# Patient Record
Sex: MALE | Race: WHITE | Employment: UNEMPLOYED | URBAN - METROPOLITAN AREA
[De-identification: names, ages, dates, MRNs, and addresses within clinical notes are randomized per-mention and may not be internally consistent; named-entity substitution may affect disease eponyms.]

---

## 2019-06-17 ENCOUNTER — HOSPITAL ENCOUNTER (INPATIENT)
Age: 34
LOS: 8 days | Discharge: ANOTHER HOSPITAL | DRG: 558 | End: 2019-06-25
Attending: PSYCHIATRY & NEUROLOGY | Admitting: INTERNAL MEDICINE
Payer: MEDICARE

## 2019-06-17 PROCEDURE — 74011250637 HC RX REV CODE- 250/637: Performed by: PSYCHIATRY & NEUROLOGY

## 2019-06-17 PROCEDURE — 65220000003 HC RM SEMIPRIVATE PSYCH

## 2019-06-17 RX ORDER — BENZTROPINE MESYLATE 1 MG/ML
2 INJECTION INTRAMUSCULAR; INTRAVENOUS
Status: DISCONTINUED | OUTPATIENT
Start: 2019-06-17 | End: 2019-06-25 | Stop reason: HOSPADM

## 2019-06-17 RX ORDER — ACETAMINOPHEN 325 MG/1
650 TABLET ORAL
Status: DISCONTINUED | OUTPATIENT
Start: 2019-06-17 | End: 2019-06-25 | Stop reason: HOSPADM

## 2019-06-17 RX ORDER — IBUPROFEN 200 MG
1 TABLET ORAL
Status: DISCONTINUED | OUTPATIENT
Start: 2019-06-17 | End: 2019-06-25 | Stop reason: HOSPADM

## 2019-06-17 RX ORDER — BENZTROPINE MESYLATE 2 MG/1
2 TABLET ORAL
Status: DISCONTINUED | OUTPATIENT
Start: 2019-06-17 | End: 2019-06-25 | Stop reason: HOSPADM

## 2019-06-17 RX ORDER — HYDROXYZINE 25 MG/1
50 TABLET, FILM COATED ORAL
Status: DISCONTINUED | OUTPATIENT
Start: 2019-06-17 | End: 2019-06-25 | Stop reason: HOSPADM

## 2019-06-17 RX ORDER — OLANZAPINE 5 MG/1
5 TABLET ORAL
Status: DISCONTINUED | OUTPATIENT
Start: 2019-06-17 | End: 2019-06-19

## 2019-06-17 RX ORDER — ADHESIVE BANDAGE
30 BANDAGE TOPICAL DAILY PRN
Status: DISCONTINUED | OUTPATIENT
Start: 2019-06-17 | End: 2019-06-25 | Stop reason: HOSPADM

## 2019-06-17 RX ORDER — TRAZODONE HYDROCHLORIDE 50 MG/1
50 TABLET ORAL
Status: DISCONTINUED | OUTPATIENT
Start: 2019-06-17 | End: 2019-06-18

## 2019-06-17 RX ORDER — IBUPROFEN 400 MG/1
400 TABLET ORAL
Status: DISCONTINUED | OUTPATIENT
Start: 2019-06-17 | End: 2019-06-25 | Stop reason: HOSPADM

## 2019-06-17 RX ADMIN — TRAZODONE HYDROCHLORIDE 50 MG: 50 TABLET ORAL at 23:51

## 2019-06-18 PROBLEM — F25.9 SCHIZOAFFECTIVE DISORDER (HCC): Status: ACTIVE | Noted: 2019-06-18

## 2019-06-18 LAB
CHOLEST SERPL-MCNC: 116 MG/DL
GLUCOSE P FAST SERPL-MCNC: 102 MG/DL (ref 65–100)
HDLC SERPL-MCNC: 40 MG/DL
HDLC SERPL: 2.9 {RATIO} (ref 0–5)
LDLC SERPL CALC-MCNC: 68.6 MG/DL (ref 0–100)
LIPID PROFILE,FLP: NORMAL
TRIGL SERPL-MCNC: 37 MG/DL (ref ?–150)
TSH SERPL DL<=0.05 MIU/L-ACNC: 3.9 UIU/ML (ref 0.36–3.74)
VLDLC SERPL CALC-MCNC: 7.4 MG/DL

## 2019-06-18 PROCEDURE — 80061 LIPID PANEL: CPT

## 2019-06-18 PROCEDURE — 82947 ASSAY GLUCOSE BLOOD QUANT: CPT

## 2019-06-18 PROCEDURE — 36415 COLL VENOUS BLD VENIPUNCTURE: CPT

## 2019-06-18 PROCEDURE — 74011250637 HC RX REV CODE- 250/637: Performed by: PSYCHIATRY & NEUROLOGY

## 2019-06-18 PROCEDURE — 84443 ASSAY THYROID STIM HORMONE: CPT

## 2019-06-18 PROCEDURE — 65220000003 HC RM SEMIPRIVATE PSYCH

## 2019-06-18 RX ORDER — TRAZODONE HYDROCHLORIDE 150 MG/1
300 TABLET ORAL
COMMUNITY
End: 2019-07-29

## 2019-06-18 RX ORDER — TRAZODONE HYDROCHLORIDE 100 MG/1
300 TABLET ORAL
Status: DISCONTINUED | OUTPATIENT
Start: 2019-06-18 | End: 2019-06-18

## 2019-06-18 RX ORDER — PALIPERIDONE 3 MG/1
3 TABLET, EXTENDED RELEASE ORAL DAILY
Status: DISCONTINUED | OUTPATIENT
Start: 2019-06-18 | End: 2019-06-18

## 2019-06-18 RX ORDER — PALIPERIDONE 3 MG/1
6 TABLET, EXTENDED RELEASE ORAL DAILY
Status: DISCONTINUED | OUTPATIENT
Start: 2019-06-18 | End: 2019-06-20

## 2019-06-18 RX ORDER — ZOLPIDEM TARTRATE 5 MG/1
5 TABLET ORAL
Status: DISCONTINUED | OUTPATIENT
Start: 2019-06-18 | End: 2019-06-24

## 2019-06-18 RX ORDER — TRAZODONE HYDROCHLORIDE 100 MG/1
100 TABLET ORAL
Status: DISCONTINUED | OUTPATIENT
Start: 2019-06-18 | End: 2019-06-25 | Stop reason: HOSPADM

## 2019-06-18 RX ADMIN — HYDROXYZINE HYDROCHLORIDE 50 MG: 25 TABLET, FILM COATED ORAL at 10:23

## 2019-06-18 RX ADMIN — OLANZAPINE 5 MG: 5 TABLET, FILM COATED ORAL at 18:21

## 2019-06-18 RX ADMIN — HYDROXYZINE HYDROCHLORIDE 50 MG: 25 TABLET, FILM COATED ORAL at 23:02

## 2019-06-18 RX ADMIN — TRAZODONE HYDROCHLORIDE 100 MG: 100 TABLET ORAL at 21:24

## 2019-06-18 RX ADMIN — OLANZAPINE 5 MG: 5 TABLET, FILM COATED ORAL at 23:02

## 2019-06-18 RX ADMIN — HYDROXYZINE HYDROCHLORIDE 50 MG: 25 TABLET, FILM COATED ORAL at 18:21

## 2019-06-18 RX ADMIN — PALIPERIDONE 6 MG: 3 TABLET, EXTENDED RELEASE ORAL at 11:29

## 2019-06-18 RX ADMIN — OLANZAPINE 5 MG: 5 TABLET, FILM COATED ORAL at 11:09

## 2019-06-18 NOTE — H&P
INITIAL PSYCHIATRIC EVALUATION            IDENTIFICATION:    Patient Name  Avery Leyva   Date of Birth 1985   [facility] [id number]   Medical Record Number  [id number]      Age  29 y.o. PCP None   Admit date:  6/17/2019    Room Number  308/02  @ [facility]   Date of Service  6/18/2019            HISTORY         REASON FOR HOSPITALIZATION:  CC: \"psyshosis\". Pt admitted under a temporary prison order (TDO) for severe psychosis proving to be an imminent danger to self and others and an inability to care for self. HISTORY OF PRESENT ILLNESS:    The patient, Avery Leyva, is a 29 y.o. WHITE [place]  male with a past psychiatric history significant for schizoaffective disorder, who presents at this time with complaints of (and/or evidence of) the following emotional symptoms: psychotic behavior and paranoid behavior. Additional symptomatology include VH. The above symptoms have been present for 48+ hours. These symptoms are of moderate to high severity. These symptoms are constant in nature. The patient's condition has been precipitated by psychosocial stressors. Patient's condition made worse by treatment noncompliance. UDS: negative; BAL=0. The patient is a poor historian. The patient corroborates the above narrative. The patient contracts for safety on the unit and gives consent for the team to contact collateral. The patient is amenable to initiating treatment while on the unit. Patient appears to be actively hallucinating for the duration of the interview, staring and gesturing and speaking largely incoherently. Patient offered PO PRN antipsychotic immediately following initial evaluation. ALLERGIES: No Known Allergies   MEDICATIONS PRIOR TO ADMISSION:   Medications Prior to Admission   Medication Sig    traZODone (DESYREL) 150 mg tablet Take 300 mg by mouth nightly. Indications: Insomnia      PAST MEDICAL HISTORY:   No past medical history on file. No past surgical history on file. SOCIAL HISTORY:   Social History     Socioeconomic History    Marital status: SINGLE     Spouse name: Not on file    Number of children: Not on file    Years of education: Not on file    Highest education level: Not on file   Occupational History    Not on file   Social Needs    Financial resource strain: Not on file    Food insecurity:     Worry: Not on file     Inability: Not on file    Transportation needs:     Medical: Not on file     Non-medical: Not on file   Tobacco Use    Smoking status: Not on file   Substance and Sexual Activity    Alcohol use: Not on file    Drug use: Not on file    Sexual activity: Not on file   Lifestyle    Physical activity:     Days per week: Not on file     Minutes per session: Not on file    Stress: Not on file   Relationships    Social connections:     Talks on phone: Not on file     Gets together: Not on file     Attends Caodaism service: Not on file     Active member of club or organization: Not on file     Attends meetings of clubs or organizations: Not on file     Relationship status: Not on file    Intimate partner violence:     Fear of current or ex partner: Not on file     Emotionally abused: Not on file     Physically abused: Not on file     Forced sexual activity: Not on file   Other Topics Concern    Not on file   Social History Narrative    Not on file      FAMILY HISTORY: History reviewed, pertinent family history as below:   No family history on file. REVIEW OF SYSTEMS:   Pertinent items are noted in the History of Present Illness. All other Systems reviewed and are considered negative.            MENTAL STATUS EXAM & VITALS     MENTAL STATUS EXAM (MSE):    MSE FINDINGS ARE WITHIN NORMAL LIMITS (WNL) UNLESS OTHERWISE STATED BELOW. ( ALL OF THE BELOW CATEGORIES OF THE MSE HAVE BEEN REVIEWED (reviewed 6/18/2019) AND UPDATED AS DEEMED APPROPRIATE )  General Presentation age appropriate, guarded and vague   Orientation not oriented to situation   Vital Signs  See below (reviewed 6/18/2019); Vital Signs (BP, Pulse, & Temp) are within normal limits if not listed below.    Gait and Station Stable/steady, no ataxia   Musculoskeletal System No extrapyramidal symptoms (EPS); no abnormal muscular movements or Tardive Dyskinesia (TD); muscle strength and tone are within normal limits   Language No aphasia or dysarthria   Speech:  hypoverbal and normal volume   Thought Processes ilogical; normal rate of thoughts; poor abstract reasoning/computation   Thought Associations blocked    Thought Content visual hallucinations and internally preoccupied   Suicidal Ideations none   Homicidal Ideations none   Mood:  anxious    Affect:  constricted and odd demeanor   Memory recent  impaired   Memory remote:  fair   Concentration/Attention:  distractable   Fund of Knowledge average   Insight:  limited   Reliability poor   Judgment:  poor          VITALS:     Patient Vitals for the past 24 hrs:   Temp Pulse Resp BP SpO2   06/18/19 0718 98.4 °F (36.9 °C) (!) 102 18 (!) 145/99 98 %   06/17/19 2329 98.2 °F (36.8 °C) 98 18 (!) 129/96 99 %     Wt Readings from Last 3 Encounters:   06/18/19 113.4 kg (250 lb)     Temp Readings from Last 3 Encounters:   06/18/19 98.4 °F (36.9 °C)     BP Readings from Last 3 Encounters:   06/18/19 (!) 145/99     Pulse Readings from Last 3 Encounters:   06/18/19 (!) 102            DATA     LABORATORY DATA:  Labs Reviewed   TSH 3RD GENERATION - Abnormal; Notable for the following components:       Result Value    TSH 3.90 (*)     All other components within normal limits   GLUCOSE, FASTING - Abnormal; Notable for the following components:    Glucose 102 (*)     All other components within normal limits   LIPID PANEL     Admission on 06/17/2019   Component Date Value Ref Range Status    TSH 06/18/2019 3.90* 0.36 - 3.74 uIU/mL Final    LIPID PROFILE 06/18/2019        Final    Cholesterol, total 06/18/2019 116  <200 MG/DL Final    Triglyceride 06/18/2019 37  <150 MG/DL Final    HDL Cholesterol 06/18/2019 40  MG/DL Final    LDL, calculated 06/18/2019 68.6  0 - 100 MG/DL Final    VLDL, calculated 06/18/2019 7.4  MG/DL Final    CHOL/HDL Ratio 06/18/2019 2.9  0.0 - 5.0   Final    Glucose 06/18/2019 102* 65 - 100 MG/DL Final        RADIOLOGY REPORTS:  No results found for this or any previous visit. No results found. MEDICATIONS       ALL MEDICATIONS  Current Facility-Administered Medications   Medication Dose Route Frequency    paliperidone (INVEGA) SR tablet 6 mg  6 mg Oral DAILY    ziprasidone (GEODON) 20 mg in sterile water (preservative free) 1 mL injection  20 mg IntraMUSCular BID PRN    OLANZapine (ZyPREXA) tablet 5 mg  5 mg Oral Q6H PRN    benztropine (COGENTIN) tablet 2 mg  2 mg Oral BID PRN    benztropine (COGENTIN) injection 2 mg  2 mg IntraMUSCular BID PRN    acetaminophen (TYLENOL) tablet 650 mg  650 mg Oral Q4H PRN    ibuprofen (MOTRIN) tablet 400 mg  400 mg Oral Q8H PRN    magnesium hydroxide (MILK OF MAGNESIA) 400 mg/5 mL oral suspension 30 mL  30 mL Oral DAILY PRN    nicotine (NICODERM CQ) 21 mg/24 hr patch 1 Patch  1 Patch TransDERmal DAILY PRN    traZODone (DESYREL) tablet 50 mg  50 mg Oral QHS PRN    hydrOXYzine HCl (ATARAX) tablet 50 mg  50 mg Oral Q6H PRN      SCHEDULED MEDICATIONS  Current Facility-Administered Medications   Medication Dose Route Frequency    paliperidone (INVEGA) SR tablet 6 mg  6 mg Oral DAILY                ASSESSMENT & PLAN        The patient, Janneth James, is a 29 y.o.  male who presents at this time for treatment of the following diagnoses:  Patient Active Hospital Problem List:   Schizoaffective disorder ([facility] (6/18/2019)    Assessment: patient grossly psychotic, illogical with active hallucinations. Per patient, he was recently released from custodial where he was started on Risperdal. Compliance unclear since that time, will give AP with SNIDER form.     Plan:   - START Invega 6 mg QDAY for psychosis  - RESTART and TAPER Trazodone to 100 mg QHS for insomnia  - START Ambien 5 mg QHS PRN insomnia (adjunct to trazodone if needed)  - IGM therapy as tolerated  - Expand database / obtain collateral  - Dispo planning           A coordinated, multidisplinary treatment team (includes the nurse, unit pharmacist,  and writer) round was conducted for this initial evaluation with the patient present. The following regarding medications was addressed during rounds with patient: the risks and benefits of the proposed medication. The patient was given the opportunity to ask questions. Informed consent given to the use of the above medications. I will continue to adjust psychiatric and non-psychiatric medications (see above \"medication\" section and orders section for details) as deemed appropriate & based upon diagnoses and response to treatment. I have reviewed admission (and previous/old) labs and medical tests in the EHR and or transferring hospital documents. I will continue to order blood tests/labs and diagnostic tests as deemed appropriate and review results as they become available (see orders for details). I have reviewed old psychiatric and medical records available in the EHR. I Will order additional psychiatric records from other institutions to further elucidate the nature of patient's psychopathology and review once available. I will gather additional collateral information from friends, family and o/p treatment team to further elucidate the nature of patient's psychopathology and baselline level of psychiatric functioning.       ESTIMATED LENGTH OF STAY:  5-7 days       STRENGTHS:  Access to housing/residential stability and Knowledge of medications                                        SIGNED:    Fox Shin MD  6/18/2019

## 2019-06-18 NOTE — BH NOTES
Patient is a 29year old  male. He was transferred from [facility]. Patient currently has a temporary [facility] order. Patient primarily went to the ED in [place], [place] with complaints of chest pain, chest tightness, and shortness of breath. Patient has been cleared medically. While in the ED, patient began displaying bizarre behaviors such as acting paranoid and talking to people who weren't there. UDS and ETOH screenings were reported to be negative. Patient receive 4 mg of Ativan and 5 mg of Haldol during his stay in the ED. Patient is alert and oriented upon arrival. He was transported via wheelchair but ambulates without assistance. He is cooperative during the admission assessment. Nikunj Cope states he was released from penitentiary 1 week ago after serving a 6 year sentence. He states he did not take any medication while he was incarcerated. He admits he was diagnosed with Bipolar when he was 17 but he does not accept this diagnosis. Appearance is unkempt. Speech is clear with normal rate and [place]. He does appear to have some thought blocking intermittently. Patient appeared to be responding to internal stimuli during the assessment. Writer asked patient if he was hearing voices or sounds that were not coming from nurse. He paused momentarily then denied any hallucinations. Patient states he doesn't have any money to purchase medications and he is hopeful he could find some help. Writer explained to him that he would meet with his treatment team in the morning and initiate a plan of care. He was provided a sandwich and a sleep aid at his request. Patient has been oriented to the unit and the expectations for participation in his treatment plan. On call provider contacted for admission orders. Plan of care initiate. Patient currently denies any feelings of SI or HI. Q 15 minute safety checks initiated.  Choco Mar BSN, RN

## 2019-06-18 NOTE — BH NOTES
0700-Report received from [facility]. 0730-Patient was in the hallways walking. Patient appears to be anxious and distracted. Patient denies SI/HI/AVH. Patients VS reviewed. 0830-Patient at the nurses station asking to speak to his mom, when stating he can call his mom he said vulgerwords and then stated  I'm not going to  talk to you and walked away. 0930-Patient still walking up and down the hallways. 1030-Patient was given prn atarax for anxiety. Patient needed constant affirmation of previous conversations and of where he was and what he was doing he. Patient was offered zyprexa and refused. 1042-Patient in his room talking to the . Patient pulled off his sheets saying they were dirty and new sheets were placed on bed again. 1135-Patient given stat order of invega 6mg. Patient had stated he took it and it was still in the medicine cup and had to be redirected to take his medicine. Patient eventually took his medication and went back to his room. 1253-Patient in his room stating he feels fine but everything is slowing down. Nurse stated that was the effects of his medicine, he said yeah I can tell its working. 1350-Patient in his room standing in his boxers stating people are trying to shoot people outside, patient was redirected and advised he is safe in the hospital and he stated he knew that. He was asked to put his clothes back on. Patient put his pants and shirt back on. 1430-Patient in his room walking around. Refused to come out for group    1515-Patient keeps sticking his head out of his room and if anyone makes eye contact with him he immediately closes his door to his room. 1638-Patient sleeping in bed. 1730-Patient was in his room sleeping, was woken up to eat. Patient said okay, I'm going to eat. 1800-Patient still has yet to eat and keeps stating he is [place].  Patient had to be redirected to his food and that if he wants to eat, he needs to eat the food on this tray. He said okay thank you and began to look at the items in the containers. 1815-Patient in his room eating. Medical doctor came in to do his exam. Patient extremely anxious. Prior to coming into the room patient had just took all his sheets off his bed saying everything was dirty, patient had clean linens placed on his bed when he put them on the floor earlier in the day. 1830- Patient given prn atarax-50mg and zyprexa-5mg     1900-Report given to [facility] DOMITILA DO-ER RN.

## 2019-06-18 NOTE — H&P
History and Physical    Subjective:     Pamela Terrazas is a 29 y.o. male with no significant past medical hx. Per psych documentation, Pt admitted under a temporary senior living order (TDO) for severe psychosis proving to be an imminent danger to self and others and an inability to care for self. Pt is a WHITE OR  male with a past psychiatric history significant for schizoaffective disorder, who presents at this time with complaints of (and/or evidence of) the following emotional symptoms: psychotic behavior and paranoid behavior. Additional symptomatology include VH. The above symptoms have been present for 48+ hours. These symptoms are of moderate to high severity. These symptoms are constant in nature. The patient's condition has been precipitated by psychosocial stressors. Patient's condition made worse by treatment noncompliance. UDS: negative; BAL=0.     Pt denies any acute medical issues. Showing no signs of acute distress. Appears young and healthy. PMHx: none declared by pt. PSHX; none declared by pt. FHx: DM     Social History     Tobacco Use    Smoking status: None declared   Substance Use Topics    Alcohol use: None declared     > no illicit drugs. Prior to Admission medications    Medication Sig Start Date End Date Taking? Authorizing Provider   traZODone (DESYREL) 150 mg tablet Take 300 mg by mouth nightly. Indications: Insomnia   Yes Provider, Historical     No Known Allergies     Review of Systems:  Constitutional: negative  Eyes: negative  Ears, Nose, Mouth, Throat, and Face: negative  Respiratory: negative  Cardiovascular: negative  Gastrointestinal: negative  Genitourinary:negative  Integument/Breast: negative  Hematologic/Lymphatic: negative  Musculoskeletal:negative  Neurological: negative  Behavioral/Psychiatric: schizophrenia. Endocrine: ? Hypothyroidism  Allergic/Immunologic: negative     Objective: Intake and Output:    No intake/output data recorded.   No intake/output data recorded. Physical Exam:   Visit Vitals  BP (!) 145/99   Pulse (!) 102   Temp 98.4 °F (36.9 °C)   Resp 18   Ht 6' 3\" (1.905 m)   Wt 113.4 kg (250 lb)   SpO2 98%   BMI 31.25 kg/m²     General:  Alert, cooperative, no distress, appears stated age. Head:  Normocephalic, without obvious abnormality, atraumatic. Eyes:  Conjunctivae/corneas clear. PERRL, EOMs intact. Ears:  Normal external ear canals both ears. Nose: Nares normal. Septum midline. Mucosa normal. No drainage or sinus tenderness. Throat: Lips, mucosa, and tongue normal. Teeth and gums normal.   Neck: Supple, symmetrical, trachea midline, no adenopathy, thyroid: no enlargement/tenderness/nodules. Back:   Symmetric, no curvature. ROM normal. No CVA tenderness. Lungs:   Clear to auscultation bilaterally. Chest wall:  No tenderness or deformity. Heart:  Regular rate and rhythm, S1, S2 normal, no murmur, click, rub or gallop. Abdomen:   Soft, non-tender. Bowel sounds normal. No masses,  No organomegaly. Extremities: Extremities normal, atraumatic, no cyanosis or edema. Pulses:    Skin: Skin color, texture, turgor normal. No rashes or lesions   Lymph nodes: No cervical or supraclavicular adenopathy. Neurologic: CNII-XII intact. Normal strength, sensation and reflexes throughout.        Data Review:   Recent Results (from the past 24 hour(s))   TSH 3RD GENERATION    Collection Time: 06/18/19  5:13 AM   Result Value Ref Range    TSH 3.90 (H) 0.36 - 3.74 uIU/mL   LIPID PANEL    Collection Time: 06/18/19  5:13 AM   Result Value Ref Range    LIPID PROFILE          Cholesterol, total 116 <200 MG/DL    Triglyceride 37 <150 MG/DL    HDL Cholesterol 40 MG/DL    LDL, calculated 68.6 0 - 100 MG/DL    VLDL, calculated 7.4 MG/DL    CHOL/HDL Ratio 2.9 0.0 - 5.0     GLUCOSE, FASTING    Collection Time: 06/18/19  5:13 AM   Result Value Ref Range    Glucose 102 (H) 65 - 100 MG/DL       Assessment:     Principal Problem: [facility] (6/18/2019)    Elevated TSH ? hypothyroidism    Plan:     Repeat TFTs    No VTE prophylaxis indicated or necessary at this time.    Signed By: Lilly Mcintyre MD     June 18, 2019

## 2019-06-18 NOTE — BH NOTES
Patient was seen by the [facility] AT [facility] for a [place] TDO and was committed.

## 2019-06-18 NOTE — PROGRESS NOTES
Laboratory monitoring for mood stabilizer and antipsychotics:    Recommended baseline monitoring has been completed based on this patient's current medication regimen. The following labs were completed at transferring facility (6/17/19): WBC 10.9, Hgb 14.3, Hct 41, Plts 290, sodium 138, potassium 3.2 (low), BUN 14, creatinine 1.1, glucose 106, TSH 2.46, UDS (-), urinalysis completed       The patient is currently taking the following medication(s):   Current Facility-Administered Medications   Medication Dose Route Frequency    [START ON 6/21/2019] paliperidone (INVEGA) SR tablet 9 mg  9 mg Oral DAILY    hydroCHLOROthiazide (HYDRODIURIL) tablet 12.5 mg  12.5 mg Oral DAILY    traZODone (DESYREL) tablet 100 mg  100 mg Oral QHS       Height, Weight, BMI Estimation  Estimated body mass index is 31.25 kg/m² as calculated from the following:    Height as of this encounter: 190.5 cm (75\"). Weight as of this encounter: 113.4 kg (250 lb). Renal Function, Hepatic Function and Chemistry  See above    Lab Results   Component Value Date/Time    Glucose 102 (H) 06/18/2019 05:13 AM    ALT (SGPT) 51 06/20/2019 05:53 AM    AST (SGOT) 43 (H) 06/20/2019 05:53 AM    Alk.  phosphatase 98 06/20/2019 05:53 AM    Protein, total 7.5 06/20/2019 05:53 AM    Albumin 4.1 06/20/2019 05:53 AM    Globulin 3.4 06/20/2019 05:53 AM    A-G Ratio 1.2 06/20/2019 05:53 AM    Bilirubin, total 0.4 06/20/2019 05:53 AM       Lab Results   Component Value Date/Time    Glucose 102 (H) 06/18/2019 05:13 AM       Hematology  See above    Lipids  Lab Results   Component Value Date/Time    Cholesterol, total 116 06/18/2019 05:13 AM    HDL Cholesterol 40 06/18/2019 05:13 AM    LDL, calculated 68.6 06/18/2019 05:13 AM    Triglyceride 37 06/18/2019 05:13 AM    CHOL/HDL Ratio 2.9 06/18/2019 05:13 AM       Thyroid Function    Lab Results   Component Value Date/Time    TSH 3.90 (H) 06/18/2019 05:13 AM     Vitals  Visit Vitals  /84   Pulse 97   Temp 98.1 °F (36.7 °C)   Resp 16   Ht 190.5 cm (75\")   Wt 113.4 kg (250 lb)   SpO2 99%   BMI 31.25 kg/m²       Rambo Enrique PharmD, BCPS  [phone] (pharmacy)

## 2019-06-18 NOTE — BH NOTES
PSYCHOSOCIAL ASSESSMENT  :Patient identifying info:  Mich Macias is a 29 y.o., male admitted 6/17/2019 11:02 PM     Presenting problem and precipitating factors: Pt presented to [facility] ED reporting chest pain but was notably paranoid and reporting AH. Pt was admitted under a TDO and committed during hearing. Pt carries and diagnosis of schizoaffective disorder. Pt was a poor historian due to level of psychosis. Pt stated he was recently released from alf and reports wanting to live a better life for his family. Recently visited his fathers grave. Reports that his mother cares for his children - Ochoa Stinson and Aaron Rory. Mental status assessment: Pt is alert and oriented. Pt denies SI/HI. Pt's mood is anxious, affect is constricted. Pt's thought process is illogical with thought blocking. Pt's insight and judgment is poor, reliability is poor. Strengths: Reestablished services at [address] [place] [address] and [facility] ()    Collateral information: POA - Paperwork in [facility] at this time - [phone] reports pt was released on 6.10.2019 from possession of methamphetamines -2.5 years and his grandmother passed recently while incarcerated. During previous 3 year stint his father and aunt passed. Pt has no closure after their deaths. Current psychiatric /substance abuse providers and contact info: [facility] -  [facility] ext [phone]     Previous psychiatric/substance abuse providers and response to treatment: [facility] - 2016, 2012, [facility] 8/2016 and 5 other times, [facility] 8/2016 and [facility] 5/2008. Family history of mental illness or substance abuse: None reported.     Substance abuse history:  UDS: negative;    BAL=0  Previous amphetamine and MJ use  Social History     Tobacco Use    Smoking status: Not on file   Substance Use Topics    Alcohol use: Not on file       History of biomedical complications associated with substance abuse: unknown     Patient's current acceptance of treatment or motivation for change: committed  - accepting medication     Family constellation: single - has children - unknown ages     Is significant other involved? N/A    Describe support system: supportive mother - in prescreening     Describe living arrangements and home environment:  Living with mother     Health issues:   Hospital Problems  Never Reviewed          Codes Class Noted POA    * (Principal) Schizoaffective disorder ([facility] ICD-10-CM: F25.9  ICD-9-CM: 295.70  2019 Unknown            Trauma history: Unknown     Legal issues: recently released from [facility] - charges and probation unknown at this time. History of  service: No reported.      Financial status: unknown     Restorationist/cultural factors: unknown     Education/work history: Unknown at this time    Have you been licensed as a health care professional (current or ): no    Leisure and recreation preferences: Unknown     Describe coping skills:  [place]  2019

## 2019-06-18 NOTE — PROGRESS NOTES
[facility] Admission Pharmacy Medication Reconciliation     Recommendations/Findings:   1) Per report patient recently released from long-term. Unknown what medications patient was receiving while at long-term. Per [facility] records, patient is only prescribed trazodone 300 mg at bedtime which was prescribed on 6/12/19. Total Time Spent: 20 minutes     Information obtained from: [place]     Patient allergies: Allergies as of 06/17/2019    (No Known Allergies)       Prior to Admission Medications   Prescriptions Last Dose Informant Patient Reported? Taking?   traZODone (DESYREL) 150 mg tablet   Yes Yes   Sig: Take 300 mg by mouth nightly. Indications:  Insomnia      Facility-Administered Medications: None       Thank you,  TENNILLE Luciano  Desk: [phone]  Pharmacy: [phone] ([id number])

## 2019-06-18 NOTE — PROGRESS NOTES
Patient stated he would find staff if he had feeling of hurting himself or others. Patient keeps stated he needed to call his mom and was allowed to us the phone then said I don't need to call her I don't even know her. He then started swearing and a few minutes later asking if I needed help.

## 2019-06-19 PROCEDURE — 74011250637 HC RX REV CODE- 250/637: Performed by: PSYCHIATRY & NEUROLOGY

## 2019-06-19 PROCEDURE — 74011000250 HC RX REV CODE- 250: Performed by: PSYCHIATRY & NEUROLOGY

## 2019-06-19 PROCEDURE — 65220000003 HC RM SEMIPRIVATE PSYCH

## 2019-06-19 PROCEDURE — 74011250636 HC RX REV CODE- 250/636: Performed by: PSYCHIATRY & NEUROLOGY

## 2019-06-19 RX ORDER — LORAZEPAM 1 MG/1
2 TABLET ORAL
Status: DISCONTINUED | OUTPATIENT
Start: 2019-06-19 | End: 2019-06-24

## 2019-06-19 RX ORDER — LORAZEPAM 2 MG/ML
2 INJECTION INTRAMUSCULAR
Status: DISCONTINUED | OUTPATIENT
Start: 2019-06-19 | End: 2019-06-24

## 2019-06-19 RX ORDER — DIPHENHYDRAMINE HCL 25 MG
50 CAPSULE ORAL
Status: DISCONTINUED | OUTPATIENT
Start: 2019-06-19 | End: 2019-06-25 | Stop reason: HOSPADM

## 2019-06-19 RX ORDER — HYDROCHLOROTHIAZIDE 25 MG/1
12.5 TABLET ORAL DAILY
Status: DISCONTINUED | OUTPATIENT
Start: 2019-06-20 | End: 2019-06-22

## 2019-06-19 RX ORDER — HALOPERIDOL 5 MG/1
5 TABLET ORAL
Status: DISCONTINUED | OUTPATIENT
Start: 2019-06-19 | End: 2019-06-25 | Stop reason: HOSPADM

## 2019-06-19 RX ORDER — DIPHENHYDRAMINE HYDROCHLORIDE 50 MG/ML
50 INJECTION, SOLUTION INTRAMUSCULAR; INTRAVENOUS
Status: DISCONTINUED | OUTPATIENT
Start: 2019-06-19 | End: 2019-06-25 | Stop reason: HOSPADM

## 2019-06-19 RX ORDER — HALOPERIDOL 5 MG/ML
5 INJECTION INTRAMUSCULAR
Status: DISCONTINUED | OUTPATIENT
Start: 2019-06-19 | End: 2019-06-25 | Stop reason: HOSPADM

## 2019-06-19 RX ADMIN — TRAZODONE HYDROCHLORIDE 100 MG: 100 TABLET ORAL at 21:38

## 2019-06-19 RX ADMIN — HYDROXYZINE HYDROCHLORIDE 50 MG: 25 TABLET, FILM COATED ORAL at 21:38

## 2019-06-19 RX ADMIN — HALOPERIDOL 5 MG: 5 TABLET ORAL at 15:07

## 2019-06-19 RX ADMIN — OLANZAPINE 5 MG: 5 TABLET, FILM COATED ORAL at 08:21

## 2019-06-19 RX ADMIN — ACETAMINOPHEN 650 MG: 325 TABLET, FILM COATED ORAL at 11:26

## 2019-06-19 RX ADMIN — HYDROXYZINE HYDROCHLORIDE 50 MG: 25 TABLET, FILM COATED ORAL at 08:22

## 2019-06-19 RX ADMIN — DIPHENHYDRAMINE HYDROCHLORIDE 50 MG: 25 CAPSULE ORAL at 22:04

## 2019-06-19 RX ADMIN — DIPHENHYDRAMINE HYDROCHLORIDE 50 MG: 25 CAPSULE ORAL at 15:07

## 2019-06-19 RX ADMIN — HALOPERIDOL 5 MG: 5 TABLET ORAL at 22:05

## 2019-06-19 RX ADMIN — LORAZEPAM 2 MG: 1 TABLET ORAL at 22:04

## 2019-06-19 RX ADMIN — PALIPERIDONE 3 MG: 3 TABLET, EXTENDED RELEASE ORAL at 08:22

## 2019-06-19 RX ADMIN — PALIPERIDONE 3 MG: 3 TABLET, EXTENDED RELEASE ORAL at 09:04

## 2019-06-19 RX ADMIN — LORAZEPAM 2 MG: 1 TABLET ORAL at 15:07

## 2019-06-19 RX ADMIN — WATER 20 MG: 1 INJECTION INTRAMUSCULAR; INTRAVENOUS; SUBCUTANEOUS at 02:00

## 2019-06-19 NOTE — BH NOTES
PSYCHIATRIC PROGRESS NOTE         Patient Name  Hilary Wright   Date of Birth 1985   [facility] [id number]   Medical Record Number  [id number]      Age  29 y.o. PCP None   Admit date:  6/17/2019    Room Number  308/02  @ [facility]   Date of Service  6/19/2019         E & M PROGRESS NOTE:         HISTORY       CC:  \"psychosis\"  HISTORY OF PRESENT ILLNESS/INTERVAL HISTORY:  (reviewed/updated 6/19/2019). per initial evaluation: The patient, Hilary Wright, is a 29 y.o. WHITE [place]  male with a past psychiatric history significant for schizoaffective disorder, who presents at this time with complaints of (and/or evidence of) the following emotional symptoms: psychotic behavior and paranoid behavior. Additional symptomatology include VH. The above symptoms have been present for 48+ hours. These symptoms are of moderate to high severity. These symptoms are constant in nature. The patient's condition has been precipitated by psychosocial stressors. Patient's condition made worse by treatment noncompliance. UDS: negative; BAL=0.      The patient is a poor historian. The patient corroborates the above narrative. The patient contracts for safety on the unit and gives consent for the team to contact collateral. The patient is amenable to initiating treatment while on the unit. Patient appears to be actively hallucinating for the duration of the interview, staring and gesturing and speaking largely incoherently. Patient offered PO PRN antipsychotic immediately following initial evaluation. 6/19- patient received multiple PRNs for agitation and psychosis, c/o auditory hallucinations, slept 5 hours after getting IM Geodon. This morning, patient noted to be speaking to persons not in the room, actively responding, but able to make needs known. Per nursing, patient repeatedly removed his clothing, requires much prompting for ADLs.         SIDE EFFECTS: (reviewed/updated 6/19/2019)  None reported or admitted to. ALLERGIES:(reviewed/updated 6/19/2019)  No Known Allergies   MEDICATIONS PRIOR TO ADMISSION:(reviewed/updated 6/19/2019)  Medications Prior to Admission   Medication Sig    traZODone (DESYREL) 150 mg tablet Take 300 mg by mouth nightly. Indications: Insomnia      PAST MEDICAL HISTORY: Past medical history from the initial psychiatric evaluation has been reviewed (reviewed/updated 6/19/2019) with no additional updates (I asked patient and no additional past medical history provided). No past medical history on file. No past surgical history on file. SOCIAL HISTORY: Social history from the initial psychiatric evaluation has been reviewed (reviewed/updated 6/19/2019) with no additional updates (I asked patient and no additional social history provided).  Social History     Socioeconomic History    Marital status: SINGLE     Spouse name: Not on file    Number of children: Not on file    Years of education: Not on file    Highest education level: Not on file   Occupational History    Not on file   Social Needs    Financial resource strain: Not on file    Food insecurity:     Worry: Not on file     Inability: Not on file    Transportation needs:     Medical: Not on file     Non-medical: Not on file   Tobacco Use    Smoking status: Not on file   Substance and Sexual Activity    Alcohol use: Not on file    Drug use: Not on file    Sexual activity: Not on file   Lifestyle    Physical activity:     Days per week: Not on file     Minutes per session: Not on file    Stress: Not on file   Relationships    Social connections:     Talks on phone: Not on file     Gets together: Not on file     Attends [employer] service: Not on file     Active member of club or organization: Not on file     Attends meetings of clubs or organizations: Not on file     Relationship status: Not on file    Intimate partner violence:     Fear of current or ex partner: Not on file     Emotionally abused: Not on file Physically abused: Not on file     Forced sexual activity: Not on file   Other Topics Concern    Not on file   Social History Narrative    Not on file      FAMILY HISTORY: Family history from the initial psychiatric evaluation has been reviewed (reviewed/updated 6/19/2019) with no additional updates (I asked patient and no additional family history provided). No family history on file. REVIEW OF SYSTEMS: (reviewed/updated 6/19/2019)  Appetite:no change from normal   Sleep: poor with DIMS (difficulty initiating & maintaining sleep)   All other Review of Systems: Negative except confusion per HPI         [facility] (MSE):    MSE FINDINGS ARE WITHIN NORMAL LIMITS (WNL) UNLESS OTHERWISE STATED BELOW. ( ALL OF THE BELOW CATEGORIES OF THE MSE HAVE BEEN REVIEWED (reviewed 6/19/2019) AND UPDATED AS DEEMED APPROPRIATE )  General Presentation age appropriate, cooperative   Orientation not oriented to situation   Vital Signs  See below (reviewed 6/19/2019); Vital Signs (BP, Pulse, & Temp) are within normal limits if not listed below.    Gait and Station Stable/steady, no ataxia   Musculoskeletal System No extrapyramidal symptoms (EPS); no abnormal muscular movements or Tardive Dyskinesia (TD); muscle strength and tone are within normal limits   Language No aphasia or dysarthria   Speech:  hypoverbal and normal volume   Thought Processes illogical; normal rate of thoughts; poor abstract reasoning/computation   Thought Associations loose associations   Thought Content auditory hallucinations, visual hallucinations and internally preoccupied   Suicidal Ideations none   Homicidal Ideations none   Mood:  anxious    Affect:  mood-congruent and odd demeanor   Memory recent  impaired   Memory remote:  impaired   Concentration/Attention:  distractable   Fund of Knowledge average   Insight:  limited   Reliability poor   Judgment:  poor          VITALS:     Patient Vitals for the past 24 hrs: Temp Pulse Resp BP SpO2   06/19/19 1031    (!) 136/113    06/19/19 0905 98.1 °F (36.7 °C) 95 18 (!) 136/94 98 %   06/18/19 1930 98.8 °F (37.1 °C) 92 18 138/85 98 %     Wt Readings from Last 3 Encounters:   06/18/19 113.4 kg (250 lb)     Temp Readings from Last 3 Encounters:   06/19/19 98.1 °F (36.7 °C)     BP Readings from Last 3 Encounters:   06/19/19 (!) 136/113     Pulse Readings from Last 3 Encounters:   06/19/19 95            DATA     LABORATORY DATA:(reviewed/updated 6/19/2019)  No results found for this or any previous visit (from the past 24 hour(s)). No results found for: VALF2, VALAC, VALP, VALPR, DS6, CRBAM, CRBAMP, CARB2, XCRBAM  No results found for: LITHM   RADIOLOGY REPORTS:(reviewed/updated 6/19/2019)  No results found.        MEDICATIONS     ALL MEDICATIONS:   Current Facility-Administered Medications   Medication Dose Route Frequency    haloperidol (HALDOL) tablet 5 mg  5 mg Oral Q6H PRN    And    LORazepam (ATIVAN) tablet 2 mg  2 mg Oral Q6H PRN    And    diphenhydrAMINE (BENADRYL) capsule 50 mg  50 mg Oral Q6H PRN    haloperidol lactate (HALDOL) injection 5 mg  5 mg IntraMUSCular Q6H PRN    And    LORazepam (ATIVAN) injection 2 mg  2 mg IntraMUSCular Q6H PRN    And    diphenhydrAMINE (BENADRYL) injection 50 mg  50 mg IntraMUSCular Q6H PRN    paliperidone (INVEGA) SR tablet 6 mg  6 mg Oral DAILY    traZODone (DESYREL) tablet 100 mg  100 mg Oral QHS    zolpidem (AMBIEN) tablet 5 mg  5 mg Oral QHS PRN    benztropine (COGENTIN) tablet 2 mg  2 mg Oral BID PRN    benztropine (COGENTIN) injection 2 mg  2 mg IntraMUSCular BID PRN    acetaminophen (TYLENOL) tablet 650 mg  650 mg Oral Q4H PRN    ibuprofen (MOTRIN) tablet 400 mg  400 mg Oral Q8H PRN    magnesium hydroxide (MILK OF MAGNESIA) 400 mg/5 mL oral suspension 30 mL  30 mL Oral DAILY PRN    nicotine (NICODERM CQ) 21 mg/24 hr patch 1 Patch  1 Patch TransDERmal DAILY PRN    hydrOXYzine HCl (ATARAX) tablet 50 mg  50 mg Oral Q6H PRN SCHEDULED MEDICATIONS:   Current Facility-Administered Medications   Medication Dose Route Frequency    paliperidone (INVEGA) SR tablet 6 mg  6 mg Oral DAILY    traZODone (DESYREL) tablet 100 mg  100 mg Oral QHS          ASSESSMENT & PLAN     DIAGNOSES REQUIRING ACTIVE TREATMENT AND MONITORING: (reviewed/updated 6/19/2019)  Patient Active Hospital Problem List:    Schizoaffective disorder ([facility] (6/18/2019)    Assessment: patient grossly psychotic, illogical with active hallucinations. Per patient, he was recently released from [facility] where he was started on Risperdal. Compliance unclear since that time, will give AP with SNIDER form. Plan:   - CONTINUE Invega 6 mg QDAY for psychosis  - CONTINUE Trazodone 100 mg QHS for insomnia  - CONTINUE Ambien 5 mg QHS PRN insomnia (adjunct to trazodone if needed)  - CHANGE PRNs to Haldol/Ativan/Benadryl given PMA  - IGM therapy as tolerated  - Expand database / obtain collateral  - Dispo planning    In summary, Hilary Wright, is a 29 y.o.  male who presents with a severe exacerbation of the principal diagnosis of Schizoaffective disorder ([facility]    Patient's condition is not improving. Patient requires continued inpatient hospitalization for further stabilization, safety monitoring and medication management. I will continue to coordinate the provision of individual, milieu, occupational, group, and substance abuse therapies to address target symptoms/diagnoses as deemed appropriate for the individual patient. A coordinated, multidisplinary treatment team round was conducted with the patient (this team consists of the nurse, psychiatric unit pharmcist,  and writer). Complete current electronic health record for patient has been reviewed today including consultant notes, ancillary staff notes, nurses and psychiatric tech notes. Suicide risk assessment completed and patient deemed to be of low risk for suicide at this time.      The following regarding medications was addressed during rounds with patient:   the risks and benefits of the proposed medication. The patient was given the opportunity to ask questions. Informed consent given to the use of the above medications. Will continue to adjust psychiatric and non-psychiatric medications (see above \"medication\" section and orders section for details) as deemed appropriate & based upon diagnoses and response to treatment. I will continue to order blood tests/labs and diagnostic tests as deemed appropriate and review results as they become available (see orders for details and above listed lab/test results). I will order psychiatric records from previous [facility] hospitals to further elucidate the nature of patient's psychopathology and review once available. I will gather additional collateral information from friends, family and o/p treatment team to further elucidate the nature of patient's psychopathology and baselline level of psychiatric functioning. I certify that this patient's inpatient psychiatric hospital services furnished since the previous certification were, and continue to be, required for treatment that could reasonably be expected to improve the patient's condition, or for diagnostic study, and that the patient continues to need, on a daily basis, active treatment furnished directly by or requiring the supervision of inpatient psychiatric facility personnel. In addition, the hospital records show that services furnished were intensive treatment services, admission or related services, or equivalent services.     EXPECTED DISCHARGE DATE/DAY: TBD     DISPOSITION: Home       Signed By:   Georgi Sandhoff, MD  6/19/2019

## 2019-06-19 NOTE — PROGRESS NOTES
General Daily Progress Note    Admit Date: 6/17/2019    Subjective:     Asked to see pt for Hypertension. Pt is mainly having diastolic HTN. Current Facility-Administered Medications   Medication Dose Route Frequency    haloperidol (HALDOL) tablet 5 mg  5 mg Oral Q6H PRN    And    LORazepam (ATIVAN) tablet 2 mg  2 mg Oral Q6H PRN    And    diphenhydrAMINE (BENADRYL) capsule 50 mg  50 mg Oral Q6H PRN    haloperidol lactate (HALDOL) injection 5 mg  5 mg IntraMUSCular Q6H PRN    And    LORazepam (ATIVAN) injection 2 mg  2 mg IntraMUSCular Q6H PRN    And    diphenhydrAMINE (BENADRYL) injection 50 mg  50 mg IntraMUSCular Q6H PRN    paliperidone (INVEGA) SR tablet 6 mg  6 mg Oral DAILY    traZODone (DESYREL) tablet 100 mg  100 mg Oral QHS    zolpidem (AMBIEN) tablet 5 mg  5 mg Oral QHS PRN    benztropine (COGENTIN) tablet 2 mg  2 mg Oral BID PRN    benztropine (COGENTIN) injection 2 mg  2 mg IntraMUSCular BID PRN    acetaminophen (TYLENOL) tablet 650 mg  650 mg Oral Q4H PRN    ibuprofen (MOTRIN) tablet 400 mg  400 mg Oral Q8H PRN    magnesium hydroxide (MILK OF MAGNESIA) 400 mg/5 mL oral suspension 30 mL  30 mL Oral DAILY PRN    nicotine (NICODERM CQ) 21 mg/24 hr patch 1 Patch  1 Patch TransDERmal DAILY PRN    hydrOXYzine HCl (ATARAX) tablet 50 mg  50 mg Oral Q6H PRN            Objective:     Patient Vitals for the past 8 hrs:   BP Pulse   06/19/19 1720 (!) 133/92 (!) 101     No intake/output data recorded. No intake/output data recorded. Physical Exam:   Visit Vitals  BP (!) 133/92   Pulse (!) 101   Temp 98.1 °F (36.7 °C)   Resp 18   Ht 6' 3\" (1.905 m)   Wt 113.4 kg (250 lb)   SpO2 98%   BMI 31.25 kg/m²     General:  Alert, cooperative, no distress, appears stated age. Head:  Normocephalic, without obvious abnormality, atraumatic. Eyes:  Conjunctivae/corneas clear. PERRL, EOMs intact. Lungs:   Clear to auscultation bilaterally. Chest wall:  No tenderness or deformity.    Heart: Regular rate and rhythm, S1, S2 normal, no murmur, click, rub or gallop. Abdomen:   Soft, non-tender. Bowel sounds normal. No masses,  No organomegaly. Extremities: Extremities normal, atraumatic, no cyanosis or edema. Pulses:    Skin: Skin color, texture, turgor normal. No rashes or lesions             No results found for this or any previous visit (from the past 24 hour(s)). Assessment:     Principal Problem:    Schizoaffective disorder ([facility] (5/85/5352)    Diastolic HTN    Plan:     Start HCTZ 12.5mg po every day.

## 2019-06-19 NOTE — PROGRESS NOTES
3013 Fabiola Barcenas received report from [facility]. 0730 Pt in room naked presenting with paranoia. This writer instructed pt to put either his clothes or hospital gown on with gripper socks. Pt was easily redirectable. Pt agreed to put his clothes on. Pt put on his gown. Pt was heard screaming and yelling responding to internal stimuli as this writer left the pt's room. This writer returned to the pt's room to assess pt. Pt stated \"I'm ok, I got them. \" Pt declined to elaborate on his statements. Pt stated that he would keep his voice down. 0820 Pt yelling and screaming in his room responding to internal stimuli. Pt stated that he was feeling anxious and requested medication to help him \"calm down. \" Pt asked this writer to come in his room and examine his bed because he felt the bed was moving. This writer assured the pt that his bed was not moving. Pt received prn atarax 50 mg for anxiety and prn zyprexa 5 mg for psychosis. Pt refused to have vitals taken. Pt took one of his invega 3mg tablets. 0845 Pt heard screaming and responding to internal stimuli. This writer encouraged pt to take the other 3 mg invega tablet, pt refused to take it. 9004 Pt presenting with less anxiety and psychosis. Pt took the other 3 mg tablet. Pt allowed this writer to take his vitals. Pt presented with elevated /94. Pt presents with AVH. Pt stated that his  is in his room. This writer assured the pt that his  is not in his room. Pt presents with thought blocking and intermittently responds to internal stimuli by yelling and screaming. Pt stated to this writer \"Man, I'm all messed up. I got mad last night and told a  to go fuck off. \" This writer encouraged the pt to talk about how he is feeling if he feels angry, anxious, or wants to hurt himself today, pt agreed to do so. Pt stated that he is not experiencing SI, HI, and AVH. Pt denied pain. 46 Pt requested a \"shot\" to help him calm down. This writer informed the pt that he has no injection to assist him with anxiety that he can get at this time. Prn atarax 50 mg and 5 mg zyprexa non-effective. 1030 This writer checked the pt's blood pressure again. Pt's BP is elevated at 136/113. This writer will notify the doctor of the pt's elevated BP. Pt in [facility] stating that he is at command station. \" This writer informed the pt that he is in the hospital.      1100 This writer submitted a consult for internal medicine for the pt to be seen for elevated BP.     1125 Pt participating in treatment team. Pt complained of a headache 3/10 pain. Pt received 650 mg tylenol for headache pain. 1130 Pt in room naked. Pt put on a gown when directed to do so. Pt stated that he needed something to calm down. This writer contacted the pt's attending psychiatrist to get medication to assist him with reducing anxiety and agitation. Pt received orders for prn 5 mg haldol PO for psychosis, 2 mg ativan PO for psychosis, and 50 mg benadryl for EPS.     1152 Pt in room responding to internal stimuli. Pt's speech pattern is excessively loud. Pt was easily redirectable. Pt stated that he would lower his voice. Pt reported that his head is not hurting at the moment. Prn tylenol 650 mg effective. 1435 Pt requesting multiple towels. This writer informed the pt that he has towels in his room that he can use. Pt stated that he was saving the towels that he is requesting. This writer informed the pt that he cannot continue to request multiple towels to save. Pt in room with boxers on and no gown. Pt was redirected to put a gown on. Pt put a gown on.    1507 Pt in room with the shower running. Pt dressed in boxers. Pt stated that he needed more towels. Pt stated that he took his gown off because it was dirty. This writer informed the pt that he was given two clean gowns.  Pt took the linen off of his bed because he said it was dirty. Pt stated \"I am going through it. Can you give me something to calm down. ?\" Pt received prn 5 mg Haldol, 2 mg ativan, and 50 mg of benadryl all PO.     1736 Pt reported feeling anxious. Pt stated \"Can you give me a shot? Why don't y'all like white people? This writer informed the pt that he received medications earlier to help reduce anxiety and psychosis. This writer informed the pt that he will be notified of when he can have medications for anxiety again. This writer encouraged the pt to use coping skills to reduce feelings of anxiety. [address] Pt seen by medical doctor Osmin Marie. Pt will be prescribed a medication to lower his blood pressure.

## 2019-06-20 LAB
ALBUMIN SERPL-MCNC: 4.1 G/DL (ref 3.5–5)
ALBUMIN/GLOB SERPL: 1.2 {RATIO} (ref 1.1–2.2)
ALP SERPL-CCNC: 98 U/L (ref 45–117)
ALT SERPL-CCNC: 51 U/L (ref 12–78)
AST SERPL-CCNC: 43 U/L (ref 15–37)
BILIRUB DIRECT SERPL-MCNC: 0.1 MG/DL (ref 0–0.2)
BILIRUB SERPL-MCNC: 0.4 MG/DL (ref 0.2–1)
GLOBULIN SER CALC-MCNC: 3.4 G/DL (ref 2–4)
PROT SERPL-MCNC: 7.5 G/DL (ref 6.4–8.2)

## 2019-06-20 PROCEDURE — 74011250637 HC RX REV CODE- 250/637: Performed by: INTERNAL MEDICINE

## 2019-06-20 PROCEDURE — 74011250637 HC RX REV CODE- 250/637: Performed by: PSYCHIATRY & NEUROLOGY

## 2019-06-20 PROCEDURE — 65220000003 HC RM SEMIPRIVATE PSYCH

## 2019-06-20 PROCEDURE — 80076 HEPATIC FUNCTION PANEL: CPT

## 2019-06-20 PROCEDURE — 36415 COLL VENOUS BLD VENIPUNCTURE: CPT

## 2019-06-20 RX ORDER — PALIPERIDONE 3 MG/1
9 TABLET, EXTENDED RELEASE ORAL DAILY
Status: DISCONTINUED | OUTPATIENT
Start: 2019-06-21 | End: 2019-06-23

## 2019-06-20 RX ADMIN — PALIPERIDONE 6 MG: 3 TABLET, EXTENDED RELEASE ORAL at 07:54

## 2019-06-20 RX ADMIN — HYDROCHLOROTHIAZIDE 12.5 MG: 25 TABLET ORAL at 07:54

## 2019-06-20 RX ADMIN — LORAZEPAM 2 MG: 1 TABLET ORAL at 23:37

## 2019-06-20 RX ADMIN — HALOPERIDOL 5 MG: 5 TABLET ORAL at 10:31

## 2019-06-20 RX ADMIN — DIPHENHYDRAMINE HYDROCHLORIDE 50 MG: 25 CAPSULE ORAL at 10:31

## 2019-06-20 RX ADMIN — DIPHENHYDRAMINE HYDROCHLORIDE 50 MG: 25 CAPSULE ORAL at 23:36

## 2019-06-20 RX ADMIN — LORAZEPAM 2 MG: 1 TABLET ORAL at 10:31

## 2019-06-20 RX ADMIN — TRAZODONE HYDROCHLORIDE 100 MG: 100 TABLET ORAL at 21:13

## 2019-06-20 RX ADMIN — HALOPERIDOL 5 MG: 5 TABLET ORAL at 23:36

## 2019-06-20 NOTE — BH NOTES
0700 Assumed care of th patient    0830 Patient in dayroom eating breakfast. Medication complaint. 0900 Patient alert and verbal. Paranoid. Thinking he is being poisoned by tear gas. Easily redirected. 0930 Patient in room yelling, responding to internal stimuli. 1030 Pt yelling and screaming louder and more frequently. Continuing to respond to internal stimuli. Now completely naked. PRN benadryl, lorazepam, and haldol given. Instructed to put clothes on. Patient complied by putting on pants. 1115 Patient remains in room. Yelling has subsided, however, pt continues to talk to self in room    1200 Patient refuses lunch    1400 In room resting quietly    1700 Refuses dinner     1738 Patient out of room to collect dinner tray.  Returned to room after receiving meal.

## 2019-06-20 NOTE — BH NOTES
PSYCHIATRIC PROGRESS NOTE         Patient Name  Kassie Martin   Date of Birth 1985   [facility] [id number]   Medical Record Number  [id number]      Age  29 y.o. PCP None   Admit date:  6/17/2019    Room Number  308/02  @ [facility]   Date of Service  6/20/2019         E & M PROGRESS NOTE:         HISTORY       CC:  \"psychosis\"  HISTORY OF PRESENT ILLNESS/INTERVAL HISTORY:  (reviewed/updated 6/20/2019). per initial evaluation: The patient, Kassie Martin, is a 29 y.o. WHITE [place]  male with a past psychiatric history significant for schizoaffective disorder, who presents at this time with complaints of (and/or evidence of) the following emotional symptoms: psychotic behavior and paranoid behavior. Additional symptomatology include VH. The above symptoms have been present for 48+ hours. These symptoms are of moderate to high severity. These symptoms are constant in nature. The patient's condition has been precipitated by psychosocial stressors. Patient's condition made worse by treatment noncompliance. UDS: negative; BAL=0.      The patient is a poor historian. The patient corroborates the above narrative. The patient contracts for safety on the unit and gives consent for the team to contact collateral. The patient is amenable to initiating treatment while on the unit. Patient appears to be actively hallucinating for the duration of the interview, staring and gesturing and speaking largely incoherently. Patient offered PO PRN antipsychotic immediately following initial evaluation. 6/19- patient received multiple PRNs for agitation and psychosis, c/o auditory hallucinations, slept 5 hours after getting IM Geodon. This morning, patient noted to be speaking to persons not in the room, actively responding, but able to make needs known.  Per nursing, patient repeatedly removed his clothing, requires much prompting for ADLs.  6/20- patient grossly disorganized, visible on the unit and generally incoherent, got multiple PRNs for ongoing psychosis, but able to voice his experiences in treatment team. Patient reports things being \"messed up\" and states his thinking is not clear. SIDE EFFECTS: (reviewed/updated 6/20/2019)  None reported or admitted to. ALLERGIES:(reviewed/updated 6/20/2019)  No Known Allergies   MEDICATIONS PRIOR TO ADMISSION:(reviewed/updated 6/20/2019)  Medications Prior to Admission   Medication Sig    traZODone (DESYREL) 150 mg tablet Take 300 mg by mouth nightly. Indications: Insomnia      PAST MEDICAL HISTORY: Past medical history from the initial psychiatric evaluation has been reviewed (reviewed/updated 6/20/2019) with no additional updates (I asked patient and no additional past medical history provided). No past medical history on file. No past surgical history on file. SOCIAL HISTORY: Social history from the initial psychiatric evaluation has been reviewed (reviewed/updated 6/20/2019) with no additional updates (I asked patient and no additional social history provided).    Social History     Socioeconomic History    Marital status: SINGLE     Spouse name: Not on file    Number of children: Not on file    Years of education: Not on file    Highest education level: Not on file   Occupational History    Not on file   Social Needs    Financial resource strain: Not on file    Food insecurity:     Worry: Not on file     Inability: Not on file    Transportation needs:     Medical: Not on file     Non-medical: Not on file   Tobacco Use    Smoking status: Not on file   Substance and Sexual Activity    Alcohol use: Not on file    Drug use: Not on file    Sexual activity: Not on file   Lifestyle    Physical activity:     Days per week: Not on file     Minutes per session: Not on file    Stress: Not on file   Relationships    Social connections:     Talks on phone: Not on file     Gets together: Not on file     Attends [employer] service: Not on file Active member of club or organization: Not on file     Attends meetings of clubs or organizations: Not on file     Relationship status: Not on file    Intimate partner violence:     Fear of current or ex partner: Not on file     Emotionally abused: Not on file     Physically abused: Not on file     Forced sexual activity: Not on file   Other Topics Concern    Not on file   Social History Narrative    Not on file      FAMILY HISTORY: Family history from the initial psychiatric evaluation has been reviewed (reviewed/updated 6/20/2019) with no additional updates (I asked patient and no additional family history provided). No family history on file. REVIEW OF SYSTEMS: (reviewed/updated 6/20/2019)  Appetite:no change from normal   Sleep: poor with DIMS (difficulty initiating & maintaining sleep)   All other Review of Systems: Negative except confusion per HPI         [facility] (MSE):    MSE FINDINGS ARE WITHIN NORMAL LIMITS (WNL) UNLESS OTHERWISE STATED BELOW. ( ALL OF THE BELOW CATEGORIES OF THE MSE HAVE BEEN REVIEWED (reviewed 6/20/2019) AND UPDATED AS DEEMED APPROPRIATE )  General Presentation age appropriate, cooperative   Orientation not oriented to situation   Vital Signs  See below (reviewed 6/20/2019); Vital Signs (BP, Pulse, & Temp) are within normal limits if not listed below.    Gait and Station Stable/steady, no ataxia   Musculoskeletal System No extrapyramidal symptoms (EPS); no abnormal muscular movements or Tardive Dyskinesia (TD); muscle strength and tone are within normal limits   Language No aphasia or dysarthria   Speech:  hypoverbal and normal volume   Thought Processes illogical; normal rate of thoughts; poor abstract reasoning/computation   Thought Associations loose associations   Thought Content auditory hallucinations, visual hallucinations and internally preoccupied   Suicidal Ideations none   Homicidal Ideations none   Mood:  anxious    Affect: mood-congruent and odd demeanor   Memory recent  impaired   Memory remote:  impaired   Concentration/Attention:  distractable   Fund of Knowledge average   Insight:  limited   Reliability poor   Judgment:  poor          VITALS:     Patient Vitals for the past 24 hrs:   Temp Pulse Resp BP SpO2   06/20/19 0834 98.1 °F (36.7 °C) 97 16 139/84    06/19/19 1935 97.6 °F (36.4 °C) 88 18 (!) 141/95 99 %   06/19/19 1720  (!) 101  (!) 133/92      Wt Readings from Last 3 Encounters:   06/18/19 113.4 kg (250 lb)     Temp Readings from Last 3 Encounters:   06/20/19 98.1 °F (36.7 °C)     BP Readings from Last 3 Encounters:   06/20/19 139/84     Pulse Readings from Last 3 Encounters:   06/20/19 97            DATA     LABORATORY DATA:(reviewed/updated 6/20/2019)  Recent Results (from the past 24 hour(s))   HEPATIC FUNCTION PANEL    Collection Time: 06/20/19  5:53 AM   Result Value Ref Range    Protein, total 7.5 6.4 - 8.2 g/dL    Albumin 4.1 3.5 - 5.0 g/dL    Globulin 3.4 2.0 - 4.0 g/dL    A-G Ratio 1.2 1.1 - 2.2      Bilirubin, total 0.4 0.2 - 1.0 MG/DL    Bilirubin, direct 0.1 0.0 - 0.2 MG/DL    Alk. phosphatase 98 45 - 117 U/L    AST (SGOT) 43 (H) 15 - 37 U/L    ALT (SGPT) 51 12 - 78 U/L     No results found for: VALF2, VALAC, VALP, VALPR, DS6, CRBAM, CRBAMP, CARB2, XCRBAM  No results found for: LITHM   RADIOLOGY REPORTS:(reviewed/updated 6/20/2019)  No results found.        MEDICATIONS     ALL MEDICATIONS:   Current Facility-Administered Medications   Medication Dose Route Frequency    [START ON 6/21/2019] paliperidone (INVEGA) SR tablet 9 mg  9 mg Oral DAILY    haloperidol (HALDOL) tablet 5 mg  5 mg Oral Q6H PRN    And    LORazepam (ATIVAN) tablet 2 mg  2 mg Oral Q6H PRN    And    diphenhydrAMINE (BENADRYL) capsule 50 mg  50 mg Oral Q6H PRN    haloperidol lactate (HALDOL) injection 5 mg  5 mg IntraMUSCular Q6H PRN    And    LORazepam (ATIVAN) injection 2 mg  2 mg IntraMUSCular Q6H PRN    And    diphenhydrAMINE (BENADRYL) injection 50 mg  50 mg IntraMUSCular Q6H PRN    hydroCHLOROthiazide (HYDRODIURIL) tablet 12.5 mg  12.5 mg Oral DAILY    traZODone (DESYREL) tablet 100 mg  100 mg Oral QHS    zolpidem (AMBIEN) tablet 5 mg  5 mg Oral QHS PRN    benztropine (COGENTIN) tablet 2 mg  2 mg Oral BID PRN    benztropine (COGENTIN) injection 2 mg  2 mg IntraMUSCular BID PRN    acetaminophen (TYLENOL) tablet 650 mg  650 mg Oral Q4H PRN    ibuprofen (MOTRIN) tablet 400 mg  400 mg Oral Q8H PRN    magnesium hydroxide (MILK OF MAGNESIA) 400 mg/5 mL oral suspension 30 mL  30 mL Oral DAILY PRN    nicotine (NICODERM CQ) 21 mg/24 hr patch 1 Patch  1 Patch TransDERmal DAILY PRN    hydrOXYzine HCl (ATARAX) tablet 50 mg  50 mg Oral Q6H PRN      SCHEDULED MEDICATIONS:   Current Facility-Administered Medications   Medication Dose Route Frequency    [START ON 6/21/2019] paliperidone (INVEGA) SR tablet 9 mg  9 mg Oral DAILY    hydroCHLOROthiazide (HYDRODIURIL) tablet 12.5 mg  12.5 mg Oral DAILY    traZODone (DESYREL) tablet 100 mg  100 mg Oral QHS          ASSESSMENT & PLAN     DIAGNOSES REQUIRING ACTIVE TREATMENT AND MONITORING: (reviewed/updated 6/20/2019)  Patient Active Hospital Problem List:    Schizoaffective disorder ([facility] 75.) (6/18/2019)    Assessment: patient grossly psychotic, illogical with active hallucinations. Per patient, he was recently released from [facility] where he was started on Risperdal. Compliance unclear since that time, will give AP with SNIDER form.     Plan:   - INCREASE Invega to 9 mg QDAY for psychosis  - CONTINUE Trazodone 100 mg QHS for insomnia  - CONTINUE Ambien 5 mg QHS PRN insomnia (adjunct to trazodone if needed)  - CHANGE PRNs to Haldol/Ativan/Benadryl given PMA  - IGM therapy as tolerated  - Expand database / obtain collateral  - Dispo planning    In summary, Liz Mahoney, is a 29 y.o.  male who presents with a severe exacerbation of the principal diagnosis of Schizoaffective disorder ([facility].)    Patient's condition is not improving. Patient requires continued inpatient hospitalization for further stabilization, safety monitoring and medication management. I will continue to coordinate the provision of individual, milieu, occupational, group, and substance abuse therapies to address target symptoms/diagnoses as deemed appropriate for the individual patient. A coordinated, multidisplinary treatment team round was conducted with the patient (this team consists of the nurse, psychiatric unit pharmcist,  and writer). Complete current electronic health record for patient has been reviewed today including consultant notes, ancillary staff notes, nurses and psychiatric tech notes. Suicide risk assessment completed and patient deemed to be of low risk for suicide at this time. The following regarding medications was addressed during rounds with patient:   the risks and benefits of the proposed medication. The patient was given the opportunity to ask questions. Informed consent given to the use of the above medications. Will continue to adjust psychiatric and non-psychiatric medications (see above \"medication\" section and orders section for details) as deemed appropriate & based upon diagnoses and response to treatment. I will continue to order blood tests/labs and diagnostic tests as deemed appropriate and review results as they become available (see orders for details and above listed lab/test results). I will order psychiatric records from previous [facility] hospitals to further elucidate the nature of patient's psychopathology and review once available. I will gather additional collateral information from friends, family and o/p treatment team to further elucidate the nature of patient's psychopathology and baselline level of psychiatric functioning.          I certify that this patient's inpatient psychiatric hospital services furnished since the previous certification were, and continue to be, required for treatment that could reasonably be expected to improve the patient's condition, or for diagnostic study, and that the patient continues to need, on a daily basis, active treatment furnished directly by or requiring the supervision of inpatient psychiatric facility personnel. In addition, the hospital records show that services furnished were intensive treatment services, admission or related services, or equivalent services.     EXPECTED DISCHARGE DATE/DAY: TBD     DISPOSITION: Home       Signed By:   Georgi Sandhoff, MD  6/20/2019

## 2019-06-20 NOTE — PROGRESS NOTES
Report received from [place], [facility]  Patient is in his room completely nude and engaging in self pleasure. Patient was asked to put on his pants while female staff was attempting to enter room and obtain vital signs and do assessment. Patient did put his pants on. He remains bizarre and he continues to appear to be responding to internal stimuli. Patient does have outbursts. He denies thoughts of harm to himself or others. Patient denies pain. No new issues noted. Patient did place the mattress on the floor. Continue to monitor. Hourly rounds ongoing throughout the night. Patient slept approx. 1 hr and 30 min.

## 2019-06-20 NOTE — BH NOTES
Patient is in the dayroom getting vitals signs signs taken and keeps wanting to touch the buttons on the vital sign machine and was redirected but still show interest in touching machine . Brant there after continued same behavior. PRN atarax and trazadone was given @ 2138. Patient continued to be loud and disruptive.  PRN haldol,ativan,benadryl was given @ 2205  Hourly rounding done,slept 1.5 hours

## 2019-06-21 LAB
ALBUMIN SERPL-MCNC: 3.8 G/DL (ref 3.5–5)
ALBUMIN/GLOB SERPL: 1.2 {RATIO} (ref 1.1–2.2)
ALP SERPL-CCNC: 99 U/L (ref 45–117)
ALT SERPL-CCNC: 55 U/L (ref 12–78)
ANION GAP SERPL CALC-SCNC: 8 MMOL/L (ref 5–15)
AST SERPL-CCNC: 44 U/L (ref 15–37)
ATRIAL RATE: 96 BPM
BILIRUB SERPL-MCNC: 0.4 MG/DL (ref 0.2–1)
BUN SERPL-MCNC: 15 MG/DL (ref 6–20)
BUN/CREAT SERPL: 14 (ref 12–20)
CALCIUM SERPL-MCNC: 9 MG/DL (ref 8.5–10.1)
CALCULATED P AXIS, ECG09: 62 DEGREES
CALCULATED R AXIS, ECG10: 21 DEGREES
CALCULATED T AXIS, ECG11: 48 DEGREES
CHLORIDE SERPL-SCNC: 103 MMOL/L (ref 97–108)
CK MB CFR SERPL CALC: 0.3 % (ref 0–2.5)
CK MB CFR SERPL CALC: 0.4 % (ref 0–2.5)
CK MB SERPL-MCNC: 3.4 NG/ML (ref 5–25)
CK MB SERPL-MCNC: 3.8 NG/ML (ref 5–25)
CK SERPL-CCNC: 1216 U/L (ref 39–308)
CK SERPL-CCNC: 875 U/L (ref 39–308)
CO2 SERPL-SCNC: 27 MMOL/L (ref 21–32)
CREAT SERPL-MCNC: 1.08 MG/DL (ref 0.7–1.3)
D DIMER PPP FEU-MCNC: <0.19 MG/L FEU (ref 0–0.65)
DIAGNOSIS, 93000: NORMAL
ERYTHROCYTE [DISTWIDTH] IN BLOOD BY AUTOMATED COUNT: 12.6 % (ref 11.5–14.5)
GLOBULIN SER CALC-MCNC: 3.1 G/DL (ref 2–4)
GLUCOSE SERPL-MCNC: 95 MG/DL (ref 65–100)
HCT VFR BLD AUTO: 39.9 % (ref 36.6–50.3)
HGB BLD-MCNC: 13.6 G/DL (ref 12.1–17)
MCH RBC QN AUTO: 28.9 PG (ref 26–34)
MCHC RBC AUTO-ENTMCNC: 34.1 G/DL (ref 30–36.5)
MCV RBC AUTO: 84.9 FL (ref 80–99)
NRBC # BLD: 0 K/UL (ref 0–0.01)
NRBC BLD-RTO: 0 PER 100 WBC
P-R INTERVAL, ECG05: 140 MS
PLATELET # BLD AUTO: 309 K/UL (ref 150–400)
PMV BLD AUTO: 9.9 FL (ref 8.9–12.9)
POTASSIUM SERPL-SCNC: 4 MMOL/L (ref 3.5–5.1)
PROT SERPL-MCNC: 6.9 G/DL (ref 6.4–8.2)
Q-T INTERVAL, ECG07: 338 MS
QRS DURATION, ECG06: 84 MS
QTC CALCULATION (BEZET), ECG08: 427 MS
RBC # BLD AUTO: 4.7 M/UL (ref 4.1–5.7)
SODIUM SERPL-SCNC: 138 MMOL/L (ref 136–145)
TROPONIN I SERPL-MCNC: <0.05 NG/ML
VENTRICULAR RATE, ECG03: 96 BPM
WBC # BLD AUTO: 8.9 K/UL (ref 4.1–11.1)

## 2019-06-21 PROCEDURE — 85379 FIBRIN DEGRADATION QUANT: CPT

## 2019-06-21 PROCEDURE — 74011250637 HC RX REV CODE- 250/637: Performed by: PSYCHIATRY & NEUROLOGY

## 2019-06-21 PROCEDURE — 74011250637 HC RX REV CODE- 250/637: Performed by: INTERNAL MEDICINE

## 2019-06-21 PROCEDURE — 80053 COMPREHEN METABOLIC PANEL: CPT

## 2019-06-21 PROCEDURE — 84484 ASSAY OF TROPONIN QUANT: CPT

## 2019-06-21 PROCEDURE — 36415 COLL VENOUS BLD VENIPUNCTURE: CPT

## 2019-06-21 PROCEDURE — 93005 ELECTROCARDIOGRAM TRACING: CPT

## 2019-06-21 PROCEDURE — 82550 ASSAY OF CK (CPK): CPT

## 2019-06-21 PROCEDURE — 74011250637 HC RX REV CODE- 250/637: Performed by: NEUROMUSCULOSKELETAL MEDICINE & OMM

## 2019-06-21 PROCEDURE — 65220000003 HC RM SEMIPRIVATE PSYCH

## 2019-06-21 PROCEDURE — 85027 COMPLETE CBC AUTOMATED: CPT

## 2019-06-21 RX ORDER — METOPROLOL TARTRATE 25 MG/1
25 TABLET, FILM COATED ORAL EVERY 12 HOURS
Status: DISCONTINUED | OUTPATIENT
Start: 2019-06-21 | End: 2019-06-21

## 2019-06-21 RX ADMIN — HYDROXYZINE HYDROCHLORIDE 50 MG: 25 TABLET, FILM COATED ORAL at 21:10

## 2019-06-21 RX ADMIN — PALIPERIDONE 9 MG: 3 TABLET, EXTENDED RELEASE ORAL at 09:34

## 2019-06-21 RX ADMIN — DIPHENHYDRAMINE HYDROCHLORIDE 50 MG: 25 CAPSULE ORAL at 09:34

## 2019-06-21 RX ADMIN — DIPHENHYDRAMINE HYDROCHLORIDE 50 MG: 25 CAPSULE ORAL at 18:44

## 2019-06-21 RX ADMIN — METOPROLOL TARTRATE 25 MG: 25 TABLET ORAL at 09:59

## 2019-06-21 RX ADMIN — ZOLPIDEM TARTRATE 5 MG: 5 TABLET ORAL at 01:20

## 2019-06-21 RX ADMIN — HALOPERIDOL 5 MG: 5 TABLET ORAL at 18:44

## 2019-06-21 RX ADMIN — HALOPERIDOL 5 MG: 5 TABLET ORAL at 09:33

## 2019-06-21 RX ADMIN — LORAZEPAM 2 MG: 1 TABLET ORAL at 18:44

## 2019-06-21 RX ADMIN — LORAZEPAM 2 MG: 1 TABLET ORAL at 09:33

## 2019-06-21 RX ADMIN — HYDROCHLOROTHIAZIDE 12.5 MG: 25 TABLET ORAL at 09:34

## 2019-06-21 RX ADMIN — TRAZODONE HYDROCHLORIDE 100 MG: 100 TABLET ORAL at 21:10

## 2019-06-21 NOTE — BH NOTES
PSYCHIATRIC PROGRESS NOTE         Patient Name  Kristina Ledezma   Date of Birth 1985   [facility] [id number]   Medical Record Number  [id number]      Age  29 y.o. PCP None   Admit date:  6/17/2019    Room Number  308/02  @ [facility]   Date of Service  6/21/2019         E & M PROGRESS NOTE:         HISTORY       CC:  \"psychosis\"  HISTORY OF PRESENT ILLNESS/INTERVAL HISTORY:  (reviewed/updated 6/21/2019). per initial evaluation: The patient, Kristina Ledezma, is a 29 y.o. WHITE [place]  male with a past psychiatric history significant for schizoaffective disorder, who presents at this time with complaints of (and/or evidence of) the following emotional symptoms: psychotic behavior and paranoid behavior. Additional symptomatology include VH. The above symptoms have been present for 48+ hours. These symptoms are of moderate to high severity. These symptoms are constant in nature. The patient's condition has been precipitated by psychosocial stressors. Patient's condition made worse by treatment noncompliance. UDS: negative; BAL=0.      The patient is a poor historian. The patient corroborates the above narrative. The patient contracts for safety on the unit and gives consent for the team to contact collateral. The patient is amenable to initiating treatment while on the unit. Patient appears to be actively hallucinating for the duration of the interview, staring and gesturing and speaking largely incoherently. Patient offered PO PRN antipsychotic immediately following initial evaluation. 6/19- patient received multiple PRNs for agitation and psychosis, c/o auditory hallucinations, slept 5 hours after getting IM Geodon. This morning, patient noted to be speaking to persons not in the room, actively responding, but able to make needs known.  Per nursing, patient repeatedly removed his clothing, requires much prompting for ADLs.  6/20- patient grossly disorganized, visible on the unit and generally incoherent, got multiple PRNs for ongoing psychosis, but able to voice his experiences in treatment team. Patient reports things being \"messed up\" and states his thinking is not clear. 6/21- the patient slept 1 hour overnight, has been getting multiple PRNs, standing in his room naked, was noted be nursing to be masturbating, not redirectable. Patient has been isolative to room, but compliant with PO medication. Per family collateral, the patient was most recently stabilized on Invega and Seroquel. Patient received EKG, QTc is 427. SIDE EFFECTS: (reviewed/updated 6/21/2019)  None reported or admitted to. ALLERGIES:(reviewed/updated 6/21/2019)  No Known Allergies   MEDICATIONS PRIOR TO ADMISSION:(reviewed/updated 6/21/2019)  Medications Prior to Admission   Medication Sig    traZODone (DESYREL) 150 mg tablet Take 300 mg by mouth nightly. Indications: Insomnia      PAST MEDICAL HISTORY: Past medical history from the initial psychiatric evaluation has been reviewed (reviewed/updated 6/21/2019) with no additional updates (I asked patient and no additional past medical history provided). No past medical history on file. No past surgical history on file. SOCIAL HISTORY: Social history from the initial psychiatric evaluation has been reviewed (reviewed/updated 6/21/2019) with no additional updates (I asked patient and no additional social history provided).    Social History     Socioeconomic History    Marital status: SINGLE     Spouse name: Not on file    Number of children: Not on file    Years of education: Not on file    Highest education level: Not on file   Occupational History    Not on file   Social Needs    Financial resource strain: Not on file    Food insecurity:     Worry: Not on file     Inability: Not on file    Transportation needs:     Medical: Not on file     Non-medical: Not on file   Tobacco Use    Smoking status: Not on file   Substance and Sexual Activity    Alcohol use: Not on file    Drug use: Not on file    Sexual activity: Not on file   Lifestyle    Physical activity:     Days per week: Not on file     Minutes per session: Not on file    Stress: Not on file   Relationships    Social connections:     Talks on phone: Not on file     Gets together: Not on file     Attends Cheondoism service: Not on file     Active member of club or organization: Not on file     Attends meetings of clubs or organizations: Not on file     Relationship status: Not on file    Intimate partner violence:     Fear of current or ex partner: Not on file     Emotionally abused: Not on file     Physically abused: Not on file     Forced sexual activity: Not on file   Other Topics Concern    Not on file   Social History Narrative    Not on file      FAMILY HISTORY: Family history from the initial psychiatric evaluation has been reviewed (reviewed/updated 6/21/2019) with no additional updates (I asked patient and no additional family history provided). No family history on file. REVIEW OF SYSTEMS: (reviewed/updated 6/21/2019)  Appetite:no change from normal   Sleep: poor with DIMS (difficulty initiating & maintaining sleep)   All other Review of Systems: Negative except confusion per HPI         [address] (MSE):    MSE FINDINGS ARE WITHIN NORMAL LIMITS (WNL) UNLESS OTHERWISE STATED BELOW. ( ALL OF THE BELOW CATEGORIES OF THE MSE HAVE BEEN REVIEWED (reviewed 6/21/2019) AND UPDATED AS DEEMED APPROPRIATE )  General Presentation age appropriate, cooperative   Orientation not oriented to situation   Vital Signs  See below (reviewed 6/21/2019); Vital Signs (BP, Pulse, & Temp) are within normal limits if not listed below.    Gait and Station Stable/steady, no ataxia   Musculoskeletal System No extrapyramidal symptoms (EPS); no abnormal muscular movements or Tardive Dyskinesia (TD); muscle strength and tone are within normal limits   Language No aphasia or dysarthria   Speech:  hypoverbal and normal volume   Thought Processes illogical; normal rate of thoughts; poor abstract reasoning/computation   Thought Associations loose associations   Thought Content auditory hallucinations, visual hallucinations and internally preoccupied   Suicidal Ideations none   Homicidal Ideations none   Mood:  anxious    Affect:  mood-congruent and odd demeanor   Memory recent  impaired   Memory remote:  impaired   Concentration/Attention:  distractable   Fund of Knowledge average   Insight:  limited   Reliability poor   Judgment:  poor          VITALS:     Patient Vitals for the past 24 hrs:   Temp Pulse Resp BP SpO2   06/21/19 1049  94  135/83    06/21/19 0928 98.2 °F (36.8 °C) (!) 123 20 (!) 139/102 98 %   06/20/19 1935 98 °F (36.7 °C) (!) 107 16 112/89 97 %     Wt Readings from Last 3 Encounters:   06/18/19 113.4 kg (250 lb)     Temp Readings from Last 3 Encounters:   06/21/19 98.2 °F (36.8 °C)     BP Readings from Last 3 Encounters:   06/21/19 135/83     Pulse Readings from Last 3 Encounters:   06/21/19 94            DATA     LABORATORY DATA:(reviewed/updated 6/21/2019)  Recent Results (from the past 24 hour(s))   EKG, 12 LEAD, INITIAL    Collection Time: 06/21/19  9:55 AM   Result Value Ref Range    Ventricular Rate 96 BPM    Atrial Rate 96 BPM    P-R Interval 140 ms    QRS Duration 84 ms    Q-T Interval 338 ms    QTC Calculation (Bezet) 427 ms    Calculated P Axis 62 degrees    Calculated R Axis 21 degrees    Calculated T Axis 48 degrees    Diagnosis       Sinus tachycardia  benign leftward axis  normal QTc  normal variant EKG  Confirmed by Jeremy Álvarez MD, Lauri Montilla ([id number]) on 6/21/2019 10:57:46 AM       No results found for: VALF2, VALAC, VALP, VALPR, DS6, CRBAM, CRBAMP, CARB2, XCRBAM  No results found for: LITHM   RADIOLOGY REPORTS:(reviewed/updated 6/21/2019)  No results found.        MEDICATIONS     ALL MEDICATIONS:   Current Facility-Administered Medications   Medication Dose Route Frequency    metoprolol tartrate (LOPRESSOR) tablet 25 mg  25 mg Oral Q12H    paliperidone (INVEGA) SR tablet 9 mg  9 mg Oral DAILY    haloperidol (HALDOL) tablet 5 mg  5 mg Oral Q6H PRN    And    LORazepam (ATIVAN) tablet 2 mg  2 mg Oral Q6H PRN    And    diphenhydrAMINE (BENADRYL) capsule 50 mg  50 mg Oral Q6H PRN    haloperidol lactate (HALDOL) injection 5 mg  5 mg IntraMUSCular Q6H PRN    And    LORazepam (ATIVAN) injection 2 mg  2 mg IntraMUSCular Q6H PRN    And    diphenhydrAMINE (BENADRYL) injection 50 mg  50 mg IntraMUSCular Q6H PRN    hydroCHLOROthiazide (HYDRODIURIL) tablet 12.5 mg  12.5 mg Oral DAILY    traZODone (DESYREL) tablet 100 mg  100 mg Oral QHS    zolpidem (AMBIEN) tablet 5 mg  5 mg Oral QHS PRN    benztropine (COGENTIN) tablet 2 mg  2 mg Oral BID PRN    benztropine (COGENTIN) injection 2 mg  2 mg IntraMUSCular BID PRN    acetaminophen (TYLENOL) tablet 650 mg  650 mg Oral Q4H PRN    ibuprofen (MOTRIN) tablet 400 mg  400 mg Oral Q8H PRN    magnesium hydroxide (MILK OF MAGNESIA) 400 mg/5 mL oral suspension 30 mL  30 mL Oral DAILY PRN    nicotine (NICODERM CQ) 21 mg/24 hr patch 1 Patch  1 Patch TransDERmal DAILY PRN    hydrOXYzine HCl (ATARAX) tablet 50 mg  50 mg Oral Q6H PRN      SCHEDULED MEDICATIONS:   Current Facility-Administered Medications   Medication Dose Route Frequency    metoprolol tartrate (LOPRESSOR) tablet 25 mg  25 mg Oral Q12H    paliperidone (INVEGA) SR tablet 9 mg  9 mg Oral DAILY    hydroCHLOROthiazide (HYDRODIURIL) tablet 12.5 mg  12.5 mg Oral DAILY    traZODone (DESYREL) tablet 100 mg  100 mg Oral QHS          ASSESSMENT & PLAN     DIAGNOSES REQUIRING ACTIVE TREATMENT AND MONITORING: (reviewed/updated 6/21/2019)  Patient Active Hospital Problem List:    Schizoaffective disorder ([facility] [address] (6/18/2019)    Assessment: patient grossly psychotic, illogical with active hallucinations.  Per patient, he was recently released from long-term where he was started on Risperdal. Compliance unclear since that time, will give AP with SNIDER form. Plan:   - INCREASE Invega to 12 mg QDAY for psychosis  - Consider Seroquel adjunct  - CONTINUE Trazodone 100 mg QHS for insomnia  - CONTINUE Ambien 5 mg QHS PRN insomnia (adjunct to trazodone if needed)  - CHANGE PRNs to Haldol/Ativan/Benadryl given PMA  - IGM therapy as tolerated  - Expand database / obtain collateral  - Dispo planning    In summary, Lora Kenny, is a 29 y.o.  male who presents with a severe exacerbation of the principal diagnosis of Schizoaffective disorder ([facility] [address]    Patient's condition is not improving. Patient requires continued inpatient hospitalization for further stabilization, safety monitoring and medication management. I will continue to coordinate the provision of individual, milieu, occupational, group, and substance abuse therapies to address target symptoms/diagnoses as deemed appropriate for the individual patient. A coordinated, multidisplinary treatment team round was conducted with the patient (this team consists of the nurse, psychiatric unit pharmcist,  and writer). Complete current electronic health record for patient has been reviewed today including consultant notes, ancillary staff notes, nurses and psychiatric tech notes. Suicide risk assessment completed and patient deemed to be of low risk for suicide at this time. The following regarding medications was addressed during rounds with patient:   the risks and benefits of the proposed medication. The patient was given the opportunity to ask questions. Informed consent given to the use of the above medications. Will continue to adjust psychiatric and non-psychiatric medications (see above \"medication\" section and orders section for details) as deemed appropriate & based upon diagnoses and response to treatment.      I will continue to order blood tests/labs and diagnostic tests as deemed appropriate and review results as they become available (see orders for details and above listed lab/test results). I will order psychiatric records from previous [facility] hospitals to further elucidate the nature of patient's psychopathology and review once available. I will gather additional collateral information from friends, family and o/p treatment team to further elucidate the nature of patient's psychopathology and baselline level of psychiatric functioning. I certify that this patient's inpatient psychiatric hospital services furnished since the previous certification were, and continue to be, required for treatment that could reasonably be expected to improve the patient's condition, or for diagnostic study, and that the patient continues to need, on a daily basis, active treatment furnished directly by or requiring the supervision of inpatient psychiatric facility personnel. In addition, the hospital records show that services furnished were intensive treatment services, admission or related services, or equivalent services.     EXPECTED DISCHARGE DATE/DAY: TBD     DISPOSITION: Home       Signed By:   Ivette Anderson MD  6/21/2019

## 2019-06-21 NOTE — PROGRESS NOTES
10 [facility] RN received report from Ra COOL.    0800 Pt in room taking sheets off of his bed.     5325 Pt presents with a distracted mood and affect inconsistent with thought content. Pt's mood is euthymic. Pt presents with AVH. Pt walking around room loudly responding to internal stimuli. Pt alert and oriented to self and time. Pt's thought content is preoccupied with meeting with his . Pt's thought process is thought blocking. Pt ate 100% of his breakfast. Pt is medication and meal compliant. Pt is present on the unit keeping to self and isolative to the room. Pt in room standing in shower with his clothes on. Pt stated \"I need some medication. \" Pt is anxious pacing around in room going in and out of the shower fully clothed. Pt's speech is garbled. Pt is missing several teeth. Pt's blood pressure and pulse are elevated. Pt /102, pulse 123. This writer notified the charge nurse and [address] [place] [address] of the patient's condition. Dr. Del Heath ordered an EKG for the pt and metoprolol 25 mg.     2337 Pt was given prn PO benadryl 50 mg, ativan 2 mg, and haldol 5 mg for psychosis. 3962 Pt completed an EKG for elevated pulse and blood pressure. EKG resulted in sinus tachycardia. 1617 This writer educated the pt on the medication metoprolol 25 mg side effects, method of action, and directions for use. Pt needs reinforcement on this matter. 18 Pt presents with less intensive psychotic behavior. Pt is no longer restless. Pt walked down the hallway to inform this writer that he is tired. This writer encouraged the pt to go in his room and lay down. Prn po ativan 2 mg, benadryl 50 mg, and haldol 5 mg proven effective. 1047 This writer re-checked the pt's blood pressure and pulse. The pt's VO=733/83 pulse 94. Pt in room laying down on the bed. 1114 This writer rechecked the pt's pulse, blood pressure, and respiratory rate.  Pt's RR=17, Pt's BP= 121/64, Pt's pulse=72. Vitals taken while pt is laying in the bed in supine position. Pt sleeping/resting in bed.     1153 Pt seen by Dr. Selvin Childress. . Pt is sleeping. Dr Selvin Childress stated that he planned to discontinue the metoprolol previously ordered. 200 Pt not cooperating with lab draw. Labs drawn on pt. CBCw/o dif, CK W/CKMB, and metabolic panel labs drawn. 1319 Pt walked to the nursing station drowsy in appearance. Pt mumbled inaudible words to the nurse. This writer encouraged the pt to go to his room to lay down and get rest. This writer walked with the pt down the hallway. This writer rechecked the pt's vitals. Pt OO=573/83, pulse 89, RR 18. This writer changed the pt's linen and cleaned the pt's room. This writer dimmed the lights and encouraged pt to get sleep. 1734 Pt's previous lab draw showed elevated CK-MB Index, and elevated Ck-mb, pt's AST is elevated. 200 Pt is wet and fully clothed. Pt stated that he took a shower while fully clothed. Pt's floor is wet. This writer encouraged the pt to clean up his room and turn the shower off. This writer called environmental services to inquire about cleaning the pt's room. Pt in responding to internal stimuli and refused to answer this writers questioned about his actions. 1749 Pt in room running the shower again. Pt naked in room. Pt came outside of his room with no clothes on. When questioned why the pt took a shower again and is not clothed the pt stated \"Because yall are trying to get me. \"    [phone] Unit secretary put in an order to stat clean pt's room. 1800 This writer notified the on call doctor, Selvin Childress of the pt's lab results. . Dr. Selvin Childress stated that he would order a troponin lab for the pt.    1807 This writer rechecked the pt's pulse and BP. Pt's pulse and BP are elevated. , /99. Pt in the sitting position. 1815 This writer rechecked the pt's pulse and BP. Pulse 115 and /110. Pt in the sitting position.  This writer notified  [facility] of the pt's elevated BP and and pulse. Nury Vasquez stated that he would add in a D-dimer lab order for the pt.     1838 This writer took the lab computer into the pt room to draw the following labs: troponin, d-dimer, and ck w/ckmb and index. Pt would not sit still. Pt obtained assistance from another nurse to assist with this lab draw. Pt stated that he was feeling anxious and requested medication for anxiety. Pt stated that he was dying and I would not be able to draw his blood. 1844 Pt received prn PO ativan 2 mg, haldol 5mg, and benadryl 50 mg for anxiety and psychosis. This writer was unable to draw the pt's blood. This writer stuck the pt two times then allowed another nurse to attempt to draw the pt's blood. This writer was unable to draw the pt's blood. This writer will notify upcoming night shift nurse of the pt's need labs.

## 2019-06-21 NOTE — PROGRESS NOTES
Hospitalist Progress Note    NAME: Liz Mahoney   :  1985   MRN:  [id number]       Assessment / Plan:  1. htn tachycardia - ekg ordered and review. Pt received one dose of lopressor. Hr and bp have improved. bp didn't sleep much last evening. Would consider secondary causes of htn and tachycardia. Will dc lopressor and monitor for now. Subjective:     Chief Complaint / Reason for Physician Visit  \"comfortable, no complaints,  sleeping\". Discussed with RN events overnight. Review of Systems:  Symptom Y/N Comments  Symptom Y/N Comments   Fever/Chills    Chest Pain     Poor Appetite    Edema     Cough    Abdominal Pain     Sputum    Joint Pain     SOB/DUNCAN    Pruritis/Rash     Nausea/vomit    Tolerating PT/OT     Diarrhea    Tolerating Diet     Constipation    Other       Could NOT obtain due to:      Objective:     VITALS:   Last 24hrs VS reviewed since prior progress note. Most recent are:  Patient Vitals for the past 24 hrs:   Temp Pulse Resp BP SpO2   19 1114  72 17 121/64    19 1049  94  135/83    19 0928 98.2 °F (36.8 °C) (!) 123 20 (!) 139/102 98 %   19 1935 98 °F (36.7 °C) (!) 107 16 112/89 97 %     No intake or output data in the 24 hours ending 19 1204     PHYSICAL EXAM:  General: WD, WN. Alert, cooperative, no acute distress    EENT:  EOMI. Anicteric sclerae. MMM  Resp:  CTA bilaterally, no wheezing or rales. No accessory muscle use  CV:  Regular  rhythm,  No edema  GI:  Soft, Non distended, Non tender.  +Bowel sounds  Neurologic:  Alert and oriented X 3, normal speech,   Psych:   Good insight. Not anxious nor agitated  Skin:  No rashes.   No jaundice    Reviewed most current lab test results and cultures  YES  Reviewed most current radiology test results   YES  Review and summation of old records today    NO  Reviewed patient's current orders and MAR    YES  PMH/SH reviewed - no change compared to H&P  ________________________________________________________________________  Care Plan discussed with:    Comments   Patient     Family      RN     Care Manager     Consultant                        Multidiciplinary team rounds were held today with , nursing, pharmacist and clinical coordinator. Patient's plan of care was discussed; medications were reviewed and discharge planning was addressed. ________________________________________________________________________  Total NON critical care TIME:  20   Minutes    Total CRITICAL CARE TIME Spent:   Minutes non procedure based      Comments   >50% of visit spent in counseling and coordination of care     ________________________________________________________________________  Carlito Hanley DO     Procedures: see electronic medical records for all procedures/Xrays and details which were not copied into this note but were reviewed prior to creation of Plan. LABS:  I reviewed today's most current labs and imaging studies. Pertinent labs include:  No results for input(s): WBC, HGB, HCT, PLT, HGBEXT, HCTEXT, PLTEXT in the last 72 hours.   Recent Labs     06/20/19  0553   ALB 4.1   TBILI 0.4   SGOT 43*   ALT 51       Signed: Carlito Hanley DO

## 2019-06-22 PROCEDURE — 74011250636 HC RX REV CODE- 250/636: Performed by: PSYCHIATRY & NEUROLOGY

## 2019-06-22 PROCEDURE — 74011250637 HC RX REV CODE- 250/637: Performed by: PSYCHIATRY & NEUROLOGY

## 2019-06-22 PROCEDURE — 74011250637 HC RX REV CODE- 250/637: Performed by: NEUROMUSCULOSKELETAL MEDICINE & OMM

## 2019-06-22 PROCEDURE — 65220000003 HC RM SEMIPRIVATE PSYCH

## 2019-06-22 PROCEDURE — 74011250637 HC RX REV CODE- 250/637: Performed by: INTERNAL MEDICINE

## 2019-06-22 RX ORDER — LANOLIN ALCOHOL/MO/W.PET/CERES
100 CREAM (GRAM) TOPICAL DAILY
Status: DISCONTINUED | OUTPATIENT
Start: 2019-06-22 | End: 2019-06-25 | Stop reason: HOSPADM

## 2019-06-22 RX ORDER — SODIUM CHLORIDE 0.9 % (FLUSH) 0.9 %
SYRINGE (ML) INJECTION
Status: COMPLETED
Start: 2019-06-22 | End: 2019-06-22

## 2019-06-22 RX ORDER — SODIUM CHLORIDE 9 MG/ML
150 INJECTION, SOLUTION INTRAVENOUS ONCE
Status: COMPLETED | OUTPATIENT
Start: 2019-06-22 | End: 2019-06-22

## 2019-06-22 RX ORDER — THERA TABS 400 MCG
1 TAB ORAL DAILY
Status: DISCONTINUED | OUTPATIENT
Start: 2019-06-22 | End: 2019-06-25 | Stop reason: HOSPADM

## 2019-06-22 RX ORDER — FOLIC ACID 1 MG/1
1 TABLET ORAL DAILY
Status: DISCONTINUED | OUTPATIENT
Start: 2019-06-22 | End: 2019-06-25 | Stop reason: HOSPADM

## 2019-06-22 RX ORDER — SODIUM CHLORIDE 9 MG/ML
150 INJECTION, SOLUTION INTRAVENOUS CONTINUOUS
Status: DISCONTINUED | OUTPATIENT
Start: 2019-06-22 | End: 2019-06-22

## 2019-06-22 RX ORDER — LABETALOL 200 MG/1
200 TABLET, FILM COATED ORAL
Status: DISCONTINUED | OUTPATIENT
Start: 2019-06-22 | End: 2019-06-25 | Stop reason: HOSPADM

## 2019-06-22 RX ADMIN — HALOPERIDOL 5 MG: 5 TABLET ORAL at 08:21

## 2019-06-22 RX ADMIN — THERA TABS 1 TABLET: TAB at 12:02

## 2019-06-22 RX ADMIN — LORAZEPAM 2 MG: 1 TABLET ORAL at 08:21

## 2019-06-22 RX ADMIN — Medication 100 MG: at 12:02

## 2019-06-22 RX ADMIN — ZOLPIDEM TARTRATE 5 MG: 5 TABLET ORAL at 01:23

## 2019-06-22 RX ADMIN — HYDROXYZINE HYDROCHLORIDE 50 MG: 25 TABLET, FILM COATED ORAL at 15:17

## 2019-06-22 RX ADMIN — HYDROCHLOROTHIAZIDE 12.5 MG: 25 TABLET ORAL at 08:21

## 2019-06-22 RX ADMIN — HALOPERIDOL 5 MG: 5 TABLET ORAL at 15:17

## 2019-06-22 RX ADMIN — SODIUM CHLORIDE 150 ML/HR: 900 INJECTION, SOLUTION INTRAVENOUS at 10:20

## 2019-06-22 RX ADMIN — FOLIC ACID 1 MG: 1 TABLET ORAL at 12:02

## 2019-06-22 RX ADMIN — PALIPERIDONE 9 MG: 3 TABLET, EXTENDED RELEASE ORAL at 08:21

## 2019-06-22 RX ADMIN — DIPHENHYDRAMINE HYDROCHLORIDE 50 MG: 25 CAPSULE ORAL at 08:21

## 2019-06-22 RX ADMIN — DIPHENHYDRAMINE HYDROCHLORIDE 50 MG: 25 CAPSULE ORAL at 15:17

## 2019-06-22 RX ADMIN — TRAZODONE HYDROCHLORIDE 100 MG: 100 TABLET ORAL at 22:13

## 2019-06-22 RX ADMIN — ZOLPIDEM TARTRATE 5 MG: 5 TABLET ORAL at 22:13

## 2019-06-22 RX ADMIN — Medication 10 ML: at 10:54

## 2019-06-22 NOTE — PROGRESS NOTES
Hospitalist Progress Note    NAME: Janneth James   :  1985   MRN:  [id number]       Assessment / Plan:  1. Acute psychosis - follow as per psych. On haldol/ativan/benadryl prn. 2. etoh withdrawal -  Place on thiamin/folic acid/mvi. [place] po hydration if cannot maintain iv site. 3. Mildly Elevated ck - maybe related to dehydration vs. Withdrawal.  Pt has been non compliant with     4. htn / tachycardia (uncontrolled)- likley secondary to acute psychosis. Labetalol. Would recommend treating supportively with mvi, iv hydration until acute psychosis resolved. Dc hct atc and place on labetalol prn for now. Prophylaxis: ambulates freely no dvt prophylaxis required        Subjective:     Chief Complaint / Reason for Physician Visit  \"pt agitated and not speaking coherently. Unable to obtain accurate history\". Discussed with RN events overnight. Review of Systems:  Symptom Y/N Comments  Symptom Y/N Comments   Fever/Chills    Chest Pain     Poor Appetite    Edema     Cough    Abdominal Pain     Sputum    Joint Pain     SOB/DUNCAN    Pruritis/Rash     Nausea/vomit    Tolerating PT/OT     Diarrhea    Tolerating Diet     Constipation    Other       Could NOT obtain due to:      Objective:     VITALS:   Last 24hrs VS reviewed since prior progress note. Most recent are:  Patient Vitals for the past 24 hrs:   Temp Pulse Resp BP SpO2   19 0752 97.7 °F (36.5 °C) (!) 116 18 165/84 98 %   19 2040  100      19 2030 98.2 °F (36.8 °C) (!) 123 24 142/87 97 %   19 1812  (!) 115  (!) 133/110    19 1807  (!) 112 18 (!) 144/99    19 1321  89 18 121/83      No intake or output data in the 24 hours ending 19 1131     PHYSICAL EXAM:  General: Agitated with hallucinations (auditory). Walking around his room naked. EENT:  EOMI. Anicteric sclerae. MMM  Resp:  CTA bilaterally, no wheezing or rales.   No accessory muscle use  CV:  Borderline tachycardia  GI:  Soft, Non distended, Non tender.  +Bowel sounds  Neurologic:  Disoriented and delusional  Psych:   Poor  insight.   Skin:  No rashes. No jaundice    Reviewed most current lab test results and cultures  YES  Reviewed most current radiology test results   YES  Review and summation of old records today    NO  Reviewed patient's current orders and MAR    YES  PMH/SH reviewed - no change compared to H&P  ________________________________________________________________________  Care Plan discussed with:    Comments   Patient     Family      RN     Care Manager     Consultant                        Multidiciplinary team rounds were held today with , nursing, pharmacist and clinical coordinator. Patient's plan of care was discussed; medications were reviewed and discharge planning was addressed. ________________________________________________________________________  Total NON critical care TIME:  30   Minutes    Total CRITICAL CARE TIME Spent:   Minutes non procedure based      Comments   >50% of visit spent in counseling and coordination of care     ________________________________________________________________________  Yamileth Martinez DO     Procedures: see electronic medical records for all procedures/Xrays and details which were not copied into this note but were reviewed prior to creation of Plan. LABS:  I reviewed today's most current labs and imaging studies.   Pertinent labs include:  Recent Labs     06/21/19  1224   WBC 8.9   HGB 13.6   HCT 39.9        Recent Labs     06/21/19  1224 06/20/19  0553     --    K 4.0  --      --    CO2 27  --    GLU 95  --    BUN 15  --    CREA 1.08  --    CA 9.0  --    ALB 3.8 4.1   TBILI 0.4 0.4   SGOT 44* 43*   ALT 55 51       Signed: Lexx Rodriguez DO

## 2019-06-22 NOTE — BH NOTES
1020 IV line placed for pt to received 1000 mg of fluids. Fluid started. Nurse stayed with pt in room. Pt attempted to pull IV out and nurse was able to calm pt down. Pt eventually pulled line out. MD notified. Pt was given water pitcher to drink water. Will continue to monitor pt and assess needs.

## 2019-06-22 NOTE — PROGRESS NOTES
3721 received report from [address] [place] [address] Patient is medication and meal compliant. 1100 Attempted to start an IV and give some fluids but patient didn't want it. 1800 PRN's given twice today. Patient spoke to his sister and told her that we had planted a bomb and some tear gas.

## 2019-06-22 NOTE — PROGRESS NOTES
1930 Rec'd report from [facility]. Pt's bp 142/87, elevated pulse at 123, then on recheck pulse 100 radial. Pt ambulating in room, completely undressed. Pt is presenting with paranoid delusions. Pt is receptive to verbal redirection. Denies SI/HI/AH. Stated \"there's blood everywhere. Look; don't you see it? \" Pt repeatedly asked \"can you see me? \" Pt is experiencing VH and is very preoccupied. Pt is oriented to name only. Labs drawn and sent to lab. Pt will often slam his bedroom door. Verbal redirection has been mildly effective. Encouraged to rest in bed. Pt was medication adherent. PRN Atarax and Ambien given for anxiety and sleep. Pt provided with reality orientation of time and that he should get some rest. Verbal redirection was effective. Pt rested ~3.5 hours during the night. NAD noted. Q15min checks and nursing rounds continue for safety.

## 2019-06-22 NOTE — BH NOTES
Pt is in his room, confused but pleasant. He reports he is thirsty. CK is elevated.     Speech: wnl  TP: confused    Plan:  Normal Saline 1L if possible for elevated CK

## 2019-06-23 PROCEDURE — 74011250637 HC RX REV CODE- 250/637: Performed by: PSYCHIATRY & NEUROLOGY

## 2019-06-23 PROCEDURE — 65220000003 HC RM SEMIPRIVATE PSYCH

## 2019-06-23 PROCEDURE — 74011250637 HC RX REV CODE- 250/637: Performed by: NEUROMUSCULOSKELETAL MEDICINE & OMM

## 2019-06-23 RX ORDER — RISPERIDONE 1 MG/1
2 TABLET, FILM COATED ORAL
Status: DISCONTINUED | OUTPATIENT
Start: 2019-06-23 | End: 2019-06-25 | Stop reason: HOSPADM

## 2019-06-23 RX ORDER — RISPERIDONE 1 MG/1
1 TABLET, FILM COATED ORAL ONCE
Status: COMPLETED | OUTPATIENT
Start: 2019-06-23 | End: 2019-06-23

## 2019-06-23 RX ORDER — RISPERIDONE 1 MG/1
1 TABLET, FILM COATED ORAL DAILY
Status: DISCONTINUED | OUTPATIENT
Start: 2019-06-24 | End: 2019-06-25 | Stop reason: HOSPADM

## 2019-06-23 RX ADMIN — Medication 100 MG: at 09:14

## 2019-06-23 RX ADMIN — RISPERIDONE 2 MG: 1 TABLET ORAL at 21:24

## 2019-06-23 RX ADMIN — THERA TABS 1 TABLET: TAB at 09:15

## 2019-06-23 RX ADMIN — PALIPERIDONE 9 MG: 3 TABLET, EXTENDED RELEASE ORAL at 09:14

## 2019-06-23 RX ADMIN — RISPERIDONE 1 MG: 1 TABLET ORAL at 14:29

## 2019-06-23 RX ADMIN — TRAZODONE HYDROCHLORIDE 100 MG: 100 TABLET ORAL at 21:24

## 2019-06-23 RX ADMIN — DIPHENHYDRAMINE HYDROCHLORIDE 50 MG: 25 CAPSULE ORAL at 09:15

## 2019-06-23 RX ADMIN — ZOLPIDEM TARTRATE 5 MG: 5 TABLET ORAL at 21:25

## 2019-06-23 RX ADMIN — LORAZEPAM 2 MG: 1 TABLET ORAL at 09:15

## 2019-06-23 RX ADMIN — HALOPERIDOL 5 MG: 5 TABLET ORAL at 09:14

## 2019-06-23 RX ADMIN — FOLIC ACID 1 MG: 1 TABLET ORAL at 09:14

## 2019-06-23 NOTE — PROGRESS NOTES
Hospitalist Progress Note    NAME: Eladia Matthews   :  1985   MRN:  [id number]       Assessment / Plan:  1. Acute psychosis - follow as per psych. On haldol/ativan/benadryl prn. Cogentin prn     2. etoh withdrawal -  cont thiamin/folic acid/mvi.  cont withliveral po hydration if cannot maintain iv site.     3. Mildly Elevated ck - recheck in am.  Reports are that he is eating well.     4. htn / tachycardia (uncontrolled)- cont with labetalol prn until acute psychosis resolves              Prophylaxis: ambulates freely no dvt prophylaxis required      Subjective:     Chief Complaint / Reason for Physician Visit  \"reports he didn't sleep last night. Appetite has been good\". Discussed with RN events overnight. Review of Systems:  Symptom Y/N Comments  Symptom Y/N Comments   Fever/Chills    Chest Pain     Poor Appetite    Edema     Cough    Abdominal Pain     Sputum    Joint Pain     SOB/DUNCAN    Pruritis/Rash     Nausea/vomit    Tolerating PT/OT     Diarrhea    Tolerating Diet     Constipation    Other       Could NOT obtain due to:      Objective:     VITALS:   Last 24hrs VS reviewed since prior progress note. Most recent are:  Patient Vitals for the past 24 hrs:   Temp Pulse Resp BP SpO2   19 0855 97.7 °F (36.5 °C) (!) 109 20 (!) 138/92 98 %   19  (!) 104      19 1935 98.1 °F (36.7 °C) (!) 120 22 151/90 98 %     No intake or output data in the 24 hours ending 19 1245     PHYSICAL EXAM:  General: Agitated and walking around the room barely clothed. EENT:  EOMI. Anicteric sclerae. MMM  Resp:  CTA bilaterally, no wheezing or rales. No accessory muscle use  CV:  Mildly tachycardic  GI:  Soft, Non distended, Non tender.  +Bowel sounds  Neurologic:  Disoriented. Psych:   poor insight. Skin:  No rashes.   No jaundice    Reviewed most current lab test results and cultures  YES  Reviewed most current radiology test results   YES  Review and summation of old records today NO  Reviewed patient's current orders and MAR    YES  PMH/SH reviewed - no change compared to H&P  ________________________________________________________________________  Care Plan discussed with:    Comments   Patient     Family      RN     Care Manager     Consultant                        Multidiciplinary team rounds were held today with , nursing, pharmacist and clinical coordinator. Patient's plan of care was discussed; medications were reviewed and discharge planning was addressed. ________________________________________________________________________  Total NON critical care TIME:  20   Minutes    Total CRITICAL CARE TIME Spent:   Minutes non procedure based      Comments   >50% of visit spent in counseling and coordination of care     ________________________________________________________________________  Tete De Leon DO     Procedures: see electronic medical records for all procedures/Xrays and details which were not copied into this note but were reviewed prior to creation of Plan. LABS:  I reviewed today's most current labs and imaging studies.   Pertinent labs include:  Recent Labs     06/21/19  1224   WBC 8.9   HGB 13.6   HCT 39.9        Recent Labs     06/21/19  1224      K 4.0      CO2 27   GLU 95   BUN 15   CREA 1.08   CA 9.0   ALB 3.8   TBILI 0.4   SGOT 44*   ALT 55       Signed: Tete De Leon DO

## 2019-06-23 NOTE — PROGRESS NOTES
Diet as tolerated. Pt noted to be meal compliant. Double portions ordered to help meet ~ needs.   Hx unremarkable per nutrition  Ht: 6'3\"  Wt: 250 lb  BMI: 31.25 kg/(m^2) c/w obesity grade I  Est energy needs: 2255 kcal, 75 g protein, 1 mL/kcal fluids  Pt will consume > 75% of meals at follow up 7-10 days  LOS

## 2019-06-23 NOTE — PROGRESS NOTES
1930 Rec'd report from offgoing RN. 1935 Pt lying in bed awake. Cooperative with nursing assessment. Denies SI/HI/AVH. Preoccupied in thought, responding to internal stimuli. Denies pain. NAD noted. Encouraged pt to drink fluids. 2200 Pt lying in bed with eyes closed. Easily awakened. Medication administration attempted. However, pt spit out his meds. Attempted to administer trazodone and ambien. Both medications were wasted. Pt in and out of the bathroom. He dropped the toilet paper roll in the toilet. Staff removed the toilet paper roll. Verbal directives given. Pt speaking with a garbled tone. Meds were pulled again from the Pyxis and administered by mouth with verbal cues to drink water and swallow medications (trazodone and ambien). Pt is lying in bed talking randomly. 2320 Pt lying in bed quietly. 0140 Pt lying in the floor. Pt pulled mattress off of the bed, but is lying on the floor quietly. 0200 Pt's toilet flooded, which resulted in a leak to the second floor. Pt relocated to the seclusion room, with door left open. Lying on the mattress. NAD noted. 0300 Pt continues to rest in the seclusion room with the door open. Eyes closed, even respirations. 0400 Pt resting with eyes closed, even respirations. Seclusion room door open. 0500 Pt resting in the seclusion room with eyes closed, even respirations. 0600 Pt lying in the seclusion room, with door open. Pt redirected to return to his room. Pt rested for ~4.75 hours.  Q15min checks and nursing rounds continue for safety

## 2019-06-23 NOTE — BH NOTES
Adult Progress Note    Date: 6/23/2019  Account Number:  [de-identified]  Name: Eladia Matthews  Diagnosis: psychotic d/o  Length of session: 10 minutes    Subjective:     Patient Active Problem List    Diagnosis Date Noted    Schizoaffective disorder ([facility] 06/18/2019     No past surgical history on file. No Known Allergies   Social History     Tobacco Use    Smoking status: Not on file   Substance Use Topics    Alcohol use: Not on file      No family history on file. Review of Systems  Review of systems not obtained due to patient factors. Objective:         Patient Vitals for the past 8 hrs:   BP Temp Pulse Resp SpO2   06/23/19 0855 (!) 138/92 97.7 °F (36.5 °C) (!) 109 20 98 %       Lab/Data Review: All lab results for the last 24 hours reviewed. Mental Status exam: WNL except for    Sensorium  disorganized   Orientation person   Relations guarded   Eye Contact poor   Appearance:  age appropriate and pt naked and in seclusion room   Motor Behavior:  hyperactive and within normal limits   Speech:  loud   Thought Process: blocked and illogical   Thought Content delusions       Assessment/Plan:   Principal Problem:    Schizoaffective disorder ([facility] (6/18/2019)    Pt was in seclusion room (not restraints) refusing to wear clothes, grossly disorganized. Decompensated since yesterday. As of this writing, he has been \"banging his hands against the windows\" very agitated. He has been getting Invega 9 mg each morning but not improving. Past history shows use of Risperdal, may be more effective. Also in [address] [place] [address] form. Will switch antipsychotics for lack of efficacy.     Risperdal 1 mg PO once now  D/c Invega  Start Risperdal 1 mg in the AM, 2 mg at HS        Medications:    Current Facility-Administered Medications   Medication Dose Route Frequency    thiamine HCL (B-1) tablet 100 mg  100 mg Oral DAILY    therapeutic multivitamin (THERAGRAN) tablet 1 Tab  1 Tab Oral DAILY    folic acid (FOLVITE) tablet 1 mg  1 mg Oral DAILY    labetalol (NORMODYNE) tablet 200 mg  200 mg Oral Q4H PRN    paliperidone (INVEGA) SR tablet 9 mg  9 mg Oral DAILY    haloperidol (HALDOL) tablet 5 mg  5 mg Oral Q6H PRN    And    LORazepam (ATIVAN) tablet 2 mg  2 mg Oral Q6H PRN    And    diphenhydrAMINE (BENADRYL) capsule 50 mg  50 mg Oral Q6H PRN    haloperidol lactate (HALDOL) injection 5 mg  5 mg IntraMUSCular Q6H PRN    And    LORazepam (ATIVAN) injection 2 mg  2 mg IntraMUSCular Q6H PRN    And    diphenhydrAMINE (BENADRYL) injection 50 mg  50 mg IntraMUSCular Q6H PRN    traZODone (DESYREL) tablet 100 mg  100 mg Oral QHS    zolpidem (AMBIEN) tablet 5 mg  5 mg Oral QHS PRN    benztropine (COGENTIN) tablet 2 mg  2 mg Oral BID PRN    benztropine (COGENTIN) injection 2 mg  2 mg IntraMUSCular BID PRN    acetaminophen (TYLENOL) tablet 650 mg  650 mg Oral Q4H PRN    ibuprofen (MOTRIN) tablet 400 mg  400 mg Oral Q8H PRN    magnesium hydroxide (MILK OF MAGNESIA) 400 mg/5 mL oral suspension 30 mL  30 mL Oral DAILY PRN    nicotine (NICODERM CQ) 21 mg/24 hr patch 1 Patch  1 Patch TransDERmal DAILY PRN    hydrOXYzine HCl (ATARAX) tablet 50 mg  50 mg Oral Q6H PRN           The following information was reviewed and discussed:  unable to communicate with patient due to mental status

## 2019-06-23 NOTE — PROGRESS NOTES
Report received from PM RN. Alert and oriented. Patient was placed in the seclusion room with the door open this morning so maintenance could unplug his toilet. He kept flicking the lights on and off and then finally he started banging on the window. Placed on continuous observation. Doctor made some medication changes and when his room was ready he was placed back in his room without further incident. Hourly rounds done and continues. Will continue to monitor patients status and assess needs per [facility] protocol.

## 2019-06-24 LAB
ALBUMIN SERPL-MCNC: 3.5 G/DL (ref 3.5–5)
ALBUMIN SERPL-MCNC: 4.1 G/DL (ref 3.5–5)
ALBUMIN/GLOB SERPL: 1.2 {RATIO} (ref 1.1–2.2)
ALBUMIN/GLOB SERPL: 1.3 {RATIO} (ref 1.1–2.2)
ALP SERPL-CCNC: 79 U/L (ref 45–117)
ALP SERPL-CCNC: 96 U/L (ref 45–117)
ALT SERPL-CCNC: 112 U/L (ref 12–78)
ALT SERPL-CCNC: 113 U/L (ref 12–78)
AMPHET UR QL SCN: NEGATIVE
ANION GAP SERPL CALC-SCNC: 11 MMOL/L (ref 5–15)
ANION GAP SERPL CALC-SCNC: 12 MMOL/L (ref 5–15)
AST SERPL-CCNC: 196 U/L (ref 15–37)
AST SERPL-CCNC: 227 U/L (ref 15–37)
ATRIAL RATE: 116 BPM
BARBITURATES UR QL SCN: NEGATIVE
BENZODIAZ UR QL: NEGATIVE
BILIRUB SERPL-MCNC: 0.6 MG/DL (ref 0.2–1)
BILIRUB SERPL-MCNC: 0.8 MG/DL (ref 0.2–1)
BUN SERPL-MCNC: 24 MG/DL (ref 6–20)
BUN SERPL-MCNC: 29 MG/DL (ref 6–20)
BUN/CREAT SERPL: 20 (ref 12–20)
BUN/CREAT SERPL: 22 (ref 12–20)
CALCIUM SERPL-MCNC: 8.2 MG/DL (ref 8.5–10.1)
CALCIUM SERPL-MCNC: 9.1 MG/DL (ref 8.5–10.1)
CALCULATED P AXIS, ECG09: 68 DEGREES
CALCULATED R AXIS, ECG10: 8 DEGREES
CALCULATED T AXIS, ECG11: 26 DEGREES
CANNABINOIDS UR QL SCN: NEGATIVE
CHLORIDE SERPL-SCNC: 101 MMOL/L (ref 97–108)
CHLORIDE SERPL-SCNC: 102 MMOL/L (ref 97–108)
CK MB CFR SERPL CALC: 0.5 % (ref 0–2.5)
CK MB CFR SERPL CALC: 0.7 % (ref 0–2.5)
CK MB SERPL-MCNC: 44.9 NG/ML (ref 5–25)
CK MB SERPL-MCNC: 46.4 NG/ML (ref 5–25)
CK SERPL-CCNC: 6783 U/L (ref 39–308)
CK SERPL-CCNC: 8489 U/L (ref 39–308)
CO2 SERPL-SCNC: 25 MMOL/L (ref 21–32)
CO2 SERPL-SCNC: 27 MMOL/L (ref 21–32)
COCAINE UR QL SCN: NEGATIVE
CREAT SERPL-MCNC: 1.08 MG/DL (ref 0.7–1.3)
CREAT SERPL-MCNC: 1.44 MG/DL (ref 0.7–1.3)
DIAGNOSIS, 93000: NORMAL
DRUG SCRN COMMENT,DRGCM: NORMAL
ERYTHROCYTE [DISTWIDTH] IN BLOOD BY AUTOMATED COUNT: 12.8 % (ref 11.5–14.5)
GLOBULIN SER CALC-MCNC: 2.9 G/DL (ref 2–4)
GLOBULIN SER CALC-MCNC: 3.1 G/DL (ref 2–4)
GLUCOSE SERPL-MCNC: 114 MG/DL (ref 65–100)
GLUCOSE SERPL-MCNC: 151 MG/DL (ref 65–100)
HCT VFR BLD AUTO: 35.5 % (ref 36.6–50.3)
HGB BLD-MCNC: 12.1 G/DL (ref 12.1–17)
LACTATE SERPL-SCNC: 0.9 MMOL/L (ref 0.4–2)
MAGNESIUM SERPL-MCNC: 2 MG/DL (ref 1.6–2.4)
MCH RBC QN AUTO: 28.6 PG (ref 26–34)
MCHC RBC AUTO-ENTMCNC: 34.1 G/DL (ref 30–36.5)
MCV RBC AUTO: 83.9 FL (ref 80–99)
METHADONE UR QL: NEGATIVE
NRBC # BLD: 0 K/UL (ref 0–0.01)
NRBC BLD-RTO: 0 PER 100 WBC
OPIATES UR QL: NEGATIVE
P-R INTERVAL, ECG05: 144 MS
PCP UR QL: NEGATIVE
PLATELET # BLD AUTO: 286 K/UL (ref 150–400)
PMV BLD AUTO: 10 FL (ref 8.9–12.9)
POTASSIUM SERPL-SCNC: 3.3 MMOL/L (ref 3.5–5.1)
POTASSIUM SERPL-SCNC: 3.6 MMOL/L (ref 3.5–5.1)
PROT SERPL-MCNC: 6.4 G/DL (ref 6.4–8.2)
PROT SERPL-MCNC: 7.2 G/DL (ref 6.4–8.2)
Q-T INTERVAL, ECG07: 308 MS
QRS DURATION, ECG06: 78 MS
QTC CALCULATION (BEZET), ECG08: 428 MS
RBC # BLD AUTO: 4.23 M/UL (ref 4.1–5.7)
SODIUM SERPL-SCNC: 138 MMOL/L (ref 136–145)
SODIUM SERPL-SCNC: 140 MMOL/L (ref 136–145)
TROPONIN I SERPL-MCNC: <0.05 NG/ML
VENTRICULAR RATE, ECG03: 116 BPM
WBC # BLD AUTO: 10.6 K/UL (ref 4.1–11.1)

## 2019-06-24 PROCEDURE — 74011250636 HC RX REV CODE- 250/636: Performed by: NEUROMUSCULOSKELETAL MEDICINE & OMM

## 2019-06-24 PROCEDURE — 74011250637 HC RX REV CODE- 250/637: Performed by: PSYCHIATRY & NEUROLOGY

## 2019-06-24 PROCEDURE — 74011250636 HC RX REV CODE- 250/636: Performed by: PSYCHIATRY & NEUROLOGY

## 2019-06-24 PROCEDURE — 83735 ASSAY OF MAGNESIUM: CPT

## 2019-06-24 PROCEDURE — 36415 COLL VENOUS BLD VENIPUNCTURE: CPT

## 2019-06-24 PROCEDURE — 93005 ELECTROCARDIOGRAM TRACING: CPT

## 2019-06-24 PROCEDURE — 83605 ASSAY OF LACTIC ACID: CPT

## 2019-06-24 PROCEDURE — 80307 DRUG TEST PRSMV CHEM ANLYZR: CPT

## 2019-06-24 PROCEDURE — 84484 ASSAY OF TROPONIN QUANT: CPT

## 2019-06-24 PROCEDURE — 74011250637 HC RX REV CODE- 250/637: Performed by: NEUROMUSCULOSKELETAL MEDICINE & OMM

## 2019-06-24 PROCEDURE — 82550 ASSAY OF CK (CPK): CPT

## 2019-06-24 PROCEDURE — 85027 COMPLETE CBC AUTOMATED: CPT

## 2019-06-24 PROCEDURE — 80053 COMPREHEN METABOLIC PANEL: CPT

## 2019-06-24 PROCEDURE — 65220000003 HC RM SEMIPRIVATE PSYCH

## 2019-06-24 RX ORDER — LORAZEPAM 2 MG/ML
1 INJECTION INTRAMUSCULAR
Status: DISCONTINUED | OUTPATIENT
Start: 2019-06-24 | End: 2019-06-25 | Stop reason: HOSPADM

## 2019-06-24 RX ORDER — LORAZEPAM 1 MG/1
4 TABLET ORAL
Status: DISCONTINUED | OUTPATIENT
Start: 2019-06-24 | End: 2019-06-25 | Stop reason: HOSPADM

## 2019-06-24 RX ORDER — LORAZEPAM 1 MG/1
2 TABLET ORAL
Status: DISCONTINUED | OUTPATIENT
Start: 2019-06-24 | End: 2019-06-25 | Stop reason: HOSPADM

## 2019-06-24 RX ORDER — SODIUM CHLORIDE 9 MG/ML
50 INJECTION, SOLUTION INTRAVENOUS CONTINUOUS
Status: DISCONTINUED | OUTPATIENT
Start: 2019-06-24 | End: 2019-06-24

## 2019-06-24 RX ORDER — HEPARIN SODIUM 5000 [USP'U]/ML
5000 INJECTION, SOLUTION INTRAVENOUS; SUBCUTANEOUS EVERY 12 HOURS
Status: DISCONTINUED | OUTPATIENT
Start: 2019-06-24 | End: 2019-06-24

## 2019-06-24 RX ORDER — SODIUM CHLORIDE 9 MG/ML
500 INJECTION, SOLUTION INTRAVENOUS CONTINUOUS
Status: DISCONTINUED | OUTPATIENT
Start: 2019-06-24 | End: 2019-06-24 | Stop reason: ALTCHOICE

## 2019-06-24 RX ORDER — SODIUM CHLORIDE 9 MG/ML
150 INJECTION, SOLUTION INTRAVENOUS CONTINUOUS
Status: DISCONTINUED | OUTPATIENT
Start: 2019-06-24 | End: 2019-06-24

## 2019-06-24 RX ADMIN — SODIUM CHLORIDE 1000 ML: 900 INJECTION, SOLUTION INTRAVENOUS at 22:08

## 2019-06-24 RX ADMIN — SODIUM CHLORIDE 1000 ML: 900 INJECTION, SOLUTION INTRAVENOUS at 19:53

## 2019-06-24 RX ADMIN — Medication 100 MG: at 11:00

## 2019-06-24 RX ADMIN — LORAZEPAM 4 MG: 1 TABLET ORAL at 16:44

## 2019-06-24 RX ADMIN — DIPHENHYDRAMINE HYDROCHLORIDE 50 MG: 50 INJECTION, SOLUTION INTRAMUSCULAR; INTRAVENOUS at 06:39

## 2019-06-24 RX ADMIN — LORAZEPAM 2 MG: 2 INJECTION INTRAMUSCULAR; INTRAVENOUS at 06:39

## 2019-06-24 RX ADMIN — ACETAMINOPHEN 650 MG: 325 TABLET, FILM COATED ORAL at 17:31

## 2019-06-24 RX ADMIN — HALOPERIDOL LACTATE 5 MG: 5 INJECTION INTRAMUSCULAR at 06:39

## 2019-06-24 RX ADMIN — SODIUM CHLORIDE 500 ML/HR: 900 INJECTION, SOLUTION INTRAVENOUS at 17:53

## 2019-06-24 RX ADMIN — RISPERIDONE 1 MG: 1 TABLET ORAL at 11:01

## 2019-06-24 RX ADMIN — THERA TABS 1 TABLET: TAB at 11:01

## 2019-06-24 RX ADMIN — FOLIC ACID 1 MG: 1 TABLET ORAL at 11:01

## 2019-06-24 NOTE — BH NOTES
Pt speech is more lucid and he is making more sense. Asking for water and is drinking a lot of water.

## 2019-06-24 NOTE — PROGRESS NOTES
Hospitalist Progress Note    NAME: Hilary Wright   :  1985   MRN:  [id number]       Assessment / Plan:  1. Acute psychosis / delerium/ ? Withdrawal - will try on ciwa scale and see how he respons. He was started on risperal yesterday and appears more coherent today though still has episodes of confusion.     2. Worsening rhabdomyolysis / dehydration -  needs iv fluids. Give 3 liters over 6 hrs.  recheck ck, bmp after iv fluids to ensure levels are coming down. Will need restraints while on iv fluids keep pt from pulling out iv. I was asked if this could be NMS. Pt with no fevers or muscle rigidity. If cks come down with iv fluids than more likely secondary to dehydration. 4. htn (controlled) - hold bp meds for now until psychosis resolves.              Prophylaxis: heparin sq bid until off restraints     Subjective:     Chief Complaint / Reason for Physician Visit  \"pt states he is very thirsty and urine is dark. Review with nursing indicates that sedate most of the morning and then ate a meal around noon. \". Discussed with RN events overnight. Review of Systems:  Symptom Y/N Comments  Symptom Y/N Comments   Fever/Chills    Chest Pain     Poor Appetite    Edema     Cough    Abdominal Pain     Sputum    Joint Pain     SOB/DUNCAN    Pruritis/Rash     Nausea/vomit    Tolerating PT/OT     Diarrhea    Tolerating Diet     Constipation    Other       Could NOT obtain due to:      Objective:     VITALS:   Last 24hrs VS reviewed since prior progress note. Most recent are:  Patient Vitals for the past 24 hrs:   Temp Pulse Resp BP   19 0720 98.1 °F (36.7 °C) (!) 112 20 106/84   19 2136 98.5 °F (36.9 °C) (!) 102 22 141/89     No intake or output data in the 24 hours ending 19 1641     PHYSICAL EXAM:  General: Awake and conversive, speech is still incoherent but more clear than yesterday. EENT:  EOMI. Anicteric sclerae. MMM  Resp:  CTA bilaterally, no wheezing or rales.   No accessory muscle use  CV:  Regular  rhythm,  No edema  GI:  Soft, Non distended, Non tender.  +Bowel sounds  Psych:   Less agitated but still poor insight. Skin:  No rashes. No jaundice    Reviewed most current lab test results and cultures  YES  Reviewed most current radiology test results   YES  Review and summation of old records today    NO  Reviewed patient's current orders and MAR    YES  PMH/SH reviewed - no change compared to H&P  ________________________________________________________________________  Care Plan discussed with:    Comments   Patient     Family      RN     Care Manager     Consultant                        Multidiciplinary team rounds were held today with , nursing, pharmacist and clinical coordinator. Patient's plan of care was discussed; medications were reviewed and discharge planning was addressed. ________________________________________________________________________  Total NON critical care TIME:  30   Minutes    Total CRITICAL CARE TIME Spent:   Minutes non procedure based      Comments   >50% of visit spent in counseling and coordination of care     ________________________________________________________________________  Joon Stokes, DO     Procedures: see electronic medical records for all procedures/Xrays and details which were not copied into this note but were reviewed prior to creation of Plan. LABS:  I reviewed today's most current labs and imaging studies. Pertinent labs include:  No results for input(s): WBC, HGB, HCT, PLT, HGBEXT, HCTEXT, PLTEXT in the last 72 hours.   Recent Labs     06/24/19  1251      K 3.6      CO2 27   *   BUN 29*   CREA 1.44*   CA 9.1   ALB 4.1   TBILI 0.8   SGOT 196*   *       Signed: Joon Stokes DO

## 2019-06-24 NOTE — BH NOTES
Psychiatric Progress Note    Date: 6/24/2019  Account Number:  [de-identified]  Name: Fanta Jose  Diagnosis: psychotic d/o  Length of session: 10 minutes    Subjective:     Patient Active Problem List    Diagnosis Date Noted    Schizoaffective disorder ([facility] 06/18/2019     No past surgical history on file. No Known Allergies   Social History     Tobacco Use    Smoking status: Not on file   Substance Use Topics    Alcohol use: Not on file      No family history on file. Review of Systems  Review of systems not obtained due to patient factors. Objective:         Patient Vitals for the past 8 hrs:   BP Temp Pulse Resp   06/24/19 0720 106/84 98.1 °F (36.7 °C) (!) 112 20       Lab/Data Review: All lab results for the last 24 hours reviewed. Mental Status exam: WNL except for    Sensorium  disorganized   Orientation person   Relations passive and unreliable   Eye Contact poor   Appearance:  age appropriate in his room   Motor Behavior:  restless   Speech:  normal volume, non-pressured, garbled and sounds inebriated   Thought Process: illogical and disorganized   Thought Content delusions       Assessment/Plan:   Principal Problem:    Schizoaffective disorder ([facility] (6/18/2019)    Pt was in seclusion room (not restraints) refusing to wear clothes, grossly disorganized. Decompensated since yesterday. As of this writing, he has been \"banging his hands against the windows\" very agitated. He has been getting Invega 9 mg each morning but not improving. Past history shows use of Risperdal, may be more effective. Also in [address] [place] [address] form. Will switch antipsychotics for lack of efficacy. 6/24--Jesús was in his own room today with a 1:1 tech due to repeated disrobing and wandering behaviors. No aggression or violence. However is seeing his daughter in the attic, reaching toward the ceiling in the room. Appears to be inebriated.   Speech is slurred and he is not making much sense though responds appropriately to questions. Denies SI/HI. Hearing the voice of his  mother.        Continue current care with risperdal  Follow up on lab results  Discuss physical health with Dr. Macie Sanz (hospitalist following) for plan to r/o delirium      Medications:  Reviewed    The following information was reviewed and discussed:  Medical derangements of labs, jaundice appearance to nursing

## 2019-06-24 NOTE — BH NOTES
Patient placed on hospital bed & is a 1:1 at this time. Bilateral soft wrist restraints applied without difficulty, patient tolerated. Patient is in need of fluids at this time, nurse attempted before & pulled out lines. Dr. Hess He consulted. Behavioral Restraint Face-to-Face Evaluation  (must be completed within one hour of initiation of restraints)      Evaluate immediate situation:  pt needing fluids, pt uncooperative & pulling lines    Reaction to intervention: pt tolerated    Medical Condition/Assessment: pt needs fluids per physician     Behavioral Condition/Assessment: pt uncooperative & pulling lines    The patients review of systems, history, medications, and recent labs were reviewed at this time.      Continue/Discontinue restraints at this time: Continue

## 2019-06-24 NOTE — BH NOTES
Patient continues with bizarre behaviors. He appears to be confused as he had a difficult time following one step directions such as \"turn the lights on\". Patient needed several episodes or redirection as he attempted to come into the hallways naked and he tried to put his gown in the toilet. Patient was offered a sandwich and juice along with his bedtime medications. He accepted all medications. He denies any current feelings of SI or HI. Patient appears to be hearing voices at times, but he denies when he his asked about hallucinations. He requires frequent room checks as he can be heard shifting furniture around his room.

## 2019-06-24 NOTE — BH NOTES
0700-Report received from [facility] previous nurse stated patient was just given B-52 and that she had to stay in his room reference to him wanting to flush his clothes and linens down the toilet and was constantly trying to get out of bed to go back into the bathroom. She stated she took everything off his bed so he could not longer flush it down the toilet and that everything was removed from his shelf as well for safety. 0720-Walking into patients room, he was naked and wet and unsteady on his feet, bumping into the walls. Patient also had garbled speech. The floor in patients room was completely saturated with water. Patient was confused, stating someone was trying to shoot him. Patient had to be directed to get into his bed and lay down. Patient kept saying he needed to shower. Patient had to be directed that he just took a shower. Patient also had a piece of plastic in his hand, that he took off the shower curtain that was holding  up the shower curtain tra. Plastic piece was removed from patients hand. Patient was extremely agitated and had to keep being directed to stay in bed. Patient appears jaundiced as well. 1 to 1 observation was placed on patient by nurse due to acute confusion. Charge nurse [facility] was made aware of situation. Patient was observed for safety by T until morning rounds could be completed by nurse. Patient denies SI/HI but is hearing voices. It is not clear who he is saying the voices are as his speech is garbled. 0830-Patient in his room sleeping. Held morning meds, and his breakfast was saved for him should he wake up.    0930-Patient still sleeping. 1030-Patient sleeping in bed. 1130-Patient awake in bed, needs constant redirection to lay back down, he is still unsteady on his feet, patients speech is still garbled, but is redirectable. Patient keeps trying to get into the shower. Patient is on 1 to 1. T in the room with him.  Patient given his breakfast to eat and this nurse gave him roughly 800 ml's of water. Patient also given his morning medications. 1230-Patient is eating his lunch in his room. Patients speech is still garbled. Patient was given another gown to put on and also encouraged to drink more water. Patient did drink another 300 ml's of water. 1340-Patient in bed. He is still confused, however his speech has improved. He appears to be more cohearant, and is more receptive to commands. 1418-Patient in his room stating he is [place]. Some gold fish and ham sandwich were provided to him. 1445-Patients pants are missing they are not in his bin, his boxers are being washed, so he can keep some clothing on. [address] with Dr. Jj Cameron, he agreed 1 to 1 observation was appropriate, order was placed. 1649-Patient was given prn 4 mg Ativan po.    1700-Patient was placed in bi-lateral wrist restraints. 1715-Patient had 18 gauge iv started in left upper arm (bicep) with 50 ml/ns running per order. IV and fluids was started by Roxann Horton.    1800-Patients IV fluid orders changed increased to 500 ml/hr. Patient given prn tylenol 650 mg po ref temperature of 99.0    1803-Patient being fed dinner by this nurse, patient ate about 5% of his dinner and then began spitting it out and saying I'm not eating anymore until you take this IV out. Patient was told again, the IV needs to stay in place to make him feel better and he refused to eat anymore. 1843-SCD's applied bi-laterally per physician orders. Patient offered water.  Patients temperature was rechecked following prn tylenol for elevated temp, it is now 97.7

## 2019-06-25 ENCOUNTER — HOSPITAL ENCOUNTER (INPATIENT)
Age: 34
LOS: 6 days | Discharge: PSYCHIATRIC OR SUBSTANCE USE FACILITY | DRG: 558 | End: 2019-07-01
Attending: INTERNAL MEDICINE | Admitting: INTERNAL MEDICINE
Payer: MEDICARE

## 2019-06-25 VITALS
SYSTOLIC BLOOD PRESSURE: 145 MMHG | HEART RATE: 107 BPM | DIASTOLIC BLOOD PRESSURE: 85 MMHG | HEIGHT: 75 IN | OXYGEN SATURATION: 98 % | TEMPERATURE: 99.7 F | RESPIRATION RATE: 20 BRPM | BODY MASS INDEX: 31.08 KG/M2 | WEIGHT: 250 LBS

## 2019-06-25 PROBLEM — R79.89 ELEVATED LFTS: Status: ACTIVE | Noted: 2019-06-25

## 2019-06-25 PROBLEM — M62.82 RHABDOMYOLYSIS: Status: ACTIVE | Noted: 2019-06-25

## 2019-06-25 LAB
ALBUMIN SERPL-MCNC: 3.4 G/DL (ref 3.5–5)
ALBUMIN/GLOB SERPL: 1.2 {RATIO} (ref 1.1–2.2)
ALP SERPL-CCNC: 77 U/L (ref 45–117)
ALT SERPL-CCNC: 120 U/L (ref 12–78)
ANION GAP SERPL CALC-SCNC: 11 MMOL/L (ref 5–15)
ANION GAP SERPL CALC-SCNC: 11 MMOL/L (ref 5–15)
AST SERPL-CCNC: 201 U/L (ref 15–37)
BILIRUB SERPL-MCNC: 0.8 MG/DL (ref 0.2–1)
BUN SERPL-MCNC: 16 MG/DL (ref 6–20)
BUN SERPL-MCNC: 19 MG/DL (ref 6–20)
BUN/CREAT SERPL: 17 (ref 12–20)
BUN/CREAT SERPL: 20 (ref 12–20)
CALCIUM SERPL-MCNC: 8 MG/DL (ref 8.5–10.1)
CALCIUM SERPL-MCNC: 8 MG/DL (ref 8.5–10.1)
CHLORIDE SERPL-SCNC: 104 MMOL/L (ref 97–108)
CHLORIDE SERPL-SCNC: 104 MMOL/L (ref 97–108)
CK MB CFR SERPL CALC: 0.4 % (ref 0–2.5)
CK MB SERPL-MCNC: 26.9 NG/ML (ref 5–25)
CK SERPL-CCNC: 3820 U/L (ref 39–308)
CK SERPL-CCNC: 5828 U/L (ref 39–308)
CK SERPL-CCNC: 7685 U/L (ref 39–308)
CO2 SERPL-SCNC: 25 MMOL/L (ref 21–32)
CO2 SERPL-SCNC: 26 MMOL/L (ref 21–32)
CREAT SERPL-MCNC: 0.93 MG/DL (ref 0.7–1.3)
CREAT SERPL-MCNC: 0.95 MG/DL (ref 0.7–1.3)
ERYTHROCYTE [DISTWIDTH] IN BLOOD BY AUTOMATED COUNT: 12.9 % (ref 11.5–14.5)
GLOBULIN SER CALC-MCNC: 2.9 G/DL (ref 2–4)
GLUCOSE SERPL-MCNC: 123 MG/DL (ref 65–100)
GLUCOSE SERPL-MCNC: 96 MG/DL (ref 65–100)
HCT VFR BLD AUTO: 34.8 % (ref 36.6–50.3)
HGB BLD-MCNC: 12 G/DL (ref 12.1–17)
MAGNESIUM SERPL-MCNC: 1.9 MG/DL (ref 1.6–2.4)
MCH RBC QN AUTO: 29.3 PG (ref 26–34)
MCHC RBC AUTO-ENTMCNC: 34.5 G/DL (ref 30–36.5)
MCV RBC AUTO: 85.1 FL (ref 80–99)
NRBC # BLD: 0 K/UL (ref 0–0.01)
NRBC BLD-RTO: 0 PER 100 WBC
PLATELET # BLD AUTO: 240 K/UL (ref 150–400)
PMV BLD AUTO: 10 FL (ref 8.9–12.9)
POTASSIUM SERPL-SCNC: 3.4 MMOL/L (ref 3.5–5.1)
POTASSIUM SERPL-SCNC: 3.4 MMOL/L (ref 3.5–5.1)
PROT SERPL-MCNC: 6.3 G/DL (ref 6.4–8.2)
RBC # BLD AUTO: 4.09 M/UL (ref 4.1–5.7)
SODIUM SERPL-SCNC: 140 MMOL/L (ref 136–145)
SODIUM SERPL-SCNC: 141 MMOL/L (ref 136–145)
WBC # BLD AUTO: 7 K/UL (ref 4.1–11.1)

## 2019-06-25 PROCEDURE — 74011250637 HC RX REV CODE- 250/637: Performed by: PSYCHIATRY & NEUROLOGY

## 2019-06-25 PROCEDURE — 80053 COMPREHEN METABOLIC PANEL: CPT

## 2019-06-25 PROCEDURE — 85027 COMPLETE CBC AUTOMATED: CPT

## 2019-06-25 PROCEDURE — 36415 COLL VENOUS BLD VENIPUNCTURE: CPT

## 2019-06-25 PROCEDURE — 74011250637 HC RX REV CODE- 250/637: Performed by: INTERNAL MEDICINE

## 2019-06-25 PROCEDURE — 65660000001 HC RM ICU INTERMED STEPDOWN

## 2019-06-25 PROCEDURE — 82550 ASSAY OF CK (CPK): CPT

## 2019-06-25 PROCEDURE — 74011250636 HC RX REV CODE- 250/636: Performed by: INTERNAL MEDICINE

## 2019-06-25 PROCEDURE — 74011250636 HC RX REV CODE- 250/636: Performed by: NURSE PRACTITIONER

## 2019-06-25 PROCEDURE — 83735 ASSAY OF MAGNESIUM: CPT

## 2019-06-25 RX ORDER — POTASSIUM CHLORIDE 7.45 MG/ML
10 INJECTION INTRAVENOUS
Status: COMPLETED | OUTPATIENT
Start: 2019-06-25 | End: 2019-06-25

## 2019-06-25 RX ORDER — FOLIC ACID 1 MG/1
1 TABLET ORAL DAILY
Status: DISCONTINUED | OUTPATIENT
Start: 2019-06-25 | End: 2019-07-01 | Stop reason: HOSPADM

## 2019-06-25 RX ORDER — POTASSIUM CHLORIDE 750 MG/1
40 TABLET, FILM COATED, EXTENDED RELEASE ORAL
Status: COMPLETED | OUTPATIENT
Start: 2019-06-25 | End: 2019-06-25

## 2019-06-25 RX ORDER — SODIUM CHLORIDE 9 MG/ML
250 INJECTION, SOLUTION INTRAVENOUS CONTINUOUS
Status: DISCONTINUED | OUTPATIENT
Start: 2019-06-25 | End: 2019-06-25

## 2019-06-25 RX ORDER — LORAZEPAM 2 MG/ML
4 INJECTION INTRAMUSCULAR
Status: DISCONTINUED | OUTPATIENT
Start: 2019-06-25 | End: 2019-07-01 | Stop reason: HOSPADM

## 2019-06-25 RX ORDER — LANOLIN ALCOHOL/MO/W.PET/CERES
100 CREAM (GRAM) TOPICAL DAILY
Status: DISCONTINUED | OUTPATIENT
Start: 2019-06-25 | End: 2019-07-01 | Stop reason: HOSPADM

## 2019-06-25 RX ORDER — ENOXAPARIN SODIUM 100 MG/ML
40 INJECTION SUBCUTANEOUS EVERY 24 HOURS
Status: DISCONTINUED | OUTPATIENT
Start: 2019-06-25 | End: 2019-07-01 | Stop reason: HOSPADM

## 2019-06-25 RX ORDER — SODIUM CHLORIDE 0.9 % (FLUSH) 0.9 %
5-40 SYRINGE (ML) INJECTION EVERY 8 HOURS
Status: DISCONTINUED | OUTPATIENT
Start: 2019-06-25 | End: 2019-07-01 | Stop reason: HOSPADM

## 2019-06-25 RX ORDER — SODIUM CHLORIDE 0.9 % (FLUSH) 0.9 %
SYRINGE (ML) INJECTION
Status: DISCONTINUED
Start: 2019-06-25 | End: 2019-06-25 | Stop reason: HOSPADM

## 2019-06-25 RX ORDER — THERA TABS 400 MCG
1 TAB ORAL DAILY
Status: DISCONTINUED | OUTPATIENT
Start: 2019-06-25 | End: 2019-07-01 | Stop reason: HOSPADM

## 2019-06-25 RX ORDER — LABETALOL HCL 20 MG/4 ML
20 SYRINGE (ML) INTRAVENOUS
Status: DISCONTINUED | OUTPATIENT
Start: 2019-06-25 | End: 2019-07-01 | Stop reason: HOSPADM

## 2019-06-25 RX ORDER — LORAZEPAM 2 MG/ML
2 INJECTION INTRAMUSCULAR
Status: DISCONTINUED | OUTPATIENT
Start: 2019-06-25 | End: 2019-07-01 | Stop reason: HOSPADM

## 2019-06-25 RX ORDER — POTASSIUM CHLORIDE AND SODIUM CHLORIDE 900; 300 MG/100ML; MG/100ML
INJECTION, SOLUTION INTRAVENOUS CONTINUOUS
Status: DISCONTINUED | OUTPATIENT
Start: 2019-06-25 | End: 2019-06-25

## 2019-06-25 RX ORDER — SODIUM CHLORIDE 0.9 % (FLUSH) 0.9 %
5-40 SYRINGE (ML) INJECTION AS NEEDED
Status: DISCONTINUED | OUTPATIENT
Start: 2019-06-25 | End: 2019-07-01 | Stop reason: HOSPADM

## 2019-06-25 RX ORDER — SODIUM CHLORIDE AND POTASSIUM CHLORIDE .9; .15 G/100ML; G/100ML
SOLUTION INTRAVENOUS CONTINUOUS
Status: DISCONTINUED | OUTPATIENT
Start: 2019-06-25 | End: 2019-06-26

## 2019-06-25 RX ADMIN — LORAZEPAM 4 MG: 2 INJECTION INTRAMUSCULAR; INTRAVENOUS at 11:34

## 2019-06-25 RX ADMIN — POTASSIUM CHLORIDE 10 MEQ: 10 INJECTION, SOLUTION INTRAVENOUS at 17:00

## 2019-06-25 RX ADMIN — SODIUM CHLORIDE AND POTASSIUM CHLORIDE: .9; .15 SOLUTION INTRAVENOUS at 16:28

## 2019-06-25 RX ADMIN — POTASSIUM CHLORIDE 10 MEQ: 10 INJECTION, SOLUTION INTRAVENOUS at 14:24

## 2019-06-25 RX ADMIN — LORAZEPAM 2 MG: 2 INJECTION INTRAMUSCULAR; INTRAVENOUS at 20:57

## 2019-06-25 RX ADMIN — FOLIC ACID 1 MG: 1 TABLET ORAL at 10:36

## 2019-06-25 RX ADMIN — Medication 10 ML: at 10:00

## 2019-06-25 RX ADMIN — LORAZEPAM 1 MG: 2 INJECTION INTRAMUSCULAR; INTRAVENOUS at 00:13

## 2019-06-25 RX ADMIN — THERA TABS 1 TABLET: TAB at 10:37

## 2019-06-25 RX ADMIN — ACETAMINOPHEN 650 MG: 325 TABLET, FILM COATED ORAL at 02:33

## 2019-06-25 RX ADMIN — Medication 100 MG: at 10:36

## 2019-06-25 RX ADMIN — SODIUM CHLORIDE 1000 ML: 900 INJECTION, SOLUTION INTRAVENOUS at 10:08

## 2019-06-25 RX ADMIN — POTASSIUM CHLORIDE 40 MEQ: 750 TABLET, EXTENDED RELEASE ORAL at 10:36

## 2019-06-25 RX ADMIN — POTASSIUM CHLORIDE 10 MEQ: 10 INJECTION, SOLUTION INTRAVENOUS at 18:07

## 2019-06-25 RX ADMIN — ENOXAPARIN SODIUM 40 MG: 40 INJECTION, SOLUTION INTRAVENOUS; SUBCUTANEOUS at 11:38

## 2019-06-25 RX ADMIN — SODIUM CHLORIDE AND POTASSIUM CHLORIDE: .9; .15 SOLUTION INTRAVENOUS at 23:44

## 2019-06-25 RX ADMIN — SODIUM CHLORIDE AND POTASSIUM CHLORIDE: .9; .15 SOLUTION INTRAVENOUS at 18:04

## 2019-06-25 RX ADMIN — Medication 10 ML: at 23:44

## 2019-06-25 RX ADMIN — TRAZODONE HYDROCHLORIDE 100 MG: 100 TABLET ORAL at 02:33

## 2019-06-25 RX ADMIN — SODIUM CHLORIDE AND POTASSIUM CHLORIDE: .9; .15 SOLUTION INTRAVENOUS at 11:31

## 2019-06-25 RX ADMIN — Medication 10 ML: at 14:00

## 2019-06-25 RX ADMIN — LORAZEPAM 4 MG: 2 INJECTION INTRAMUSCULAR; INTRAVENOUS at 10:36

## 2019-06-25 RX ADMIN — POTASSIUM CHLORIDE 10 MEQ: 10 INJECTION, SOLUTION INTRAVENOUS at 13:15

## 2019-06-25 RX ADMIN — RISPERIDONE 2 MG: 1 TABLET ORAL at 02:33

## 2019-06-25 NOTE — PROGRESS NOTES
Patient has elevated CPK due to possible rhabdomyolysis. Now it is slightly trending down. Patient received IV normal saline bolus yesterday. It was recommended by nursing staff that patient be transferred to [facility] due to medical/psychiatry combined requirement which cannot be fulfilled at [facility].  I talked to hospitalist at [facility] who recommended to obtain TD for transfer to [facility] before he would accept the transfer.  We will leave it up to daytime rounder to decide if patient needs further transfer orders Or can be managed locally

## 2019-06-25 NOTE — PROGRESS NOTES
Report called and given to Tisha Cart in ICU, patient transported to ICU by nursing supervisor Chelle Curry.

## 2019-06-25 NOTE — PROGRESS NOTES
10:11 AM  Critical potassium of 2.5. MD notified; will order repletion    10:34 AM  Left restraint removed so patient can eat his breakfast sandwich    1100. Left restraint replaced and right restraint removed. Discussed with patient that he is not to pull on the medical equipment; especially his IV; and if he is able to comply, we can leave the restraint off. Verbalizes understanding. 1:33 PM  Called psych consult. It will be doctor Sheryl Melo.   Facesheet will be faxed to [phone]

## 2019-06-25 NOTE — H&P
Hospitalist Admission Note    NAME: Yakelin Bass   :  1985   MRN:  [id number]   Room Number: ADM6/46  @ [facility]     Date/Time:  2019 9:08 AM    Patient PCP: None  ______________________________________________________________________  Given the patient's current clinical presentation, I have a high level of concern for decompensation if discharged from the emergency department. Complex decision making was performed, which includes reviewing the patient's available past medical records, laboratory results, and x-ray films. My assessment of this patient's clinical condition and my plan of care is as follows. Assessment / Plan:    Rhabdomyolysis most likely secondary to dehydration  Low suspicion for NMS. Despite the fact that the patient was recently started on Risperdal.  He does not have fever or muscle rigidity. Admit to stepdown  Monitor CK levels, continue with aggressive fluid hydration    Elevated LFTs  Check CMP. Continue IV fluids    Acute psychosis/delirium. Schizophrenia  Patient was recently started on Risperdal and was receiving Invega 9 mg IV daily per psychiatry. Consult psych  PRN Ativan for agitation    Hypokalemia  Replace    Code Status: full  Surrogate Decision Maker:motherELIZ([phone])    DVT Prophylaxis: Lovenox  GI Prophylaxis: not indicated    Baseline: Transferred from [facility]:   CHIEF COMPLAINT: delirium    HISTORY OF PRESENT ILLNESS:     Yakelin Bass is a 29 y.o.  male with PMH of schizophrenia who was transdferred from [facility] for medical management. He has history of schizoaffective disorder who was admitted on  to behavioral health unit with concerns of severe psychosis. Was seen by hospitalist on  for acute psychosis/delirium likely withdrawal.  Worsening rhabdomyolysis/dehydration.   He was started on IV fluids however patient ripped out IV lines and IV fluids could not be administered. His CK was around 8000. As per records, patient was receiving iInvega 9 mg every day but not improving. He was started on Risperdal by psychiatrist.  He was evaluated by hospitalist yesterday. Patient did not have any fever or muscle rigidity. elevated CK was most likely from dehydration. Overnight there were concerns of transferring the patient to [facility] due toworsening rhabdomyolysis however there were no ICU beds available. Patient seen by me this morning and transferred to stepdown unit. We were asked to admit for work up and evaluation of the above problems. past medical history -schizophrenia    No past surgical history -reviewed    Social History     Tobacco Use    Smoking status: None declared   Substance Use Topics    Alcohol use: None declared         family history-DM in family  No Known Allergies    Prior to Admission medications    Medication Sig Start Date End Date Taking? Authorizing Provider   traZODone (DESYREL) 150 mg tablet Take 300 mg by mouth nightly. Indications: Insomnia    Provider, Historical       REVIEW OF SYSTEMS:     I am not able to complete the review of systems because:    The patient is intubated and sedated    The patient has altered mental status due to his acute medical problems    The patient has baseline aphasia from prior stroke(s)    The patient has baseline dementia and is not reliable historian    The patient is in acute medical distress and unable to provide information           Total of 12 systems reviewed as follows:       POSITIVE= underlined text  Negative = text not underlined  General:  fever, chills, sweats, generalized weakness, weight loss/gain,      loss of appetite   Eyes:    blurred vision, eye pain, loss of vision, double vision  ENT:    rhinorrhea, pharyngitis   Respiratory:   cough, sputum production, SOB, DUNCAN, wheezing, pleuritic pain   Cardiology:   chest pain, palpitations, orthopnea, PND, edema, syncope Gastrointestinal:  abdominal pain , N/V, diarrhea, dysphagia, constipation, bleeding   Genitourinary:  frequency, urgency, dysuria, hematuria, incontinence   Muskuloskeletal :  arthralgia, myalgia, back pain  Hematology:  easy bruising, nose or gum bleeding, lymphadenopathy   Dermatological: rash, ulceration, pruritis, color change / jaundice  Endocrine:   hot flashes or polydipsia   Neurological:  headache, dizziness, confusion, focal weakness, paresthesia,     Speech difficulties, memory loss, gait difficulty  Psychological: Feelings of anxiety, depression, agitation    Objective:   VITALS:    Visit Vitals  BP (!) 134/92   Pulse 94   Temp 98 °F (36.7 °C)   Resp 17   SpO2 99%       PHYSICAL EXAM:    General:    Alert, cooperative, no distress, appears stated age. HEENT: Atraumatic, anicteric sclerae, pink conjunctivae     No oral ulcers, mucosa moist, throat clear, dentition fair  Neck:  Supple, symmetrical,  thyroid: non tender  Lungs:   Clear to auscultation bilaterally. No Wheezing or Rhonchi. No rales. Chest wall:  No tenderness  No Accessory muscle use. Heart:   Regular  rhythm,  No  murmur   No edema  Abdomen:   Soft, non-tender. Not distended. Bowel sounds normal  Extremities: No cyanosis. No clubbing,      Skin turgor normal, Capillary refill normal, Radial dial pulse 2+  Skin:     Not pale. Not Jaundiced  No rashes   Psych:  poor insight. Not depressed. Visual and auditory hallucinations,No anxious or agitation  Neurologic: EOMs intact. No facial asymmetry. No aphasia or slurred speech. Symmetrical strength, Sensation grossly intact.  Alert and oriented X 4.     ______________________________________________________________________    Care Plan discussed with:  Patient/Family, Nurse and     Expected  Disposition:  [facility]  ________________________________________________________________________  TOTAL TIME:  85 Minutes    Critical Care Provided     Minutes non procedure based Comments     Reviewed previous records   >50% of visit spent in counseling and coordination of care  Discussion with patient and/or family and questions answered       ________________________________________________________________________  Signed: Mitch Simmons MD    Procedures: see electronic medical records for all procedures/Xrays and details which were not copied into this note but were reviewed prior to creation of Plan. LAB DATA REVIEWED:    Recent Results (from the past 24 hour(s))   EKG, 12 LEAD, INITIAL    Collection Time: 06/24/19  8:16 PM   Result Value Ref Range    Ventricular Rate 116 BPM    Atrial Rate 116 BPM    P-R Interval 144 ms    QRS Duration 78 ms    Q-T Interval 308 ms    QTC Calculation (Bezet) 428 ms    Calculated P Axis 68 degrees    Calculated R Axis 8 degrees    Calculated T Axis 26 degrees    Diagnosis       Sinus tachycardia  Leftward axis  normal QTc  normal variant with no significant interval change  Confirmed by Alfredo Church MD, [facility] ([id number]) on 6/24/2019 10:20:47 PM     DRUG SCREEN, URINE    Collection Time: 06/24/19  8:32 PM   Result Value Ref Range    AMPHETAMINES NEGATIVE  NEG      BARBITURATES NEGATIVE  NEG      BENZODIAZEPINES NEGATIVE  NEG      COCAINE NEGATIVE  NEG      METHADONE NEGATIVE  NEG      OPIATES NEGATIVE  NEG      PCP(PHENCYCLIDINE) NEGATIVE  NEG      THC (TH-CANNABINOL) NEGATIVE  NEG      Drug screen comment (NOTE)    CBC W/O DIFF    Collection Time: 06/24/19  8:32 PM   Result Value Ref Range    WBC 10.6 4.1 - 11.1 K/uL    RBC 4.23 4. 10 - 5.70 M/uL    HGB 12.1 12.1 - 17.0 g/dL    HCT 35.5 (L) 36.6 - 50.3 %    MCV 83.9 80.0 - 99.0 FL    MCH 28.6 26.0 - 34.0 PG    MCHC 34.1 30.0 - 36.5 g/dL    RDW 12.8 11.5 - 14.5 %    PLATELET 329 679 - 790 K/uL    MPV 10.0 8.9 - 12.9 FL    NRBC 0.0 0  WBC    ABSOLUTE NRBC 0.00 0.00 - 0.01 K/uL   MAGNESIUM    Collection Time: 06/24/19  8:32 PM   Result Value Ref Range    Magnesium 2.0 1.6 - 2.4 mg/dL   METABOLIC PANEL, COMPREHENSIVE    Collection Time: 06/24/19  8:32 PM   Result Value Ref Range    Sodium 138 136 - 145 mmol/L    Potassium 3.3 (L) 3.5 - 5.1 mmol/L    Chloride 102 97 - 108 mmol/L    CO2 25 21 - 32 mmol/L    Anion gap 11 5 - 15 mmol/L    Glucose 114 (H) 65 - 100 mg/dL    BUN 24 (H) 6 - 20 MG/DL    Creatinine 1.08 0.70 - 1.30 MG/DL    BUN/Creatinine ratio 22 (H) 12 - 20      GFR est AA >60 >60 ml/min/1.73m2    GFR est non-AA >60 >60 ml/min/1.73m2    Calcium 8.2 (L) 8.5 - 10.1 MG/DL    Bilirubin, total 0.6 0.2 - 1.0 MG/DL    ALT (SGPT) 113 (H) 12 - 78 U/L    AST (SGOT) 227 (H) 15 - 37 U/L    Alk.  phosphatase 79 45 - 117 U/L    Protein, total 6.4 6.4 - 8.2 g/dL    Albumin 3.5 3.5 - 5.0 g/dL    Globulin 2.9 2.0 - 4.0 g/dL    A-G Ratio 1.2 1.1 - 2.2     LACTIC ACID    Collection Time: 06/24/19  8:32 PM   Result Value Ref Range    Lactic acid 0.9 0.4 - 2.0 MMOL/L   CK W/ CKMB & INDEX    Collection Time: 06/24/19  8:34 PM   Result Value Ref Range    CK 8,489 (H) 39 - 308 U/L    CK - MB 46.4 (H) <3.6 NG/ML    CK-MB Index 0.5 0.0 - 2.5     CK W/ CKMB & INDEX    Collection Time: 06/25/19  2:52 AM   Result Value Ref Range    CK 7,685 (H) 39 - 308 U/L    CK - MB 26.9 (H) <3.6 NG/ML    CK-MB Index 0.4 0.0 - 2.5     METABOLIC PANEL, BASIC    Collection Time: 06/25/19  2:52 AM   Result Value Ref Range    Sodium 140 136 - 145 mmol/L    Potassium 3.4 (L) 3.5 - 5.1 mmol/L    Chloride 104 97 - 108 mmol/L    CO2 25 21 - 32 mmol/L    Anion gap 11 5 - 15 mmol/L    Glucose 123 (H) 65 - 100 mg/dL    BUN 19 6 - 20 MG/DL    Creatinine 0.95 0.70 - 1.30 MG/DL    BUN/Creatinine ratio 20 12 - 20      GFR est AA >60 >60 ml/min/1.73m2    GFR est non-AA >60 >60 ml/min/1.73m2    Calcium 8.0 (L) 8.5 - 10.1 MG/DL   CBC W/O DIFF    Collection Time: 06/25/19  9:41 AM   Result Value Ref Range    WBC 7.0 4.1 - 11.1 K/uL    RBC 4.09 (L) 4.10 - 5.70 M/uL    HGB 12.0 (L) 12.1 - 17.0 g/dL    HCT 34.8 (L) 36.6 - 50.3 %    MCV 85.1 80.0 - 99.0 FL    MCH 29.3 26.0 - 34.0 PG    MCHC 34.5 30.0 - 36.5 g/dL    RDW 12.9 11.5 - 14.5 %    PLATELET 674 928 - 803 K/uL    MPV 10.0 8.9 - 12.9 FL    NRBC 0.0 0  WBC    ABSOLUTE NRBC 0.00 0.00 - 6.82 K/uL   METABOLIC PANEL, COMPREHENSIVE    Collection Time: 06/25/19  9:41 AM   Result Value Ref Range    Sodium 141 136 - 145 mmol/L    Potassium 3.4 (L) 3.5 - 5.1 mmol/L    Chloride 104 97 - 108 mmol/L    CO2 26 21 - 32 mmol/L    Anion gap 11 5 - 15 mmol/L    Glucose 96 65 - 100 mg/dL    BUN 16 6 - 20 MG/DL    Creatinine 0.93 0.70 - 1.30 MG/DL    BUN/Creatinine ratio 17 12 - 20      GFR est AA >60 >60 ml/min/1.73m2    GFR est non-AA >60 >60 ml/min/1.73m2    Calcium 8.0 (L) 8.5 - 10.1 MG/DL    Bilirubin, total 0.8 0.2 - 1.0 MG/DL    ALT (SGPT) 120 (H) 12 - 78 U/L    AST (SGOT) 201 (H) 15 - 37 U/L    Alk.  phosphatase 77 45 - 117 U/L    Protein, total 6.3 (L) 6.4 - 8.2 g/dL    Albumin 3.4 (L) 3.5 - 5.0 g/dL    Globulin 2.9 2.0 - 4.0 g/dL    A-G Ratio 1.2 1.1 - 2.2     CK    Collection Time: 06/25/19  9:41 AM   Result Value Ref Range    CK 5,828 (H) 39 - 308 U/L   MAGNESIUM    Collection Time: 06/25/19  9:41 AM   Result Value Ref Range    Magnesium 1.9 1.6 - 2.4 mg/dL

## 2019-06-25 NOTE — BH NOTES
Behavioral Health Transition Record to Provider    Patient Name: Jed Marte  YOB: 1985  Medical Record Number: [id number]  Date of Admission: 6/17/2019  Date of Discharge: 6/25/2019    Attending Provider: Alisha Johnson, *  Discharging Provider: Leandra Holguin MD  To contact this individual call [phone] and ask the  to page. If unavailable, ask to be transferred to [facility] Provider on call. [facility] Provider will be available on call 24/7 and during holidays. Primary Care Provider: None    No Known Allergies    Reason for Admission: Pt presented to [facility] ED reporting chest pain but was notably paranoid and reporting AH. Pt was admitted under a TDO and committed during hearing. Pt carries and diagnosis of schizoaffective disorder. Pt was a poor historian due to level of psychosis. Pt stated he was recently released from prison and reports wanting to live a better life for his family. Recently visited his fathers grave. Reports that his mother cares for his children - Fuad Sánchez and Evelyn Tim. Admission Diagnosis: Schizoaffective disorder ([address] [F25.9]  Elevated LFTs [R94.5]    * No surgery found *    Results for orders placed or performed during the hospital encounter of 06/17/19   TSH 3RD GENERATION   Result Value Ref Range    TSH 3.90 (H) 0.36 - 3.74 uIU/mL   LIPID PANEL   Result Value Ref Range    LIPID PROFILE          Cholesterol, total 116 <200 MG/DL    Triglyceride 37 <150 MG/DL    HDL Cholesterol 40 MG/DL    LDL, calculated 68.6 0 - 100 MG/DL    VLDL, calculated 7.4 MG/DL    CHOL/HDL Ratio 2.9 0.0 - 5.0     GLUCOSE, FASTING   Result Value Ref Range    Glucose 102 (H) 65 - 100 MG/DL   HEPATIC FUNCTION PANEL   Result Value Ref Range    Protein, total 7.5 6.4 - 8.2 g/dL    Albumin 4.1 3.5 - 5.0 g/dL    Globulin 3.4 2.0 - 4.0 g/dL    A-G Ratio 1.2 1.1 - 2.2      Bilirubin, total 0.4 0.2 - 1.0 MG/DL    Bilirubin, direct 0.1 0.0 - 0.2 MG/DL    Alk. phosphatase 98 45 - 117 U/L    AST (SGOT) 43 (H) 15 - 37 U/L    ALT (SGPT) 51 12 - 78 U/L   CBC W/O DIFF   Result Value Ref Range    WBC 8.9 4.1 - 11.1 K/uL    RBC 4.70 4. 10 - 5.70 M/uL    HGB 13.6 12.1 - 17.0 g/dL    HCT 39.9 36.6 - 50.3 %    MCV 84.9 80.0 - 99.0 FL    MCH 28.9 26.0 - 34.0 PG    MCHC 34.1 30.0 - 36.5 g/dL    RDW 12.6 11.5 - 14.5 %    PLATELET 360 229 - 786 K/uL    MPV 9.9 8.9 - 12.9 FL    NRBC 0.0 0  WBC    ABSOLUTE NRBC 0.00 0.00 - 0.22 K/uL   METABOLIC PANEL, COMPREHENSIVE   Result Value Ref Range    Sodium 138 136 - 145 mmol/L    Potassium 4.0 3.5 - 5.1 mmol/L    Chloride 103 97 - 108 mmol/L    CO2 27 21 - 32 mmol/L    Anion gap 8 5 - 15 mmol/L    Glucose 95 65 - 100 mg/dL    BUN 15 6 - 20 MG/DL    Creatinine 1.08 0.70 - 1.30 MG/DL    BUN/Creatinine ratio 14 12 - 20      GFR est AA >60 >60 ml/min/1.73m2    GFR est non-AA >60 >60 ml/min/1.73m2    Calcium 9.0 8.5 - 10.1 MG/DL    Bilirubin, total 0.4 0.2 - 1.0 MG/DL    ALT (SGPT) 55 12 - 78 U/L    AST (SGOT) 44 (H) 15 - 37 U/L    Alk.  phosphatase 99 45 - 117 U/L    Protein, total 6.9 6.4 - 8.2 g/dL    Albumin 3.8 3.5 - 5.0 g/dL    Globulin 3.1 2.0 - 4.0 g/dL    A-G Ratio 1.2 1.1 - 2.2     CK W/ CKMB & INDEX   Result Value Ref Range     (H) 39 - 308 U/L    CK - MB 3.8 (H) <3.6 NG/ML    CK-MB Index 0.4 0.0 - 2.5     CK W/ CKMB & INDEX   Result Value Ref Range    CK 1,216 (H) 39 - 308 U/L    CK - MB 3.4 <3.6 NG/ML    CK-MB Index 0.3 0.0 - 2.5     TROPONIN I   Result Value Ref Range    Troponin-I, Qt. <0.05 <0.05 ng/mL   D DIMER   Result Value Ref Range    D-dimer <0.19 0.00 - 0.65 mg/L FEU   METABOLIC PANEL, COMPREHENSIVE   Result Value Ref Range    Sodium 140 136 - 145 mmol/L    Potassium 3.6 3.5 - 5.1 mmol/L    Chloride 101 97 - 108 mmol/L    CO2 27 21 - 32 mmol/L    Anion gap 12 5 - 15 mmol/L    Glucose 151 (H) 65 - 100 mg/dL    BUN 29 (H) 6 - 20 MG/DL    Creatinine 1.44 (H) 0.70 - 1.30 MG/DL    BUN/Creatinine ratio 20 12 - 20      GFR est AA >60 >60 ml/min/1.73m2    GFR est non-AA 57 (L) >60 ml/min/1.73m2    Calcium 9.1 8.5 - 10.1 MG/DL    Bilirubin, total 0.8 0.2 - 1.0 MG/DL    ALT (SGPT) 112 (H) 12 - 78 U/L    AST (SGOT) 196 (H) 15 - 37 U/L    Alk. phosphatase 96 45 - 117 U/L    Protein, total 7.2 6.4 - 8.2 g/dL    Albumin 4.1 3.5 - 5.0 g/dL    Globulin 3.1 2.0 - 4.0 g/dL    A-G Ratio 1.3 1.1 - 2.2     CK W/ CKMB & INDEX   Result Value Ref Range    CK 6,783 (H) 39 - 308 U/L    CK - MB 44.9 (H) <3.6 NG/ML    CK-MB Index 0.7 0.0 - 2.5     TROPONIN I   Result Value Ref Range    Troponin-I, Qt. <0.05 <0.05 ng/mL   DRUG SCREEN, URINE   Result Value Ref Range    AMPHETAMINES NEGATIVE  NEG      BARBITURATES NEGATIVE  NEG      BENZODIAZEPINES NEGATIVE  NEG      COCAINE NEGATIVE  NEG      METHADONE NEGATIVE  NEG      OPIATES NEGATIVE  NEG      PCP(PHENCYCLIDINE) NEGATIVE  NEG      THC (TH-CANNABINOL) NEGATIVE  NEG      Drug screen comment (NOTE)    CBC W/O DIFF   Result Value Ref Range    WBC 10.6 4.1 - 11.1 K/uL    RBC 4.23 4. 10 - 5.70 M/uL    HGB 12.1 12.1 - 17.0 g/dL    HCT 35.5 (L) 36.6 - 50.3 %    MCV 83.9 80.0 - 99.0 FL    MCH 28.6 26.0 - 34.0 PG    MCHC 34.1 30.0 - 36.5 g/dL    RDW 12.8 11.5 - 14.5 %    PLATELET 798 351 - 241 K/uL    MPV 10.0 8.9 - 12.9 FL    NRBC 0.0 0  WBC    ABSOLUTE NRBC 0.00 0.00 - 0.01 K/uL   MAGNESIUM   Result Value Ref Range    Magnesium 2.0 1.6 - 2.4 mg/dL   METABOLIC PANEL, COMPREHENSIVE   Result Value Ref Range    Sodium 138 136 - 145 mmol/L    Potassium 3.3 (L) 3.5 - 5.1 mmol/L    Chloride 102 97 - 108 mmol/L    CO2 25 21 - 32 mmol/L    Anion gap 11 5 - 15 mmol/L    Glucose 114 (H) 65 - 100 mg/dL    BUN 24 (H) 6 - 20 MG/DL    Creatinine 1.08 0.70 - 1.30 MG/DL    BUN/Creatinine ratio 22 (H) 12 - 20      GFR est AA >60 >60 ml/min/1.73m2    GFR est non-AA >60 >60 ml/min/1.73m2    Calcium 8.2 (L) 8.5 - 10.1 MG/DL    Bilirubin, total 0.6 0.2 - 1.0 MG/DL    ALT (SGPT) 113 (H) 12 - 78 U/L    AST (SGOT) 227 (H) 15 - 37 U/L    Alk.  phosphatase 79 45 - 117 U/L    Protein, total 6.4 6.4 - 8.2 g/dL    Albumin 3.5 3.5 - 5.0 g/dL    Globulin 2.9 2.0 - 4.0 g/dL    A-G Ratio 1.2 1.1 - 2.2     LACTIC ACID   Result Value Ref Range    Lactic acid 0.9 0.4 - 2.0 MMOL/L   CK W/ CKMB & INDEX   Result Value Ref Range    CK 8,489 (H) 39 - 308 U/L    CK - MB 46.4 (H) <3.6 NG/ML    CK-MB Index 0.5 0.0 - 2.5     CK W/ CKMB & INDEX   Result Value Ref Range    CK 7,685 (H) 39 - 308 U/L    CK - MB 26.9 (H) <3.6 NG/ML    CK-MB Index 0.4 0.0 - 2.5     METABOLIC PANEL, BASIC   Result Value Ref Range    Sodium 140 136 - 145 mmol/L    Potassium 3.4 (L) 3.5 - 5.1 mmol/L    Chloride 104 97 - 108 mmol/L    CO2 25 21 - 32 mmol/L    Anion gap 11 5 - 15 mmol/L    Glucose 123 (H) 65 - 100 mg/dL    BUN 19 6 - 20 MG/DL    Creatinine 0.95 0.70 - 1.30 MG/DL    BUN/Creatinine ratio 20 12 - 20      GFR est AA >60 >60 ml/min/1.73m2    GFR est non-AA >60 >60 ml/min/1.73m2    Calcium 8.0 (L) 8.5 - 10.1 MG/DL   EKG, 12 LEAD, INITIAL   Result Value Ref Range    Ventricular Rate 96 BPM    Atrial Rate 96 BPM    P-R Interval 140 ms    QRS Duration 84 ms    Q-T Interval 338 ms    QTC Calculation (Bezet) 427 ms    Calculated P Axis 62 degrees    Calculated R Axis 21 degrees    Calculated T Axis 48 degrees    Diagnosis       Sinus tachycardia  benign leftward axis  normal QTc  normal variant EKG  Confirmed by Anaya Kelly MD, Robert Vizcarra ([id number]) on 6/21/2019 10:57:46 AM     EKG, 12 LEAD, INITIAL   Result Value Ref Range    Ventricular Rate 116 BPM    Atrial Rate 116 BPM    P-R Interval 144 ms    QRS Duration 78 ms    Q-T Interval 308 ms    QTC Calculation (Bezet) 428 ms    Calculated P Axis 68 degrees    Calculated R Axis 8 degrees    Calculated T Axis 26 degrees    Diagnosis       Sinus tachycardia  Leftward axis  normal QTc  normal variant with no significant interval change  Confirmed by Anaya Kelly MD, Robert Vizcarra ([id number]) on 6/24/2019 10:20:47 PM         Immunizations administered during this encounter: There is no immunization history on file for this patient. Screening for Metabolic Disorders for Patients on Antipsychotic Medications  (Data obtained from the EMR)    Estimated Body Mass Index  Estimated body mass index is 31.25 kg/m² as calculated from the following:    Height as of this encounter: 6' 3\" (1.905 m). Weight as of this encounter: 113.4 kg (250 lb). Vital Signs/Blood Pressure  Visit Vitals  /85 (BP 1 Location: Right arm)   Pulse (!) 107   Temp 99.7 °F (37.6 °C)   Resp 20   Ht 6' 3\" (1.905 m)   Wt 113.4 kg (250 lb)   SpO2 98%   BMI 31.25 kg/m²       Blood Glucose/Hemoglobin A1c  Lab Results   Component Value Date/Time    Glucose 123 (H) 06/25/2019 02:52 AM    Glucose 102 (H) 06/18/2019 05:13 AM       No results found for: HBA1C, HGBE8, KIF3ZOGV     Lipid Panel  Lab Results   Component Value Date/Time    Cholesterol, total 116 06/18/2019 05:13 AM    HDL Cholesterol 40 06/18/2019 05:13 AM    LDL, calculated 68.6 06/18/2019 05:13 AM    Triglyceride 37 06/18/2019 05:13 AM    CHOL/HDL Ratio 2.9 06/18/2019 05:13 AM        Discharge Diagnosis: Please refer to physician's discharge plan. Discharge Plan:  Pt was transferred to medical unit due to medical needs. Pt will be transferred to [facility] ICU. Pt's mother/POA was informed of plans. Pt remains delirious and disorganized. Once discharged from [facility] for medical and behavioral health treatment he will follow up with the [facility] treatment team's recommendations and [facility] - Reginia Hiss and Derl Flatness (CM). Discharge Medication List and Instructions:   Current Discharge Medication List          Unresulted Labs (24h ago, onward)    None        To obtain results of studies pending at discharge, please contact     Follow-up Information    None         Advanced Directive:   Does the patient have an appointed surrogate decision maker? Unknown   Does the patient have a Medical Advance Directive?  Unknown   Does the patient have a Psychiatric Advance Directive? Unknown   If the patient does not have a surrogate or Medical Advance Directive AND Psychiatric Advance Directive, the patient was offered information on these advance directives. Unknown        Patient Instructions: Please continue all medications until otherwise directed by physician. Tobacco Cessation Discharge Plan:   Is the patient a smoker and needs referral for smoking cessation? No  Patient referred to the following for smoking cessation with an appointment? No   Patient was offered medication to assist with smoking cessation at discharge? No  Was education for smoking cessation added to the discharge instructions? No     Alcohol/Substance Abuse Discharge Plan:   Does the patient have a history of substance/alcohol abuse and requires a referral for treatment? Yes  Patient referred to the following for substance/alcohol abuse treatment with an appointment? Yes  Patient was offered medication to assist with alcohol cessation at discharge? No  Was education for substance/alcohol abuse added to discharge instructions? Yes     Patient discharged to Home; provided to the patient/caregiver either in hard copy or electronically. Continuing care paperwork was faxed to community mental health providers.

## 2019-06-25 NOTE — PROGRESS NOTES
Assisted nursing staff with patient assessment and obtaining ordered labs. Bilateral soft wrist restraints remain in use and documented.

## 2019-06-25 NOTE — BH NOTES
Patient was able to maneuver his wrist restraints so he could get to his IV tubing. He was successful with pulling the IV out most of the way. IV was d/c'd and dressed with a band aid. MD notified.  Jesús GUIDRYN, RN

## 2019-06-25 NOTE — BH NOTES
Patient continues on IV fluids. He had difficulty voiding despite infusing IV fluids and oral fluids. He was bladder scanned as a nursing judgment. Bladder scan yields 263 ml. After approximately 45 minutes, patient was able to void 75 ml of patsy colored urine. Urine specimen was sent to the lab for a drug screening. Patient continues with auditory hallucination and mild anxiety. He can be redirected but requires frequent reminders not to scoot down to the bottom of the bed or not to pull at the tubes and cords on or near him. He refused his HS medications x 3 attempts. Latest CIWA yields a score of 5. No prn medication given at this time. Writer attempted to feed patient but he has a poor appetite. He readily accepted oral fluids at the beginning of the shift, but currently is pressing his lips closed and shaking his head when staff offers fluids. Bag 3 of 3 of normal saline is currently infusing at 500 ml/hr. IV site in clean and dry. [facility] remains of 1:1 observation. Skin underneath the wrist restraints have been assessed. Skin integrity remains intact. Vital signs will be reassessed as his blood pressure was elevated at 147/107 and pulse rate was 118. His blood pressure is currently below the parameters to receive labetalol. Patient's speech is clear, but his responses are non-logical. Will continue to monitor patient for any further changes.

## 2019-06-25 NOTE — INTERDISCIPLINARY ROUNDS
Patient transfer from [address] [place] [address]. Provide patient POA information to MD. Mother Abdulaziz Dao [phone]. [address] [place] [address] 
[phone]

## 2019-06-25 NOTE — PROGRESS NOTES
1957 Assessment of patient shows increasing need for medical care in addition to behavioral health care. Patient has 1:1 care established, in 4 point restraints upon shift arrival, receiving IV fluids and order is for BMP after fluids are completed 6 hours from now. Patient temperature is increasing, currently 100.0 axillary and tachycardic. Nursing judgement is patient needs diagnostic results STAT related to worsening of patient condition for re-evaluation and treatment. Nurse called on call psychiatry to update with Dr. Sarita Olsen findings. Requested CBC, Magnesium, CMP and Lactic Acid due to Internal Medicine consult reference to increasing rhabdomyolysis. Orders received from Peyman NP along with STAT EKG, consult with hospitalist to transfer to medical care after STAT orders resulted. Nursing Supervisor present and assisting in care of patient with staff. Unit Manager also contacted. [address] updated. [address]  Dr. Roddy Larkin, on call medical TeleQ, paged regarding patient's condition declining. Dr. Roddy Larkin made aware of Dr. Sarita Olsen documentation and Roddy Larkin reviewed chart for himself.  Dr. Roddy Larkin states he will call back with further information

## 2019-06-25 NOTE — BH NOTES
[facility] called this AM & reported that Dr. Edwina Ernst who accepted the patient at  has suggested that the patient now be a ICU admission. They are reporting that at this time there are no ICU beds available, they have 2 holds in the ER & 1 patient being transferred in. They will call when they have a bed available. Dr. Stephania Zepeda was updated & provided this information. The patient will be moved to PCU bed at this time.

## 2019-06-25 NOTE — BH NOTES
After many attempts, patient was able to void 300 ml of patsy colored urine. Bladder scanned post void yields 48 ml. Patient continues with delusions and requires frequent redirection and reorientation. Continues with 1:1 observation.  Mario Officer BSN, RN

## 2019-06-26 LAB
ANION GAP SERPL CALC-SCNC: 8 MMOL/L (ref 5–15)
BUN SERPL-MCNC: 10 MG/DL (ref 6–20)
BUN/CREAT SERPL: 13 (ref 12–20)
CALCIUM SERPL-MCNC: 8.2 MG/DL (ref 8.5–10.1)
CHLORIDE SERPL-SCNC: 107 MMOL/L (ref 97–108)
CO2 SERPL-SCNC: 26 MMOL/L (ref 21–32)
CREAT SERPL-MCNC: 0.79 MG/DL (ref 0.7–1.3)
ERYTHROCYTE [DISTWIDTH] IN BLOOD BY AUTOMATED COUNT: 13 % (ref 11.5–14.5)
GLUCOSE SERPL-MCNC: 81 MG/DL (ref 65–100)
HCT VFR BLD AUTO: 38.1 % (ref 36.6–50.3)
HCV AB SER IA-ACNC: >11 INDEX
HCV AB SERPL QL IA: REACTIVE
HCV COMMENT,HCGAC: ABNORMAL
HGB BLD-MCNC: 12.7 G/DL (ref 12.1–17)
HIV 1+2 AB+HIV1 P24 AG SERPL QL IA: NONREACTIVE
HIV12 RESULT COMMENT, HHIVC: NORMAL
MCH RBC QN AUTO: 29.3 PG (ref 26–34)
MCHC RBC AUTO-ENTMCNC: 33.3 G/DL (ref 30–36.5)
MCV RBC AUTO: 87.8 FL (ref 80–99)
NRBC # BLD: 0 K/UL (ref 0–0.01)
NRBC BLD-RTO: 0 PER 100 WBC
PLATELET # BLD AUTO: 249 K/UL (ref 150–400)
PMV BLD AUTO: 10.3 FL (ref 8.9–12.9)
POTASSIUM SERPL-SCNC: 4.5 MMOL/L (ref 3.5–5.1)
RBC # BLD AUTO: 4.34 M/UL (ref 4.1–5.7)
SODIUM SERPL-SCNC: 141 MMOL/L (ref 136–145)
WBC # BLD AUTO: 7.1 K/UL (ref 4.1–11.1)

## 2019-06-26 PROCEDURE — 36415 COLL VENOUS BLD VENIPUNCTURE: CPT

## 2019-06-26 PROCEDURE — 86480 TB TEST CELL IMMUN MEASURE: CPT

## 2019-06-26 PROCEDURE — 87389 HIV-1 AG W/HIV-1&-2 AB AG IA: CPT

## 2019-06-26 PROCEDURE — 74011000258 HC RX REV CODE- 258: Performed by: INTERNAL MEDICINE

## 2019-06-26 PROCEDURE — 74011250636 HC RX REV CODE- 250/636: Performed by: INTERNAL MEDICINE

## 2019-06-26 PROCEDURE — 87517 HEPATITIS B DNA QUANT: CPT

## 2019-06-26 PROCEDURE — 86803 HEPATITIS C AB TEST: CPT

## 2019-06-26 PROCEDURE — 74011250637 HC RX REV CODE- 250/637: Performed by: INTERNAL MEDICINE

## 2019-06-26 PROCEDURE — 85027 COMPLETE CBC AUTOMATED: CPT

## 2019-06-26 PROCEDURE — 80048 BASIC METABOLIC PNL TOTAL CA: CPT

## 2019-06-26 PROCEDURE — 65660000001 HC RM ICU INTERMED STEPDOWN

## 2019-06-26 RX ORDER — ACETAMINOPHEN 325 MG/1
650 TABLET ORAL
Status: DISCONTINUED | OUTPATIENT
Start: 2019-06-26 | End: 2019-06-26

## 2019-06-26 RX ORDER — IBUPROFEN 400 MG/1
400 TABLET ORAL
Status: DISCONTINUED | OUTPATIENT
Start: 2019-06-26 | End: 2019-07-01 | Stop reason: HOSPADM

## 2019-06-26 RX ORDER — SODIUM CHLORIDE 450 MG/100ML
100 INJECTION, SOLUTION INTRAVENOUS CONTINUOUS
Status: DISCONTINUED | OUTPATIENT
Start: 2019-06-26 | End: 2019-07-01

## 2019-06-26 RX ADMIN — THERA TABS 1 TABLET: TAB at 08:41

## 2019-06-26 RX ADMIN — LORAZEPAM 2 MG: 2 INJECTION INTRAMUSCULAR; INTRAVENOUS at 22:23

## 2019-06-26 RX ADMIN — SODIUM CHLORIDE AND POTASSIUM CHLORIDE: .9; .15 SOLUTION INTRAVENOUS at 04:15

## 2019-06-26 RX ADMIN — Medication 10 ML: at 21:18

## 2019-06-26 RX ADMIN — Medication 100 MG: at 08:41

## 2019-06-26 RX ADMIN — SODIUM CHLORIDE 150 ML/HR: 450 INJECTION, SOLUTION INTRAVENOUS at 15:33

## 2019-06-26 RX ADMIN — Medication 10 ML: at 14:30

## 2019-06-26 RX ADMIN — FOLIC ACID 1 MG: 1 TABLET ORAL at 08:41

## 2019-06-26 RX ADMIN — LORAZEPAM 2 MG: 2 INJECTION INTRAMUSCULAR; INTRAVENOUS at 21:18

## 2019-06-26 RX ADMIN — SODIUM CHLORIDE 150 ML/HR: 450 INJECTION, SOLUTION INTRAVENOUS at 08:41

## 2019-06-26 RX ADMIN — ENOXAPARIN SODIUM 40 MG: 40 INJECTION, SOLUTION INTRAVENOUS; SUBCUTANEOUS at 09:38

## 2019-06-26 RX ADMIN — SODIUM CHLORIDE 150 ML/HR: 450 INJECTION, SOLUTION INTRAVENOUS at 21:22

## 2019-06-26 NOTE — PROGRESS NOTES
Hospitalist Progress Note    NAME: Janneth James   :  1985   MRN:  [id number]   Room Number:  DTG0/87  @ [facility] Hospital Summary: 29 y.o. male whom presented on 2019 with      Assessment / Plan:  update  Called patient's mom Rose Marie(medical power of ) on [phone]. Mother reported that the patient was incarcerated twince and was being treated for latent TB. He also has a history of polysubstance drug abuse and IV drug abuse and had hepatitis C that was never treated. I took consent for checking HIV test which she approved. Also got a call from Nurse- public health nurse in [facility]. She reported that patient was being treated for latent TB from 3/15/2019 with last day 6/10/2019. He was on 300 mg isoniazid daily +50 mg vitamin B6. She has requested us to do a QuantiFERON-TB test and report results to her. She will need to be contacted on [phone]. Rhabdomyolysis most likely secondary to dehydration-improving  Low suspicion for NMS. Despite the fact that the patient was recently started on Risperdal.  He does not have fever or muscle rigidity. CK [id number]  continue with aggressive fluid hydration    Elevated LFTs  C/t monitor  Avoid hepatotoxic meds    Acute psychosis/delirium- mentation better today off meds  Schizophrenia  Patient was recently started on Risperdal and was receiving Invega 9 mg IV daily per psychiatry. appreciate psych input. PRN Ativan for agitation    Chronic Hep C  Hx IV Drug abuse  polysubstance drug abuse  Latent TB    Check lab test as stated above    Code status: Full  Prophylaxis: Lovenox  Recommended Disposition: [facility]     Subjective:     Chief Complaint / Reason for Physician Visit  \" DOING OK\". Discussed with RN events overnight.      Review of Systems:  Symptom Y/N Comments  Symptom Y/N Comments   Fever/Chills    Chest Pain     Poor Appetite    Edema     Cough    Abdominal Pain     Sputum    Joint Pain SOB/DUNCAN    Pruritis/Rash     Nausea/vomit    Tolerating PT/OT     Diarrhea    Tolerating Diet     Constipation    Other       Could NOT obtain due to:      Objective:     VITALS:   Last 24hrs VS reviewed since prior progress note. Most recent are:  Patient Vitals for the past 24 hrs:   Temp Pulse Resp BP SpO2   06/26/19 1217 97.5 °F (36.4 °C) 89 18 (!) 133/95 100 %   06/26/19 0829 98.3 °F (36.8 °C) 98 17 (!) 135/91 98 %   06/26/19 0400 98.1 °F (36.7 °C) 70 11 100/63 95 %   06/26/19 0200  73 13 108/72 94 %   06/25/19 2300  77 13 105/64 95 %   06/25/19 2200  (!) 103 19 (!) 118/96 95 %   06/25/19 2000 98.2 °F (36.8 °C) 99 20 132/88 99 %   06/25/19 1845  97 17  98 %   06/25/19 1800  94 17 134/82 100 %   06/25/19 1700  93 16 122/80 98 %   06/25/19 1645  97 20  99 %   06/25/19 1606  91 18 109/66 98 %       Intake/Output Summary (Last 24 hours) at 6/26/2019 1513  Last data filed at 6/26/2019 1504  Gross per 24 hour   Intake 240 ml   Output 4925 ml   Net -4685 ml        PHYSICAL EXAM:  General: WD, WN. Alert, cooperative, no acute distress    EENT:  EOMI. Anicteric sclerae. MMM  Resp:  CTA bilaterally, no wheezing or rales. No accessory muscle use  CV:  Regular  rhythm,  No edema  GI:  Soft, Non distended, Non tender.  +Bowel sounds  Neurologic:  Alert and oriented X 2, normal speech,   Psych:   some insight. Not anxious nor agitated  Skin:  No rashes.   No jaundice    Reviewed most current lab test results and cultures  YES  Reviewed most current radiology test results   YES  Review and summation of old records today    NO  Reviewed patient's current orders and MAR    YES  PMH/SH reviewed - no change compared to H&P  ________________________________________________________________________  Care Plan discussed with:    Comments   Patient     Family      RN     Care Manager     Consultant                        Multidiciplinary team rounds were held today with , nursing, pharmacist and clinical coordinator. Patient's plan of care was discussed; medications were reviewed and discharge planning was addressed. ________________________________________________________________________  Total NON critical care TIME:  45   Minutes    Total CRITICAL CARE TIME Spent:   Minutes non procedure based      Comments   >50% of visit spent in counseling and coordination of care x    ________________________________________________________________________  Rojelio Paige MD     Procedures: see electronic medical records for all procedures/Xrays and details which were not copied into this note but were reviewed prior to creation of Plan. LABS:  I reviewed today's most current labs and imaging studies.   Pertinent labs include:  Recent Labs     06/26/19  0451 06/25/19  0941 06/24/19 2032   WBC 7.1 7.0 10.6   HGB 12.7 12.0* 12.1   HCT 38.1 34.8* 35.5*    240 286     Recent Labs     06/26/19  0451 06/25/19  0941 06/25/19  0252 06/24/19 2032 06/24/19  1251    141 140 138 140   K 4.5 3.4* 3.4* 3.3* 3.6    104 104 102 101   CO2 26 26 25 25 27   GLU 81 96 123* 114* 151*   BUN 10 16 19 24* 29*   CREA 0.79 0.93 0.95 1.08 1.44*   CA 8.2* 8.0* 8.0* 8.2* 9.1   MG  --  1.9  --  2.0  --    ALB  --  3.4*  --  3.5 4.1   TBILI  --  0.8  --  0.6 0.8   SGOT  --  201*  --  227* 196*   ALT  --  120*  --  113* 112*       Signed: Rojelio Brooking, MD

## 2019-06-26 NOTE — CONSULTS
PSYCHIATRY CONSULT NOTE:    REASON FOR CONSULT:  delusions and psychotic behavior  Paranoia    HISTORY OF PRESENTING COMPLAINT:  Kay Nuñez is a 29 y.o. male who was admitted to psychiatry for paranoid and psychotic behaviors that was acute in onset and seemed to worsen during the course of hospitalization. Medical was consulted and they followed along noticing the negative trend in the patients sensorium (waxing and waning periods of clarity and the appearance of a drunken stupor) and his chemistries. CPK's elevated and kidney functions prompted further medical action. Patient was not aggressive or violent, however was exhibitionistic, confused and wandering without proper body coverings. Transferred for safety and stabilization    Today patient remains delirious and confused with disorganized speech on a 1:1 safety check. Question as to whether the CPK is due to the antipsychotic injections vs an alternative cause of muscle breakdown. He speaks with a slight slur also. No aggression or violence. PAST PSYCHIATRIC HISTORY:  In Patient Schizoaffective disorder    SUBSTANCE ABUSE HISTORY:  Social History     Substance and Sexual Activity   Drug Use Not on file     Social History     Substance and Sexual Activity   Alcohol Use Not on file     Social History     Tobacco Use   Smoking Status Not on file       PAST MEDICAL HISTORY:  No past medical history on file.     SOCIAL HISTORY:  ARTIE is Mother Kimi Given [phone].       VITALS:  Visit Vitals  BP (!) 118/96   Pulse (!) 103   Temp 98.2 °F (36.8 °C)   Resp 19   SpO2 95%       MEDICATIONS:    Current Facility-Administered Medications:     sodium chloride (NS) flush 5-40 mL, 5-40 mL, IntraVENous, Q8H, Madhuri Dotson MD, 10 mL at 06/25/19 1400    sodium chloride (NS) flush 5-40 mL, 5-40 mL, IntraVENous, PRN, Alida Greer MD    enoxaparin (LOVENOX) injection 40 mg, 40 mg, SubCUTAneous, Q24H, Alida Greer MD, 40 mg at 06/25/19 1138   labetalol (NORMODYNE;TRANDATE) 20 mg/4 mL (5 mg/mL) injection 20 mg, 20 mg, IntraVENous, Q4H PRN, Krystle Lopez MD    LORazepam (ATIVAN) injection 2 mg, 2 mg, IntraVENous, Q1H PRN, Krystle Lopez MD, 2 mg at 06/25/19 2057    LORazepam (ATIVAN) injection 4 mg, 4 mg, IntraVENous, Q1H PRN, Krystle Lopez MD, 4 mg at 06/25/19 1134    thiamine HCL (B-1) tablet 100 mg, 100 mg, Oral, DAILY, Krystle Lopez MD, 100 mg at 98/72/98 4403    folic acid (FOLVITE) tablet 1 mg, 1 mg, Oral, DAILY, Krystle Lopez MD, 1 mg at 06/25/19 1036    therapeutic multivitamin (THERAGRAN) tablet 1 Tab, 1 Tab, Oral, DAILY, Krystle Lopez MD, 1 Tab at 06/25/19 1037    0.9% sodium chloride with KCl 20 mEq/L infusion, , IntraVENous, CONTINUOUS, Krystle Lopez MD, Last Rate: 200 mL/hr at 06/25/19 1804    MENTAL STATUS EXAM:  Calm, Delusions and Preoccupied  Person/place only  Denies SI/HI/AH/VH  Goal directed    ASSESSMENT AND PLAN:  Schizoaffective Disorder, Cluster A Traits , Problems related to social environment  and 51-60 moderate symptoms    Will follow along with medical to determine which medications are needed  Please continue the 1:1 due to confusion and wandering spells.   Thank you for this consultation    Gloira Craig MD  6/25/2019

## 2019-06-26 NOTE — INTERDISCIPLINARY ROUNDS
Discuss patient treatment plan with team. Patient left arm restraint removed. Once medically cleared transfer to [facility]. [address] [place] [address] 
[phone]

## 2019-06-26 NOTE — DISCHARGE SUMMARY
PSYCHIATRIC DISCHARGE SUMMARY         IDENTIFICATION:    Patient Name  Pieter Fountain   Date of Birth 1985   [facility] [id number]   Medical Record Number  [id number]      Age  29 y.o. PCP None   Admit date:  6/25/2019    Discharge date: 6/25/2019   Room Number  PCU2/01  @ [facility]   Date of Service  6/25/2019            TYPE OF DISCHARGE: Transfer to Medical               CONDITION AT DISCHARGE: poor and serious       PROVISIONAL & DISCHARGE DIAGNOSES:    Problem List  Never Reviewed          Codes Class    Elevated LFTs ICD-10-CM: R94.5  ICD-9-CM: 790.6         * (Principal) Rhabdomyolysis ICD-10-CM: M62.82  ICD-9-CM: 728.88         [facility] ([facility] ICD-10-CM: F25.9  ICD-9-CM: 295.70               Active Hospital Problems    *Rhabdomyolysis        DISCHARGE DIAGNOSIS:   Axis I:  SEE ABOVE  Axis II: SEE ABOVE  Axis III: SEE ABOVE  Axis IV:  lack of structure  Axis V:  <50 on admission, 55+ on discharge     CC & HISTORY OF PRESENT ILLNESS:   29 y.o. WHITE OR  male with a past psychiatric history significant for schizoaffective disorder, who presents at this time with complaints of (and/or evidence of) the following emotional symptoms: psychotic behavior and paranoid behavior. Additional symptomatology include VH. The above symptoms have been present for 48+ hours. These symptoms are of moderate to high severity. These symptoms are constant in nature. The patient's condition has been precipitated by psychosocial stressors. Patient's condition made worse by treatment noncompliance.  UDS: negative; BAL=0.      SOCIAL HISTORY:    Social History     Socioeconomic History    Marital status: SINGLE     Spouse name: Not on file    Number of children: Not on file    Years of education: Not on file    Highest education level: Not on file   Occupational History    Not on file   Social Needs    Financial resource strain: Not on file    Food insecurity:     Worry: Not on file Inability: Not on file    Transportation needs:     Medical: Not on file     Non-medical: Not on file   Tobacco Use    Smoking status: Not on file   Substance and Sexual Activity    Alcohol use: Not on file    Drug use: Not on file    Sexual activity: Not on file   Lifestyle    Physical activity:     Days per week: Not on file     Minutes per session: Not on file    Stress: Not on file   Relationships    Social connections:     Talks on phone: Not on file     Gets together: Not on file     Attends Mandaeism service: Not on file     Active member of club or organization: Not on file     Attends meetings of clubs or organizations: Not on file     Relationship status: Not on file    Intimate partner violence:     Fear of current or ex partner: Not on file     Emotionally abused: Not on file     Physically abused: Not on file     Forced sexual activity: Not on file   Other Topics Concern    Not on file   Social History Narrative    Not on file      FAMILY HISTORY:   No family history on file. HOSPITALIZATION COURSE:    Will Le was admitted to the inpatient psychiatric unit [facility] for acute psychiatric stabilization in regards to symptomatology as described in the HPI above. The differential diagnosis at time of admission included: Schizophrenia vs substance induced psychotic disorder schizoaffective vs bipolar disorder. While on the unit Will Le was involved in individual, group, occupational and milieu therapy. Psychiatric medications were adjusted during this hospitalization. Will Le demonstrated a progressive improvement in overall condition. Much of patient's initial presentation appeared to be related to situational stressors, effects of medication non-compliance and psychological factors. Please see individual progress notes for more specific details regarding patient's hospitalization course.      At time of discharge, Will Le is delirious and confused along with psychotic. His condition was steadily declining over the course of treatment. The hospitalist evaluated and found that he was having Rhabdomyolisis so he was taken to medical for IV management.        LABS AND IMAGAING:    Labs Reviewed   CBC W/O DIFF - Abnormal; Notable for the following components:       Result Value    RBC 4.09 (*)     HGB 12.0 (*)     HCT 34.8 (*)     All other components within normal limits   METABOLIC PANEL, COMPREHENSIVE - Abnormal; Notable for the following components:    Potassium 3.4 (*)     Calcium 8.0 (*)     ALT (SGPT) 120 (*)     AST (SGOT) 201 (*)     Protein, total 6.3 (*)     Albumin 3.4 (*)     All other components within normal limits   CK - Abnormal; Notable for the following components:    CK 5,828 (*)     All other components within normal limits   CK - Abnormal; Notable for the following components:    CK 3,820 (*)     All other components within normal limits   MAGNESIUM   METABOLIC PANEL, BASIC   CBC W/O DIFF     No results found for: DS35, PHEN, PHENO, PHENT, DILF, DS39, PHENY, PTN, VALF2, VALAC, VALP, VALPR, DS6, CRBAM, CRBAMP, CARB2, XCRBAM  Admission on 06/25/2019   Component Date Value Ref Range Status    WBC 06/25/2019 7.0  4.1 - 11.1 K/uL Final    RBC 06/25/2019 4.09* 4.10 - 5.70 M/uL Final    HGB 06/25/2019 12.0* 12.1 - 17.0 g/dL Final    HCT 06/25/2019 34.8* 36.6 - 50.3 % Final    MCV 06/25/2019 85.1  80.0 - 99.0 FL Final    MCH 06/25/2019 29.3  26.0 - 34.0 PG Final    MCHC 06/25/2019 34.5  30.0 - 36.5 g/dL Final    RDW 06/25/2019 12.9  11.5 - 14.5 % Final    PLATELET 23/29/2536 378  150 - 400 K/uL Final    MPV 06/25/2019 10.0  8.9 - 12.9 FL Final    NRBC 06/25/2019 0.0  0  WBC Final    ABSOLUTE NRBC 06/25/2019 0.00  0.00 - 0.01 K/uL Final    Sodium 06/25/2019 141  136 - 145 mmol/L Final    Potassium 06/25/2019 3.4* 3.5 - 5.1 mmol/L Final    Chloride 06/25/2019 104  97 - 108 mmol/L Final    CO2 06/25/2019 26  21 - 32 mmol/L Final    Anion gap 06/25/2019 11  5 - 15 mmol/L Final    Glucose 06/25/2019 96  65 - 100 mg/dL Final    BUN 06/25/2019 16  6 - 20 MG/DL Final    Creatinine 06/25/2019 0.93  0.70 - 1.30 MG/DL Final    BUN/Creatinine ratio 06/25/2019 17  12 - 20   Final    GFR est AA 06/25/2019 >60  >60 ml/min/1.73m2 Final    GFR est non-AA 06/25/2019 >60  >60 ml/min/1.73m2 Final    Calcium 06/25/2019 8.0* 8.5 - 10.1 MG/DL Final    Bilirubin, total 06/25/2019 0.8  0.2 - 1.0 MG/DL Final    ALT (SGPT) 06/25/2019 120* 12 - 78 U/L Final    AST (SGOT) 06/25/2019 201* 15 - 37 U/L Final    Alk. phosphatase 06/25/2019 77  45 - 117 U/L Final    Protein, total 06/25/2019 6.3* 6.4 - 8.2 g/dL Final    Albumin 06/25/2019 3.4* 3.5 - 5.0 g/dL Final    Globulin 06/25/2019 2.9  2.0 - 4.0 g/dL Final    A-G Ratio 06/25/2019 1.2  1.1 - 2.2   Final    CK 06/25/2019 5,828* 39 - 308 U/L Final    Magnesium 06/25/2019 1.9  1.6 - 2.4 mg/dL Final    CK 06/25/2019 3,820* 39 - 308 U/L Final   Admission on 06/17/2019, Discharged on 06/25/2019   Component Date Value Ref Range Status    TSH 06/18/2019 3.90* 0.36 - 3.74 uIU/mL Final    LIPID PROFILE 06/18/2019        Final    Cholesterol, total 06/18/2019 116  <200 MG/DL Final    Triglyceride 06/18/2019 37  <150 MG/DL Final    HDL Cholesterol 06/18/2019 40  MG/DL Final    LDL, calculated 06/18/2019 68.6  0 - 100 MG/DL Final    VLDL, calculated 06/18/2019 7.4  MG/DL Final    CHOL/HDL Ratio 06/18/2019 2.9  0.0 - 5.0   Final    Glucose 06/18/2019 102* 65 - 100 MG/DL Final    Protein, total 06/20/2019 7.5  6.4 - 8.2 g/dL Final    Albumin 06/20/2019 4.1  3.5 - 5.0 g/dL Final    Globulin 06/20/2019 3.4  2.0 - 4.0 g/dL Final    A-G Ratio 06/20/2019 1.2  1.1 - 2.2   Final    Bilirubin, total 06/20/2019 0.4  0.2 - 1.0 MG/DL Final    Bilirubin, direct 06/20/2019 0.1  0.0 - 0.2 MG/DL Final    Alk.  phosphatase 06/20/2019 98  45 - 117 U/L Final    AST (SGOT) 06/20/2019 43* 15 - 37 U/L Final    ALT (SGPT) 06/20/2019 51  12 - 78 U/L Final    Ventricular Rate 06/21/2019 96  BPM Final    Atrial Rate 06/21/2019 96  BPM Final    P-R Interval 06/21/2019 140  ms Final    QRS Duration 06/21/2019 84  ms Final    Q-T Interval 06/21/2019 338  ms Final    QTC Calculation (Bezet) 06/21/2019 427  ms Final    Calculated P Axis 06/21/2019 62  degrees Final    Calculated R Axis 06/21/2019 21  degrees Final    Calculated T Axis 06/21/2019 48  degrees Final    Diagnosis 06/21/2019    Final                    Value:Sinus tachycardia  benign leftward axis  normal QTc  normal variant EKG  Confirmed by Governor Cassette MD, Orquidea Trinidad ([id number]) on 6/21/2019 10:57:46 AM      WBC 06/21/2019 8.9  4.1 - 11.1 K/uL Final    RBC 06/21/2019 4.70  4. 10 - 5.70 M/uL Final    HGB 06/21/2019 13.6  12.1 - 17.0 g/dL Final    HCT 06/21/2019 39.9  36.6 - 50.3 % Final    MCV 06/21/2019 84.9  80.0 - 99.0 FL Final    MCH 06/21/2019 28.9  26.0 - 34.0 PG Final    MCHC 06/21/2019 34.1  30.0 - 36.5 g/dL Final    RDW 06/21/2019 12.6  11.5 - 14.5 % Final    PLATELET 82/50/5773 687  150 - 400 K/uL Final    MPV 06/21/2019 9.9  8.9 - 12.9 FL Final    NRBC 06/21/2019 0.0  0  WBC Final    ABSOLUTE NRBC 06/21/2019 0.00  0.00 - 0.01 K/uL Final    Sodium 06/21/2019 138  136 - 145 mmol/L Final    Potassium 06/21/2019 4.0  3.5 - 5.1 mmol/L Final    Chloride 06/21/2019 103  97 - 108 mmol/L Final    CO2 06/21/2019 27  21 - 32 mmol/L Final    Anion gap 06/21/2019 8  5 - 15 mmol/L Final    Glucose 06/21/2019 95  65 - 100 mg/dL Final    BUN 06/21/2019 15  6 - 20 MG/DL Final    Creatinine 06/21/2019 1.08  0.70 - 1.30 MG/DL Final    BUN/Creatinine ratio 06/21/2019 14  12 - 20   Final    GFR est AA 06/21/2019 >60  >60 ml/min/1.73m2 Final    GFR est non-AA 06/21/2019 >60  >60 ml/min/1.73m2 Final    Calcium 06/21/2019 9.0  8.5 - 10.1 MG/DL Final    Bilirubin, total 06/21/2019 0.4  0.2 - 1.0 MG/DL Final    ALT (SGPT) 06/21/2019 55  12 - 78 U/L Final    AST (SGOT) 06/21/2019 44* 15 - 37 U/L Final    Alk. phosphatase 06/21/2019 99  45 - 117 U/L Final    Protein, total 06/21/2019 6.9  6.4 - 8.2 g/dL Final    Albumin 06/21/2019 3.8  3.5 - 5.0 g/dL Final    Globulin 06/21/2019 3.1  2.0 - 4.0 g/dL Final    A-G Ratio 06/21/2019 1.2  1.1 - 2.2   Final    CK 06/21/2019 875* 39 - 308 U/L Final    CK - MB 06/21/2019 3.8* <3.6 NG/ML Final    CK-MB Index 06/21/2019 0.4  0.0 - 2.5   Final    CK 06/21/2019 1,216* 39 - 308 U/L Final    CK - MB 06/21/2019 3.4  <3.6 NG/ML Final    CK-MB Index 06/21/2019 0.3  0.0 - 2.5   Final    Troponin-I, Qt. 06/21/2019 <0.05  <0.05 ng/mL Final    D-dimer 06/21/2019 <0.19  0.00 - 0.65 mg/L FEU Final    Sodium 06/24/2019 140  136 - 145 mmol/L Final    Potassium 06/24/2019 3.6  3.5 - 5.1 mmol/L Final    Chloride 06/24/2019 101  97 - 108 mmol/L Final    CO2 06/24/2019 27  21 - 32 mmol/L Final    Anion gap 06/24/2019 12  5 - 15 mmol/L Final    Glucose 06/24/2019 151* 65 - 100 mg/dL Final    BUN 06/24/2019 29* 6 - 20 MG/DL Final    Creatinine 06/24/2019 1.44* 0.70 - 1.30 MG/DL Final    BUN/Creatinine ratio 06/24/2019 20  12 - 20   Final    GFR est AA 06/24/2019 >60  >60 ml/min/1.73m2 Final    GFR est non-AA 06/24/2019 57* >60 ml/min/1.73m2 Final    Calcium 06/24/2019 9.1  8.5 - 10.1 MG/DL Final    Bilirubin, total 06/24/2019 0.8  0.2 - 1.0 MG/DL Final    ALT (SGPT) 06/24/2019 112* 12 - 78 U/L Final    AST (SGOT) 06/24/2019 196* 15 - 37 U/L Final    Alk.  phosphatase 06/24/2019 96  45 - 117 U/L Final    Protein, total 06/24/2019 7.2  6.4 - 8.2 g/dL Final    Albumin 06/24/2019 4.1  3.5 - 5.0 g/dL Final    Globulin 06/24/2019 3.1  2.0 - 4.0 g/dL Final    A-G Ratio 06/24/2019 1.3  1.1 - 2.2   Final    CK 06/24/2019 6,783* 39 - 308 U/L Final    CK - MB 06/24/2019 44.9* <3.6 NG/ML Final    CK-MB Index 06/24/2019 0.7  0.0 - 2.5   Final    Troponin-I, Qt. 06/24/2019 <0.05  <0.05 ng/mL Final    AMPHETAMINES 06/24/2019 NEGATIVE   NEG   Final    BARBITURATES 06/24/2019 NEGATIVE   NEG   Final    BENZODIAZEPINES 06/24/2019 NEGATIVE   NEG   Final    COCAINE 06/24/2019 NEGATIVE   NEG   Final    METHADONE 06/24/2019 NEGATIVE   NEG   Final    OPIATES 06/24/2019 NEGATIVE   NEG   Final    PCP(PHENCYCLIDINE) 06/24/2019 NEGATIVE   NEG   Final    THC (TH-CANNABINOL) 06/24/2019 NEGATIVE   NEG   Final    Drug screen comment 06/24/2019 (NOTE)   Final    WBC 06/24/2019 10.6  4.1 - 11.1 K/uL Final    RBC 06/24/2019 4.23  4. 10 - 5.70 M/uL Final    HGB 06/24/2019 12.1  12.1 - 17.0 g/dL Final    HCT 06/24/2019 35.5* 36.6 - 50.3 % Final    MCV 06/24/2019 83.9  80.0 - 99.0 FL Final    MCH 06/24/2019 28.6  26.0 - 34.0 PG Final    MCHC 06/24/2019 34.1  30.0 - 36.5 g/dL Final    RDW 06/24/2019 12.8  11.5 - 14.5 % Final    PLATELET 47/02/7063 523  150 - 400 K/uL Final    MPV 06/24/2019 10.0  8.9 - 12.9 FL Final    NRBC 06/24/2019 0.0  0  WBC Final    ABSOLUTE NRBC 06/24/2019 0.00  0.00 - 0.01 K/uL Final    Magnesium 06/24/2019 2.0  1.6 - 2.4 mg/dL Final    Sodium 06/24/2019 138  136 - 145 mmol/L Final    Potassium 06/24/2019 3.3* 3.5 - 5.1 mmol/L Final    Chloride 06/24/2019 102  97 - 108 mmol/L Final    CO2 06/24/2019 25  21 - 32 mmol/L Final    Anion gap 06/24/2019 11  5 - 15 mmol/L Final    Glucose 06/24/2019 114* 65 - 100 mg/dL Final    BUN 06/24/2019 24* 6 - 20 MG/DL Final    Creatinine 06/24/2019 1.08  0.70 - 1.30 MG/DL Final    BUN/Creatinine ratio 06/24/2019 22* 12 - 20   Final    GFR est AA 06/24/2019 >60  >60 ml/min/1.73m2 Final    GFR est non-AA 06/24/2019 >60  >60 ml/min/1.73m2 Final    Calcium 06/24/2019 8.2* 8.5 - 10.1 MG/DL Final    Bilirubin, total 06/24/2019 0.6  0.2 - 1.0 MG/DL Final    ALT (SGPT) 06/24/2019 113* 12 - 78 U/L Final    AST (SGOT) 06/24/2019 227* 15 - 37 U/L Final    Alk.  phosphatase 06/24/2019 79  45 - 117 U/L Final    Protein, total 06/24/2019 6.4  6.4 - 8.2 g/dL Final    Albumin 06/24/2019 3.5  3.5 - 5.0 g/dL Final    Globulin 06/24/2019 2.9  2.0 - 4.0 g/dL Final    A-G Ratio 06/24/2019 1.2  1.1 - 2.2   Final    Lactic acid 06/24/2019 0.9  0.4 - 2.0 MMOL/L Final    Ventricular Rate 06/24/2019 116  BPM Final    Atrial Rate 06/24/2019 116  BPM Final    P-R Interval 06/24/2019 144  ms Final    QRS Duration 06/24/2019 78  ms Final    Q-T Interval 06/24/2019 308  ms Final    QTC Calculation (Bezet) 06/24/2019 428  ms Final    Calculated P Axis 06/24/2019 68  degrees Final    Calculated R Axis 06/24/2019 8  degrees Final    Calculated T Axis 06/24/2019 26  degrees Final    Diagnosis 06/24/2019    Final                    Value:Sinus tachycardia  Leftward axis  normal QTc  normal variant with no significant interval change  Confirmed by [facility] Bud JOHNSON ([id number]) on 6/24/2019 10:20:47 PM      CK 06/24/2019 8,489* 39 - 308 U/L Final    CK - MB 06/24/2019 46.4* <3.6 NG/ML Final    CK-MB Index 06/24/2019 0.5  0.0 - 2.5   Final    CK 06/25/2019 7,685* 39 - 308 U/L Final    CK - MB 06/25/2019 26.9* <3.6 NG/ML Final    CK-MB Index 06/25/2019 0.4  0.0 - 2.5   Final    Sodium 06/25/2019 140  136 - 145 mmol/L Final    Potassium 06/25/2019 3.4* 3.5 - 5.1 mmol/L Final    Chloride 06/25/2019 104  97 - 108 mmol/L Final    CO2 06/25/2019 25  21 - 32 mmol/L Final    Anion gap 06/25/2019 11  5 - 15 mmol/L Final    Glucose 06/25/2019 123* 65 - 100 mg/dL Final    BUN 06/25/2019 19  6 - 20 MG/DL Final    Creatinine 06/25/2019 0.95  0.70 - 1.30 MG/DL Final    BUN/Creatinine ratio 06/25/2019 20  12 - 20   Final    GFR est AA 06/25/2019 >60  >60 ml/min/1.73m2 Final    GFR est non-AA 06/25/2019 >60  >60 ml/min/1.73m2 Final    Calcium 06/25/2019 8.0* 8.5 - 10.1 MG/DL Final     No results found. DISPOSITION:    Medical floor bed PCU-2             FOLLOW-UP CARE:    Activity as tolerated  Regular diet  Wound Care: none needed.   Follow-up Information    None PROGNOSIS:   Guarded / Poor---- based on nature of patient's pathology/ies and treatment compliance issues. Prognosis is greatly dependent upon patient's ability to remain sober and to follow up with scheduled appointments as well as to comply with psychiatric medications as prescribed. DISCHARGE MEDICATIONS:     Informed consent given for the use of following psychotropic medications:  Current Discharge Medication List                 A coordinated, multidisplinary treatment team round was conducted with Marilu Nazario is done daily here at [facility]. This team consists of the nurse, psychiatric unit pharmacist,  and Beto Sanford. I have spent greater than 35 minutes on discharge work.     Signed:  Chata Chaparro MD  6/25/2019

## 2019-06-26 NOTE — PROGRESS NOTES
Bedside and Verbal shift change report given to Annalisa Mcclellan (oncoming nurse) by Audra Santo rn (offgoing nurse). Report included the following information SBAR, Kardex, Intake/Output, MAR, Recent Results, Med Rec Status and Cardiac Rhythm NSR/STACH.

## 2019-06-27 LAB
ALBUMIN SERPL-MCNC: 3.6 G/DL (ref 3.5–5)
ALBUMIN/GLOB SERPL: 1.1 {RATIO} (ref 1.1–2.2)
ALP SERPL-CCNC: 83 U/L (ref 45–117)
ALT SERPL-CCNC: 113 U/L (ref 12–78)
ANION GAP SERPL CALC-SCNC: 9 MMOL/L (ref 5–15)
AST SERPL-CCNC: 110 U/L (ref 15–37)
BILIRUB SERPL-MCNC: 0.5 MG/DL (ref 0.2–1)
BUN SERPL-MCNC: 8 MG/DL (ref 6–20)
BUN/CREAT SERPL: 11 (ref 12–20)
CALCIUM SERPL-MCNC: 9 MG/DL (ref 8.5–10.1)
CHLORIDE SERPL-SCNC: 103 MMOL/L (ref 97–108)
CK SERPL-CCNC: 1729 U/L (ref 39–308)
CO2 SERPL-SCNC: 27 MMOL/L (ref 21–32)
CREAT SERPL-MCNC: 0.76 MG/DL (ref 0.7–1.3)
GLOBULIN SER CALC-MCNC: 3.4 G/DL (ref 2–4)
GLUCOSE SERPL-MCNC: 83 MG/DL (ref 65–100)
HBV DNA SERPL NAA+PROBE-ACNC: NORMAL IU/ML
HBV DNA SERPL NAA+PROBE-LOG IU: NORMAL LOG10 IU/ML
POTASSIUM SERPL-SCNC: 3.9 MMOL/L (ref 3.5–5.1)
PROT SERPL-MCNC: 7 G/DL (ref 6.4–8.2)
SODIUM SERPL-SCNC: 139 MMOL/L (ref 136–145)
TEST INFORMATION: NORMAL

## 2019-06-27 PROCEDURE — 80053 COMPREHEN METABOLIC PANEL: CPT

## 2019-06-27 PROCEDURE — 65660000001 HC RM ICU INTERMED STEPDOWN

## 2019-06-27 PROCEDURE — 74011000258 HC RX REV CODE- 258: Performed by: INTERNAL MEDICINE

## 2019-06-27 PROCEDURE — 87522 HEPATITIS C REVRS TRNSCRPJ: CPT

## 2019-06-27 PROCEDURE — 82550 ASSAY OF CK (CPK): CPT

## 2019-06-27 PROCEDURE — 74011250636 HC RX REV CODE- 250/636: Performed by: INTERNAL MEDICINE

## 2019-06-27 PROCEDURE — 36415 COLL VENOUS BLD VENIPUNCTURE: CPT

## 2019-06-27 PROCEDURE — 74011250637 HC RX REV CODE- 250/637: Performed by: INTERNAL MEDICINE

## 2019-06-27 RX ADMIN — Medication 10 ML: at 08:45

## 2019-06-27 RX ADMIN — ENOXAPARIN SODIUM 40 MG: 40 INJECTION, SOLUTION INTRAVENOUS; SUBCUTANEOUS at 10:07

## 2019-06-27 RX ADMIN — THERA TABS 1 TABLET: TAB at 08:45

## 2019-06-27 RX ADMIN — SODIUM CHLORIDE 100 ML/HR: 450 INJECTION, SOLUTION INTRAVENOUS at 18:46

## 2019-06-27 RX ADMIN — Medication 10 ML: at 21:09

## 2019-06-27 RX ADMIN — FOLIC ACID 1 MG: 1 TABLET ORAL at 08:45

## 2019-06-27 RX ADMIN — Medication 100 MG: at 08:45

## 2019-06-27 RX ADMIN — LORAZEPAM 4 MG: 2 INJECTION INTRAMUSCULAR; INTRAVENOUS at 02:39

## 2019-06-27 RX ADMIN — LORAZEPAM 2 MG: 2 INJECTION INTRAMUSCULAR; INTRAVENOUS at 23:21

## 2019-06-27 RX ADMIN — SODIUM CHLORIDE 150 ML/HR: 450 INJECTION, SOLUTION INTRAVENOUS at 10:11

## 2019-06-27 RX ADMIN — LORAZEPAM 2 MG: 2 INJECTION INTRAMUSCULAR; INTRAVENOUS at 21:13

## 2019-06-27 RX ADMIN — LORAZEPAM 4 MG: 2 INJECTION INTRAMUSCULAR; INTRAVENOUS at 22:19

## 2019-06-27 RX ADMIN — LORAZEPAM 4 MG: 2 INJECTION INTRAMUSCULAR; INTRAVENOUS at 06:47

## 2019-06-27 RX ADMIN — Medication 20 ML: at 23:21

## 2019-06-27 RX ADMIN — SODIUM CHLORIDE 150 ML/HR: 450 INJECTION, SOLUTION INTRAVENOUS at 02:45

## 2019-06-27 RX ADMIN — Medication 10 ML: at 13:50

## 2019-06-27 NOTE — PROGRESS NOTES
Hospitalist Progress Note    NAME: Rachna Paris   :  1985   MRN:  [id number]   Room Number:  IRC3/09  @ [facility] Summary: 29 y.o. male whom presented on 2019 with      Assessment / Plan:  Update-  Limit use of Ativan   C/w ivf(dec rate)      Called patient's mom Rose Marie(medical power of ) on [phone]. Mother reported that the patient was incarcerated twince and was being treated for latent TB. He also has a history of polysubstance drug abuse and IV drug abuse and had hepatitis C that was never treated. I took consent for checking HIV test which she approved. Also got a call from Nurse- public health nurse in [facility]. She reported that patient was being treated for latent TB from 3/15/2019 with last day 6/10/2019. He was on 300 mg isoniazid daily +50 mg vitamin B6. She has requested us to do a QuantiFERON-TB test and report results to her. She will need to be contacted on [phone]. Rhabdomyolysis most likely secondary to dehydration-improving  Low suspicion for NMS. Despite the fact that the patient was recently started on Risperdal.  He does not have fever or muscle rigidity. CK [id number]  continue with fluid hydration-dec rate    Elevated LFTs  C/t monitor  Avoid hepatotoxic meds    Acute psychosis/delirium- mentation better today off meds  Schizophrenia  Patient was recently started on Risperdal and was receiving Invega 9 mg IV daily per psychiatry. appreciate psych input. PRN Ativan for agitation- limit ativan use    Chronic Hep C  Hx IV Drug abuse  polysubstance drug abuse  Latent TB  HEP C POSITIVE  HIV- Neg        Code status: Full  Prophylaxis: Lovenox  Recommended Disposition: [facility]     Subjective:     Chief Complaint / Reason for Physician Visit  \" DOING OK\". Discussed with RN events overnight.      Review of Systems:  Symptom Y/N Comments  Symptom Y/N Comments   Fever/Chills    Chest Pain     Poor Appetite Edema     Cough    Abdominal Pain     Sputum    Joint Pain     SOB/DUNCAN    Pruritis/Rash     Nausea/vomit    Tolerating PT/OT     Diarrhea    Tolerating Diet     Constipation    Other       Could NOT obtain due to:      Objective:     VITALS:   Last 24hrs VS reviewed since prior progress note. Most recent are:  Patient Vitals for the past 24 hrs:   Temp Pulse Resp BP SpO2   06/27/19 1201 98.5 °F (36.9 °C) 89 23 111/82 100 %   06/27/19 0823 98.7 °F (37.1 °C) 90 16 102/87 99 %   06/27/19 0330 98 °F (36.7 °C) 94 18 127/79 98 %   06/26/19 2337 97.8 °F (36.6 °C) 85 18 129/81 98 %   06/26/19 2104 99 °F (37.2 °C) (!) 106 20 (!) 136/95 100 %   06/26/19 1606 99 °F (37.2 °C) 98 20 (!) 134/96 100 %       Intake/Output Summary (Last 24 hours) at 6/27/2019 1303  Last data filed at 6/26/2019 1504  Gross per 24 hour   Intake    Output 1600 ml   Net -1600 ml        PHYSICAL EXAM:  General: WD, WN. Alert, cooperative, no acute distress    EENT:  EOMI. Anicteric sclerae. MMM  Resp:  CTA bilaterally, no wheezing or rales. No accessory muscle use  CV:  Regular  rhythm,  No edema  GI:  Soft, Non distended, Non tender.  +Bowel sounds  Neurologic:  Alert and oriented X 2, normal speech,   Psych:   some insight. Not anxious nor agitated  Skin:  No rashes. No jaundice    Reviewed most current lab test results and cultures  YES  Reviewed most current radiology test results   YES  Review and summation of old records today    NO  Reviewed patient's current orders and MAR    YES  PMH/SH reviewed - no change compared to H&P  ________________________________________________________________________  Care Plan discussed with:    Comments   Patient     Family      RN     Care Manager     Consultant                        Multidiciplinary team rounds were held today with , nursing, pharmacist and clinical coordinator. Patient's plan of care was discussed; medications were reviewed and discharge planning was addressed. ________________________________________________________________________  Total NON critical care TIME:  45   Minutes    Total CRITICAL CARE TIME Spent:   Minutes non procedure based      Comments   >50% of visit spent in counseling and coordination of care x    ________________________________________________________________________  Savana Mccarty MD     Procedures: see electronic medical records for all procedures/Xrays and details which were not copied into this note but were reviewed prior to creation of Plan. LABS:  I reviewed today's most current labs and imaging studies. Pertinent labs include:  Recent Labs     06/26/19 0451 06/25/19  0941 06/24/19 2032   WBC 7.1 7.0 10.6   HGB 12.7 12.0* 12.1   HCT 38.1 34.8* 35.5*    240 286     Recent Labs     06/27/19 0227 06/26/19  0451 06/25/19  0941  06/24/19 2032    141 141   < > 138   K 3.9 4.5 3.4*   < > 3.3*    107 104   < > 102   CO2 27 26 26   < > 25   GLU 83 81 96   < > 114*   BUN 8 10 16   < > 24*   CREA 0.76 0.79 0.93   < > 1.08   CA 9.0 8.2* 8.0*   < > 8.2*   MG  --   --  1.9  --  2.0   ALB 3.6  --  3.4*  --  3.5   TBILI 0.5  --  0.8  --  0.6   SGOT 110*  --  201*  --  227*   *  --  120*  --  113*    < > = values in this interval not displayed.        Signed: Savana Mccarty MD

## 2019-06-27 NOTE — PROGRESS NOTES
Bedside and Verbal shift change report given to Stella rn (oncoming nurse) by Lucy Trujillo rn (offgoing nurse). Report included the following information SBAR, Kardex, Intake/Output, MAR, Recent Results, Med Rec Status and Cardiac Rhythm NSR/STACH.

## 2019-06-27 NOTE — CONSULTS
PSYCHIATRY CONSULT NOTE:    REASON FOR CONSULT:  delusions and psychotic behavior  Paranoia    HISTORY OF PRESENTING COMPLAINT:  Yakelin Bass is a 29 y.o. male who was admitted to psychiatry for paranoid and psychotic behaviors that was acute in onset and seemed to worsen during the course of hospitalization. Medical was consulted and they followed along noticing the negative trend in the patients sensorium (waxing and waning periods of clarity and the appearance of a drunken stupor) and his chemistries. CPK's elevated and kidney functions prompted further medical action. Patient was not aggressive or violent, however was exhibitionistic, confused and wandering without proper body coverings. Transferred for safety and stabilization    Today patient remains delirious and confused with disorganized speech on a 1:1 safety check. Question as to whether the CPK is due to the antipsychotic injections vs an alternative cause of muscle breakdown. He speaks with a slight slur also. No aggression or violence. 6/26- Discussed with Dr. Darryle Bottom. Janee Freedman Raciel Barrett remains delirious but is a bit more clear today. He is partially clothed and alert speaking more clearly. He was not drinking prior to admission by reports, but was on Methamphetamines which may have contributed to the muscle breakdown leading to the Rhabdomyolsis. No aggression or violence. PAST PSYCHIATRIC HISTORY:  In Patient Schizoaffective disorder    SUBSTANCE ABUSE HISTORY:  Social History     Substance and Sexual Activity   Drug Use Not on file     Social History     Substance and Sexual Activity   Alcohol Use Not on file     Social History     Tobacco Use   Smoking Status Not on file       PAST MEDICAL HISTORY:  No past medical history on file.     SOCIAL HISTORY:  ARTIE is Mother Gray Stai [phone].       VITALS:  Visit Vitals  /87 (BP 1 Location: Left arm, BP Patient Position: At rest)   Pulse 90   Temp 98.7 °F (37.1 °C)   Resp 16   SpO2 99%       MEDICATIONS:    Current Facility-Administered Medications:     0.45% sodium chloride infusion, 150 mL/hr, IntraVENous, CONTINUOUS, Leana Salgado MD, Last Rate: 150 mL/hr at 06/27/19 0245, 150 mL/hr at 06/27/19 0245    ibuprofen (MOTRIN) tablet 400 mg, 400 mg, Oral, Q6H PRN, Leana Salgado MD    sodium chloride (NS) flush 5-40 mL, 5-40 mL, IntraVENous, Q8H, Nati ROMANO MD, 10 mL at 06/27/19 0845    sodium chloride (NS) flush 5-40 mL, 5-40 mL, IntraVENous, PRN, Leana Salgado MD    enoxaparin (LOVENOX) injection 40 mg, 40 mg, SubCUTAneous, Q24H, Nati ROMANO MD, 40 mg at 06/26/19 1205    labetalol (NORMODYNE;TRANDATE) 20 mg/4 mL (5 mg/mL) injection 20 mg, 20 mg, IntraVENous, Q4H PRN, Leana Salgado MD    LORazepam (ATIVAN) injection 2 mg, 2 mg, IntraVENous, Q1H PRN, Leana Salgado MD, 2 mg at 06/26/19 2223    LORazepam (ATIVAN) injection 4 mg, 4 mg, IntraVENous, Q1H PRN, Leana Salgado MD, 4 mg at 06/27/19 7972    thiamine HCL (B-1) tablet 100 mg, 100 mg, Oral, DAILY, Leana Salgado MD, 100 mg at 90/69/55 7476    folic acid (FOLVITE) tablet 1 mg, 1 mg, Oral, DAILY, Leana Salgado MD, 1 mg at 06/27/19 0845    therapeutic multivitamin (Ollis Farrow) tablet 1 Tab, 1 Tab, Oral, DAILY, Leana Salgado MD, 1 Tab at 06/27/19 0845    MENTAL STATUS EXAM:  Calm, Delusions and Preoccupied  Person/place only  Denies SI/HI/AH/VH  Goal directed    ASSESSMENT AND PLAN:  Schizoaffective Disorder, Cluster A Traits , Problems related to social environment  and 51-60 moderate symptoms  Will follow along with medical to determine which medications are needed  Please continue the 1:1 due to confusion and wandering spells.   Minimize use of ativan and benzodiazepines for detox purposes as this was not his condition and may prolong his confusion/delirium  Thank you for this consultation    Janny Gillespie MD  6/27/2019

## 2019-06-28 LAB
CK SERPL-CCNC: 726 U/L (ref 39–308)
HCV RNA SERPL NAA+PROBE-ACNC: NORMAL IU/ML
HCV RNA SERPL NAA+PROBE-LOG IU: 6.61 LOG10 IU/ML
SERIAL MONITORING: NORMAL

## 2019-06-28 PROCEDURE — 65270000029 HC RM PRIVATE

## 2019-06-28 PROCEDURE — 74011000258 HC RX REV CODE- 258: Performed by: INTERNAL MEDICINE

## 2019-06-28 PROCEDURE — 74011250637 HC RX REV CODE- 250/637: Performed by: INTERNAL MEDICINE

## 2019-06-28 PROCEDURE — 74011250636 HC RX REV CODE- 250/636: Performed by: INTERNAL MEDICINE

## 2019-06-28 PROCEDURE — 36415 COLL VENOUS BLD VENIPUNCTURE: CPT

## 2019-06-28 PROCEDURE — 82550 ASSAY OF CK (CPK): CPT

## 2019-06-28 RX ADMIN — LORAZEPAM 4 MG: 2 INJECTION INTRAMUSCULAR; INTRAVENOUS at 02:47

## 2019-06-28 RX ADMIN — Medication 10 ML: at 21:24

## 2019-06-28 RX ADMIN — THERA TABS 1 TABLET: TAB at 08:41

## 2019-06-28 RX ADMIN — Medication 10 ML: at 15:03

## 2019-06-28 RX ADMIN — SODIUM CHLORIDE 100 ML/HR: 450 INJECTION, SOLUTION INTRAVENOUS at 04:57

## 2019-06-28 RX ADMIN — FOLIC ACID 1 MG: 1 TABLET ORAL at 08:41

## 2019-06-28 RX ADMIN — SODIUM CHLORIDE 100 ML/HR: 450 INJECTION, SOLUTION INTRAVENOUS at 15:02

## 2019-06-28 RX ADMIN — LORAZEPAM 2 MG: 2 INJECTION INTRAMUSCULAR; INTRAVENOUS at 15:04

## 2019-06-28 RX ADMIN — Medication 100 MG: at 08:41

## 2019-06-28 RX ADMIN — ENOXAPARIN SODIUM 40 MG: 40 INJECTION, SOLUTION INTRAVENOUS; SUBCUTANEOUS at 10:08

## 2019-06-28 RX ADMIN — Medication 10 ML: at 05:03

## 2019-06-28 NOTE — PROGRESS NOTES
Bedside and Verbal shift change report given to Stella rn (oncoming nurse) by Ziggy David rn (offgoing nurse). Report included the following information SBAR, Kardex, Intake/Output, MAR, Recent Results and Med Rec Status.

## 2019-06-28 NOTE — PROGRESS NOTES
Hospitalist Progress Note    NAME: Elen Reyna   :  1985   MRN:  [id number]   Room Number:  RRU0/02  @ [address] [facility]       Interim Hospital Summary: 29 y.o. male whom presented on 2019 with      Assessment / Plan:  Update-  Not agitated/anxious. Sitting comfortably on bed. 1:1 SITTER        Called patient's mom Rose Marie(medical power of ) on [phone]. Mother reported that the patient was incarcerated twince and was being treated for latent TB. He also has a history of polysubstance drug abuse and IV drug abuse and had hepatitis C that was never treated. I took consent for checking HIV test which she approved. Also got a call from Nurse- public health nurse in [facility]. She reported that patient was being treated for latent TB from 3/15/2019 with last day 6/10/2019. He was on 300 mg isoniazid daily +50 mg vitamin B6. She has requested us to do a QuantiFERON-TB test and report results to her. She will need to be contacted on [phone]. Rhabdomyolysis most likely secondary to dehydration-improving  Low suspicion for NMS. Despite the fact that the patient was recently started on Risperdal.  He does not have fever or muscle rigidity. CK [id number]  continue with fluid hydration-dec rate    Elevated LFTs  C/t monitor  Avoid hepatotoxic meds    Acute psychosis/delirium- mentation better today off meds  Schizophrenia  Patient was recently started on Risperdal and was receiving Invega 9 mg IV daily per psychiatry. appreciate psych input. PRN Ativan for agitation- limit ativan use    Chronic Hep C  Hx IV Drug abuse  polysubstance drug abuse  Latent TB  HEP C POSITIVE,await viral load results. Will need OP Hepatology referral.   HIV- Neg  Await TB test results        Code status: Full  Prophylaxis: Lovenox  Recommended Disposition: [facility]     Subjective:     Chief Complaint / Reason for Physician Visit  \" DOING OK\". Discussed with RN events overnight. Review of Systems:  Symptom Y/N Comments  Symptom Y/N Comments   Fever/Chills    Chest Pain     Poor Appetite    Edema     Cough    Abdominal Pain     Sputum    Joint Pain     SOB/DUNCAN    Pruritis/Rash     Nausea/vomit    Tolerating PT/OT     Diarrhea    Tolerating Diet     Constipation    Other       Could NOT obtain due to:      Objective:     VITALS:   Last 24hrs VS reviewed since prior progress note. Most recent are:  Patient Vitals for the past 24 hrs:   Temp Pulse Resp BP SpO2   06/28/19 0454 98 °F (36.7 °C) 73 14 104/63 100 %   06/28/19 0041 97.2 °F (36.2 °C) 75 18 121/76 99 %   06/27/19 2318 97.7 °F (36.5 °C) 83 16 113/73 100 %   06/27/19 1936 98.2 °F (36.8 °C) 95 23 119/72 98 %   06/27/19 1611 98 °F (36.7 °C) 91 16 114/72 100 %   06/27/19 1201 98.5 °F (36.9 °C) 89 23 111/82 100 %       Intake/Output Summary (Last 24 hours) at 6/28/2019 0831  Last data filed at 6/28/2019 0558  Gross per 24 hour   Intake 6346.67 ml   Output 525 ml   Net 5821.67 ml        PHYSICAL EXAM:  General: WD, WN. Alert, cooperative, no acute distress    EENT:  EOMI. Anicteric sclerae. MMM  Resp:  CTA bilaterally, no wheezing or rales. No accessory muscle use  CV:  Regular  rhythm,  No edema  GI:  Soft, Non distended, Non tender.  +Bowel sounds  Neurologic:  Alert and oriented X 2, normal speech,   Psych:   some insight. Not anxious nor agitated  Skin:  No rashes. No jaundice    Reviewed most current lab test results and cultures  YES  Reviewed most current radiology test results   YES  Review and summation of old records today    NO  Reviewed patient's current orders and MAR    YES  PMH/SH reviewed - no change compared to H&P  ________________________________________________________________________  Care Plan discussed with:    Comments   Patient     Family      RN     Care Manager     Consultant                        Multidiciplinary team rounds were held today with , nursing, pharmacist and clinical coordinator. Patient's plan of care was discussed; medications were reviewed and discharge planning was addressed. ________________________________________________________________________  Total NON critical care TIME:  45   Minutes    Total CRITICAL CARE TIME Spent:   Minutes non procedure based      Comments   >50% of visit spent in counseling and coordination of care x    ________________________________________________________________________  Shilpa East MD     Procedures: see electronic medical records for all procedures/Xrays and details which were not copied into this note but were reviewed prior to creation of Plan. LABS:  I reviewed today's most current labs and imaging studies.   Pertinent labs include:  Recent Labs     06/26/19 0451 06/25/19  0941   WBC 7.1 7.0   HGB 12.7 12.0*   HCT 38.1 34.8*    240     Recent Labs     06/27/19 0227 06/26/19 0451 06/25/19  0941    141 141   K 3.9 4.5 3.4*    107 104   CO2 27 26 26   GLU 83 81 96   BUN 8 10 16   CREA 0.76 0.79 0.93   CA 9.0 8.2* 8.0*   MG  --   --  1.9   ALB 3.6  --  3.4*   TBILI 0.5  --  0.8   SGOT 110*  --  201*   *  --  120*       Signed: Shilpa East MD

## 2019-06-28 NOTE — PROGRESS NOTES
Spiritual Care Partner Volunteer visited patient in PCU at [facility] on 6/28/19.   Documented by:  Noe Vargas

## 2019-06-28 NOTE — PROGRESS NOTES
Initial Nutrition Assessment:    INTERVENTIONS/RECOMMENDATIONS:   ·  Reg diet as tolerated  ·  cont IVF resuscitation  · Supplements: Commercial supplement  ·  push po fluids  ·  Ensure supplement on lunch trays  ·  MV/day  ·  double portions meat and vegetables    ASSESSMENT:   Pt admitted for rhabdo 2' dehydration, slurred speech and drunken-like behavior, latent TB (was being treated elsewhere), CPK trending down, renal fx improving. Elev LFTs noted, + hep C, polysubstance abuse, IVDU    Diet Order: Regular  % Eaten:    Patient Vitals for the past 72 hrs:   % Diet Eaten   06/26/19 0735 100 %     Pertinent Medications: [x]Reviewed []Other  Pertinent Labs: [x]Reviewed []Other  Food Allergies: [x]None []Other   Last BM:    [x]Active     []Hyperactive  []Hypoactive       [] Absent BS  Skin:    [x] Intact   [] Incision  [] Breakdown  [] Other:    Anthropometrics:   Height: 6' 3\" (190.5 cm) Weight: 113.4 kg (250 lb)(from recent previous [facility] admission)   IBW (%IBW): 88.9 kg (196 lb) (127.55 %) UBW (%UBW):   (  %)   Last Weight Metrics:  Weight Loss Metrics 6/28/2019 6/25/2019 6/18/2019 6/17/2019   Today's Wt 250 lb - 250 lb -   BMI - 31.25 kg/m2 - 31.25 kg/m2       BMI: Body mass index is 31.25 kg/m². This BMI is indicative of:   []Underweight    []Normal    []Overweight    [x] Obesity   [] Extreme Obesity (BMI>40)     Estimated Nutrition Needs (Based on):   2255 Kcals/day(2020 x 1.2 AF) , 75 g(.7 g/kg CBW) Protein  Carbohydrate:  At Least 130 g/day  Fluids: 2250  mL/day (1ml/kcal)    NUTRITION DIAGNOSES:   Problem:  Limited adherence to nutrition-related recommendations      Etiology: related to poor diet, poor nutrient intake, substance abuse, dehydration     Signs/Symptoms: as evidenced by labs, UDS, rhabdo      NUTRITION INTERVENTIONS:      Supplements: Commercial supplement              GOAL:   PO intake of meals and supplements > 75% next 5-7 days    LEARNING NEEDS (Diet, Food/Nutrient-Drug Interaction):    [x] None Identified   [] Identified and Education Provided/Documented   [] Identified and Pt declined/was not appropriate     Cultureal, Druze, OR Ethnic Dietary Needs:    [x] None Identified   [] Identified and Addressed     [x] Interdisciplinary Care Plan Reviewed/Documented    [x] Discharge Planning:  Red diet with supplements    MONITORING /EVALUATION:   Behavioral-Environmental Outcomes: Behavior  Food/Nutrient Intake Outcomes:  Total energy intake, Oral fluids amount, IV fluids  Physical Signs/Symptoms Outcomes: Acid-base balance, Electrolyte and renal profile, Other (comment)(hepatic, TB)    NUTRITION RISK:    [] Patient At Nutritional Risk Mild   [] Patient Not At Nutritional Risk    PT SEEN FOR:    []  MD Consult: []Calorie Count      []Diabetic Diet Education        []Diet Education     []Electrolyte Management     [x]General Nutrition Management and Supplements     []Management of Tube Feeding     []TPN Recommendations    [x]  RN Referral:  []MST score >=2     []Enteral/Parenteral Nutrition PTA     []Pregnant: Gestational DM or Multigestation     []Pressure Ulcer/Wound Care needs        []  Low BMI  []  DAISY Hussein, PhD, [facility], [address] [place] [address]   Pager [phone]

## 2019-06-28 NOTE — PROGRESS NOTES
Interdisciplinary team rounds were held 6/28/2019 with the following team members:Care Management, Infection Control, Nursing and Physician and the patient. Plan of care discussed. See clinical pathway and/or care plan for interventions and desired outcomes.

## 2019-06-28 NOTE — PROGRESS NOTES
Patient will need PCP follow-up. CM to call patient mother confirm family PCP for appointment.      [address] [place] [address]  [phone]

## 2019-06-28 NOTE — PROGRESS NOTES
Bedside and Verbal shift change report given to Yu Ashton (oncoming nurse) by Armen Uribe rn (offgoing nurse). Report included the following information SBAR, Kardex, Intake/Output, MAR, Recent Results, Med Rec Status and Cardiac Rhythm A-FIB.

## 2019-06-29 LAB
ANION GAP SERPL CALC-SCNC: 10 MMOL/L (ref 5–15)
BUN SERPL-MCNC: 14 MG/DL (ref 6–20)
BUN/CREAT SERPL: 18 (ref 12–20)
CALCIUM SERPL-MCNC: 8.9 MG/DL (ref 8.5–10.1)
CHLORIDE SERPL-SCNC: 104 MMOL/L (ref 97–108)
CK SERPL-CCNC: 575 U/L (ref 39–308)
CO2 SERPL-SCNC: 25 MMOL/L (ref 21–32)
CREAT SERPL-MCNC: 0.8 MG/DL (ref 0.7–1.3)
GLUCOSE SERPL-MCNC: 94 MG/DL (ref 65–100)
POTASSIUM SERPL-SCNC: 3.9 MMOL/L (ref 3.5–5.1)
SODIUM SERPL-SCNC: 139 MMOL/L (ref 136–145)

## 2019-06-29 PROCEDURE — 80048 BASIC METABOLIC PNL TOTAL CA: CPT

## 2019-06-29 PROCEDURE — 74011250636 HC RX REV CODE- 250/636: Performed by: INTERNAL MEDICINE

## 2019-06-29 PROCEDURE — 65270000029 HC RM PRIVATE

## 2019-06-29 PROCEDURE — 74011250637 HC RX REV CODE- 250/637: Performed by: INTERNAL MEDICINE

## 2019-06-29 PROCEDURE — 74011000258 HC RX REV CODE- 258: Performed by: INTERNAL MEDICINE

## 2019-06-29 PROCEDURE — 36415 COLL VENOUS BLD VENIPUNCTURE: CPT

## 2019-06-29 PROCEDURE — 82550 ASSAY OF CK (CPK): CPT

## 2019-06-29 RX ORDER — LANOLIN ALCOHOL/MO/W.PET/CERES
3 CREAM (GRAM) TOPICAL
Status: DISCONTINUED | OUTPATIENT
Start: 2019-06-29 | End: 2019-06-30 | Stop reason: ALTCHOICE

## 2019-06-29 RX ADMIN — LORAZEPAM 2 MG: 2 INJECTION INTRAMUSCULAR; INTRAVENOUS at 22:46

## 2019-06-29 RX ADMIN — LORAZEPAM 2 MG: 2 INJECTION INTRAMUSCULAR; INTRAVENOUS at 10:34

## 2019-06-29 RX ADMIN — SODIUM CHLORIDE 100 ML/HR: 450 INJECTION, SOLUTION INTRAVENOUS at 21:44

## 2019-06-29 RX ADMIN — THERA TABS 1 TABLET: TAB at 09:16

## 2019-06-29 RX ADMIN — Medication 3 MG: at 21:32

## 2019-06-29 RX ADMIN — Medication 10 ML: at 06:08

## 2019-06-29 RX ADMIN — Medication 10 ML: at 18:00

## 2019-06-29 RX ADMIN — SODIUM CHLORIDE 100 ML/HR: 450 INJECTION, SOLUTION INTRAVENOUS at 12:18

## 2019-06-29 RX ADMIN — Medication 10 ML: at 21:32

## 2019-06-29 RX ADMIN — FOLIC ACID 1 MG: 1 TABLET ORAL at 09:16

## 2019-06-29 RX ADMIN — ENOXAPARIN SODIUM 40 MG: 40 INJECTION, SOLUTION INTRAVENOUS; SUBCUTANEOUS at 09:17

## 2019-06-29 RX ADMIN — SODIUM CHLORIDE 100 ML/HR: 450 INJECTION, SOLUTION INTRAVENOUS at 01:44

## 2019-06-29 RX ADMIN — Medication 100 MG: at 09:15

## 2019-06-29 NOTE — PROGRESS NOTES
Bedside and Verbal shift change report given to Franci Funes (oncoming nurse) by Stella (offgoing nurse). Report included the following information SBAR, Kardex, MAR, Accordion and Recent Results. Patient in bed; restless but redirectable. Sitter at bedside for safety (pulling at lines)    10:46 AM  Bed alarm placed on patient as there is no sitter to monitor the patient. 2:52 PM Spoke with the patient's mother on the phone, who had just spoken with her son. Expressed concerns that he seems \"dopey. \" Inquired which medications the patient is taking. Let his mother know that the patient is on IV fluids and does get ativan as needed. Mother expressed concern that he has not yet been transferred up to psych. Discussed the target CK values for the patient and explained that patients are not usually on IV fluids on the psych unit due to safety concerns. Verbalized understanding. Assured the patient's mother that I will be working with the patient tomorrow as well, and that I would call her with updates to his labs and his plan. Ms. Freya Sloan- [phone]       Melatonin order placed per verbal order from Dr. Chelly Yee to promote sleep. 1945 Bedside and Verbal shift change report given to Stella (oncoming nurse) by Franci Funes (offgoing nurse). Report included the following information SBAR, Kardex, MAR and Accordion.

## 2019-06-29 NOTE — PROGRESS NOTES
Hospitalist Progress Note    NAME: Pamela Terrazas   :  1985   MRN:  [id number]   Room Number:  HWA1/20  @ [address] [facility]       Interim Hospital Summary: 29 y.o. male whom presented on 2019 with      Assessment / Plan:  Update-  Not agitated/anxious. Sitting comfortably on bed. 1:1 SITTER        Called patient's mom Rose Marie(medical power of ) on [phone]. Mother reported that the patient was incarcerated twince and was being treated for latent TB. He also has a history of polysubstance drug abuse and IV drug abuse and had hepatitis C that was never treated. I took consent for checking HIV test which she approved. Also got a call from Nurse- public health nurse in [facility]. She reported that patient was being treated for latent TB from 3/15/2019 with last day 6/10/2019. He was on 300 mg isoniazid daily +50 mg vitamin B6. She has requested us to do a QuantiFERON-TB test and report results to her. She will need to be contacted on [phone]. Rhabdomyolysis most likely secondary to dehydration-improving  Low suspicion for NMS. Despite the fact that the patient was recently started on Risperdal.  He does not have fever or muscle rigidity. CK [id number]  continue with fluid hydration-dec rate    Elevated LFTs  C/t monitor  Avoid hepatotoxic meds    Acute psychosis/delirium- mentation better today off meds  Schizophrenia  Patient was recently started on Risperdal and was receiving Invega 9 mg IV daily per psychiatry. appreciate psych input. PRN Ativan for agitation- limit ativan use    Chronic Hep C  Hx IV Drug abuse  polysubstance drug abuse  Latent TB  HEP C POSITIVE viral load 4,110,000 -. Will need OP Hepatology referral.   HIV- Neg  Await TB test results    Insomnia  PRN Melatonin    Code status: Full  Prophylaxis: Lovenox  Recommended Disposition: [facility]     Subjective:     Chief Complaint / Reason for Physician Visit  \" Wong Loud".   Discussed with RN events overnight. Review of Systems:  Symptom Y/N Comments  Symptom Y/N Comments   Fever/Chills    Chest Pain     Poor Appetite    Edema     Cough    Abdominal Pain     Sputum    Joint Pain     SOB/DUNCAN    Pruritis/Rash     Nausea/vomit    Tolerating PT/OT     Diarrhea    Tolerating Diet     Constipation    Other       Could NOT obtain due to:      Objective:     VITALS:   Last 24hrs VS reviewed since prior progress note. Most recent are:  Patient Vitals for the past 24 hrs:   Temp Pulse Resp BP SpO2   06/29/19 1033  90  115/72 97 %   06/29/19 0900 98.6 °F (37 °C) 80 18 (!) 109/91 99 %   06/29/19 0145 97.6 °F (36.4 °C) 76 18 108/78 99 %   06/28/19 1940 98.8 °F (37.1 °C) 88 16 113/75 100 %   06/28/19 1610 98 °F (36.7 °C) 92 20 124/74 99 %   06/28/19 1502  82  108/78        Intake/Output Summary (Last 24 hours) at 6/29/2019 1301  Last data filed at 6/29/2019 0802  Gross per 24 hour   Intake 3123.33 ml   Output 1375 ml   Net 1748.33 ml        PHYSICAL EXAM:  General: WD, WN. Alert, cooperative, no acute distress    EENT:  EOMI. Anicteric sclerae. MMM  Resp:  CTA bilaterally, no wheezing or rales. No accessory muscle use  CV:  Regular  rhythm,  No edema  GI:  Soft, Non distended, Non tender.  +Bowel sounds  Neurologic:  Alert and oriented X 2, normal speech,   Psych:   some insight. Not anxious nor agitated  Skin:  No rashes. No jaundice    Reviewed most current lab test results and cultures  YES  Reviewed most current radiology test results   YES  Review and summation of old records today    NO  Reviewed patient's current orders and MAR    YES  PMH/SH reviewed - no change compared to H&P  ________________________________________________________________________  Care Plan discussed with:    Comments   Patient     Family      RN     Care Manager     Consultant                        Multidiciplinary team rounds were held today with , nursing, pharmacist and clinical coordinator.   Patient's plan of care was discussed; medications were reviewed and discharge planning was addressed. ________________________________________________________________________  Total NON critical care TIME:  45   Minutes    Total CRITICAL CARE TIME Spent:   Minutes non procedure based      Comments   >50% of visit spent in counseling and coordination of care x    ________________________________________________________________________  Yenny Brasher MD     Procedures: see electronic medical records for all procedures/Xrays and details which were not copied into this note but were reviewed prior to creation of Plan. LABS:  I reviewed today's most current labs and imaging studies. Pertinent labs include:  No results for input(s): WBC, HGB, HCT, PLT, HGBEXT, HCTEXT, PLTEXT, HGBEXT, HCTEXT, PLTEXT in the last 72 hours.   Recent Labs     06/29/19  0441 06/27/19  0227    139   K 3.9 3.9    103   CO2 25 27   GLU 94 83   BUN 14 8   CREA 0.80 0.76   CA 8.9 9.0   ALB  --  3.6   TBILI  --  0.5   SGOT  --  110*   ALT  --  113*       Signed: Yenny Brasher MD

## 2019-06-30 LAB — CK SERPL-CCNC: 423 U/L (ref 39–308)

## 2019-06-30 PROCEDURE — 74011250636 HC RX REV CODE- 250/636: Performed by: INTERNAL MEDICINE

## 2019-06-30 PROCEDURE — 65270000029 HC RM PRIVATE

## 2019-06-30 PROCEDURE — 74011250637 HC RX REV CODE- 250/637: Performed by: PSYCHIATRY & NEUROLOGY

## 2019-06-30 PROCEDURE — 82550 ASSAY OF CK (CPK): CPT

## 2019-06-30 PROCEDURE — 74011250637 HC RX REV CODE- 250/637: Performed by: INTERNAL MEDICINE

## 2019-06-30 PROCEDURE — 74011000258 HC RX REV CODE- 258: Performed by: INTERNAL MEDICINE

## 2019-06-30 PROCEDURE — 74011250636 HC RX REV CODE- 250/636

## 2019-06-30 PROCEDURE — 36415 COLL VENOUS BLD VENIPUNCTURE: CPT

## 2019-06-30 RX ORDER — VALPROIC ACID 250 MG/1
250 CAPSULE, LIQUID FILLED ORAL 3 TIMES DAILY
Status: DISCONTINUED | OUTPATIENT
Start: 2019-06-30 | End: 2019-06-30 | Stop reason: RX

## 2019-06-30 RX ORDER — HALOPERIDOL 5 MG/ML
2 INJECTION INTRAMUSCULAR ONCE
Status: COMPLETED | OUTPATIENT
Start: 2019-06-30 | End: 2019-06-30

## 2019-06-30 RX ORDER — LANOLIN ALCOHOL/MO/W.PET/CERES
50 CREAM (GRAM) TOPICAL DAILY
COMMUNITY
End: 2019-07-29

## 2019-06-30 RX ORDER — LEVOTHYROXINE SODIUM 50 UG/1
50 TABLET ORAL
COMMUNITY
End: 2019-07-29

## 2019-06-30 RX ORDER — LEVOTHYROXINE SODIUM 50 UG/1
50 TABLET ORAL
Status: DISCONTINUED | OUTPATIENT
Start: 2019-07-01 | End: 2019-07-01 | Stop reason: HOSPADM

## 2019-06-30 RX ORDER — RISPERIDONE 0.25 MG/1
0.5 TABLET, FILM COATED ORAL EVERY 12 HOURS
Status: DISCONTINUED | OUTPATIENT
Start: 2019-06-30 | End: 2019-07-01

## 2019-06-30 RX ORDER — HALOPERIDOL 5 MG/ML
INJECTION INTRAMUSCULAR
Status: COMPLETED
Start: 2019-06-30 | End: 2019-06-30

## 2019-06-30 RX ORDER — TRAZODONE HYDROCHLORIDE 100 MG/1
300 TABLET ORAL
Status: DISCONTINUED | OUTPATIENT
Start: 2019-06-30 | End: 2019-07-01 | Stop reason: HOSPADM

## 2019-06-30 RX ORDER — VALPROIC ACID 250 MG/5ML
250 SOLUTION ORAL 3 TIMES DAILY
Status: DISCONTINUED | OUTPATIENT
Start: 2019-06-30 | End: 2019-06-30

## 2019-06-30 RX ORDER — ISONIAZID 300 MG/1
300 TABLET ORAL DAILY
COMMUNITY
End: 2019-07-29

## 2019-06-30 RX ORDER — RISPERIDONE 0.5 MG/1
1.5 TABLET, FILM COATED ORAL
COMMUNITY
End: 2019-07-29

## 2019-06-30 RX ADMIN — LORAZEPAM 2 MG: 2 INJECTION INTRAMUSCULAR; INTRAVENOUS at 18:50

## 2019-06-30 RX ADMIN — SODIUM CHLORIDE 100 ML/HR: 450 INJECTION, SOLUTION INTRAVENOUS at 07:15

## 2019-06-30 RX ADMIN — Medication 10 ML: at 18:50

## 2019-06-30 RX ADMIN — Medication 10 ML: at 21:39

## 2019-06-30 RX ADMIN — THERA TABS 1 TABLET: TAB at 08:48

## 2019-06-30 RX ADMIN — Medication 10 ML: at 13:41

## 2019-06-30 RX ADMIN — LORAZEPAM 2 MG: 2 INJECTION INTRAMUSCULAR; INTRAVENOUS at 01:51

## 2019-06-30 RX ADMIN — RISPERIDONE 0.5 MG: 0.25 TABLET ORAL at 13:39

## 2019-06-30 RX ADMIN — Medication 100 MG: at 08:48

## 2019-06-30 RX ADMIN — LORAZEPAM 2 MG: 2 INJECTION INTRAMUSCULAR; INTRAVENOUS at 13:39

## 2019-06-30 RX ADMIN — TRAZODONE HYDROCHLORIDE 300 MG: 100 TABLET ORAL at 21:39

## 2019-06-30 RX ADMIN — HALOPERIDOL 2 MG: 5 INJECTION INTRAMUSCULAR at 02:50

## 2019-06-30 RX ADMIN — Medication 10 ML: at 05:22

## 2019-06-30 RX ADMIN — FOLIC ACID 1 MG: 1 TABLET ORAL at 08:48

## 2019-06-30 RX ADMIN — SODIUM CHLORIDE 100 ML/HR: 450 INJECTION, SOLUTION INTRAVENOUS at 18:16

## 2019-06-30 RX ADMIN — HALOPERIDOL LACTATE 2 MG: 5 INJECTION, SOLUTION INTRAMUSCULAR at 02:50

## 2019-06-30 RX ADMIN — RISPERIDONE 0.5 MG: 0.25 TABLET ORAL at 21:39

## 2019-06-30 RX ADMIN — ENOXAPARIN SODIUM 40 MG: 40 INJECTION, SOLUTION INTRAVENOUS; SUBCUTANEOUS at 11:56

## 2019-06-30 NOTE — PROGRESS NOTES
Dr. Marylene Hopping paged via TeleMed d/t pt being very anxious and restless in bed. PRN ativan given with little effect. Pt crying stating he feels like he is about to have a nervous breakdown. Dr. Marylene Hopping also made aware that pt has not slept more than a couple hours in the past few days and that Melatonin was given earlier in the night with no effect. x1 dose of 2mg Haldol IV ordered. See Epic/MAR.

## 2019-06-30 NOTE — PROGRESS NOTES
[facility] Admission Pharmacy Medication Reconciliation     Information obtained from:   Faxed [facility] document from [facility] [phone] per Nurse Sebastian Restrepo    Findings:     6/11/19: Patient released from correctional facility with 30-day supply of the following:    Levothyroxine 50 mcg po daily  Risperidone 0.5 mg tabs - take 3 tabs po every evening  Vitamin B6 50 mg po daily  Isoniazid 300 mg po daily    6/12/19: Dispensed from [address] [phone]:    Trazodone 150 mg tabs - take 2 tabs po QHS      PTA list updated below. Patient Active Problem List   Diagnosis Code    Schizoaffective disorder ([facility] [address] F25.9    Elevated LFTs R94.5    Rhabdomyolysis M62.82         Patient allergies: Allergies as of 06/25/2019    (No Known Allergies)         Prior to Admission Medications   Prescriptions    Taking?   isoniazid (NYDRAZID) 300 mg tablet    Yes   Sig: Take 300 mg by mouth daily. levothyroxine (SYNTHROID) 50 mcg tablet    Yes   Sig: Take 50 mcg by mouth Daily (before breakfast). pyridoxine, vitamin B6, (VITAMIN B-6) 50 mg tablet    Yes   Sig: Take 50 mg by mouth daily. risperiDONE (RISPERDAL) 0.5 mg tablet    Yes   Sig: Take 1.5 mg by mouth nightly. Risperidone 0.5 mg tabs: Take 3 tabs po every evening   traZODone (DESYREL) 150 mg tablet    Yes   Sig: Take 300 mg by mouth nightly. Indications:  Insomnia           Thank you,  Devan Carr, [facility]

## 2019-06-30 NOTE — PROGRESS NOTES
Bedside and Verbal shift change report given to Becki Stubbs (oncoming nurse) by Stella (offgoing nurse). Report included the following information SBAR, Kardex, MAR, Accordion and Recent Results. Patient resting comfortably in bed, no acute distress noted; appears asleep. Patient did receive Ativan twice overnight for agitation and ultimately received a one-time dose of Haldol and was able to sleep for the past 4 hours. 80- Called and spoke with Dr. Luis Hoang re: lab results and general plan for this patient. CK was in the 400s. Plan is to keep the patient on the unit on fluids and re-evaluate the CK in the morning. Patient will likely transfer to behavioral health tomorrow pending lab results. 9:11 AM  Called and left a nonspecific message on Briseida Monte's (patient's [phone]) voicemail requesting a call-back. Was calling to give a status update on the patient as requested. Left our callback number on the voicemail. 10:02 AM  Patient's mother called back to discuss patient's status. Inquired if the patient was receiving his medication for thyroid. Advised her that I had not given him any medication for thyroid, and asked her for the prescription information. Reading from the bottles, she listed the following home medications:     Levothyroxine 50mcg. Take one tablet daily   Risperidone 0.5mg tablets once daily  Vitamin B4 50mg daily  Trazodone (for sleep)  Isoniazid 300mg (states patient had a false positive on a TB test)    1015 Dr Joycelyn Portillo at bedside. Discussed concerns expressed by the patient's mother. Psych consult was called by monitor tech. Dr. Sima Mccoy will call back. 11:31 AM Called and spoke with patient's mother to determine the pharmacy whence the patient got the medications. Patient had his medications filled at [facility] in [place], [place] where he was recently incarcerated.   This information was passed on to the pharmacist to see if we can get his prescription history. 1:18 PM  Patient getting increasingly anxious, calling out to each person who comes by and continually ringing the call bell. Called [facility] again and requested that they re-submit the page to psych consult as he has not yet contacted us.    1:22 PM  Dr. Lebanon called the unit. Report given about the patient; including his current status. Is currently at [facility]. Will review the chart, place orders as appropriate, and come in to see the patient in the morning.

## 2019-06-30 NOTE — PROGRESS NOTES
Progress Note      Pt Name  Jed Marte   Date of Birth 1985   Medical Record Number  [id number]      Age  29 y.o. PCP None   Admit date:  6/25/2019    Room Number  RGK0/50  @ [facility]   Date of Service  6/30/2019     Admission Diagnoses:  Rhabdomyolysis     Assessment and plan:     Update- 06/30/19   Report from tele-hospitalist overnight shows -   Not agitated/anxious. Sitting comfortably on bed  Will ask psychiatrist to help with sleep medicines       6/26  Called patient's mom Rose Marie(medical power of ) on [phone].  Mother reported that the patient was incarcerated twince and was being treated for latent TB. He also has a history of polysubstance drug abuse and IV drug abuse and had hepatitis C that was never treated. I took consent for checking HIV test which she approved. Also got a call from Nurse- public health nurse in [facility]. Sony Alston reported that patient was being treated for latent TB from 3/15/2019 with last day 6/10/2019. Eugene Nation was on 300 mg isoniazid daily +50 mg vitamin B6.  She has requested us to do a QuantiFERON-TB test and report results to her. Sony Alston will need to be contacted on [phone].      Rhabdomyolysis most likely secondary to dehydration-improving  Low suspicion for NMS.  Despite the fact that the patient was recently started on [address] does not have fever or muscle rigidity. CK [id number]  continue with fluid hydration-dec rate    Elevated LFTs  C/t monitor  Avoid hepatotoxic meds    Acute psychosis/delirium- mentation better today off meds  Schizophrenia  Patient was recently started on Risperdal and was receiving Invega 9 mg IV daily per psychiatry. appreciate psych input. PRN Ativan for agitation- limit ativan use    ? Hypothyroidism   -nursing staff reported to me that pt's mother had reported to them that the pt was on thyroid supplementation, levothyroxine 50mcg /d; pt confirmed too.    We will ask pharmacist to reconcile and resume that.     Chronic Hep C  -HEP C POSITIVE viral load 4,110,000 -. Will need OP Hepatology referral.     Hx of IV Drug abuse  Polysubstance drug abuse    Latent TB- see above 06/29/19   -HIV- Neg  -Await TB test results     Insomnia -  Will ask psychiatrist to help/assist             CODE STATUS   Full     Functional Status  Pt is disabled from mental health issues. He lives with his mother in [facility]     Surrogate decision maker:  Pt's mother      Prophylaxis   Lovenox   Discharge Plan:  Back to behavioral health unit ,     Misc   Benefit:  Payor: [place] MED[place]ARE / Plan: [place] MEDICARE PART A / Product Type: Medicare /    Isolation :  There are currently no Active Isolations   ADT status:  INPATIENT      Query   None noted today    Prognosis      Social issues  Date  Comment     06/30/19  12:01 PM  PM No family or visitor here with the patient today                      Subjective Data     \"OK \"  Review of Systems - History obtained from the patient  General ROS: negative  Respiratory ROS: no cough, shortness of breath, or wheezing  Cardiovascular ROS: no chest pain or dyspnea on exertion    Objective Data       Comments  Soft spoken pleasant gentleman lying in bed in no distress      Patient Vitals for the past 24 hrs:   BP   06/30/19 0841 104/83   06/30/19 0313 124/77   06/29/19 2134 128/77   06/29/19 1033 115/72      Patient Vitals for the past 24 hrs:   Pulse   06/30/19 0841 83   06/30/19 0313 77   06/29/19 2134 80   06/29/19 1033 90      Patient Vitals for the past 24 hrs:   Resp   06/30/19 0841 20   06/30/19 0313 20   06/29/19 2134 16      Patient Vitals for the past 24 hrs:   Temp   06/30/19 0841 98.1 °F (36.7 °C)   06/30/19 0313 98.1 °F (36.7 °C)   06/29/19 2134 97.9 °F (36.6 °C)        SpO2 Readings from Last 6 Encounters:   06/30/19 100%   06/25/19 98%          O2 Device: Room air Body mass index is 31.25 kg/m².  -  Wt Readings from Last 10 Encounters:   06/28/19 113.4 kg (250 lb) 06/18/19 113.4 kg (250 lb)        Physical Exam:             General:  Alert, cooperative,   well noursished,   well developed,   appears stated age    Ears/Eyes:  Hearing intact  Sclera anicteric. Pupils equal   Mouth/Throat:  Mucous membranes normal pink and moist     Neck:     Lungs:  Trachea midline  Chest excursion symmetrical   Auscultation B/L Symmetrical with   Vesicular breath sounds     No Crepitations noted          CVS:  Regular rate and rhythm   no  murmur,   No click, rub or gallop  S1 normal   S2 normal   Pedal pulses  b/l symmetrical   Abdomen:    Soft, non-tender  Bowel sounds normal  No distension      Extremities:    No cyanosis, jaundice  No edema noted   No sign of DVT/cord like lesion on palpation  No sign of acute trauma   Age appropriate OA changes noted.     Skin:    Skin color, texture, turgor normal. no acute rash or lesions    Lymph nodes:     Musculoskeletal Muscle bulk B/L symmetrical   Neuro Cranial nerves are intact,   motor movement b/l symmetrical,   Sensory evaluation b/l symmetrical    Psych:  Alert and oriented,           Medications reviewed     Current Facility-Administered Medications   Medication Dose Route Frequency    melatonin tablet 3 mg  3 mg Oral QHS    0.45% sodium chloride infusion  100 mL/hr IntraVENous CONTINUOUS    ibuprofen (MOTRIN) tablet 400 mg  400 mg Oral Q6H PRN    sodium chloride (NS) flush 5-40 mL  5-40 mL IntraVENous Q8H    sodium chloride (NS) flush 5-40 mL  5-40 mL IntraVENous PRN    enoxaparin (LOVENOX) injection 40 mg  40 mg SubCUTAneous Q24H    labetalol (NORMODYNE;TRANDATE) 20 mg/4 mL (5 mg/mL) injection 20 mg  20 mg IntraVENous Q4H PRN    LORazepam (ATIVAN) injection 2 mg  2 mg IntraVENous Q1H PRN    LORazepam (ATIVAN) injection 4 mg  4 mg IntraVENous Q1H PRN    thiamine HCL (B-1) tablet 100 mg  100 mg Oral DAILY    folic acid (FOLVITE) tablet 1 mg  1 mg Oral DAILY    therapeutic multivitamin (THERAGRAN) tablet 1 Tab  1 Tab Oral DAILY Relevant other informations: Other medical conditions listed in [facility] problem list section; all of these and other pertinent data were taken into consideration when treatment plan is developed and customized to this patient's unique overall circumstances and needs. We have reviewed available old medical records within the constraints of this admission process. Data Review:   Recent Days:  All Micro Results     None          No results for input(s): WBC, HGB, HCT, PLT, HGBEXT, HCTEXT, PLTEXT in the last 72 hours. Recent Labs     06/29/19  0441      K 3.9      CO2 25   GLU 94   BUN 14   CREA 0.80   CA 8.9      Lab Results   Component Value Date/Time    TSH 3.90 (H) 06/18/2019 05:13 AM            Care Plan discussed with:Patient/Family and Nurse   Other medical conditions are listed in the [facility] hospital problem list section; these and other pertinent data were taken into consideration when the treatment plan was developed and customized to this patient's unique overall circumstances and needs. Mod  complexity decision making was performed for this patient who is at high risk for decompensation with multiple organ involvement. Today total floor/unit time was 35 minutes while caring for this patient and greater than 50% of that time was spent with patient (and/or family) coordinating patients clinical issues; this includes time spent during multidisciplinary rounds.         Marlon Levin MD MPH FACP    6/30/2019

## 2019-06-30 NOTE — PROGRESS NOTES
Patients mother here visiting with the patient, she is asking about the patients dentures. Pts nurse at this time reported that the patient was not sent down wearing dentures. [address] [place] [address] Unit was checked by this Nursing Supervisor, the patient does have belongings still on the unit, no dentures were located. Patients charted noted that the patient did not have any dentures when he arrived to the [address] [place] [address]. The patient was a transfer from [place]. This facility was contacted by this Nursing Supervisor, who spoke with the charge nurse Indira Elliott, she checked the unit & reported that there were not any dentures found for the patient. The patients mother was updated about the dentures. She reported thank you & had no further comments or concerns at this time.

## 2019-06-30 NOTE — PROGRESS NOTES
Re: Valproic acid with liver dysfunction:  Liver disease: Should not be administered to patients with hepatic disease or significant hepatic insufficiency [Micromedex]    Patient Active Problem List   Diagnosis Code    Schizoaffective disorder ([facility] F25.9    Elevated LFTs R94.5    Rhabdomyolysis M62.82     Contacted [facility] [phone] to inform MD.   Dr. Sahni Wolfe responded. Discontinue Valproic acid for now.

## 2019-06-30 NOTE — BH NOTES
I received a call from Consuelo Maldonado, [address] [facility] regarding the agitated state of Mr. Renetta Negrete. He has been restless, trying to get out of bed. His CK has improved markedly and was 432 earlier today. There was no leukocytosis, dysautonomia, rigidity, or elevated temperatures and NMS seems highly unlikely. I will restart him on Risperidone 0.5mg BID and Ativan IV can be given for agitation. He was also started on Valproic acid 250mg TID for his agitation. Please avoid given an IM or IV antipsychotics at the present time. After evaluating him tomorrow morning I will make a decision regarding transfer to psychiatry.

## 2019-07-01 ENCOUNTER — HOSPITAL ENCOUNTER (INPATIENT)
Age: 34
LOS: 28 days | Discharge: HOME | DRG: 558 | End: 2019-07-29
Attending: PSYCHIATRY & NEUROLOGY | Admitting: PSYCHIATRY & NEUROLOGY
Payer: MEDICARE

## 2019-07-01 VITALS
HEART RATE: 87 BPM | BODY MASS INDEX: 31.08 KG/M2 | WEIGHT: 250 LBS | RESPIRATION RATE: 18 BRPM | DIASTOLIC BLOOD PRESSURE: 69 MMHG | OXYGEN SATURATION: 100 % | HEIGHT: 75 IN | TEMPERATURE: 98 F | SYSTOLIC BLOOD PRESSURE: 114 MMHG

## 2019-07-01 LAB
ALBUMIN SERPL-MCNC: 3.4 G/DL (ref 3.5–5)
ALBUMIN/GLOB SERPL: 1.1 {RATIO} (ref 1.1–2.2)
ALP SERPL-CCNC: 79 U/L (ref 45–117)
ALT SERPL-CCNC: 100 U/L (ref 12–78)
AST SERPL-CCNC: 45 U/L (ref 15–37)
BILIRUB DIRECT SERPL-MCNC: 0.1 MG/DL (ref 0–0.2)
BILIRUB SERPL-MCNC: 0.2 MG/DL (ref 0.2–1)
CK SERPL-CCNC: 294 U/L (ref 39–308)
GLOBULIN SER CALC-MCNC: 3.1 G/DL (ref 2–4)
PROT SERPL-MCNC: 6.5 G/DL (ref 6.4–8.2)

## 2019-07-01 PROCEDURE — 82550 ASSAY OF CK (CPK): CPT

## 2019-07-01 PROCEDURE — 74011250636 HC RX REV CODE- 250/636: Performed by: INTERNAL MEDICINE

## 2019-07-01 PROCEDURE — 74011250637 HC RX REV CODE- 250/637: Performed by: PSYCHIATRY & NEUROLOGY

## 2019-07-01 PROCEDURE — 80076 HEPATIC FUNCTION PANEL: CPT

## 2019-07-01 PROCEDURE — 74011250637 HC RX REV CODE- 250/637: Performed by: INTERNAL MEDICINE

## 2019-07-01 PROCEDURE — 36415 COLL VENOUS BLD VENIPUNCTURE: CPT

## 2019-07-01 PROCEDURE — 65220000003 HC RM SEMIPRIVATE PSYCH

## 2019-07-01 RX ORDER — IBUPROFEN 400 MG/1
400 TABLET ORAL
Status: CANCELLED | OUTPATIENT
Start: 2019-07-01

## 2019-07-01 RX ORDER — BENZTROPINE MESYLATE 2 MG/1
2 TABLET ORAL
Status: DISCONTINUED | OUTPATIENT
Start: 2019-07-01 | End: 2019-07-30 | Stop reason: HOSPADM

## 2019-07-01 RX ORDER — LABETALOL HCL 20 MG/4 ML
20 SYRINGE (ML) INTRAVENOUS
Status: CANCELLED | OUTPATIENT
Start: 2019-07-01

## 2019-07-01 RX ORDER — SODIUM CHLORIDE 0.9 % (FLUSH) 0.9 %
5-40 SYRINGE (ML) INJECTION AS NEEDED
Status: CANCELLED | OUTPATIENT
Start: 2019-07-01

## 2019-07-01 RX ORDER — TRAZODONE HYDROCHLORIDE 50 MG/1
50 TABLET ORAL
Status: DISCONTINUED | OUTPATIENT
Start: 2019-07-01 | End: 2019-07-02

## 2019-07-01 RX ORDER — ADHESIVE BANDAGE
30 BANDAGE TOPICAL DAILY PRN
Status: DISCONTINUED | OUTPATIENT
Start: 2019-07-01 | End: 2019-07-30 | Stop reason: HOSPADM

## 2019-07-01 RX ORDER — HYDROXYZINE 25 MG/1
50 TABLET, FILM COATED ORAL
Status: DISCONTINUED | OUTPATIENT
Start: 2019-07-01 | End: 2019-07-30 | Stop reason: HOSPADM

## 2019-07-01 RX ORDER — BENZTROPINE MESYLATE 1 MG/ML
2 INJECTION INTRAMUSCULAR; INTRAVENOUS
Status: DISCONTINUED | OUTPATIENT
Start: 2019-07-01 | End: 2019-07-30 | Stop reason: HOSPADM

## 2019-07-01 RX ORDER — LORAZEPAM 2 MG/ML
2 INJECTION INTRAMUSCULAR
Status: DISCONTINUED | OUTPATIENT
Start: 2019-07-01 | End: 2019-07-19

## 2019-07-01 RX ORDER — RISPERIDONE 1 MG/1
1 TABLET, FILM COATED ORAL EVERY 12 HOURS
Status: DISCONTINUED | OUTPATIENT
Start: 2019-07-01 | End: 2019-07-01 | Stop reason: HOSPADM

## 2019-07-01 RX ORDER — THERA TABS 400 MCG
1 TAB ORAL DAILY
Status: CANCELLED | OUTPATIENT
Start: 2019-07-02

## 2019-07-01 RX ORDER — ENOXAPARIN SODIUM 100 MG/ML
40 INJECTION SUBCUTANEOUS EVERY 24 HOURS
Status: CANCELLED | OUTPATIENT
Start: 2019-07-02

## 2019-07-01 RX ORDER — IBUPROFEN 400 MG/1
400 TABLET ORAL
Status: DISCONTINUED | OUTPATIENT
Start: 2019-07-01 | End: 2019-07-30 | Stop reason: HOSPADM

## 2019-07-01 RX ORDER — LORAZEPAM 2 MG/ML
4 INJECTION INTRAMUSCULAR
Status: CANCELLED | OUTPATIENT
Start: 2019-07-01

## 2019-07-01 RX ORDER — OLANZAPINE 5 MG/1
5 TABLET ORAL
Status: DISCONTINUED | OUTPATIENT
Start: 2019-07-01 | End: 2019-07-03

## 2019-07-01 RX ORDER — LORAZEPAM 2 MG/ML
2 INJECTION INTRAMUSCULAR
Status: CANCELLED | OUTPATIENT
Start: 2019-07-01

## 2019-07-01 RX ORDER — TRAZODONE HYDROCHLORIDE 100 MG/1
300 TABLET ORAL
Status: CANCELLED | OUTPATIENT
Start: 2019-07-01

## 2019-07-01 RX ORDER — IBUPROFEN 200 MG
1 TABLET ORAL
Status: DISCONTINUED | OUTPATIENT
Start: 2019-07-01 | End: 2019-07-26

## 2019-07-01 RX ORDER — RISPERIDONE 1 MG/1
1 TABLET, FILM COATED ORAL EVERY 12 HOURS
Status: DISCONTINUED | OUTPATIENT
Start: 2019-07-01 | End: 2019-07-03

## 2019-07-01 RX ORDER — SODIUM CHLORIDE 0.9 % (FLUSH) 0.9 %
5-40 SYRINGE (ML) INJECTION EVERY 8 HOURS
Status: CANCELLED | OUTPATIENT
Start: 2019-07-01

## 2019-07-01 RX ORDER — RISPERIDONE 1 MG/1
1 TABLET, FILM COATED ORAL EVERY 12 HOURS
Status: CANCELLED | OUTPATIENT
Start: 2019-07-01

## 2019-07-01 RX ORDER — LEVOTHYROXINE SODIUM 50 UG/1
50 TABLET ORAL
Status: CANCELLED | OUTPATIENT
Start: 2019-07-02

## 2019-07-01 RX ORDER — LANOLIN ALCOHOL/MO/W.PET/CERES
100 CREAM (GRAM) TOPICAL DAILY
Status: CANCELLED | OUTPATIENT
Start: 2019-07-02

## 2019-07-01 RX ORDER — FOLIC ACID 1 MG/1
1 TABLET ORAL DAILY
Status: CANCELLED | OUTPATIENT
Start: 2019-07-02

## 2019-07-01 RX ORDER — ACETAMINOPHEN 325 MG/1
650 TABLET ORAL
Status: DISCONTINUED | OUTPATIENT
Start: 2019-07-01 | End: 2019-07-30 | Stop reason: HOSPADM

## 2019-07-01 RX ADMIN — ACETAMINOPHEN 650 MG: 325 TABLET, FILM COATED ORAL at 16:51

## 2019-07-01 RX ADMIN — HYDROXYZINE HYDROCHLORIDE 50 MG: 25 TABLET, FILM COATED ORAL at 16:51

## 2019-07-01 RX ADMIN — LORAZEPAM 2 MG: 2 INJECTION INTRAMUSCULAR; INTRAVENOUS at 05:43

## 2019-07-01 RX ADMIN — Medication 100 MG: at 08:55

## 2019-07-01 RX ADMIN — ENOXAPARIN SODIUM 40 MG: 40 INJECTION, SOLUTION INTRAVENOUS; SUBCUTANEOUS at 09:45

## 2019-07-01 RX ADMIN — FOLIC ACID 1 MG: 1 TABLET ORAL at 08:55

## 2019-07-01 RX ADMIN — THERA TABS 1 TABLET: TAB at 08:55

## 2019-07-01 RX ADMIN — RISPERIDONE 1 MG: 1 TABLET ORAL at 20:49

## 2019-07-01 RX ADMIN — HYDROXYZINE HYDROCHLORIDE 50 MG: 25 TABLET, FILM COATED ORAL at 20:49

## 2019-07-01 RX ADMIN — LEVOTHYROXINE SODIUM 50 MCG: 50 TABLET ORAL at 08:55

## 2019-07-01 RX ADMIN — RISPERIDONE 0.5 MG: 0.25 TABLET ORAL at 08:54

## 2019-07-01 RX ADMIN — Medication 10 ML: at 05:15

## 2019-07-01 RX ADMIN — TRAZODONE HYDROCHLORIDE 50 MG: 50 TABLET ORAL at 20:49

## 2019-07-01 NOTE — BH NOTES
GROUP THERAPY PROGRESS NOTE    The patient Carlos carias 29 y.o. male is participating in [employer]. Group time: 1 hour    Personal goal for participation:  To concentrate on selected task    Goal orientation: social    Group therapy participation: minimal    Therapeutic interventions reviewed and discussed: Crafts, games, music    Impression of participation: The patient was present-elected to watch    [employer] April 7/1/2019  6:13 PM

## 2019-07-01 NOTE — PROGRESS NOTES
1445 Pt arrived to the [facility] unit from the med surg unit. Pt denied SI, HI. Pt is cooperative and restless. Pt presents with a distracted attitude, flat affect, and anxious mood. Pt presents with delusions and pt is paranoid. Pt sated \"I feel like my eyes aren't there. \" Pt's speech is garbled. Pt presents with a circumstantial thought process. 1651 Pt complained of pain in his left finger 4/10. Pt presenting with anxiety. Pt restless, continuing to be intrusive at the nursing station. Pt received prn atarax 50 mg.     1751 Pt presents with less anxiety, although pt is slightly restless. Pt reported less pain in his finger.

## 2019-07-01 NOTE — DISCHARGE SUMMARY
Hospitalist Discharge Summary     Patient ID:  Lora Kenny  [id number]  8 y.o.  1985    PCP on record: None    Admit date: 6/25/2019  Discharge date and time: 7/1/2019      Admission Diagnoses: Rhabdomyolysis [M62.82]    Discharge Diagnoses:    Principal Problem:    Rhabdomyolysis (6/25/2019)    Active Problems:    Schizoaffective disorder ([address] (6/18/2019)           Hospital Course:     D/W with Dr Dang Marx, APPRECIATE HIS ASSISTANCE IN ACCEPTING PATIENT  [address].      6/26  Called patient's mom Rose Marie(medical power of ) on [phone]. Mother reported that the patient was incarcerated twince and was being treated for latent TB. He also has a history of polysubstance drug abuse and IV drug abuse and had hepatitis C that was never treated. I took consent for checking HIV test which she approved. Also got a call from Nurse- public health nurse in [facility]. She reported that patient was being treated for latent TB from 3/15/2019 with last day 6/10/2019. He was on 300 mg isoniazid daily +50 mg vitamin B6. She has requested us to do a QuantiFERON-TB test and report results to her. She will need to be contacted on [phone]. Rhabdomyolysis most likely secondary to dehydration-iresolved  Low suspicion for NMS. Despite the fact that the patient was recently started on Risperdal.  He does not have fever or muscle rigidity. CK [id number]->200  continue with fluid hydration-dec rate    Elevated LFTs  C/t monitor  Avoid hepatotoxic meds    Acute psychosis/delirium- mentation better today off meds  Schizophrenia  Patient was recently started on Risperdal and was receiving Invega 9 mg IV daily per psychiatry. appreciate psych input. PRN Ativan for agitation- limit ativan use     Chronic Hep C  Hx IV Drug abuse  polysubstance drug abuse  Latent TB  HEP C POSITIVE viral load 4,110,000 -.  Will need OP Hepatology referral.   HIV- Neg  Await TB test results Insomnia  PRN Melatonin          CONSULTATIONS:  IP CONSULT TO PSYCHIATRY    Excerpted HPI from H&P of Christel Brown MD:  Kristina Ledezma is a 29 y.o.  male with PMH of schizophrenia who was transdferred from [facility] for medical management. He has history of schizoaffective disorder who was admitted on 18 June to behavioral health unit with concerns of severe psychosis. Was seen by hospitalist on 24 June for acute psychosis/delirium likely withdrawal.  Worsening rhabdomyolysis/dehydration. He was started on IV fluids however patient ripped out IV lines and IV fluids could not be administered. His CK was around 8000. As per records, patient was receiving iInvega 9 mg every day but not improving. He was started on Risperdal by psychiatrist.  He was evaluated by hospitalist yesterday. Patient did not have any fever or muscle rigidity. elevated CK was most likely from dehydration. Overnight there were concerns of transferring the patient to [facility] due toworsening rhabdomyolysis however there were no ICU beds available. Patient seen by me this morning and transferred to stepdown unit.         ______________________________________________________________________  DISCHARGE SUMMARY/HOSPITAL COURSE:  for full details see H&P, daily progress notes, labs, consult notes. _______________________________________________________________________  Patient seen and examined by me on discharge day. Pertinent Findings:  Gen:    Not in distress  Chest: Clear lungs  CVS:   Regular rhythm. No edema  Abd:  Soft, not distended, not tender  Neuro:  Alert with good insight.   Oriented to person, place, and time   _______________________________________________________________________  CURRENT IN HOSPITAL MEDICATIONS:   Current Facility-Administered Medications   Medication Dose Route Frequency    risperiDONE (RisperDAL) tablet 1 mg  1 mg Oral Q12H    levothyroxine (SYNTHROID) tablet 50 mcg  50 mcg Oral ACB  traZODone (DESYREL) tablet 300 mg  300 mg Oral QHS    ibuprofen (MOTRIN) tablet 400 mg  400 mg Oral Q6H PRN    sodium chloride (NS) flush 5-40 mL  5-40 mL IntraVENous Q8H    sodium chloride (NS) flush 5-40 mL  5-40 mL IntraVENous PRN    enoxaparin (LOVENOX) injection 40 mg  40 mg SubCUTAneous Q24H    labetalol (NORMODYNE;TRANDATE) 20 mg/4 mL (5 mg/mL) injection 20 mg  20 mg IntraVENous Q4H PRN    LORazepam (ATIVAN) injection 2 mg  2 mg IntraVENous Q1H PRN    LORazepam (ATIVAN) injection 4 mg  4 mg IntraVENous Q1H PRN    thiamine HCL (B-1) tablet 100 mg  100 mg Oral DAILY    folic acid (FOLVITE) tablet 1 mg  1 mg Oral DAILY    therapeutic multivitamin (THERAGRAN) tablet 1 Tab  1 Tab Oral DAILY       My Recommended Diet, Activity, Wound Care, and follow-up labs are listed in the patient's Discharge Insturctions which I have personally completed and reviewed.     _______________________________________________________________________  DISPOSITION:     Home with Family: x   Home with HH/PT/OT/RN:    SNF/LTC:    PRASHANTH:    OTHER:        Condition at Discharge:  Stable  _______________________________________________________________________  Follow up with:   PCP : None  Follow-up Information     Follow up With Specialties Details Why Contact Info    None    None ([phone]) Patient stated that they have no PCP                Total time in minutes spent coordinating this discharge (includes going over instructions, follow-up, prescriptions, and preparing report for sign off to her PCP) :  30 minutes    Signed:  Yenny Brasher MD

## 2019-07-01 NOTE — PROGRESS NOTES
TRANSFER - OUT REPORT:    Verbal report given to [facility] Rn(name) on Mich Macias  being transferred to [facility]) for routine progression of care       Report consisted of patients Situation, Background, Assessment and   Recommendations(SBAR). Information from the following report(s) SBAR, Kardex, Intake/Output, Recent Results and Med Rec Status was reviewed with the receiving nurse. Opportunity for questions and clarification was provided. Patient transported with:   Tech. 1446 pt transfer to Psych unit by  and PCT. all belonging send with pt.

## 2019-07-02 PROBLEM — F23 SCHIZOPHRENIA, ACUTE (HCC): Status: ACTIVE | Noted: 2019-07-02

## 2019-07-02 LAB
M TB IFN-G BLD-IMP: NEGATIVE
QUANTIFERON CRITERIA, QFI1T: NORMAL
QUANTIFERON MITOGEN VALUE: >10 IU/ML
QUANTIFERON NIL VALUE: 0.01 IU/ML
QUANTIFERON TB1 AG: 0.34 IU/ML
QUANTIFERON TB2 AG: 0.32 IU/ML

## 2019-07-02 PROCEDURE — 74011250637 HC RX REV CODE- 250/637: Performed by: PSYCHIATRY & NEUROLOGY

## 2019-07-02 PROCEDURE — 74011250636 HC RX REV CODE- 250/636: Performed by: PSYCHIATRY & NEUROLOGY

## 2019-07-02 PROCEDURE — 74011000250 HC RX REV CODE- 250: Performed by: PSYCHIATRY & NEUROLOGY

## 2019-07-02 PROCEDURE — 65220000003 HC RM SEMIPRIVATE PSYCH

## 2019-07-02 PROCEDURE — 74011250636 HC RX REV CODE- 250/636: Performed by: INTERNAL MEDICINE

## 2019-07-02 RX ORDER — SODIUM CHLORIDE 9 MG/ML
125 INJECTION, SOLUTION INTRAVENOUS ONCE
Status: COMPLETED | OUTPATIENT
Start: 2019-07-02 | End: 2019-07-02

## 2019-07-02 RX ORDER — LEVOTHYROXINE SODIUM 50 UG/1
50 TABLET ORAL
Status: DISCONTINUED | OUTPATIENT
Start: 2019-07-03 | End: 2019-07-30 | Stop reason: HOSPADM

## 2019-07-02 RX ORDER — ZOLPIDEM TARTRATE 10 MG/1
10 TABLET ORAL
Status: DISCONTINUED | OUTPATIENT
Start: 2019-07-02 | End: 2019-07-19

## 2019-07-02 RX ORDER — TRAZODONE HYDROCHLORIDE 100 MG/1
300 TABLET ORAL
Status: DISCONTINUED | OUTPATIENT
Start: 2019-07-02 | End: 2019-07-17

## 2019-07-02 RX ADMIN — HYDROXYZINE HYDROCHLORIDE 50 MG: 25 TABLET, FILM COATED ORAL at 07:28

## 2019-07-02 RX ADMIN — TRAZODONE HYDROCHLORIDE 300 MG: 100 TABLET ORAL at 21:05

## 2019-07-02 RX ADMIN — WATER 20 MG: 1 INJECTION INTRAMUSCULAR; INTRAVENOUS; SUBCUTANEOUS at 16:03

## 2019-07-02 RX ADMIN — SODIUM CHLORIDE 125 ML/HR: 900 INJECTION, SOLUTION INTRAVENOUS at 11:01

## 2019-07-02 RX ADMIN — RISPERIDONE 1 MG: 1 TABLET ORAL at 08:27

## 2019-07-02 RX ADMIN — RISPERIDONE 1 MG: 1 TABLET ORAL at 21:05

## 2019-07-02 RX ADMIN — LORAZEPAM 2 MG: 2 INJECTION INTRAMUSCULAR; INTRAVENOUS at 20:18

## 2019-07-02 RX ADMIN — ZOLPIDEM TARTRATE 10 MG: 10 TABLET ORAL at 23:23

## 2019-07-02 RX ADMIN — HYDROXYZINE HYDROCHLORIDE 50 MG: 25 TABLET, FILM COATED ORAL at 13:16

## 2019-07-02 RX ADMIN — OLANZAPINE 5 MG: 5 TABLET, FILM COATED ORAL at 10:01

## 2019-07-02 NOTE — PROGRESS NOTES
Patient ambulating in hallway and said \"welcome back\". Patient denies any SI/HI but appears to respond to internal stimuli. Will continue to monitor with nursing rounds and q 15 minute checks. 2013 Patient pulse 117 and states his heart feels like it is racing. Patient encouraged to take slow deep breaths and sit quietly. Will re-evaluate. 2100 Patient pulse 126, medicated with Atarax and Trazodone. 2200 Patient radial pulse 94    0630 Patient has been sleeping approximately 7 hours.

## 2019-07-02 NOTE — BH NOTES
[address]    Name:  Malcolm Banuelos  MR#:  [id number]  :  1985  ACCOUNT #:  [de-identified]  DATE OF SERVICE:  2019    CHIEF COMPLAINT:  \"I feel okay now. \"    HISTORY OF PRESENT ILLNESS:  The patient is a 60-year-old  man, who is currently admitted to the medical floor at [facility].  He was initially admitted on , to Dr. Veronica Porter service on the psychiatry floor. After a couple of days, his CPK was found to be elevated to 1729 and he was transferred to the medical floor for treatment of rhabdomyolysis. At that time, he did not have any rigidity, hyperthermia, or leukocytosis, which are indicative of neuroleptic malignant syndrome. His CKs dropped immediately with hydration. He has improved markedly and currently reports feeling better in a physical sense. Also during his stay, his liver enzymes increased although, they were normal at baseline. He does have a history of being positive for hepatitis C and has been treated with Isoniazid for latent tuberculosis in the past.  Currently, he remains on one-to-one observation and was agitated and aggressive at times yesterday. He had to be given p.r.n.'s. We had started him on a low dose of risperidone yesterday given the low likelihood of him having had NMS. His CPK most recently was 297. He continues to exhibit disorganized behavior and delusional thinking. When I asked him, he stated that he wanted to leave and go stay with his mother but when Dr. Steffanie Servin, the hospitalist, asked him he stated that he wanted to go to the psychiatric floor because he does not feel safe going home. At the present time, if the patient requests to be discharged home, my opinion is that Crisis should be called for TDO evaluation. He denies any auditory hallucinations today, but was yesterday noted to be talking to himself. He is a poor historian and unable to provide much history.     PAST MEDICAL HISTORY: Reviewed as per the history and physical exam and includes recent acute rhabdomyolysis. No past medical history on file. Prior to Admission medications    Medication Sig Start Date End Date Taking? Authorizing Provider   levothyroxine (SYNTHROID) 50 mcg tablet Take 50 mcg by mouth Daily (before breakfast). Provider, Historical   risperiDONE (RISPERDAL) 0.5 mg tablet Take 1.5 mg by mouth nightly. Risperidone 0.5 mg tabs: Take 3 tabs po every evening    Provider, Historical   pyridoxine, vitamin B6, (VITAMIN B-6) 50 mg tablet Take 50 mg by mouth daily. Provider, Historical   isoniazid (NYDRAZID) 300 mg tablet Take 300 mg by mouth daily. Provider, Historical   traZODone (DESYREL) 150 mg tablet Take 300 mg by mouth nightly. Indications: Insomnia    Provider, Historical      Vitals:    07/02/19 0747 07/02/19 2132 07/03/19 0707 07/03/19 1000   BP: 111/86 126/63 113/72 128/64   Pulse: 99 (!) 107 100 100   Resp:  19 20 20   Temp:  98.1 °F (36.7 °C) 97.9 °F (36.6 °C)    SpO2:  99% 100% 100%   Weight:       Height:          Lab Results   Component Value Date/Time    WBC 7.1 06/26/2019 04:51 AM    HGB 12.7 06/26/2019 04:51 AM    HCT 38.1 06/26/2019 04:51 AM    PLATELET 867 09/34/0029 04:51 AM    MCV 87.8 06/26/2019 04:51 AM     Lab Results   Component Value Date/Time    Sodium 139 06/29/2019 04:41 AM    Potassium 3.9 06/29/2019 04:41 AM    Chloride 104 06/29/2019 04:41 AM    CO2 25 06/29/2019 04:41 AM    Anion gap 10 06/29/2019 04:41 AM    Glucose 94 06/29/2019 04:41 AM    Glucose 102 (H) 06/18/2019 05:13 AM    BUN 14 06/29/2019 04:41 AM    Creatinine 0.80 06/29/2019 04:41 AM    BUN/Creatinine ratio 18 06/29/2019 04:41 AM    GFR est AA >60 06/29/2019 04:41 AM    GFR est non-AA >60 06/29/2019 04:41 AM    Calcium 8.9 06/29/2019 04:41 AM    Bilirubin, total 0.2 07/01/2019 02:17 PM    AST (SGOT) 45 (H) 07/01/2019 02:17 PM    Alk.  phosphatase 79 07/01/2019 02:17 PM    Protein, total 6.5 07/01/2019 02:17 PM    Albumin 3.4 (L) 07/01/2019 02:17 PM    Globulin 3.1 07/01/2019 02:17 PM    A-G Ratio 1.1 07/01/2019 02:17 PM    ALT (SGPT) 100 (H) 07/01/2019 02:17 PM         PAST PSYCHIATRIC HISTORY:  The patient reports that he has been diagnosed with schizoaffective disorder and has been in treatment since his teens. He has been hospitalized multiple times, although he could not remember any of the occasions that he was an inpatient. He had been incarcerated for 6 years and states that he was being treated by the long-term psychiatrist there. There is a history of meth addiction, but states he has not used meths in 6 years. Also has a history of heavy drinking periodically. PSYCHOSOCIAL HISTORY:  The patient reports that he is currently homeless, although he was living with his mother in [place], [place], recently. Prior to that, he had been incarcerated for 6 years for several different charges including possession of narcotics. He was released from long-term about 2 months ago, but he is not sure of the exact duration. States that he is single, has never been  and does not have any children. MENTAL STATUS EXAMINATION:  The patient is a young  man, who is dressed in hospital apparel. He is sitting in a chair and was relaxed and comfortable during the interview. His speech is spontaneous and coherent. Affect is reactive and mood is reported as being okay. Denied any perceptual abnormalities to me. Denies any active suicidal ideation or plan, but kept repeatedly asking whether the chair he was sitting in was a chair meant for electrocution because he is worried about that. His thought process is disorganized and tangential.  Cognitively, he is awake and alert, oriented to place and person, but struggled with the date and the day of the week. Cognitive exam was not possible as he is not cooperative. Insight is poor. Judgment is poor.     ASSESSMENT AND PLAN:  The patient meets criteria for a diagnosis of schizoaffective disorder and is status post acute rhabdomyolysis, which appears to have resolved. He remains a candidate for placement on the Inpatient Psychiatric Unit and at the present time, he has indicated his willingness to do so. If he refuses, please call Crisis for a TDO evaluation. Thank you for your consult.       RUTH Flanagan MD      [id number]  D:  07/01/2019 13:54  T:  07/01/2019 17:27  JOB #:  [id number]

## 2019-07-02 NOTE — BH NOTES
0700 Assumed care of the patient    0730 Patient states he is anxious, cannot sit still and has too many thoughts in his head. Blood pressure elevated. PRN atarax given. 0800 Patient continuously calling for nurse stating he needs help. Patient is restless, complains of feeling hot, and keeps grabbing at neck. Medical consult placed for patient evaluation. Blood pressure within normal limits at this time    0830 Atarax ineffective. Patient conts to call nurse repetitively, asking for help. Patient states he is being electrocuted. Patient ensured he is safe. Patient verbalized understanding yet continues to call out for nurse    1000 Patient remains restless. Somatic complaints intensified. PRN zyprexa given. 1030 Patient appears flushed. Remains restless. Vitals assessed. Heart rate 133. Hospitalist Dr. Burns  informed. New order obtained to insert peripheral IV and administer 250ml bolus of normal saline. 1105 20 gauge IV inserted in right AC. IV fluids started    1145 IV fluids infusing without difficulty. Patient cannot sit still. Continues to ask for help. Nurse at bedside.  Continues to reassure patient he is safe

## 2019-07-02 NOTE — PROGRESS NOTES
Initial Nutrition Assessment:    INTERVENTIONS/RECOMMENDATIONS:   · Meals/Snacks: General/healthful diet:  Continue regular diet. Enc compliance with meals. ASSESSMENT:   7/2:  Chart reviewed; med noted for gilmarenia. Admitted to the [facility]. Recent documented po intake indicates good intake. No new acute nutrition dx at this time. Suspect documented weight is an error. Diet Order: Regular  % Eaten:  No data found. Pertinent Medications: [x]Reviewed []Other: milk of mag  Pertinent Labs: [x]Reviewed []Other   Food Allergies: [x]None []Other   Last BM: 7/1   [x]Active     []Hyperactive  []Hypoactive       [] Absent BS  Skin:    [x] Intact   [] Incision  [] Breakdown  [] Other:    Anthropometrics:   Height: 6' 2\" (188 cm) Weight: 92.9 kg (204 lb 11.2 oz)   IBW (%IBW):   ( ) UBW (%UBW):   (  %)   Last Weight Metrics:  Weight Loss Metrics 7/1/2019 6/28/2019 6/25/2019 6/18/2019 6/17/2019   Today's Wt 204 lb 11.2 oz 250 lb - 250 lb -   BMI 26.28 kg/m2 - 31.25 kg/m2 - 31.25 kg/m2       BMI: Body mass index is 26.28 kg/m². This BMI is indicative of:   []Underweight    []Normal    [x]Overweight    [] Obesity   [] Extreme Obesity (BMI>40)     Estimated Nutrition Needs (Based on):   2338 Kcals/day(BMR (1949) x 1. 2AF) , 93 g(1.0 g/kg bw) Protein  Carbohydrate:  At Least 130 g/day  Fluids: 2300 mL/day (1ml/kcal)    NUTRITION DIAGNOSES:   Problem:  No nutritional diagnosis at this time      Etiology: related to       Signs/Symptoms: as evidenced by        NUTRITION INTERVENTIONS:  Meals/Snacks: General/healthful diet                  GOAL:   PO intake >50% of meals next 5-7 days    LEARNING NEEDS (Diet, Food/Nutrient-Drug Interaction):    [x] None Identified   [] Identified and Education Provided/Documented   [] Identified and Pt declined/was not appropriate     Cultureal, Advent, OR Ethnic Dietary Needs:    [x] None Identified   [] Identified and Addressed     [x] Interdisciplinary Care Plan Reviewed/Documented [x] Discharge Planning:  Continue regular diet     MONITORING /EVALUATION:   Food/Nutrient Intake Outcomes:  Total energy intake    NUTRITION RISK:    [x] Patient At Nutritional Risk    [] Patient Not At Nutritional Risk    PT SEEN FOR:    []  MD Consult: []Calorie Count      []Diabetic Diet Education        []Diet Education     []Electrolyte Management     []General Nutrition Management and Supplements     []Management of Tube Feeding     []TPN Recommendations    [x]  RN Referral:  [x]MST score >=2     []Enteral/Parenteral Nutrition PTA     []Pregnant: Gestational DM or Multigestation     []Pressure Ulcer/Wound Care needs        []  Low BMI  []  DAISY      Annamarie Elsie, [address] [place] [address]  Pager [phone]  Weekend Pager [phone]

## 2019-07-02 NOTE — H&P
Hospitalist Admission Note    NAME: Jayden Kowalski   :  1985   MRN:  [id number]   Room Number: 011/88  @ [address] [facility]     Date/Time:  2019 11:59 AM    Patient PCP: None  ______________________________________________________________________  Given the patient's current clinical presentation, I have a high level of concern for decompensation if discharged from the emergency department. Complex decision making was performed, which includes reviewing the patient's available past medical records, laboratory results, and x-ray films. My assessment of this patient's clinical condition and my plan of care is as follows. Assessment / Plan:    Chronic hep C   history of IV drug abuse  Polysubstance drug abuse  Latent TB  Elevated LFTs  hypothyroidism   Sinus tachycardia  Insomnia       250 mg IV bolus x1  Await QuantiFERON test results. Once results are all public health nurse in [facility] Ms. Autumn Gaffney will need to be contacted on [phone]. Continue to monitor LFTs, trending down  Patient will need outpatient hepatology follow-up  Avoid hepatotoxic agents  HEP C POSITIVE viral load 4,110,000 -. Will need OP Hepatology referral.   HIV- Neg  PRN Trazadone    Psychiatric treatment and management of health issues,Defer to psychiatrist for further management. Continue home meds. Medically stable at this time. We will follow up on a p.r.n. basis. No VTE prophylaxis indicated or warranted at this time. Subjective:   CHIEF COMPLAINT: mental health problem    HISTORY OF PRESENT ILLNESS:     Jayden Kowalski is a 29 y.o.  male with PMH of schizophrenia who was transdferred from [facility] for medical management. He has history of schizoaffective disorder who was admitted on  to behavioral health unit with concerns of severe psychosis. Was seen by hospitalist on  for acute psychosis/delirium likely withdrawal.  Worsening rhabdomyolysis/dehydration.   He was started on IV fluids however patient ripped out IV lines and IV fluids could not be administered. His CK was around 8000. As per records, patient was receiving iInvega 9 mg every day but not improving. He was started on Risperdal by psychiatrist.  He was evaluated by hospitalist yesterday. Patient did not have any fever or muscle rigidity. elevated CK was most likely from dehydration. Patient transferred to stepdown unit. Patient was treated for acute rhabdomyolysis and the levels with back to normal.  He was transferred back to psych floor for further management of his schizoaffective disorder. Nurse reported to this morning patient's heart rate -sinus tachycardia. 250 mill IV bolus was initiated patient asymptomatic at the moment. Vitals stable. past medical history-hep c,scziophrenia    No past surgical history-reviewed     Social History     Tobacco Use    Smoking status: occasional   Substance Use Topics    Alcohol use: yes         family history -DM ,HTN  No Known Allergies     Prior to Admission medications    Medication Sig Start Date End Date Taking? Authorizing Provider   levothyroxine (SYNTHROID) 50 mcg tablet Take 50 mcg by mouth Daily (before breakfast). Provider, Historical   risperiDONE (RISPERDAL) 0.5 mg tablet Take 1.5 mg by mouth nightly. Risperidone 0.5 mg tabs: Take 3 tabs po every evening    Provider, Historical   pyridoxine, vitamin B6, (VITAMIN B-6) 50 mg tablet Take 50 mg by mouth daily. Provider, Historical   isoniazid (NYDRAZID) 300 mg tablet Take 300 mg by mouth daily. Provider, Historical   traZODone (DESYREL) 150 mg tablet Take 300 mg by mouth nightly. Indications: Insomnia    Provider, Historical       REVIEW OF SYSTEMS:     I am not able to complete the review of systems because:    The patient is intubated and sedated    The patient has altered mental status due to his acute medical problems    The patient has baseline aphasia from prior stroke(s)    The patient has baseline dementia and is not reliable historian    The patient is in acute medical distress and unable to provide information           Total of 12 systems reviewed as follows:       POSITIVE= underlined text  Negative = text not underlined  General:  fever, chills, sweats, generalized weakness, weight loss/gain,      loss of appetite   Eyes:    blurred vision, eye pain, loss of vision, double vision  ENT:    rhinorrhea, pharyngitis   Respiratory:   cough, sputum production, SOB, DUNCAN, wheezing, pleuritic pain   Cardiology:   chest pain, palpitations, orthopnea, PND, edema, syncope   Gastrointestinal:  abdominal pain , N/V, diarrhea, dysphagia, constipation, bleeding   Genitourinary:  frequency, urgency, dysuria, hematuria, incontinence   Muskuloskeletal :  arthralgia, myalgia, back pain  Hematology:  easy bruising, nose or gum bleeding, lymphadenopathy   Dermatological: rash, ulceration, pruritis, color change / jaundice  Endocrine:   hot flashes or polydipsia   Neurological:  headache, dizziness, confusion, focal weakness, paresthesia,     Speech difficulties, memory loss, gait difficulty  Psychological: Feelings of anxiety, depression, agitation    Objective:   VITALS:    Visit Vitals  /86 (BP 1 Location: Left arm, BP Patient Position: Sitting)   Pulse 99   Temp 97.4 °F (36.3 °C)   Resp 18   Ht 6' 2\" (1.88 m)   Wt 92.9 kg (204 lb 11.2 oz)   SpO2 100%   BMI 26.28 kg/m²       PHYSICAL EXAM:    General:    Alert, cooperative, no distress, appears stated age. HEENT: Atraumatic, anicteric sclerae, pink conjunctivae     No oral ulcers, mucosa moist, throat clear, dentition fair  Neck:  Supple, symmetrical,  thyroid: non tender  Lungs:   Clear to auscultation bilaterally. No Wheezing or Rhonchi. No rales. Chest wall:  No tenderness  No Accessory muscle use. Heart:   Regular  rhythm,  No  murmur   No edema  Abdomen:   Soft, non-tender. Not distended. Bowel sounds normal  Extremities: No cyanosis. No clubbing,      Skin turgor normal, Capillary refill normal, Radial dial pulse 2+  Skin:     Not pale. Not Jaundiced  No rashes   Psych:   some insight. Not depressed. Not anxious or agitated. Neurologic: EOMs intact. No facial asymmetry. No aphasia or slurred speech. Symmetrical strength, Sensation grossly intact. Alert and oriented X 4.     ______________________________________________________________________    Care Plan discussed with:  Patient/Family and Nurse    ________________________________________________________________________  TOTAL TIME:  30 Minutes    Critical Care Provided     Minutes non procedure based      Comments     Reviewed previous records   >50% of visit spent in counseling and coordination of care  Discussion with patient and/or family and questions answered       ________________________________________________________________________  Signed: Raciel Benson MD    Procedures: see electronic medical records for all procedures/Xrays and details which were not copied into this note but were reviewed prior to creation of Plan. LAB DATA REVIEWED:    No results found for this or any previous visit (from the past 24 hour(s)).

## 2019-07-02 NOTE — BH NOTES
PSYCHOSOCIAL ASSESSMENT  :Patient identifying info:  Kristina Ledezma is a 29 y.o., male admitted 7/1/2019  3:08 PM     Presenting problem and precipitating factors: Pt was transferred to medical floor and treated for rhabodomyolysis and now has transferred back to [facility]. Committed to [facility] on 6/18/19. Pt presented to [facility] ED reporting chest pain but was notably paranoid and reporting AH. Pt was admitted under a TDO and committed during hearing. Pt carries and diagnosis of schizoaffective disorder. Pt was a poor historian due to level of psychosis. Pt stated he was recently released from [facility] and reports wanting to live a better life for his family. Recently visited his fathers grave. Reports that his mother cares for his children - Roddy Mcgrath and Lin Nelson.      Mental status assessment: Pt is alert and oriented. Pt denies SI/HI. Pt's mood is anxious, affect is constricted, odd. Pt's thought process is illogical, slow rate of thoughts. Pt's insight and judgment is poor, reliability is poor.       Strengths: Reestablished services at [address] [place] [address] and [facility]ia Nash ()     Collateral information: POA - Paperwork in [facility] -  Shani Oleary at this time - [phone] reports pt was released on 6.10.2019 from possession of methamphetamines -2.5 years and his grandmother passed recently while incarcerated. During previous 3 year stint his father and aunt passed. Pt has no closure after their deaths.     Current psychiatric /substance abuse providers and contact info: [facility] -  Vania Charity [phone] ext [phone]      Previous psychiatric/substance abuse providers and response to treatment: [facility] - 2016, 2012, [facility] 8/2016 and 5 other times, [facility] 8/2016 and [facility] 5/2008.      Family history of mental illness or substance abuse: None reported.     Substance abuse history:  UDS: negative;   BAL=0  Previous amphetamine and MJ use.    Social History     Tobacco Use    Smoking status: Not on file   Substance Use Topics    Alcohol use: Not on file     History of biomedical complications associated with substance abuse: unknown      Patient's current acceptance of treatment or motivation for change: committed  - accepting medication      Family constellation: single - has children that are in the custody of his mother.      Is significant other involved?  N/A     Describe support system: supportive mother      Describe living arrangements and home environment:  Living with mother and his children     Health issues:   Hospital Problems  Date Reviewed: 2019          Codes Class Noted POA    Schizophrenia, acute ([facility] ICD-10-CM: F23  ICD-9-CM: 295.80  2019 Unknown        * (Principal) Schizoaffective disorder ([facility] ICD-10-CM: F25.9  ICD-9-CM: 295.70  2019 Yes              Trauma history: Unknown      Legal issues: recently released from [facility] - charges and probation - meth related drug charges.      History of  service: None.       Financial status: Family      Orthodoxy/cultural factors: unknown      Education/work history: High school education.      Have you been licensed as a health care professional (current or ): no     Leisure and recreation preferences:  spending time with his children.      Describe coping skills:  Derald Bosworth  2019

## 2019-07-02 NOTE — H&P
INITIAL PSYCHIATRIC EVALUATION            IDENTIFICATION:    Patient Name  Pamela Terrazas   Date of Birth 1985   [facility] [id number]   Medical Record Number  [id number]      Age  29 y.o. PCP None   Admit date:  7/1/2019    Room Number  314/01  @ [facility]   Date of Service  7/2/2019            HISTORY         REASON FOR HOSPITALIZATION:  CC: \"psychosis\". Pt admitted under a temporary residential order (TDO) for severe psychosis proving to be an imminent danger to self and an inability to care for self. HISTORY OF PRESENT ILLNESS:    The patient, Pamela Terrazas, is a 29 y.o. WHITE [place]  male with a past psychiatric history significant for schizoaffective disorder vs schizophrenia, who presents at this time with complaints of (and/or evidence of) the following emotional symptoms: agitation, delusions and psychotic behavior. Additional symptomatology include AH and VH. The above symptoms have been present for 2+ weeks. These symptoms are of moderate to high severity. These symptoms are constant in nature. The patient's condition has been precipitated by psychosocial stressors. Patient's condition made worse by treatment noncompliance. UDS: negative; BAL=0. The patient was readmitted from medical unit following rhabdomyolysis and SHYLA which resolved with IV hydration. He is grossly psychotic and has been noted to be confused. Patient is markedly disorganized and confused on interview. He cannot state the days of the week backward despite effort. From patient's medical admission:    The patient is a 59-year-old  man, who is currently admitted to the medical floor at [facility].  He was initially admitted on 06/17, to Dr. Atul Morris service on the psychiatry floor. After a couple of days, his CPK was found to be elevated to 1729 and he was transferred to the medical floor for treatment of rhabdomyolysis.   At that time, he did not have any rigidity, hyperthermia, or leukocytosis, which are indicative of neuroleptic malignant syndrome. His CKs dropped immediately with hydration. He has improved markedly and currently reports feeling better in a physical sense. Also during his stay, his liver enzymes increased although, they were normal at baseline. He does have a history of being positive for hepatitis C and has been treated with Isoniazid for latent tuberculosis in the past.  Currently, he remains on one-to-one observation and was agitated and aggressive at times yesterday. He had to be given p.r.n.'s. We had started him on a low dose of risperidone yesterday given the low likelihood of him having had NMS. His CPK most recently was 297. He continues to exhibit disorganized behavior and delusional thinking. When I asked him, he stated that he wanted to leave and go stay with his mother but when Dr. Katie Pablo, the hospitalist, asked him he stated that he wanted to go to the psychiatric floor because he does not feel safe going home. At the present time, if the patient requests to be discharged home, my opinion is that Crisis should be called for TDO evaluation. He denies any auditory hallucinations today, but was yesterday noted to be talking to himself. He is a poor historian and unable to provide much history. ALLERGIES: No Known Allergies   MEDICATIONS PRIOR TO ADMISSION:   Medications Prior to Admission   Medication Sig    levothyroxine (SYNTHROID) 50 mcg tablet Take 50 mcg by mouth Daily (before breakfast).  risperiDONE (RISPERDAL) 0.5 mg tablet Take 1.5 mg by mouth nightly. Risperidone 0.5 mg tabs: Take 3 tabs po every evening    pyridoxine, vitamin B6, (VITAMIN B-6) 50 mg tablet Take 50 mg by mouth daily.  isoniazid (NYDRAZID) 300 mg tablet Take 300 mg by mouth daily.  traZODone (DESYREL) 150 mg tablet Take 300 mg by mouth nightly. Indications: Insomnia      PAST MEDICAL HISTORY:   No past medical history on file. No past surgical history on file. SOCIAL HISTORY:   Social History     Socioeconomic History    Marital status: SINGLE     Spouse name: Not on file    Number of children: Not on file    Years of education: Not on file    Highest education level: Not on file   Occupational History    Not on file   Social Needs    Financial resource strain: Not on file    Food insecurity:     Worry: Not on file     Inability: Not on file    Transportation needs:     Medical: Not on file     Non-medical: Not on file   Tobacco Use    Smoking status: Not on file   Substance and Sexual Activity    Alcohol use: Not on file    Drug use: Not on file    Sexual activity: Not on file   Lifestyle    Physical activity:     Days per week: Not on file     Minutes per session: Not on file    Stress: Not on file   Relationships    Social connections:     Talks on phone: Not on file     Gets together: Not on file     Attends L[employer]-day service: Not on file     Active member of club or organization: Not on file     Attends meetings of clubs or organizations: Not on file     Relationship status: Not on file    Intimate partner violence:     Fear of current or ex partner: Not on file     Emotionally abused: Not on file     Physically abused: Not on file     Forced sexual activity: Not on file   Other Topics Concern    Not on file   Social History Narrative    Not on file      FAMILY HISTORY: History reviewed, pertinent family history as below:   No family history on file. REVIEW OF SYSTEMS:   Pertinent items are noted in the History of Present Illness. All other Systems reviewed and are considered negative.            MENTAL STATUS EXAM & VITALS     MENTAL STATUS EXAM (MSE):    MSE FINDINGS ARE WITHIN NORMAL LIMITS (WNL) UNLESS OTHERWISE STATED BELOW. ( ALL OF THE BELOW CATEGORIES OF THE MSE HAVE BEEN REVIEWED (reviewed 7/2/2019) AND UPDATED AS DEEMED APPROPRIATE )  General Presentation age appropriate, cooperative   Orientation disorganized, disoriented   Vital Signs  See below (reviewed 7/2/2019); Vital Signs (BP, Pulse, & Temp) are within normal limits if not listed below.    Gait and Station Stable/steady, no ataxia   Musculoskeletal System No extrapyramidal symptoms (EPS); no abnormal muscular movements or Tardive Dyskinesia (TD); muscle strength and tone are within normal limits   Language No aphasia or dysarthria   Speech:  normal volume and non-pressured   Thought Processes illogical; slow rate of thoughts; poor abstract reasoning/computation   Thought Associations loose associations   Thought Content free of hallucinations and internally preoccupied   Suicidal Ideations none   Homicidal Ideations none   Mood:  euthymic   Affect:  constricted and odd demeanor   Memory recent  impaired   Memory remote:  impaired   Concentration/Attention:  distractable   Fund of Knowledge below average   Insight:  poor   Reliability poor   Judgment:  limited          VITALS:     Patient Vitals for the past 24 hrs:   Temp Pulse Resp BP SpO2   07/02/19 0747  99  111/86    07/02/19 0719  98 18 (!) 146/105 100 %   07/01/19 2200  94      07/01/19 2100  (!) 126  128/72    07/01/19 2013 97.4 °F (36.3 °C) (!) 117 18 144/64 100 %   07/01/19 1824 98.8 °F (37.1 °C) 100 18 131/75 99 %   07/01/19 1452 98.9 °F (37.2 °C) (!) 111 18 115/76 99 %     Wt Readings from Last 3 Encounters:   07/01/19 92.9 kg (204 lb 11.2 oz)   06/28/19 113.4 kg (250 lb)   06/18/19 113.4 kg (250 lb)     Temp Readings from Last 3 Encounters:   07/01/19 97.4 °F (36.3 °C)   07/01/19 98 °F (36.7 °C)   06/25/19 99.7 °F (37.6 °C)     BP Readings from Last 3 Encounters:   07/02/19 111/86   07/01/19 114/69   06/25/19 145/85     Pulse Readings from Last 3 Encounters:   07/02/19 99   07/01/19 87   06/25/19 (!) 107            DATA     LABORATORY DATA:  Labs Reviewed - No data to display  Admission on 06/25/2019, Discharged on 07/01/2019   Component Date Value Ref Range Status    WBC 06/25/2019 7.0  4.1 - 11.1 K/uL Final    RBC 06/25/2019 4.09* 4.10 - 5.70 M/uL Final    HGB 06/25/2019 12.0* 12.1 - 17.0 g/dL Final    HCT 06/25/2019 34.8* 36.6 - 50.3 % Final    MCV 06/25/2019 85.1  80.0 - 99.0 FL Final    MCH 06/25/2019 29.3  26.0 - 34.0 PG Final    MCHC 06/25/2019 34.5  30.0 - 36.5 g/dL Final    RDW 06/25/2019 12.9  11.5 - 14.5 % Final    PLATELET 34/84/3939 381  150 - 400 K/uL Final    MPV 06/25/2019 10.0  8.9 - 12.9 FL Final    NRBC 06/25/2019 0.0  0  WBC Final    ABSOLUTE NRBC 06/25/2019 0.00  0.00 - 0.01 K/uL Final    Sodium 06/25/2019 141  136 - 145 mmol/L Final    Potassium 06/25/2019 3.4* 3.5 - 5.1 mmol/L Final    Chloride 06/25/2019 104  97 - 108 mmol/L Final    CO2 06/25/2019 26  21 - 32 mmol/L Final    Anion gap 06/25/2019 11  5 - 15 mmol/L Final    Glucose 06/25/2019 96  65 - 100 mg/dL Final    BUN 06/25/2019 16  6 - 20 MG/DL Final    Creatinine 06/25/2019 0.93  0.70 - 1.30 MG/DL Final    BUN/Creatinine ratio 06/25/2019 17  12 - 20   Final    GFR est AA 06/25/2019 >60  >60 ml/min/1.73m2 Final    GFR est non-AA 06/25/2019 >60  >60 ml/min/1.73m2 Final    Calcium 06/25/2019 8.0* 8.5 - 10.1 MG/DL Final    Bilirubin, total 06/25/2019 0.8  0.2 - 1.0 MG/DL Final    ALT (SGPT) 06/25/2019 120* 12 - 78 U/L Final    AST (SGOT) 06/25/2019 201* 15 - 37 U/L Final    Alk.  phosphatase 06/25/2019 77  45 - 117 U/L Final    Protein, total 06/25/2019 6.3* 6.4 - 8.2 g/dL Final    Albumin 06/25/2019 3.4* 3.5 - 5.0 g/dL Final    Globulin 06/25/2019 2.9  2.0 - 4.0 g/dL Final    A-G Ratio 06/25/2019 1.2  1.1 - 2.2   Final    CK 06/25/2019 5,828* 39 - 308 U/L Final    Magnesium 06/25/2019 1.9  1.6 - 2.4 mg/dL Final    CK 06/25/2019 3,820* 39 - 308 U/L Final    Sodium 06/26/2019 141  136 - 145 mmol/L Final    Potassium 06/26/2019 4.5  3.5 - 5.1 mmol/L Final    Chloride 06/26/2019 107  97 - 108 mmol/L Final    CO2 06/26/2019 26  21 - 32 mmol/L Final    Anion gap 06/26/2019 8  5 - 15 mmol/L Final    Glucose 06/26/2019 81  65 - 100 mg/dL Final    BUN 06/26/2019 10  6 - 20 MG/DL Final    Creatinine 06/26/2019 0.79  0.70 - 1.30 MG/DL Final    BUN/Creatinine ratio 06/26/2019 13  12 - 20   Final    GFR est AA 06/26/2019 >60  >60 ml/min/1.73m2 Final    GFR est non-AA 06/26/2019 >60  >60 ml/min/1.73m2 Final    Calcium 06/26/2019 8.2* 8.5 - 10.1 MG/DL Final    WBC 06/26/2019 7.1  4.1 - 11.1 K/uL Final    RBC 06/26/2019 4.34  4.10 - 5.70 M/uL Final    HGB 06/26/2019 12.7  12.1 - 17.0 g/dL Final    HCT 06/26/2019 38.1  36.6 - 50.3 % Final    MCV 06/26/2019 87.8  80.0 - 99.0 FL Final    MCH 06/26/2019 29.3  26.0 - 34.0 PG Final    MCHC 06/26/2019 33.3  30.0 - 36.5 g/dL Final    RDW 06/26/2019 13.0  11.5 - 14.5 % Final    PLATELET 84/46/2647 838  150 - 400 K/uL Final    MPV 06/26/2019 10.3  8.9 - 12.9 FL Final    NRBC 06/26/2019 0.0  0  WBC Final    ABSOLUTE NRBC 06/26/2019 0.00  0.00 - 0.01 K/uL Final    Hepatitis C virus Ab 06/26/2019 >11.00  Index Final    Hep C  virus Ab Interp. 06/26/2019 REACTIVE* NR   Final    Hep C  virus Ab comment 06/26/2019 Method used is [employer]    Final    HBV IU/mL 06/26/2019 HBV DNA not detected  IU/mL Final    log10 HBV IU/mL 06/26/2019 TEST NOT PERFORMED  log10 IU/mL Final    Test information 06/26/2019 Comment    Final    HIV 1/2 Interpretation 06/26/2019 NONREACTIVE  NR   Final    HIV 1/2 result comment 06/26/2019 SEE NOTE    Final    Sodium 06/27/2019 139  136 - 145 mmol/L Final    Potassium 06/27/2019 3.9  3.5 - 5.1 mmol/L Final    Chloride 06/27/2019 103  97 - 108 mmol/L Final    CO2 06/27/2019 27  21 - 32 mmol/L Final    Anion gap 06/27/2019 9  5 - 15 mmol/L Final    Glucose 06/27/2019 83  65 - 100 mg/dL Final    BUN 06/27/2019 8  6 - 20 MG/DL Final    Creatinine 06/27/2019 0.76  0.70 - 1.30 MG/DL Final    BUN/Creatinine ratio 06/27/2019 11* 12 - 20   Final    GFR est AA 06/27/2019 >60  >60 ml/min/1.73m2 Final  GFR est non-AA 06/27/2019 >60  >60 ml/min/1.73m2 Final    Calcium 06/27/2019 9.0  8.5 - 10.1 MG/DL Final    Bilirubin, total 06/27/2019 0.5  0.2 - 1.0 MG/DL Final    ALT (SGPT) 06/27/2019 113* 12 - 78 U/L Final    AST (SGOT) 06/27/2019 110* 15 - 37 U/L Final    Alk. phosphatase 06/27/2019 83  45 - 117 U/L Final    Protein, total 06/27/2019 7.0  6.4 - 8.2 g/dL Final    Albumin 06/27/2019 3.6  3.5 - 5.0 g/dL Final    Globulin 06/27/2019 3.4  2.0 - 4.0 g/dL Final    A-G Ratio 06/27/2019 1.1  1.1 - 2.2   Final    CK 06/27/2019 1,729* 39 - 308 U/L Final    Hepatitis C Quantitation 06/27/2019 4,110,000  IU/mL Final    HCV log10 06/27/2019 6.614  log10 IU/mL Final    Serial monitoring 06/27/2019 Comment    Final    CK 06/28/2019 726* 39 - 308 U/L Final    CK 06/29/2019 575* 39 - 308 U/L Final    Sodium 06/29/2019 139  136 - 145 mmol/L Final    Potassium 06/29/2019 3.9  3.5 - 5.1 mmol/L Final    Chloride 06/29/2019 104  97 - 108 mmol/L Final    CO2 06/29/2019 25  21 - 32 mmol/L Final    Anion gap 06/29/2019 10  5 - 15 mmol/L Final    Glucose 06/29/2019 94  65 - 100 mg/dL Final    BUN 06/29/2019 14  6 - 20 MG/DL Final    Creatinine 06/29/2019 0.80  0.70 - 1.30 MG/DL Final    BUN/Creatinine ratio 06/29/2019 18  12 - 20   Final    GFR est AA 06/29/2019 >60  >60 ml/min/1.73m2 Final    GFR est non-AA 06/29/2019 >60  >60 ml/min/1.73m2 Final    Calcium 06/29/2019 8.9  8.5 - 10.1 MG/DL Final    CK 06/30/2019 423* 39 - 308 U/L Final    CK 07/01/2019 294  39 - 308 U/L Final    Protein, total 07/01/2019 6.5  6.4 - 8.2 g/dL Final    Albumin 07/01/2019 3.4* 3.5 - 5.0 g/dL Final    Globulin 07/01/2019 3.1  2.0 - 4.0 g/dL Final    A-G Ratio 07/01/2019 1.1  1.1 - 2.2   Final    Bilirubin, total 07/01/2019 0.2  0.2 - 1.0 MG/DL Final    Bilirubin, direct 07/01/2019 0.1  0.0 - 0.2 MG/DL Final    Alk.  phosphatase 07/01/2019 79  45 - 117 U/L Final    AST (SGOT) 07/01/2019 45* 15 - 37 U/L Final    ALT (SGPT) 07/01/2019 100* 12 - 78 U/L Final        RADIOLOGY REPORTS:  No results found for this or any previous visit. No results found.            MEDICATIONS       ALL MEDICATIONS  Current Facility-Administered Medications   Medication Dose Route Frequency    [START ON 7/3/2019] levothyroxine (SYNTHROID) tablet 50 mcg  50 mcg Oral ACB    traZODone (DESYREL) tablet 300 mg  300 mg Oral QHS    zolpidem (AMBIEN) tablet 10 mg  10 mg Oral QHS PRN    ziprasidone (GEODON) 20 mg in sterile water (preservative free) 1 mL injection  20 mg IntraMUSCular BID PRN    OLANZapine (ZyPREXA) tablet 5 mg  5 mg Oral Q6H PRN    benztropine (COGENTIN) tablet 2 mg  2 mg Oral BID PRN    benztropine (COGENTIN) injection 2 mg  2 mg IntraMUSCular BID PRN    LORazepam (ATIVAN) injection 2 mg  2 mg IntraMUSCular Q4H PRN    acetaminophen (TYLENOL) tablet 650 mg  650 mg Oral Q4H PRN    ibuprofen (MOTRIN) tablet 400 mg  400 mg Oral Q8H PRN    magnesium hydroxide (MILK OF MAGNESIA) 400 mg/5 mL oral suspension 30 mL  30 mL Oral DAILY PRN    nicotine (NICODERM CQ) 21 mg/24 hr patch 1 Patch  1 Patch TransDERmal DAILY PRN    hydrOXYzine HCl (ATARAX) tablet 50 mg  50 mg Oral Q6H PRN    risperiDONE (RisperDAL) tablet 1 mg  1 mg Oral Q12H      SCHEDULED MEDICATIONS  Current Facility-Administered Medications   Medication Dose Route Frequency    [START ON 7/3/2019] levothyroxine (SYNTHROID) tablet 50 mcg  50 mcg Oral ACB    traZODone (DESYREL) tablet 300 mg  300 mg Oral QHS    risperiDONE (RisperDAL) tablet 1 mg  1 mg Oral Q12H                ASSESSMENT & PLAN        The patient, Jacque Reynolds, is a 29 y.o.  male who presents at this time for treatment of the following diagnoses:  Patient Active Hospital Problem List:   Schizoaffective disorder ([facility] [address] (6/18/2019)    Assessment: patient with ongoing psychotic presentation, had previously been stabilized on Invega and Seroquel, now on Risperdal s/p rhabdomyolysis and SHYLA due to dehydration vs iatrogenic. Will continue psychiatric stabilization and monitor vitals. Patient still tachycardic, may require additional IV fluids.   - RESTART Risperdal 1 mg Q12H for psychosis  - IGM therapy as tolerated  - Expand database / obtain collateral  - Dispo planning           A coordinated, multidisplinary treatment team (includes the nurse, unit pharmacist,  and writer) round was conducted for this initial evaluation with the patient present. The following regarding medications was addressed during rounds with patient: the risks and benefits of the proposed medication. The patient was given the opportunity to ask questions. Informed consent given to the use of the above medications. I will continue to adjust psychiatric and non-psychiatric medications (see above \"medication\" section and orders section for details) as deemed appropriate & based upon diagnoses and response to treatment. I have reviewed admission (and previous/old) labs and medical tests in the EHR and or transferring hospital documents. I will continue to order blood tests/labs and diagnostic tests as deemed appropriate and review results as they become available (see orders for details). I have reviewed old psychiatric and medical records available in the EHR. I Will order additional psychiatric records from other institutions to further elucidate the nature of patient's psychopathology and review once available. I will gather additional collateral information from friends, family and o/p treatment team to further elucidate the nature of patient's psychopathology and baselline level of psychiatric functioning.       ESTIMATED LENGTH OF STAY:  5-7 days       STRENGTHS:  Access to housing/residential stability, Interpersonal/supportive relationships (family, friends, peers) and Patient optimistic that change can occur                                        SIGNED:    Sahil Garcia MD  7/2/2019

## 2019-07-02 NOTE — BH NOTES
1300-Patient report received from Earnest Moe RN    1329-Patient is very anxious and needs constant redirection. 1455-Patient extremely anxious. 1559-Patient was given prn Ruslan IM by Patricia Mccurdy. TRAVON.     1641-Patient still incredibly anxious. Patient in his room eating dinner. 1839-Patient in his room pacing. Patient needs constant redirection.

## 2019-07-03 PROCEDURE — 74011250637 HC RX REV CODE- 250/637: Performed by: PSYCHIATRY & NEUROLOGY

## 2019-07-03 PROCEDURE — 74011000250 HC RX REV CODE- 250: Performed by: PSYCHIATRY & NEUROLOGY

## 2019-07-03 PROCEDURE — 65220000003 HC RM SEMIPRIVATE PSYCH

## 2019-07-03 PROCEDURE — 74011250636 HC RX REV CODE- 250/636: Performed by: PSYCHIATRY & NEUROLOGY

## 2019-07-03 RX ORDER — IBUPROFEN 400 MG/1
400 TABLET ORAL
Status: DISCONTINUED | OUTPATIENT
Start: 2019-07-03 | End: 2019-07-03

## 2019-07-03 RX ORDER — CLONIDINE HYDROCHLORIDE 0.1 MG/1
0.1 TABLET ORAL
Status: DISCONTINUED | OUTPATIENT
Start: 2019-07-03 | End: 2019-07-30 | Stop reason: HOSPADM

## 2019-07-03 RX ORDER — FOLIC ACID 1 MG/1
1 TABLET ORAL DAILY
Status: DISCONTINUED | OUTPATIENT
Start: 2019-07-04 | End: 2019-07-03

## 2019-07-03 RX ORDER — RISPERIDONE 1 MG/1
2 TABLET, FILM COATED ORAL
Status: DISCONTINUED | OUTPATIENT
Start: 2019-07-03 | End: 2019-07-04

## 2019-07-03 RX ORDER — LABETALOL 100 MG/1
100 TABLET, FILM COATED ORAL
Status: DISCONTINUED | OUTPATIENT
Start: 2019-07-03 | End: 2019-07-03

## 2019-07-03 RX ORDER — ZIPRASIDONE HYDROCHLORIDE 20 MG/1
20 CAPSULE ORAL
Status: DISCONTINUED | OUTPATIENT
Start: 2019-07-03 | End: 2019-07-30 | Stop reason: HOSPADM

## 2019-07-03 RX ORDER — LEVOTHYROXINE SODIUM 50 UG/1
50 TABLET ORAL
Status: DISCONTINUED | OUTPATIENT
Start: 2019-07-04 | End: 2019-07-03

## 2019-07-03 RX ORDER — TRAZODONE HYDROCHLORIDE 100 MG/1
300 TABLET ORAL
Status: DISCONTINUED | OUTPATIENT
Start: 2019-07-03 | End: 2019-07-03

## 2019-07-03 RX ORDER — LANOLIN ALCOHOL/MO/W.PET/CERES
100 CREAM (GRAM) TOPICAL DAILY
Status: DISCONTINUED | OUTPATIENT
Start: 2019-07-04 | End: 2019-07-03

## 2019-07-03 RX ORDER — THERA TABS 400 MCG
1 TAB ORAL DAILY
Status: DISCONTINUED | OUTPATIENT
Start: 2019-07-04 | End: 2019-07-03

## 2019-07-03 RX ORDER — RISPERIDONE 1 MG/1
1 TABLET, FILM COATED ORAL EVERY 12 HOURS
Status: DISCONTINUED | OUTPATIENT
Start: 2019-07-03 | End: 2019-07-03

## 2019-07-03 RX ORDER — RISPERIDONE 1 MG/1
1 TABLET, FILM COATED ORAL DAILY
Status: DISCONTINUED | OUTPATIENT
Start: 2019-07-04 | End: 2019-07-04

## 2019-07-03 RX ORDER — LABETALOL HCL 20 MG/4 ML
20 SYRINGE (ML) INTRAVENOUS
Status: DISCONTINUED | OUTPATIENT
Start: 2019-07-03 | End: 2019-07-03

## 2019-07-03 RX ADMIN — HYDROXYZINE HYDROCHLORIDE 50 MG: 25 TABLET, FILM COATED ORAL at 16:28

## 2019-07-03 RX ADMIN — IBUPROFEN 400 MG: 400 TABLET ORAL at 06:40

## 2019-07-03 RX ADMIN — HYDROXYZINE HYDROCHLORIDE 50 MG: 25 TABLET, FILM COATED ORAL at 06:08

## 2019-07-03 RX ADMIN — WATER 20 MG: 1 INJECTION INTRAMUSCULAR; INTRAVENOUS; SUBCUTANEOUS at 10:50

## 2019-07-03 RX ADMIN — LEVOTHYROXINE SODIUM 50 MCG: 50 TABLET ORAL at 06:08

## 2019-07-03 RX ADMIN — RISPERIDONE 2 MG: 1 TABLET ORAL at 21:39

## 2019-07-03 RX ADMIN — RISPERIDONE 1 MG: 1 TABLET ORAL at 08:32

## 2019-07-03 RX ADMIN — ZOLPIDEM TARTRATE 10 MG: 10 TABLET ORAL at 21:45

## 2019-07-03 NOTE — BH NOTES
Patient continues to require frequent redirection. Patient paces the hallways with high levels of anxiety. Writer redirects patient to lay down in his bed to help lower his anxiety. Patient mumbles incoherently at times. He accepted prescribed HS medications. He is currently in his room talking to himself. Q 15 minute safety checks continue.  Torrie GUIDRYN, RN

## 2019-07-03 NOTE — BH NOTES
PSYCHIATRIC PROGRESS NOTE         Patient Name  Kassie Martin   Date of Birth 1985   [facility] [id number]   Medical Record Number  [id number]      Age  29 y.o. PCP None   Admit date:  7/1/2019    Room Number  314/01  @ [facility]   Date of Service  7/3/2019         E & M PROGRESS NOTE:         HISTORY       CC:  \"psychosis\"  HISTORY OF PRESENT ILLNESS/INTERVAL HISTORY:  (reviewed/updated 7/3/2019). per initial evaluation: The patient, Kassie Martin, is a 29 y.o. WHITE [place]  male with a past psychiatric history significant for schizoaffective disorder vs schizophrenia, who presents at this time with complaints of (and/or evidence of) the following emotional symptoms: agitation, delusions and psychotic behavior. Additional symptomatology include AH and VH. The above symptoms have been present for 2+ weeks. These symptoms are of moderate to high severity. These symptoms are constant in nature. The patient's condition has been precipitated by psychosocial stressors. Patient's condition made worse by treatment noncompliance. UDS: negative; BAL=0.      The patient was readmitted from medical unit following rhabdomyolysis and SHYLA which resolved with IV hydration. He is grossly psychotic and has been noted to be confused. Patient is markedly disorganized and confused on interview.  He cannot state the days of the week backward despite effort.     From patient's medical admission:     The patient is a 69-year-old  man, who is currently admitted to the medical floor at [address] [place] [address] Drive was initially admitted on 06/17, to Dr. Darrell Wayne service on the psychiatry floor. Abe Rondon a couple of days, his CPK was found to be elevated to 1729 and he was transferred to the medical floor for treatment of rhabdomyolysis.  At that time, he did not have any rigidity, hyperthermia, or leukocytosis, which are indicative of neuroleptic malignant syndrome.  His CKs dropped immediately with hydration. Amina Reynolds has improved markedly and currently reports feeling better in a physical sense.  Also during his stay, his liver enzymes increased although, they were normal at baseline. Amina Reynolds does have a history of being positive for hepatitis C and has been treated with Isoniazid for latent tuberculosis in the past.  Currently, he remains on one-to-one observation and was agitated and aggressive at times yesterday. Amina Reynolds had to be given p.r.n.'s. Lisa Mays had started him on a low dose of risperidone yesterday given the low likelihood of him having had NMS.  His CPK most recently was 1. Amina Reynolds continues to exhibit disorganized behavior and delusional thinking.  When I asked him, he stated that he wanted to leave and go stay with his mother but when Dr. Mary Gabriel, the hospitalist, asked him he stated that he wanted to go to the psychiatric floor because he does not feel safe going home.  At the present time, if the patient requests to be discharged home, my opinion is that Crisis should be called for TDO evaluation. Amina Reynolds denies any auditory hallucinations today, but was yesterday noted to be talking to himself. Amina Reynolds is a poor historian and unable to provide much history. 7/3- patient has remained largely illogical, at times appears to be hallucinating in his room, but is able to coherently describe his experience. Patient given PRN Geodon and Zyprexa with limited effect. Per mother Cici Sarmiento patient was very sedated yesterday following Zyprexa administration, and does better with Geodon historically as a PRN. Patient continues to appear ill, with tachycardia unresolved despite fluid rehydration.         SIDE EFFECTS: (reviewed/updated 7/3/2019)  None reported or admitted to.      ALLERGIES:(reviewed/updated 7/3/2019)  No Known Allergies   MEDICATIONS PRIOR TO ADMISSION:(reviewed/updated 7/3/2019)  Medications Prior to Admission   Medication Sig    levothyroxine (SYNTHROID) 50 mcg tablet Take 50 mcg by mouth Daily (before breakfast).  risperiDONE (RISPERDAL) 0.5 mg tablet Take 1.5 mg by mouth nightly. Risperidone 0.5 mg tabs: Take 3 tabs po every evening    pyridoxine, vitamin B6, (VITAMIN B-6) 50 mg tablet Take 50 mg by mouth daily.  isoniazid (NYDRAZID) 300 mg tablet Take 300 mg by mouth daily.  traZODone (DESYREL) 150 mg tablet Take 300 mg by mouth nightly. Indications: Insomnia      PAST MEDICAL HISTORY: Past medical history from the initial psychiatric evaluation has been reviewed (reviewed/updated 7/3/2019) with no additional updates (I asked patient and no additional past medical history provided). No past medical history on file. No past surgical history on file. SOCIAL HISTORY: Social history from the initial psychiatric evaluation has been reviewed (reviewed/updated 7/3/2019) with no additional updates (I asked patient and no additional social history provided).  Social History     Socioeconomic History    Marital status: SINGLE     Spouse name: Not on file    Number of children: Not on file    Years of education: Not on file    Highest education level: Not on file   Occupational History    Not on file   Social Needs    Financial resource strain: Not on file    Food insecurity:     Worry: Not on file     Inability: Not on file    Transportation needs:     Medical: Not on file     Non-medical: Not on file   Tobacco Use    Smoking status: Not on file   Substance and Sexual Activity    Alcohol use: Not on file    Drug use: Not on file    Sexual activity: Not on file   Lifestyle    Physical activity:     Days per week: Not on file     Minutes per session: Not on file    Stress: Not on file   Relationships    Social connections:     Talks on phone: Not on file     Gets together: Not on file     Attends L[employer]-day service: Not on file     Active member of club or organization: Not on file     Attends meetings of clubs or organizations: Not on file     Relationship status: Not on file    Intimate partner violence:     Fear of current or ex partner: Not on file     Emotionally abused: Not on file     Physically abused: Not on file     Forced sexual activity: Not on file   Other Topics Concern    Not on file   Social History Narrative    Not on file      FAMILY HISTORY: Family history from the initial psychiatric evaluation has been reviewed (reviewed/updated 7/3/2019) with no additional updates (I asked patient and no additional family history provided). No family history on file. REVIEW OF SYSTEMS: (reviewed/updated 7/3/2019)  Appetite:poor   Sleep: poor with DIMS (difficulty initiating & maintaining sleep)   All other Review of Systems: Negative except confusion per HPI         [facility] (MSE):    MSE FINDINGS ARE WITHIN NORMAL LIMITS (WNL) UNLESS OTHERWISE STATED BELOW. ( ALL OF THE BELOW CATEGORIES OF THE MSE HAVE BEEN REVIEWED (reviewed 7/3/2019) AND UPDATED AS DEEMED APPROPRIATE )  General Presentation age appropriate, cooperative   Orientation confused, disorganized, disoriented   Vital Signs  See below (reviewed 7/3/2019); Vital Signs (BP, Pulse, & Temp) are within normal limits if not listed below.    Gait and Station Stable/steady, no ataxia   Musculoskeletal System No extrapyramidal symptoms (EPS); no abnormal muscular movements or Tardive Dyskinesia (TD); muscle strength and tone are within normal limits   Language No aphasia or dysarthria   Speech:  hypoverbal and normal volume   Thought Processes illogical; normal rate of thoughts; poor abstract reasoning/computation   Thought Associations loose associations   Thought Content visual hallucinations and preoccupations   Suicidal Ideations none   Homicidal Ideations none   Mood:  anxious    Affect:  constricted and mood-congruent   Memory recent  impaired   Memory remote:  fair   Concentration/Attention:  impaired   Fund of Knowledge average   Insight:  limited   Reliability fair   Judgment:  impaired due to active psychosis          VITALS:     Patient Vitals for the past 24 hrs:   Temp Pulse Resp BP SpO2   07/03/19 1000  100 20 128/64 100 %   07/03/19 0707 97.9 °F (36.6 °C) 100 20 113/72 100 %   07/02/19 2132 98.1 °F (36.7 °C) (!) 107 19 126/63 99 %     Wt Readings from Last 3 Encounters:   07/01/19 92.9 kg (204 lb 11.2 oz)   06/28/19 113.4 kg (250 lb)   06/18/19 113.4 kg (250 lb)     Temp Readings from Last 3 Encounters:   07/03/19 97.9 °F (36.6 °C)   07/01/19 98 °F (36.7 °C)   06/25/19 99.7 °F (37.6 °C)     BP Readings from Last 3 Encounters:   07/03/19 128/64   07/01/19 114/69   06/25/19 145/85     Pulse Readings from Last 3 Encounters:   07/03/19 100   07/01/19 87   06/25/19 (!) 107            DATA     LABORATORY DATA:(reviewed/updated 7/3/2019)  No results found for this or any previous visit (from the past 24 hour(s)). No results found for: VALF2, VALAC, VALP, VALPR, DS6, CRBAM, CRBAMP, CARB2, XCRBAM  No results found for: LITHM   RADIOLOGY REPORTS:(reviewed/updated 7/3/2019)  No results found.        MEDICATIONS     ALL MEDICATIONS:   Current Facility-Administered Medications   Medication Dose Route Frequency    ziprasidone (GEODON) capsule 20 mg  20 mg Oral Q8H PRN    levothyroxine (SYNTHROID) tablet 50 mcg  50 mcg Oral ACB    traZODone (DESYREL) tablet 300 mg  300 mg Oral QHS    zolpidem (AMBIEN) tablet 10 mg  10 mg Oral QHS PRN    ziprasidone (GEODON) 20 mg in sterile water (preservative free) 1 mL injection  20 mg IntraMUSCular BID PRN    benztropine (COGENTIN) tablet 2 mg  2 mg Oral BID PRN    benztropine (COGENTIN) injection 2 mg  2 mg IntraMUSCular BID PRN    LORazepam (ATIVAN) injection 2 mg  2 mg IntraMUSCular Q4H PRN    acetaminophen (TYLENOL) tablet 650 mg  650 mg Oral Q4H PRN    ibuprofen (MOTRIN) tablet 400 mg  400 mg Oral Q8H PRN    magnesium hydroxide (MILK OF MAGNESIA) 400 mg/5 mL oral suspension 30 mL  30 mL Oral DAILY PRN    nicotine (NICODERM CQ) 21 mg/24 hr patch 1 Patch  1 Patch TransDERmal DAILY PRN    hydrOXYzine HCl (ATARAX) tablet 50 mg  50 mg Oral Q6H PRN    risperiDONE (RisperDAL) tablet 1 mg  1 mg Oral Q12H      SCHEDULED MEDICATIONS:   Current Facility-Administered Medications   Medication Dose Route Frequency    levothyroxine (SYNTHROID) tablet 50 mcg  50 mcg Oral ACB    traZODone (DESYREL) tablet 300 mg  300 mg Oral QHS    risperiDONE (RisperDAL) tablet 1 mg  1 mg Oral Q12H          ASSESSMENT & PLAN     DIAGNOSES REQUIRING ACTIVE TREATMENT AND MONITORING: (reviewed/updated 7/3/2019)  Patient Active Hospital Problem List:   Schizoaffective disorder ([facility] (6/18/2019)    Assessment: patient with ongoing psychotic presentation, had previously been stabilized on Invega and Seroquel, now on Risperdal s/p rhabdomyolysis and SHYLA due to dehydration vs iatrogenic. Will continue psychiatric stabilization and monitor vitals. Patient still tachycardic, may require additional IV fluids. - INCREASE Risperdal to 1 mg QDAY and 2 mg QHS for psychosis  - CHANGE PRN to Geodon PO and IM  - f/u NMDA Ab  - IGM therapy as tolerated  - Expand database / obtain collateral  - Dispo planning    In summary, Casper Wolfe, is a 29 y.o.  male who presents with a severe exacerbation of the principal diagnosis of Schizoaffective disorder ([facility]    Patient's condition is not improving. Patient requires continued inpatient hospitalization for further stabilization, safety monitoring and medication management. I will continue to coordinate the provision of individual, milieu, occupational, group, and substance abuse therapies to address target symptoms/diagnoses as deemed appropriate for the individual patient. A coordinated, multidisplinary treatment team round was conducted with the patient (this team consists of the nurse, psychiatric unit pharmcist,  and writer).      Complete current electronic health record for patient has been reviewed today including consultant notes, ancillary staff notes, nurses and psychiatric tech notes. Suicide risk assessment completed and patient deemed to be of low risk for suicide at this time. The following regarding medications was addressed during rounds with patient:   the risks and benefits of the proposed medication. The patient was given the opportunity to ask questions. Informed consent given to the use of the above medications. Will continue to adjust psychiatric and non-psychiatric medications (see above \"medication\" section and orders section for details) as deemed appropriate & based upon diagnoses and response to treatment. I will continue to order blood tests/labs and diagnostic tests as deemed appropriate and review results as they become available (see orders for details and above listed lab/test results). I will order psychiatric records from previous [facility] hospitals to further elucidate the nature of patient's psychopathology and review once available. I will gather additional collateral information from friends, family and o/p treatment team to further elucidate the nature of patient's psychopathology and baselline level of psychiatric functioning. I certify that this patient's inpatient psychiatric hospital services furnished since the previous certification were, and continue to be, required for treatment that could reasonably be expected to improve the patient's condition, or for diagnostic study, and that the patient continues to need, on a daily basis, active treatment furnished directly by or requiring the supervision of inpatient psychiatric facility personnel. In addition, the hospital records show that services furnished were intensive treatment services, admission or related services, or equivalent services.     EXPECTED DISCHARGE DATE/DAY: TBD     DISPOSITION: Home       Signed By:   Carol Valadez MD  7/3/2019

## 2019-07-03 NOTE — BH NOTES
Writer found patient in his room naked urinating on his floor. Writer redirected patient to redress himself and assisted him with cleaning the floor. Patient's thoughts were disorganized and he was answering questions inappropriate. He voiced several somatic complaints such as he thought his skin was on fire and he needed oxygen because he was having a heart attack. Vital signs obtained and there were no abnormal readings. Prn Ativan was administered for patient's symptoms. Minutes later, writer hears water running in the bathroom. Patient is standing in the bathroom attempting to get in the shower fully clothed. Writer encouraged patient to shower in the morning as the medication just given could make him drowsy. He complied. He was given an additional HS snack as he states he didn't remember eating. Oral fluids encouraged. He denies any current feelings of SI or HI. Patient observed talking to himself. Q 15 minute safety checks continue.  Jena Pang BSN, RN

## 2019-07-03 NOTE — BH NOTES
0700-Report received from [address] denies SI/HI and is experiencing Auditory Hallucinations. He stated that people are using electricity to talk to him. Patient denies pain. 0730-Patient took a shower. 0800-Patient very anxious and paranoid in his room stating he is hungary, he was advised the food cart was on the general side and would be over shortly. 0852-Patient still in his room in need of constant redirection. 1010-Patient still extremely anxious, and paranoid. 1050-Patient received Geodon     1113-Patient in his room, he has just spoken with the treatment team. Patient is still extremely anxious. 1216-Patient ate lunch and is now laying down in his room. Patient still needs constant redirection. Patient still very anxious and stating that people keep talking to him through electricity and messing with his mind. 1448-Patient is still anxious, and needs constant redirection.

## 2019-07-03 NOTE — PROGRESS NOTES
1500 Report received from Alanna Homans    9503 Pt was anxious and restless. VS completed. His skin was damp. He was unable to complete an oral temp. It was done axillary. He was offered and accepted hydroxyzine. [id number] Pt reported improvement.

## 2019-07-04 LAB
ALBUMIN SERPL-MCNC: 4.3 G/DL (ref 3.5–5)
ALBUMIN/GLOB SERPL: 1.6 {RATIO} (ref 1.1–2.2)
ALP SERPL-CCNC: 91 U/L (ref 45–117)
ALT SERPL-CCNC: 120 U/L (ref 12–78)
ANION GAP SERPL CALC-SCNC: 17 MMOL/L (ref 5–15)
AST SERPL-CCNC: 104 U/L (ref 15–37)
BILIRUB SERPL-MCNC: 0.8 MG/DL (ref 0.2–1)
BUN SERPL-MCNC: 23 MG/DL (ref 6–20)
BUN/CREAT SERPL: 19 (ref 12–20)
CALCIUM SERPL-MCNC: 9.8 MG/DL (ref 8.5–10.1)
CHLORIDE SERPL-SCNC: 103 MMOL/L (ref 97–108)
CO2 SERPL-SCNC: 22 MMOL/L (ref 21–32)
CREAT SERPL-MCNC: 1.18 MG/DL (ref 0.7–1.3)
GLOBULIN SER CALC-MCNC: 2.7 G/DL (ref 2–4)
GLUCOSE SERPL-MCNC: 79 MG/DL (ref 65–100)
POTASSIUM SERPL-SCNC: 4.3 MMOL/L (ref 3.5–5.1)
PROT SERPL-MCNC: 7 G/DL (ref 6.4–8.2)
SODIUM SERPL-SCNC: 142 MMOL/L (ref 136–145)

## 2019-07-04 PROCEDURE — 74011250637 HC RX REV CODE- 250/637: Performed by: PSYCHIATRY & NEUROLOGY

## 2019-07-04 PROCEDURE — 65220000003 HC RM SEMIPRIVATE PSYCH

## 2019-07-04 PROCEDURE — 80053 COMPREHEN METABOLIC PANEL: CPT

## 2019-07-04 PROCEDURE — 74011250637 HC RX REV CODE- 250/637: Performed by: FAMILY MEDICINE

## 2019-07-04 PROCEDURE — 36415 COLL VENOUS BLD VENIPUNCTURE: CPT

## 2019-07-04 PROCEDURE — 86255 FLUORESCENT ANTIBODY SCREEN: CPT

## 2019-07-04 RX ORDER — RISPERIDONE 1 MG/1
2 TABLET, FILM COATED ORAL EVERY 12 HOURS
Status: DISCONTINUED | OUTPATIENT
Start: 2019-07-04 | End: 2019-07-08

## 2019-07-04 RX ORDER — METOPROLOL TARTRATE 50 MG/1
50 TABLET ORAL EVERY 12 HOURS
Status: DISCONTINUED | OUTPATIENT
Start: 2019-07-04 | End: 2019-07-05

## 2019-07-04 RX ORDER — CLONAZEPAM 1 MG/1
1 TABLET ORAL
Status: DISCONTINUED | OUTPATIENT
Start: 2019-07-04 | End: 2019-07-10

## 2019-07-04 RX ADMIN — RISPERIDONE 2 MG: 1 TABLET ORAL at 20:18

## 2019-07-04 RX ADMIN — RISPERIDONE 1 MG: 1 TABLET ORAL at 08:24

## 2019-07-04 RX ADMIN — METOPROLOL TARTRATE 50 MG: 50 TABLET, FILM COATED ORAL at 11:52

## 2019-07-04 RX ADMIN — HYDROXYZINE HYDROCHLORIDE 50 MG: 25 TABLET, FILM COATED ORAL at 20:17

## 2019-07-04 RX ADMIN — METOPROLOL TARTRATE 50 MG: 50 TABLET, FILM COATED ORAL at 20:18

## 2019-07-04 RX ADMIN — HYDROXYZINE HYDROCHLORIDE 50 MG: 25 TABLET, FILM COATED ORAL at 05:58

## 2019-07-04 RX ADMIN — CLONAZEPAM 1 MG: 1 TABLET ORAL at 17:17

## 2019-07-04 RX ADMIN — LEVOTHYROXINE SODIUM 50 MCG: 50 TABLET ORAL at 05:59

## 2019-07-04 NOTE — BH NOTES
Patient is in his room and is anxious ,stated he was hot and slightly sweating ,vital signs were attempted numerous times and Patient would not hold still long enough for a blood pressure to register on the monitor  2145 trazadone held by pharmacy, PRN Ambien was given for sleep  Hourly rounding done  Slept 3.5 hours

## 2019-07-04 NOTE — PROGRESS NOTES
0700 Report received from [facility]    4972  Pt was up for breakfast.  He is anxious, confused, and restless. Pt repeatedly asks for help. He has difficulty remaining still. 1200  Pt was started on Metoprolol. 1645  Pt remains disorganized. He does appear calmer. B/P 108/80 using a manual cuff. Pt is able to discuss some things he remembers doing prior to admission. He stated he was doing meth. Pt is oriented to place and person.

## 2019-07-04 NOTE — PROGRESS NOTES
Hospitalist Progress Note  Flakita David MD  Answering service: [phone] from in house phone  Cell: [phone]      Date of Service:  2019 3  NAME:  Jacque Reynolds  :  1985  MRN:  [id number]  Admission date: 2019  3:08 PM    Admission Summary:   Jacque Reynolds 29 y.o. male with psychosis, was transferred from medical floor after hydration and resolution of the mild rhabdomyolisis. Remains tachycardic and slightly hypertensive. Interval history / Subjective:   Pacing in the halls. Denies muscle aches. Assessment & Plan:   Sinus tachycardia. Likely anxiety plays major role. HTN  -Added metoprolol 50 mg PO BID  -Clonidine 0.1 mg q2hrs as needed for SBP>180  -Encouraged PO fluids  Questionable hypothyroidism  -Recheck TSH  -Continue levothyroxine      Hospital Problems  Date Reviewed: 2019          Codes Class Noted POA    Schizophrenia, acute ([facility] 75[facility] ICD-10-CM: F23  ICD-9-CM: 295.80  2019 Unknown        * (Principal) Schizoaffective disorder ([facility] 75.) ICD-10-CM: F25.9  ICD-9-CM: 295.70  2019 Yes                Vital Signs:     BP (!) 151/99 (BP 1 Location: Right arm, BP Patient Position: Standing)   Pulse 95   Temp 98 °F (36.7 °C)   Resp 20   Ht 6' 2\" (1.88 m)   Wt 92.9 kg (204 lb 11.2 oz)   SpO2 100%   BMI 26.28 kg/m²     Physical Examination:         Constitutional:  No acute distress, anxious appearing   ENT:  Oral mucosa moist,    Resp:  CTA bilaterally.  No wheezing/rhonchi/rales   CV:  Regular rhythm, normal rate, no murmurs, gallops, rubs                  Data Review:                  Flakita David MD

## 2019-07-04 NOTE — BH NOTES
PSYCHIATRIC PROGRESS NOTE         Patient Name  Kay Nuñez   Date of Birth 1985   [facility] [id number]   Medical Record Number  [id number]      Age  29 y.o. PCP None   Admit date:  7/1/2019    Room Number  314/01  @ [facility]   Date of Service  7/4/2019         E & M PROGRESS NOTE:         HISTORY       CC:  \"psychosis\"  HISTORY OF PRESENT ILLNESS/INTERVAL HISTORY:  (reviewed/updated 7/4/2019). per initial evaluation: The patient, Kay Nuñez, is a 29 y.o. WHITE OR  male with a past psychiatric history significant for schizoaffective disorder vs schizophrenia, who presents at this time with complaints of (and/or evidence of) the following emotional symptoms: agitation, delusions and psychotic behavior. Additional symptomatology include AH and VH. The above symptoms have been present for 2+ weeks. These symptoms are of moderate to high severity. These symptoms are constant in nature. The patient's condition has been precipitated by psychosocial stressors. Patient's condition made worse by treatment noncompliance. UDS: negative; BAL=0.      The patient was readmitted from medical unit following rhabdomyolysis and SHYLA which resolved with IV hydration. He is grossly psychotic and has been noted to be confused. Patient is markedly disorganized and confused on interview.  He cannot state the days of the week backward despite effort.     From patient's medical admission:     The patient is a 81-year-old  man, who is currently admitted to the medical floor at [address] [place] [address] Drive was initially admitted on 06/17, to Dr. Petey Lamb service on the psychiatry floor. Geovanny Palam a couple of days, his CPK was found to be elevated to 1729 and he was transferred to the medical floor for treatment of rhabdomyolysis.  At that time, he did not have any rigidity, hyperthermia, or leukocytosis, which are indicative of neuroleptic malignant syndrome.  His CKs dropped immediately with hydration. [facility] has improved markedly and currently reports feeling better in a physical sense.  Also during his stay, his liver enzymes increased although, they were normal at baseline. [facility] does have a history of being positive for hepatitis C and has been treated with Isoniazid for latent tuberculosis in the past.  Currently, he remains on one-to-one observation and was agitated and aggressive at times yesterday. [facility] had to be given p.r.n.'s. Rico Courser had started him on a low dose of risperidone yesterday given the low likelihood of him having had NMS.  His CPK most recently was 1. [facility] continues to exhibit disorganized behavior and delusional thinking.  When I asked him, he stated that he wanted to leave and go stay with his mother but when Dr. Diana Gomez, the hospitalist, asked him he stated that he wanted to go to the psychiatric floor because he does not feel safe going home.  At the present time, if the patient requests to be discharged home, my opinion is that Crisis should be called for TDO evaluation. [facility] denies any auditory hallucinations today, but was yesterday noted to be talking to himself. [facility] is a poor historian and unable to provide much history. 7/3- patient has remained largely illogical, at times appears to be hallucinating in his room, but is able to coherently describe his experience. Patient given PRN Geodon and Zyprexa with limited effect. Per mother Risa Genail patient was very sedated yesterday following Zyprexa administration, and does better with Geodon historically as a PRN. Patient continues to appear ill, with tachycardia unresolved despite fluid rehydration. 7/4- patient more visible, has been more coherent than 24 hours prior. Vitals still concerning, tachycardic and with autonomic instability evident. Medicine following. Patient endorses disorganized thoughts and anxiety.         SIDE EFFECTS: (reviewed/updated 7/4/2019)  None reported or admitted to.      ALLERGIES:(reviewed/updated 7/4/2019)  No Known Allergies   MEDICATIONS PRIOR TO ADMISSION:(reviewed/updated 7/4/2019)  Medications Prior to Admission   Medication Sig    levothyroxine (SYNTHROID) 50 mcg tablet Take 50 mcg by mouth Daily (before breakfast).  risperiDONE (RISPERDAL) 0.5 mg tablet Take 1.5 mg by mouth nightly. Risperidone 0.5 mg tabs: Take 3 tabs po every evening    pyridoxine, vitamin B6, (VITAMIN B-6) 50 mg tablet Take 50 mg by mouth daily.  isoniazid (NYDRAZID) 300 mg tablet Take 300 mg by mouth daily.  traZODone (DESYREL) 150 mg tablet Take 300 mg by mouth nightly. Indications: Insomnia      PAST MEDICAL HISTORY: Past medical history from the initial psychiatric evaluation has been reviewed (reviewed/updated 7/4/2019) with no additional updates (I asked patient and no additional past medical history provided). No past medical history on file. No past surgical history on file. SOCIAL HISTORY: Social history from the initial psychiatric evaluation has been reviewed (reviewed/updated 7/4/2019) with no additional updates (I asked patient and no additional social history provided).    Social History     Socioeconomic History    Marital status: SINGLE     Spouse name: Not on file    Number of children: Not on file    Years of education: Not on file    Highest education level: Not on file   Occupational History    Not on file   Social Needs    Financial resource strain: Not on file    Food insecurity:     Worry: Not on file     Inability: Not on file    Transportation needs:     Medical: Not on file     Non-medical: Not on file   Tobacco Use    Smoking status: Not on file   Substance and Sexual Activity    Alcohol use: Not on file    Drug use: Not on file    Sexual activity: Not on file   Lifestyle    Physical activity:     Days per week: Not on file     Minutes per session: Not on file    Stress: Not on file   Relationships    Social connections:     Talks on phone: Not on file     Gets together: Not on file     Attends [employer] service: Not on file     Active member of club or organization: Not on file     Attends meetings of clubs or organizations: Not on file     Relationship status: Not on file    Intimate partner violence:     Fear of current or ex partner: Not on file     Emotionally abused: Not on file     Physically abused: Not on file     Forced sexual activity: Not on file   Other Topics Concern    Not on file   Social History Narrative    Not on file      FAMILY HISTORY: Family history from the initial psychiatric evaluation has been reviewed (reviewed/updated 7/4/2019) with no additional updates (I asked patient and no additional family history provided). No family history on file. REVIEW OF SYSTEMS: (reviewed/updated 7/4/2019)  Appetite:poor   Sleep: poor with DIMS (difficulty initiating & maintaining sleep)   All other Review of Systems: Negative except confusion per HPI         [address] (MSE):    MSE FINDINGS ARE WITHIN NORMAL LIMITS (WNL) UNLESS OTHERWISE STATED BELOW. ( ALL OF THE BELOW CATEGORIES OF THE MSE HAVE BEEN REVIEWED (reviewed 7/4/2019) AND UPDATED AS DEEMED APPROPRIATE )  General Presentation age appropriate, cooperative   Orientation confused, disorganized, disoriented   Vital Signs  See below (reviewed 7/4/2019); Vital Signs (BP, Pulse, & Temp) are within normal limits if not listed below.    Gait and Station Stable/steady, no ataxia   Musculoskeletal System No extrapyramidal symptoms (EPS); no abnormal muscular movements or Tardive Dyskinesia (TD); muscle strength and tone are within normal limits   Language No aphasia or dysarthria   Speech:  hypoverbal and normal volume   Thought Processes illogical; normal rate of thoughts; poor abstract reasoning/computation   Thought Associations loose associations   Thought Content visual hallucinations and preoccupations   Suicidal Ideations none   Homicidal Ideations none   Mood:  anxious Affect:  constricted and mood-congruent   Memory recent  impaired   Memory remote:  fair   Concentration/Attention:  impaired   Fund of Knowledge average   Insight:  limited   Reliability fair   Judgment:  impaired due to active psychosis          VITALS:     Patient Vitals for the past 24 hrs:   Temp Pulse Resp BP SpO2   07/04/19 1358    130/90    07/04/19 0700 98 °F (36.7 °C) 95 20 (!) 151/99 100 %   07/03/19 2007 98.3 °F (36.8 °C) (!) 115      07/03/19 1629 98.1 °F (36.7 °C) (!) 110 24 118/88      Wt Readings from Last 3 Encounters:   07/01/19 92.9 kg (204 lb 11.2 oz)   06/28/19 113.4 kg (250 lb)   06/18/19 113.4 kg (250 lb)     Temp Readings from Last 3 Encounters:   07/04/19 98 °F (36.7 °C)   07/01/19 98 °F (36.7 °C)   06/25/19 99.7 °F (37.6 °C)     BP Readings from Last 3 Encounters:   07/04/19 130/90   07/01/19 114/69   06/25/19 145/85     Pulse Readings from Last 3 Encounters:   07/04/19 95   07/01/19 87   06/25/19 (!) 107            DATA     LABORATORY DATA:(reviewed/updated 7/4/2019)  Recent Results (from the past 24 hour(s))   METABOLIC PANEL, COMPREHENSIVE    Collection Time: 07/04/19  5:47 AM   Result Value Ref Range    Sodium 142 136 - 145 mmol/L    Potassium 4.3 3.5 - 5.1 mmol/L    Chloride 103 97 - 108 mmol/L    CO2 22 21 - 32 mmol/L    Anion gap 17 (H) 5 - 15 mmol/L    Glucose 79 65 - 100 mg/dL    BUN 23 (H) 6 - 20 MG/DL    Creatinine 1.18 0.70 - 1.30 MG/DL    BUN/Creatinine ratio 19 12 - 20      GFR est AA >60 >60 ml/min/1.73m2    GFR est non-AA >60 >60 ml/min/1.73m2    Calcium 9.8 8.5 - 10.1 MG/DL    Bilirubin, total 0.8 0.2 - 1.0 MG/DL    ALT (SGPT) 120 (H) 12 - 78 U/L    AST (SGOT) 104 (H) 15 - 37 U/L    Alk.  phosphatase 91 45 - 117 U/L    Protein, total 7.0 6.4 - 8.2 g/dL    Albumin 4.3 3.5 - 5.0 g/dL    Globulin 2.7 2.0 - 4.0 g/dL    A-G Ratio 1.6 1.1 - 2.2       No results found for: VALF2, VALAC, VALP, VALPR, DS6, CRBAM, CRBAMP, CARB2, XCRBAM  No results found for: [facility] [place] [facility] RADIOLOGY REPORTS:(reviewed/updated 7/4/2019)  No results found.        MEDICATIONS     ALL MEDICATIONS:   Current Facility-Administered Medications   Medication Dose Route Frequency    metoprolol tartrate (LOPRESSOR) tablet 50 mg  50 mg Oral Q12H    risperiDONE (RisperDAL) tablet 2 mg  2 mg Oral Q12H    clonazePAM (KlonoPIN) tablet 1 mg  1 mg Oral BID and QHS    ziprasidone (GEODON) capsule 20 mg  20 mg Oral Q8H PRN    cloNIDine HCl (CATAPRES) tablet 0.1 mg  0.1 mg Oral Q2H PRN    levothyroxine (SYNTHROID) tablet 50 mcg  50 mcg Oral ACB    traZODone (DESYREL) tablet 300 mg  300 mg Oral QHS    zolpidem (AMBIEN) tablet 10 mg  10 mg Oral QHS PRN    ziprasidone (GEODON) 20 mg in sterile water (preservative free) 1 mL injection  20 mg IntraMUSCular BID PRN    benztropine (COGENTIN) tablet 2 mg  2 mg Oral BID PRN    benztropine (COGENTIN) injection 2 mg  2 mg IntraMUSCular BID PRN    LORazepam (ATIVAN) injection 2 mg  2 mg IntraMUSCular Q4H PRN    acetaminophen (TYLENOL) tablet 650 mg  650 mg Oral Q4H PRN    ibuprofen (MOTRIN) tablet 400 mg  400 mg Oral Q8H PRN    magnesium hydroxide (MILK OF MAGNESIA) 400 mg/5 mL oral suspension 30 mL  30 mL Oral DAILY PRN    nicotine (NICODERM CQ) 21 mg/24 hr patch 1 Patch  1 Patch TransDERmal DAILY PRN    hydrOXYzine HCl (ATARAX) tablet 50 mg  50 mg Oral Q6H PRN      SCHEDULED MEDICATIONS:   Current Facility-Administered Medications   Medication Dose Route Frequency    metoprolol tartrate (LOPRESSOR) tablet 50 mg  50 mg Oral Q12H    risperiDONE (RisperDAL) tablet 2 mg  2 mg Oral Q12H    clonazePAM (KlonoPIN) tablet 1 mg  1 mg Oral BID and QHS    levothyroxine (SYNTHROID) tablet 50 mcg  50 mcg Oral ACB    traZODone (DESYREL) tablet 300 mg  300 mg Oral QHS          ASSESSMENT & PLAN     DIAGNOSES REQUIRING ACTIVE TREATMENT AND MONITORING: (reviewed/updated 7/4/2019)  Patient Active Hospital Problem List:   Schizoaffective disorder ([facility] [address] [address] (6/18/2019)    Assessment: patient with ongoing psychotic presentation, had previously been stabilized on Invega and Seroquel, now on Risperdal s/p rhabdomyolysis and SHYLA due to dehydration vs iatrogenic. Will continue psychiatric stabilization and monitor vitals. Patient still tachycardic, may require additional IV fluids. - INCREASE Risperdal to 2 mg Q12H for psychosis  - START Klonopin 1 mg TID for agitation and anxiety  - f/u NMDA Ab  - IGM therapy as tolerated  - Expand database / obtain collateral  - Dispo planning    In summary, Severiano Bumps, is a 29 y.o.  male who presents with a severe exacerbation of the principal diagnosis of Schizoaffective disorder ([facility] [address] [address]    Patient's condition is not improving. Patient requires continued inpatient hospitalization for further stabilization, safety monitoring and medication management. I will continue to coordinate the provision of individual, milieu, occupational, group, and substance abuse therapies to address target symptoms/diagnoses as deemed appropriate for the individual patient. A coordinated, multidisplinary treatment team round was conducted with the patient (this team consists of the nurse, psychiatric unit pharmcist,  and writer). Complete current electronic health record for patient has been reviewed today including consultant notes, ancillary staff notes, nurses and psychiatric tech notes. Suicide risk assessment completed and patient deemed to be of low risk for suicide at this time. The following regarding medications was addressed during rounds with patient:   the risks and benefits of the proposed medication. The patient was given the opportunity to ask questions. Informed consent given to the use of the above medications. Will continue to adjust psychiatric and non-psychiatric medications (see above \"medication\" section and orders section for details) as deemed appropriate & based upon diagnoses and response to treatment.      I will continue to order blood tests/labs and diagnostic tests as deemed appropriate and review results as they become available (see orders for details and above listed lab/test results). I will order psychiatric records from previous [facility] hospitals to further elucidate the nature of patient's psychopathology and review once available. I will gather additional collateral information from friends, family and o/p treatment team to further elucidate the nature of patient's psychopathology and baselline level of psychiatric functioning. I certify that this patient's inpatient psychiatric hospital services furnished since the previous certification were, and continue to be, required for treatment that could reasonably be expected to improve the patient's condition, or for diagnostic study, and that the patient continues to need, on a daily basis, active treatment furnished directly by or requiring the supervision of inpatient psychiatric facility personnel. In addition, the hospital records show that services furnished were intensive treatment services, admission or related services, or equivalent services.     EXPECTED DISCHARGE DATE/DAY: TBD     DISPOSITION: Home       Signed By:   Mary Hi MD  7/4/2019

## 2019-07-05 PROCEDURE — 65220000003 HC RM SEMIPRIVATE PSYCH

## 2019-07-05 PROCEDURE — 74011250637 HC RX REV CODE- 250/637: Performed by: PSYCHIATRY & NEUROLOGY

## 2019-07-05 PROCEDURE — 74011250637 HC RX REV CODE- 250/637: Performed by: FAMILY MEDICINE

## 2019-07-05 RX ORDER — METOPROLOL TARTRATE 25 MG/1
25 TABLET, FILM COATED ORAL EVERY 12 HOURS
Status: DISCONTINUED | OUTPATIENT
Start: 2019-07-05 | End: 2019-07-30 | Stop reason: HOSPADM

## 2019-07-05 RX ADMIN — METOPROLOL TARTRATE 50 MG: 50 TABLET, FILM COATED ORAL at 09:20

## 2019-07-05 RX ADMIN — CLONAZEPAM 1 MG: 1 TABLET ORAL at 18:46

## 2019-07-05 RX ADMIN — TRAZODONE HYDROCHLORIDE 300 MG: 100 TABLET ORAL at 01:04

## 2019-07-05 RX ADMIN — CLONAZEPAM 1 MG: 1 TABLET ORAL at 01:04

## 2019-07-05 RX ADMIN — CLONAZEPAM 1 MG: 1 TABLET ORAL at 09:20

## 2019-07-05 RX ADMIN — TRAZODONE HYDROCHLORIDE 300 MG: 100 TABLET ORAL at 21:41

## 2019-07-05 RX ADMIN — LEVOTHYROXINE SODIUM 50 MCG: 50 TABLET ORAL at 05:40

## 2019-07-05 RX ADMIN — RISPERIDONE 2 MG: 1 TABLET ORAL at 09:20

## 2019-07-05 RX ADMIN — CLONAZEPAM 1 MG: 1 TABLET ORAL at 21:41

## 2019-07-05 RX ADMIN — METOPROLOL TARTRATE 25 MG: 25 TABLET ORAL at 21:41

## 2019-07-05 RX ADMIN — ACETAMINOPHEN 650 MG: 325 TABLET, FILM COATED ORAL at 05:40

## 2019-07-05 RX ADMIN — RISPERIDONE 2 MG: 1 TABLET ORAL at 21:41
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
## 2019-07-05 NOTE — BH NOTES
GROUP THERAPY PROGRESS NOTE    The patient Shannan carias 29 y.o. male is participating in [employer]. Group time: 1 hour    Personal goal for participation:  To concentrate on selected task    Goal orientation: social    Group therapy participation: minimal    Therapeutic interventions reviewed and discussed: Crafts, games, music    Impression of participation: The patient was present-arrived late    Maxx Awan  7/5/2019  6:10 PM

## 2019-07-05 NOTE — PROGRESS NOTES
Hospitalist Progress Note  Dodie Catherine MD  Answering service: [phone] OR 36 from in house phone  Cell: [phone]      Date of Service:  2019 4  NAME:  Fanta Jose  :  1985  MRN:  [id number]  Admission date: 2019  3:08 PM    Admission Summary:   Fanta Jose 29 y.o. male with psychosis, was transferred from medical floor after hydration and resolution of the mild rhabdomyolisis. Remains tachycardic and slightly hypertensive. Interval history / Subjective:   No acute events. Appears calm. BP is better, not tachycardic today. Assessment & Plan:   Sinus tachycardia. Likely anxiety plays major role. Resolved on metoprolol  HTN. Controlled. -Decreased metoprolol to 25 mg PO BID  -Did not need clonidine. Borderline hypotensive. Questionable hypothyroidism  -Recheck TSH (pending)  -Continue levothyroxine      Hospital Problems  Date Reviewed: 2019          Codes Class Noted POA    Schizophrenia, acute ([facility] ICD-10-CM: F23  ICD-9-CM: 295.80  2019 Unknown        * (Principal) Schizoaffective disorder ([facility] ICD-10-CM: F25.9  ICD-9-CM: 295.70  2019 Yes                Vital Signs:     /63 (BP 1 Location: Left arm)   Pulse 75   Temp 97.7 °F (36.5 °C)   Resp 20   Ht 6' 2\" (1.88 m)   Wt 92.9 kg (204 lb 11.2 oz)   SpO2 99%   BMI 26.28 kg/m²     Physical Examination:         Constitutional:  No acute distress, anxious appearing   ENT:  Oral mucosa moist,    Resp:  CTA bilaterally. No wheezing/rhonchi/rales   CV:  Regular rhythm, normal rate, no murmurs, gallops, rubs                  Data Review:                  Dodie Catherine MD   Will sign off. Please, call with questions.

## 2019-07-05 NOTE — BH NOTES
PSYCHIATRIC PROGRESS NOTE         Patient Name  Kassie Martin   Date of Birth 1985   [facility] [id number]   Medical Record Number  [id number]      Age  29 y.o. PCP None   Admit date:  7/1/2019    Room Number  314/01  @ [facility]   Date of Service  7/5/2019         E & M PROGRESS NOTE:         HISTORY       CC:  \"psychosis\"  HISTORY OF PRESENT ILLNESS/INTERVAL HISTORY:  (reviewed/updated 7/5/2019). per initial evaluation: The patient, Kassie Martin, is a 29 y.o. WHITE [place]  male with a past psychiatric history significant for schizoaffective disorder vs schizophrenia, who presents at this time with complaints of (and/or evidence of) the following emotional symptoms: agitation, delusions and psychotic behavior. Additional symptomatology include AH and VH. The above symptoms have been present for 2+ weeks. These symptoms are of moderate to high severity. These symptoms are constant in nature. The patient's condition has been precipitated by psychosocial stressors. Patient's condition made worse by treatment noncompliance. UDS: negative; BAL=0.      The patient was readmitted from medical unit following rhabdomyolysis and SHYLA which resolved with IV hydration. He is grossly psychotic and has been noted to be confused. Patient is markedly disorganized and confused on interview.  He cannot state the days of the week backward despite effort.     From patient's medical admission:     The patient is a 61-year-old  man, who is currently admitted to the medical floor at [address] [place] [address] Drive was initially admitted on 06/17, to Dr. Darrell Wayne service on the psychiatry floor. Abe Rondon a couple of days, his CPK was found to be elevated to 1729 and he was transferred to the medical floor for treatment of rhabdomyolysis.  At that time, he did not have any rigidity, hyperthermia, or leukocytosis, which are indicative of neuroleptic malignant syndrome.  His CKs dropped immediately with hydration. [facility] has improved markedly and currently reports feeling better in a physical sense.  Also during his stay, his liver enzymes increased although, they were normal at baseline. [facility] does have a history of being positive for hepatitis C and has been treated with Isoniazid for latent tuberculosis in the past.  Currently, he remains on one-to-one observation and was agitated and aggressive at times yesterday. [facility] had to be given p.r.n.'s. Rosaura Comer had started him on a low dose of risperidone yesterday given the low likelihood of him having had NMS.  His CPK most recently was 1. [facility] continues to exhibit disorganized behavior and delusional thinking.  When I asked him, he stated that he wanted to leave and go stay with his mother but when Dr. Anil Akers, the hospitalist, asked him he stated that he wanted to go to the psychiatric floor because he does not feel safe going home.  At the present time, if the patient requests to be discharged home, my opinion is that Crisis should be called for TDO evaluation. [facility] denies any auditory hallucinations today, but was yesterday noted to be talking to himself. [facility] is a poor historian and unable to provide much history. 7/3- patient has remained largely illogical, at times appears to be hallucinating in his room, but is able to coherently describe his experience. Patient given PRN Geodon and Zyprexa with limited effect. Per mother Lasandra August patient was very sedated yesterday following Zyprexa administration, and does better with Geodon historically as a PRN. Patient continues to appear ill, with tachycardia unresolved despite fluid rehydration. 7/4- patient more visible, has been more coherent than 24 hours prior. Vitals still concerning, tachycardic and with autonomic instability evident. Medicine following. Patient endorses disorganized thoughts and anxiety.   7/5- patient largely unchanged, repeatedly asking for help from staff due to \"something in my head\" but coherent, slept 7 hours. Patient continues to endorse anxiety and restlessness. Patient still noted to be in moderate distress. Patient medication compliant. Repeatedly states he is dying.         SIDE EFFECTS: (reviewed/updated 7/5/2019)  None reported or admitted to. ALLERGIES:(reviewed/updated 7/5/2019)  No Known Allergies   MEDICATIONS PRIOR TO ADMISSION:(reviewed/updated 7/5/2019)  Medications Prior to Admission   Medication Sig    levothyroxine (SYNTHROID) 50 mcg tablet Take 50 mcg by mouth Daily (before breakfast).  risperiDONE (RISPERDAL) 0.5 mg tablet Take 1.5 mg by mouth nightly. Risperidone 0.5 mg tabs: Take 3 tabs po every evening    pyridoxine, vitamin B6, (VITAMIN B-6) 50 mg tablet Take 50 mg by mouth daily.  isoniazid (NYDRAZID) 300 mg tablet Take 300 mg by mouth daily.  traZODone (DESYREL) 150 mg tablet Take 300 mg by mouth nightly. Indications: Insomnia      PAST MEDICAL HISTORY: Past medical history from the initial psychiatric evaluation has been reviewed (reviewed/updated 7/5/2019) with no additional updates (I asked patient and no additional past medical history provided). No past medical history on file. No past surgical history on file. SOCIAL HISTORY: Social history from the initial psychiatric evaluation has been reviewed (reviewed/updated 7/5/2019) with no additional updates (I asked patient and no additional social history provided).    Social History     Socioeconomic History    Marital status: SINGLE     Spouse name: Not on file    Number of children: Not on file    Years of education: Not on file    Highest education level: Not on file   Occupational History    Not on file   Social Needs    Financial resource strain: Not on file    Food insecurity:     Worry: Not on file     Inability: Not on file    Transportation needs:     Medical: Not on file     Non-medical: Not on file   Tobacco Use    Smoking status: Not on file   Substance and Sexual Activity    Alcohol use: Not on file    Drug use: Not on file    Sexual activity: Not on file   Lifestyle    Physical activity:     Days per week: Not on file     Minutes per session: Not on file    Stress: Not on file   Relationships    Social connections:     Talks on phone: Not on file     Gets together: Not on file     Attends [employer] service: Not on file     Active member of club or organization: Not on file     Attends meetings of clubs or organizations: Not on file     Relationship status: Not on file    Intimate partner violence:     Fear of current or ex partner: Not on file     Emotionally abused: Not on file     Physically abused: Not on file     Forced sexual activity: Not on file   Other Topics Concern    Not on file   Social History Narrative    Not on file      FAMILY HISTORY: Family history from the initial psychiatric evaluation has been reviewed (reviewed/updated 7/5/2019) with no additional updates (I asked patient and no additional family history provided). No family history on file. REVIEW OF SYSTEMS: (reviewed/updated 7/5/2019)  Appetite:poor   Sleep: poor with DIMS (difficulty initiating & maintaining sleep)   All other Review of Systems: Negative except confusion per HPI         [address] (MSE):    MSE FINDINGS ARE WITHIN NORMAL LIMITS (WNL) UNLESS OTHERWISE STATED BELOW. ( ALL OF THE BELOW CATEGORIES OF THE MSE HAVE BEEN REVIEWED (reviewed 7/5/2019) AND UPDATED AS DEEMED APPROPRIATE )  General Presentation age appropriate, cooperative   Orientation confused, disorganized, disoriented   Vital Signs  See below (reviewed 7/5/2019); Vital Signs (BP, Pulse, & Temp) are within normal limits if not listed below.    Gait and Station Stable/steady, no ataxia   Musculoskeletal System No extrapyramidal symptoms (EPS); no abnormal muscular movements or Tardive Dyskinesia (TD); muscle strength and tone are within normal limits   Language No aphasia or dysarthria   Speech: hypoverbal and normal volume   Thought Processes illogical; normal rate of thoughts; poor abstract reasoning/computation   Thought Associations loose associations   Thought Content visual hallucinations and preoccupations   Suicidal Ideations none   Homicidal Ideations none   Mood:  anxious    Affect:  constricted and mood-congruent   Memory recent  impaired   Memory remote:  fair   Concentration/Attention:  impaired   Fund of Knowledge average   Insight:  limited   Reliability fair   Judgment:  impaired due to active psychosis          VITALS:     Patient Vitals for the past 24 hrs:   Temp Pulse Resp BP SpO2   07/05/19 0754 97.7 °F (36.5 °C) 75 20 111/63 99 %   07/04/19 1950 97.8 °F (36.6 °C) 98 20 108/76 98 %   07/04/19 1622    102/80    07/04/19 1358    130/90      Wt Readings from Last 3 Encounters:   07/01/19 92.9 kg (204 lb 11.2 oz)   06/28/19 113.4 kg (250 lb)   06/18/19 113.4 kg (250 lb)     Temp Readings from Last 3 Encounters:   07/05/19 97.7 °F (36.5 °C)   07/01/19 98 °F (36.7 °C)   06/25/19 99.7 °F (37.6 °C)     BP Readings from Last 3 Encounters:   07/05/19 111/63   07/01/19 114/69   06/25/19 145/85     Pulse Readings from Last 3 Encounters:   07/05/19 75   07/01/19 87   06/25/19 (!) 107            DATA     LABORATORY DATA:(reviewed/updated 7/5/2019)  No results found for this or any previous visit (from the past 24 hour(s)). No results found for: VALF2, VALAC, VALP, VALPR, DS6, CRBAM, CRBAMP, CARB2, XCRBAM  No results found for: LITHM   RADIOLOGY REPORTS:(reviewed/updated 7/5/2019)  No results found.        MEDICATIONS     ALL MEDICATIONS:   Current Facility-Administered Medications   Medication Dose Route Frequency    metoprolol tartrate (LOPRESSOR) tablet 25 mg  25 mg Oral Q12H    risperiDONE (RisperDAL) tablet 2 mg  2 mg Oral Q12H    clonazePAM (KlonoPIN) tablet 1 mg  1 mg Oral BID and QHS    ziprasidone (GEODON) capsule 20 mg  20 mg Oral Q8H PRN    cloNIDine HCl (CATAPRES) tablet 0.1 mg  0.1 mg Oral Q2H PRN    levothyroxine (SYNTHROID) tablet 50 mcg  50 mcg Oral ACB    traZODone (DESYREL) tablet 300 mg  300 mg Oral QHS    zolpidem (AMBIEN) tablet 10 mg  10 mg Oral QHS PRN    ziprasidone (GEODON) 20 mg in sterile water (preservative free) 1 mL injection  20 mg IntraMUSCular BID PRN    benztropine (COGENTIN) tablet 2 mg  2 mg Oral BID PRN    benztropine (COGENTIN) injection 2 mg  2 mg IntraMUSCular BID PRN    LORazepam (ATIVAN) injection 2 mg  2 mg IntraMUSCular Q4H PRN    acetaminophen (TYLENOL) tablet 650 mg  650 mg Oral Q4H PRN    ibuprofen (MOTRIN) tablet 400 mg  400 mg Oral Q8H PRN    magnesium hydroxide (MILK OF MAGNESIA) 400 mg/5 mL oral suspension 30 mL  30 mL Oral DAILY PRN    nicotine (NICODERM CQ) 21 mg/24 hr patch 1 Patch  1 Patch TransDERmal DAILY PRN    hydrOXYzine HCl (ATARAX) tablet 50 mg  50 mg Oral Q6H PRN      SCHEDULED MEDICATIONS:   Current Facility-Administered Medications   Medication Dose Route Frequency    metoprolol tartrate (LOPRESSOR) tablet 25 mg  25 mg Oral Q12H    risperiDONE (RisperDAL) tablet 2 mg  2 mg Oral Q12H    clonazePAM (KlonoPIN) tablet 1 mg  1 mg Oral BID and QHS    levothyroxine (SYNTHROID) tablet 50 mcg  50 mcg Oral ACB    traZODone (DESYREL) tablet 300 mg  300 mg Oral QHS          ASSESSMENT & PLAN     DIAGNOSES REQUIRING ACTIVE TREATMENT AND MONITORING: (reviewed/updated 7/5/2019)  Patient Active Hospital Problem List:   Schizoaffective disorder ([facility] [address] (6/18/2019)    Assessment: patient with ongoing psychotic presentation, had previously been stabilized on Invega and Seroquel, now on Risperdal s/p rhabdomyolysis and SHYLA due to dehydration vs iatrogenic. Will continue psychiatric stabilization and monitor vitals.  Patient still tachycardic, may require additional IV fluids.   - CONTINUE Risperdal 2 mg Q12H for psychosis  - CONTINUE Klonopin 1 mg TID for agitation and anxiety  - f/u CT head w/o contrast  - f/u NMDA Ab  - IGM therapy as tolerated  - Expand database / obtain collateral  - Dispo planning    In summary, Candelaria Alpers, is a 29 y.o.  male who presents with a severe exacerbation of the principal diagnosis of Schizoaffective disorder ([facility] [address]    Patient's condition is not improving. Patient requires continued inpatient hospitalization for further stabilization, safety monitoring and medication management. I will continue to coordinate the provision of individual, milieu, occupational, group, and substance abuse therapies to address target symptoms/diagnoses as deemed appropriate for the individual patient. A coordinated, multidisplinary treatment team round was conducted with the patient (this team consists of the nurse, psychiatric unit pharmcist,  and writer). Complete current electronic health record for patient has been reviewed today including consultant notes, ancillary staff notes, nurses and psychiatric tech notes. Suicide risk assessment completed and patient deemed to be of low risk for suicide at this time. The following regarding medications was addressed during rounds with patient:   the risks and benefits of the proposed medication. The patient was given the opportunity to ask questions. Informed consent given to the use of the above medications. Will continue to adjust psychiatric and non-psychiatric medications (see above \"medication\" section and orders section for details) as deemed appropriate & based upon diagnoses and response to treatment. I will continue to order blood tests/labs and diagnostic tests as deemed appropriate and review results as they become available (see orders for details and above listed lab/test results). I will order psychiatric records from previous [facility] hospitals to further elucidate the nature of patient's psychopathology and review once available.     I will gather additional collateral information from friends, family and o/p treatment team to further elucidate the nature of patient's psychopathology and baselline level of psychiatric functioning. I certify that this patient's inpatient psychiatric hospital services furnished since the previous certification were, and continue to be, required for treatment that could reasonably be expected to improve the patient's condition, or for diagnostic study, and that the patient continues to need, on a daily basis, active treatment furnished directly by or requiring the supervision of inpatient psychiatric facility personnel. In addition, the hospital records show that services furnished were intensive treatment services, admission or related services, or equivalent services.     EXPECTED DISCHARGE DATE/DAY: TBD     DISPOSITION: Home       Signed By:   Emmanuel Francisco MD  7/5/2019

## 2019-07-05 NOTE — BH NOTES
Patient is up in the hallway ,cooperative with vital signs ,Patient has a reduction in anxiety. Medication compliant. Patient slept well 7 hours  PRN tylenol was given @ 0540  Hourly rounding done.

## 2019-07-05 NOTE — BH NOTES
GROUP THERAPY PROGRESS NOTE    The patient Elen Reyna a 29 y.o. male is participating in [address] [place] [address]. Group time: 45 minutes    Personal goal for participation:  To learn about mental illness    Goal orientation:  personal    Group therapy participation: minimal    Therapeutic interventions reviewed and discussed: educational DVD    Impression of participation:  The patient was present-left session early    Mirta Mccabe  7/5/2019  5:23 PM

## 2019-07-06 ENCOUNTER — HOSPITAL ENCOUNTER (OUTPATIENT)
Dept: CT IMAGING | Age: 34
Discharge: HOME | End: 2019-07-06
Attending: PSYCHIATRY & NEUROLOGY

## 2019-07-06 LAB — TSH SERPL DL<=0.05 MIU/L-ACNC: 1.27 UIU/ML (ref 0.36–3.74)

## 2019-07-06 PROCEDURE — 74011250637 HC RX REV CODE- 250/637: Performed by: PSYCHIATRY & NEUROLOGY

## 2019-07-06 PROCEDURE — 74011250637 HC RX REV CODE- 250/637: Performed by: FAMILY MEDICINE

## 2019-07-06 PROCEDURE — 84443 ASSAY THYROID STIM HORMONE: CPT

## 2019-07-06 PROCEDURE — 36415 COLL VENOUS BLD VENIPUNCTURE: CPT

## 2019-07-06 PROCEDURE — 65220000003 HC RM SEMIPRIVATE PSYCH

## 2019-07-06 PROCEDURE — 70450 CT HEAD/BRAIN W/O DYE: CPT

## 2019-07-06 RX ADMIN — CLONAZEPAM 1 MG: 1 TABLET ORAL at 22:27

## 2019-07-06 RX ADMIN — RISPERIDONE 2 MG: 1 TABLET ORAL at 09:10

## 2019-07-06 RX ADMIN — METOPROLOL TARTRATE 25 MG: 25 TABLET ORAL at 22:27

## 2019-07-06 RX ADMIN — METOPROLOL TARTRATE 25 MG: 25 TABLET ORAL at 09:10

## 2019-07-06 RX ADMIN — TRAZODONE HYDROCHLORIDE 300 MG: 100 TABLET ORAL at 22:27

## 2019-07-06 RX ADMIN — HYDROXYZINE HYDROCHLORIDE 50 MG: 25 TABLET, FILM COATED ORAL at 06:12

## 2019-07-06 RX ADMIN — LEVOTHYROXINE SODIUM 50 MCG: 50 TABLET ORAL at 06:10

## 2019-07-06 RX ADMIN — CLONAZEPAM 1 MG: 1 TABLET ORAL at 17:16

## 2019-07-06 RX ADMIN — HYDROXYZINE HYDROCHLORIDE 50 MG: 25 TABLET, FILM COATED ORAL at 13:44

## 2019-07-06 RX ADMIN — RISPERIDONE 2 MG: 1 TABLET ORAL at 22:27

## 2019-07-06 RX ADMIN — CLONAZEPAM 1 MG: 1 TABLET ORAL at 09:10

## 2019-07-06 NOTE — BH NOTES
Patient standing in his room comes out of room to the doorway and looks for staff to ask for something or to say he needs help.   Hourly rounding done  Slept 7 hours  0612 PRN po Atarax 50 mg given for anxiety

## 2019-07-06 NOTE — BH NOTES
PSYCHIATRIC PROGRESS NOTE         Patient Name  Elen Reyna   Date of Birth 1985   [facility] [id number]   Medical Record Number  [id number]      Age  29 y.o. PCP None   Admit date:  7/1/2019    Room Number  327/01  @ [facility]   Date of Service  7/6/2019         E & M PROGRESS NOTE:         HISTORY       CC:  \"psychosis\"  HISTORY OF PRESENT ILLNESS/INTERVAL HISTORY:  (reviewed/updated 7/6/2019). per initial evaluation: The patient, Elen Reyna, is a 29 y.o. WHITE [place]  male with a past psychiatric history significant for schizoaffective disorder vs schizophrenia, who presents at this time with complaints of (and/or evidence of) the following emotional symptoms: agitation, delusions and psychotic behavior. Additional symptomatology include AH and VH. The above symptoms have been present for 2+ weeks. These symptoms are of moderate to high severity. These symptoms are constant in nature. The patient's condition has been precipitated by psychosocial stressors. Patient's condition made worse by treatment noncompliance. UDS: negative; BAL=0.      The patient was readmitted from medical unit following rhabdomyolysis and SHYLA which resolved with IV hydration. He is grossly psychotic and has been noted to be confused. Patient is markedly disorganized and confused on interview.  He cannot state the days of the week backward despite effort.     From patient's medical admission:     The patient is a 81-year-old  man, who is currently admitted to the medical floor at [address] [place] [address] Drive was initially admitted on 06/17, to Dr. Onel Gonzalez service on the psychiatry floor. Holly Bodily a couple of days, his CPK was found to be elevated to 1729 and he was transferred to the medical floor for treatment of rhabdomyolysis.  At that time, he did not have any rigidity, hyperthermia, or leukocytosis, which are indicative of neuroleptic malignant syndrome.  His CKs dropped immediately with hydration. Karina Pollack has improved markedly and currently reports feeling better in a physical sense.  Also during his stay, his liver enzymes increased although, they were normal at baseline. Karina Pollack does have a history of being positive for hepatitis C and has been treated with Isoniazid for latent tuberculosis in the past.  Currently, he remains on one-to-one observation and was agitated and aggressive at times yesterday. Karina Pollack had to be given p.r.n.'s. Priscila Lima had started him on a low dose of risperidone yesterday given the low likelihood of him having had NMS.  His CPK most recently was 1. Karina Pollack continues to exhibit disorganized behavior and delusional thinking.  When I asked him, he stated that he wanted to leave and go stay with his mother but when Dr. Jeane Amado, the hospitalist, asked him he stated that he wanted to go to the psychiatric floor because he does not feel safe going home.  At the present time, if the patient requests to be discharged home, my opinion is that Crisis should be called for TDO evaluation. Karina Pollack denies any auditory hallucinations today, but was yesterday noted to be talking to himself. Karina Pollack is a poor historian and unable to provide much history. 7/3- patient has remained largely illogical, at times appears to be hallucinating in his room, but is able to coherently describe his experience. Patient given PRN Geodon and Zyprexa with limited effect. Per mother Jaylin Guy patient was very sedated yesterday following Zyprexa administration, and does better with Geodon historically as a PRN. Patient continues to appear ill, with tachycardia unresolved despite fluid rehydration. 7/4- patient more visible, has been more coherent than 24 hours prior. Vitals still concerning, tachycardic and with autonomic instability evident. Medicine following. Patient endorses disorganized thoughts and anxiety.   7/5- patient largely unchanged, repeatedly asking for help from staff due to \"something in my head\" but coherent, slept 7 hours. Patient continues to endorse anxiety and restlessness. Patient still noted to be in moderate distress. Patient medication compliant. Repeatedly states he is dying. 7/6-Presents anxious and remains needy. Staff familiar to him reports slow improvement is noticed. Remains pre-occupied but compliant with meds. Prn used-Atarax.          SIDE EFFECTS: (reviewed/updated 7/6/2019)  None reported or admitted to. ALLERGIES:(reviewed/updated 7/6/2019)  No Known Allergies   MEDICATIONS PRIOR TO ADMISSION:(reviewed/updated 7/6/2019)  Medications Prior to Admission   Medication Sig    levothyroxine (SYNTHROID) 50 mcg tablet Take 50 mcg by mouth Daily (before breakfast).  risperiDONE (RISPERDAL) 0.5 mg tablet Take 1.5 mg by mouth nightly. Risperidone 0.5 mg tabs: Take 3 tabs po every evening    pyridoxine, vitamin B6, (VITAMIN B-6) 50 mg tablet Take 50 mg by mouth daily.  isoniazid (NYDRAZID) 300 mg tablet Take 300 mg by mouth daily.  traZODone (DESYREL) 150 mg tablet Take 300 mg by mouth nightly. Indications: Insomnia      PAST MEDICAL HISTORY: Past medical history from the initial psychiatric evaluation has been reviewed (reviewed/updated 7/6/2019) with no additional updates (I asked patient and no additional past medical history provided). No past medical history on file. No past surgical history on file. SOCIAL HISTORY: Social history from the initial psychiatric evaluation has been reviewed (reviewed/updated 7/6/2019) with no additional updates (I asked patient and no additional social history provided).    Social History     Socioeconomic History    Marital status: SINGLE     Spouse name: Not on file    Number of children: Not on file    Years of education: Not on file    Highest education level: Not on file   Occupational History    Not on file   Social Needs    Financial resource strain: Not on file    Food insecurity:     Worry: Not on file     Inability: Not on file   [employer] needs:     Medical: Not on file     Non-medical: Not on file   Tobacco Use    Smoking status: Not on file   Substance and Sexual Activity    Alcohol use: Not on file    Drug use: Not on file    Sexual activity: Not on file   Lifestyle    Physical activity:     Days per week: Not on file     Minutes per session: Not on file    Stress: Not on file   Relationships    Social connections:     Talks on phone: Not on file     Gets together: Not on file     Attends [employer] service: Not on file     Active member of club or organization: Not on file     Attends meetings of clubs or organizations: Not on file     Relationship status: Not on file    Intimate partner violence:     Fear of current or ex partner: Not on file     Emotionally abused: Not on file     Physically abused: Not on file     Forced sexual activity: Not on file   Other Topics Concern    Not on file   Social History Narrative    Not on file      FAMILY HISTORY: Family history from the initial psychiatric evaluation has been reviewed (reviewed/updated 7/6/2019) with no additional updates (I asked patient and no additional family history provided). No family history on file. REVIEW OF SYSTEMS: (reviewed/updated 7/6/2019)  Appetite:poor   Sleep: poor with DIMS (difficulty initiating & maintaining sleep)   All other Review of Systems: Negative except confusion per HPI         [facility] (MSE):    MSE FINDINGS ARE WITHIN NORMAL LIMITS (WNL) UNLESS OTHERWISE STATED BELOW. ( ALL OF THE BELOW CATEGORIES OF THE MSE HAVE BEEN REVIEWED (reviewed 7/6/2019) AND UPDATED AS DEEMED APPROPRIATE )  General Presentation age appropriate, cooperative   Orientation confused, disorganized, disoriented   Vital Signs  See below (reviewed 7/6/2019); Vital Signs (BP, Pulse, & Temp) are within normal limits if not listed below.    Gait and Station Stable/steady, no ataxia   Musculoskeletal System No extrapyramidal symptoms (EPS); no abnormal muscular movements or Tardive Dyskinesia (TD); muscle strength and tone are within normal limits   Language No aphasia or dysarthria   Speech:  hypoverbal and normal volume   Thought Processes illogical; normal rate of thoughts; poor abstract reasoning/computation   Thought Associations loose associations   Thought Content visual hallucinations and preoccupations   Suicidal Ideations none   Homicidal Ideations none   Mood:  anxious    Affect:  constricted and mood-congruent   Memory recent  impaired   Memory remote:  fair   Concentration/Attention:  impaired   Fund of Knowledge average   Insight:  limited   Reliability fair   Judgment:  impaired due to active psychosis          VITALS:     Patient Vitals for the past 24 hrs:   Temp Pulse Resp BP SpO2   07/06/19 0810 98.4 °F (36.9 °C) 83  109/76    07/05/19 1930 98.6 °F (37 °C) 93 18 123/84 100 %     Wt Readings from Last 3 Encounters:   07/01/19 92.9 kg (204 lb 11.2 oz)   06/28/19 113.4 kg (250 lb)   06/18/19 113.4 kg (250 lb)     Temp Readings from Last 3 Encounters:   07/06/19 98.4 °F (36.9 °C)   07/01/19 98 °F (36.7 °C)   06/25/19 99.7 °F (37.6 °C)     BP Readings from Last 3 Encounters:   07/06/19 109/76   07/01/19 114/69   06/25/19 145/85     Pulse Readings from Last 3 Encounters:   07/06/19 83   07/01/19 87   06/25/19 (!) 107            DATA     LABORATORY DATA:(reviewed/updated 7/6/2019)  Recent Results (from the past 24 hour(s))   TSH 3RD GENERATION    Collection Time: 07/06/19  5:24 AM   Result Value Ref Range    TSH 1.27 0.36 - 3.74 uIU/mL     No results found for: VALF2, VALAC, VALP, VALPR, DS6, CRBAM, CRBAMP, CARB2, XCRBAM  No results found for: LITHM   RADIOLOGY REPORTS:(reviewed/updated 7/6/2019)  No results found.        MEDICATIONS     ALL MEDICATIONS:   Current Facility-Administered Medications   Medication Dose Route Frequency    metoprolol tartrate (LOPRESSOR) tablet 25 mg  25 mg Oral Q12H    risperiDONE (RisperDAL) tablet 2 mg  2 mg Oral Q12H    clonazePAM (KlonoPIN) tablet 1 mg  1 mg Oral BID and QHS    ziprasidone (GEODON) capsule 20 mg  20 mg Oral Q8H PRN    cloNIDine HCl (CATAPRES) tablet 0.1 mg  0.1 mg Oral Q2H PRN    levothyroxine (SYNTHROID) tablet 50 mcg  50 mcg Oral ACB    traZODone (DESYREL) tablet 300 mg  300 mg Oral QHS    zolpidem (AMBIEN) tablet 10 mg  10 mg Oral QHS PRN    ziprasidone (GEODON) 20 mg in sterile water (preservative free) 1 mL injection  20 mg IntraMUSCular BID PRN    benztropine (COGENTIN) tablet 2 mg  2 mg Oral BID PRN    benztropine (COGENTIN) injection 2 mg  2 mg IntraMUSCular BID PRN    LORazepam (ATIVAN) injection 2 mg  2 mg IntraMUSCular Q4H PRN    acetaminophen (TYLENOL) tablet 650 mg  650 mg Oral Q4H PRN    ibuprofen (MOTRIN) tablet 400 mg  400 mg Oral Q8H PRN    magnesium hydroxide (MILK OF MAGNESIA) 400 mg/5 mL oral suspension 30 mL  30 mL Oral DAILY PRN    nicotine (NICODERM CQ) 21 mg/24 hr patch 1 Patch  1 Patch TransDERmal DAILY PRN    hydrOXYzine HCl (ATARAX) tablet 50 mg  50 mg Oral Q6H PRN      SCHEDULED MEDICATIONS:   Current Facility-Administered Medications   Medication Dose Route Frequency    metoprolol tartrate (LOPRESSOR) tablet 25 mg  25 mg Oral Q12H    risperiDONE (RisperDAL) tablet 2 mg  2 mg Oral Q12H    clonazePAM (KlonoPIN) tablet 1 mg  1 mg Oral BID and QHS    levothyroxine (SYNTHROID) tablet 50 mcg  50 mcg Oral ACB    traZODone (DESYREL) tablet 300 mg  300 mg Oral QHS          ASSESSMENT & PLAN     DIAGNOSES REQUIRING ACTIVE TREATMENT AND MONITORING: (reviewed/updated 7/6/2019)  Patient Active Hospital Problem List:   Schizoaffective disorder ([facility] [address] (6/18/2019)    Assessment: patient with ongoing psychotic presentation, had previously been stabilized on Invega and Seroquel, now on Risperdal s/p rhabdomyolysis and SHYLA due to dehydration vs iatrogenic. Will continue psychiatric stabilization and monitor vitals.  Patient still tachycardic, may require additional IV fluids.   - CONTINUE Risperdal 2 mg Q12H for psychosis  - CONTINUE Klonopin 1 mg TID for agitation and anxiety  - f/u CT head w/o contrast  - f/u NMDA Ab  - IGM therapy as tolerated  - Expand database / obtain collateral  - Dispo planning    In summary, Shannan Barron, is a 29 y.o.  male who presents with a severe exacerbation of the principal diagnosis of Schizoaffective disorder ([facility] [address]    Patient's condition is not improving. Patient requires continued inpatient hospitalization for further stabilization, safety monitoring and medication management. I will continue to coordinate the provision of individual, milieu, occupational, group, and substance abuse therapies to address target symptoms/diagnoses as deemed appropriate for the individual patient. A coordinated, multidisplinary treatment team round was conducted with the patient (this team consists of the nurse, psychiatric unit pharmcist,  and writer). Complete current electronic health record for patient has been reviewed today including consultant notes, ancillary staff notes, nurses and psychiatric tech notes. Suicide risk assessment completed and patient deemed to be of low risk for suicide at this time. The following regarding medications was addressed during rounds with patient:   the risks and benefits of the proposed medication. The patient was given the opportunity to ask questions. Informed consent given to the use of the above medications. Will continue to adjust psychiatric and non-psychiatric medications (see above \"medication\" section and orders section for details) as deemed appropriate & based upon diagnoses and response to treatment. I will continue to order blood tests/labs and diagnostic tests as deemed appropriate and review results as they become available (see orders for details and above listed lab/test results).     I will order psychiatric records from previous [facility] hospitals to further elucidate the nature of patient's psychopathology and review once available. I will gather additional collateral information from friends, family and o/p treatment team to further elucidate the nature of patient's psychopathology and baselline level of psychiatric functioning. I certify that this patient's inpatient psychiatric hospital services furnished since the previous certification were, and continue to be, required for treatment that could reasonably be expected to improve the patient's condition, or for diagnostic study, and that the patient continues to need, on a daily basis, active treatment furnished directly by or requiring the supervision of inpatient psychiatric facility personnel. In addition, the hospital records show that services furnished were intensive treatment services, admission or related services, or equivalent services.     EXPECTED DISCHARGE DATE/DAY: TBD     DISPOSITION: Home       Signed By:   Sarath Farooq MD  7/6/2019

## 2019-07-06 NOTE — BH NOTES
0900 Patient up on floor ambulating without difficulty, denies hallucinations, denies SI/HI    1100 Patient in hallway speaking to writer, inquiring about lunch, easily redirected.      1300 Patient visiting with mother     46 Patient sitting upright in bed, gets up to get a snack    1657  Patient eating dinner    1800 Patient noted ambulating in room, 0 distress noted , 0 concerns voiced     1821 Patient c/o burning pain in foot, while ambulating, denies numbness tingling, unable to rate pain on scale 0-10,pedal pulses present, skin color WNL,skin warm to touch,  will continue to monitor and pass along in report

## 2019-07-06 NOTE — BH NOTES
1900 Report red'c from [facility] Walking about the unit aimlessly. NAD.  2230 Awakened for medication  2300 Medication compliant. To bed at this time. Has appeared to sleep ~7 hours tonight.

## 2019-07-07 PROCEDURE — 74011250637 HC RX REV CODE- 250/637: Performed by: PSYCHIATRY & NEUROLOGY

## 2019-07-07 PROCEDURE — 65220000003 HC RM SEMIPRIVATE PSYCH

## 2019-07-07 PROCEDURE — 74011250637 HC RX REV CODE- 250/637: Performed by: FAMILY MEDICINE

## 2019-07-07 RX ADMIN — CLONAZEPAM 1 MG: 1 TABLET ORAL at 20:51

## 2019-07-07 RX ADMIN — TRAZODONE HYDROCHLORIDE 300 MG: 100 TABLET ORAL at 20:52

## 2019-07-07 RX ADMIN — IBUPROFEN 400 MG: 400 TABLET ORAL at 12:49

## 2019-07-07 RX ADMIN — METOPROLOL TARTRATE 25 MG: 25 TABLET ORAL at 20:52

## 2019-07-07 RX ADMIN — RISPERIDONE 2 MG: 1 TABLET ORAL at 07:32

## 2019-07-07 RX ADMIN — CLONAZEPAM 1 MG: 1 TABLET ORAL at 07:33

## 2019-07-07 RX ADMIN — RISPERIDONE 2 MG: 1 TABLET ORAL at 20:52

## 2019-07-07 RX ADMIN — HYDROXYZINE HYDROCHLORIDE 50 MG: 25 TABLET, FILM COATED ORAL at 12:48

## 2019-07-07 RX ADMIN — LEVOTHYROXINE SODIUM 50 MCG: 50 TABLET ORAL at 06:15

## 2019-07-07 RX ADMIN — CLONAZEPAM 1 MG: 1 TABLET ORAL at 17:39

## 2019-07-07 RX ADMIN — ACETAMINOPHEN 650 MG: 325 TABLET, FILM COATED ORAL at 15:35

## 2019-07-07 NOTE — PROGRESS NOTES
Assumed care of pt at 0700. Pt cooperative but confused at times. Focused of medications and vital signs. Somatic at times. Needs frequent redirection and reassurance from staff that he is ok. Will continue to monitor. 1230 Pt continues to need frequent reassurance and redirection. Complaint of anxiety and back pain. PRN Tylenol and Atarax given with moderate effect. 1500 Pt resting in room, no S/S distress noted  1530 Continued complaint of back pain. Pt states \"I think i have been shot, I am a little confused\". Pt reoriented and PRN Tylenol given  1730 Pt continues to be confused with somatic complaints. Voices concern that \"this is the [employer] unit\". Pt reassured and redirected. 1930 Pt continues to be slightly confused but does well with redirection and reassurance  2100 Med complaint.    Hourly rounds and Q15 minute checks maitained and to continue

## 2019-07-07 NOTE — BH NOTES
PSYCHIATRIC PROGRESS NOTE         Patient Name  Candelaria Alpers   Date of Birth 1985   [facility] [id number]   Medical Record Number  [id number]      Age  29 y.o. PCP None   Admit date:  7/1/2019    Room Number  327/01  @ [facility]   Date of Service  7/7/2019         E & M PROGRESS NOTE:         HISTORY       CC:  \"psychosis\"  HISTORY OF PRESENT ILLNESS/INTERVAL HISTORY:  (reviewed/updated 7/7/2019). per initial evaluation: The patient, Candelaria Alpers, is a 29 y.o. WHITE [place]  male with a past psychiatric history significant for schizoaffective disorder vs schizophrenia, who presents at this time with complaints of (and/or evidence of) the following emotional symptoms: agitation, delusions and psychotic behavior. Additional symptomatology include AH and VH. The above symptoms have been present for 2+ weeks. These symptoms are of moderate to high severity. These symptoms are constant in nature. The patient's condition has been precipitated by psychosocial stressors. Patient's condition made worse by treatment noncompliance. UDS: negative; BAL=0.      The patient was readmitted from medical unit following rhabdomyolysis and SHYLA which resolved with IV hydration. He is grossly psychotic and has been noted to be confused. Patient is markedly disorganized and confused on interview.  He cannot state the days of the week backward despite effort.     From patient's medical admission:     The patient is a 28-year-old  man, who is currently admitted to the medical floor at [address] [place] [address] Drive was initially admitted on 06/17, to Dr. Chandan Thompson service on the psychiatry floor. Vernadine Shed a couple of days, his CPK was found to be elevated to 1729 and he was transferred to the medical floor for treatment of rhabdomyolysis.  At that time, he did not have any rigidity, hyperthermia, or leukocytosis, which are indicative of neuroleptic malignant syndrome.  His CKs dropped immediately with hydration. Marty German has improved markedly and currently reports feeling better in a physical sense.  Also during his stay, his liver enzymes increased although, they were normal at baseline. Marty German does have a history of being positive for hepatitis C and has been treated with Isoniazid for latent tuberculosis in the past.  Currently, he remains on one-to-one observation and was agitated and aggressive at times yesterday. Marty German had to be given p.r.n.'s. Kali Wise had started him on a low dose of risperidone yesterday given the low likelihood of him having had NMS.  His CPK most recently was 1. Marty German continues to exhibit disorganized behavior and delusional thinking.  When I asked him, he stated that he wanted to leave and go stay with his mother but when Dr. Katie Pablo, the hospitalist, asked him he stated that he wanted to go to the psychiatric floor because he does not feel safe going home.  At the present time, if the patient requests to be discharged home, my opinion is that Crisis should be called for TDO evaluation. Marty German denies any auditory hallucinations today, but was yesterday noted to be talking to himself. Marty German is a poor historian and unable to provide much history. 7/3- patient has remained largely illogical, at times appears to be hallucinating in his room, but is able to coherently describe his experience. Patient given PRN Geodon and Zyprexa with limited effect. Per mother Jorge Valladares patient was very sedated yesterday following Zyprexa administration, and does better with Geodon historically as a PRN. Patient continues to appear ill, with tachycardia unresolved despite fluid rehydration. 7/4- patient more visible, has been more coherent than 24 hours prior. Vitals still concerning, tachycardic and with autonomic instability evident. Medicine following. Patient endorses disorganized thoughts and anxiety.   7/5- patient largely unchanged, repeatedly asking for help from staff due to \"something in my head\" but coherent, slept 7 hours. Patient continues to endorse anxiety and restlessness. Patient still noted to be in moderate distress. Patient medication compliant. Repeatedly states he is dying. 7/6-Presents anxious and remains needy. Staff familiar to him reports slow improvement is noticed. Remains pre-occupied but compliant with meds. Prn used-Atarax. 7/7-Pt was moved to the General side yesterday. Continues to maintain improvement, less anxious. Slept 7 hrs. No prn's were used. SIDE EFFECTS: (reviewed/updated 7/7/2019)  None reported or admitted to. ALLERGIES:(reviewed/updated 7/7/2019)  No Known Allergies   MEDICATIONS PRIOR TO ADMISSION:(reviewed/updated 7/7/2019)  Medications Prior to Admission   Medication Sig    levothyroxine (SYNTHROID) 50 mcg tablet Take 50 mcg by mouth Daily (before breakfast).  risperiDONE (RISPERDAL) 0.5 mg tablet Take 1.5 mg by mouth nightly. Risperidone 0.5 mg tabs: Take 3 tabs po every evening    pyridoxine, vitamin B6, (VITAMIN B-6) 50 mg tablet Take 50 mg by mouth daily.  isoniazid (NYDRAZID) 300 mg tablet Take 300 mg by mouth daily.  traZODone (DESYREL) 150 mg tablet Take 300 mg by mouth nightly. Indications: Insomnia      PAST MEDICAL HISTORY: Past medical history from the initial psychiatric evaluation has been reviewed (reviewed/updated 7/7/2019) with no additional updates (I asked patient and no additional past medical history provided). No past medical history on file. No past surgical history on file. SOCIAL HISTORY: Social history from the initial psychiatric evaluation has been reviewed (reviewed/updated 7/7/2019) with no additional updates (I asked patient and no additional social history provided).    Social History     Socioeconomic History    Marital status: SINGLE     Spouse name: Not on file    Number of children: Not on file    Years of education: Not on file    Highest education level: Not on file   Occupational History    Not on file   Social Needs    Financial resource strain: Not on file    Food insecurity:     Worry: Not on file     Inability: Not on file    Transportation needs:     Medical: Not on file     Non-medical: Not on file   Tobacco Use    Smoking status: Not on file   Substance and Sexual Activity    Alcohol use: Not on file    Drug use: Not on file    Sexual activity: Not on file   Lifestyle    Physical activity:     Days per week: Not on file     Minutes per session: Not on file    Stress: Not on file   Relationships    Social connections:     Talks on phone: Not on file     Gets together: Not on file     Attends Taoism service: Not on file     Active member of club or organization: Not on file     Attends meetings of clubs or organizations: Not on file     Relationship status: Not on file    Intimate partner violence:     Fear of current or ex partner: Not on file     Emotionally abused: Not on file     Physically abused: Not on file     Forced sexual activity: Not on file   Other Topics Concern    Not on file   Social History Narrative    Not on file      FAMILY HISTORY: Family history from the initial psychiatric evaluation has been reviewed (reviewed/updated 7/7/2019) with no additional updates (I asked patient and no additional family history provided). No family history on file. REVIEW OF SYSTEMS: (reviewed/updated 7/7/2019)  Appetite:poor   Sleep: poor with DIMS (difficulty initiating & maintaining sleep)   All other Review of Systems: Negative except confusion per HPI         [facility] (MSE):    MSE FINDINGS ARE WITHIN NORMAL LIMITS (WNL) UNLESS OTHERWISE STATED BELOW. ( ALL OF THE BELOW CATEGORIES OF THE MSE HAVE BEEN REVIEWED (reviewed 7/7/2019) AND UPDATED AS DEEMED APPROPRIATE )  General Presentation age appropriate, cooperative   Orientation confused, disorganized, disoriented   Vital Signs  See below (reviewed 7/7/2019);  Vital Signs (BP, Pulse, & Temp) are within normal limits if not listed below. Gait and Station Stable/steady, no ataxia   Musculoskeletal System No extrapyramidal symptoms (EPS); no abnormal muscular movements or Tardive Dyskinesia (TD); muscle strength and tone are within normal limits   Language No aphasia or dysarthria   Speech:  hypoverbal and normal volume   Thought Processes illogical; normal rate of thoughts; poor abstract reasoning/computation   Thought Associations loose associations   Thought Content visual hallucinations and preoccupations   Suicidal Ideations none   Homicidal Ideations none   Mood:  anxious    Affect:  constricted and mood-congruent   Memory recent  impaired   Memory remote:  fair   Concentration/Attention:  impaired   Fund of Knowledge average   Insight:  limited   Reliability fair   Judgment:  impaired due to active psychosis          VITALS:     Patient Vitals for the past 24 hrs:   Temp Pulse Resp BP SpO2   07/07/19 0800 98 °F (36.7 °C) 72 18 112/47 98 %   07/07/19 0734  72  112/47    07/06/19 2029 97.4 °F (36.3 °C) 90 18 111/73 99 %     Wt Readings from Last 3 Encounters:   07/01/19 92.9 kg (204 lb 11.2 oz)   06/28/19 113.4 kg (250 lb)   06/18/19 113.4 kg (250 lb)     Temp Readings from Last 3 Encounters:   07/07/19 98 °F (36.7 °C)   07/01/19 98 °F (36.7 °C)   06/25/19 99.7 °F (37.6 °C)     BP Readings from Last 3 Encounters:   07/07/19 112/47   07/01/19 114/69   06/25/19 145/85     Pulse Readings from Last 3 Encounters:   07/07/19 72   07/01/19 87   06/25/19 (!) 107            DATA     LABORATORY DATA:(reviewed/updated 7/7/2019)  No results found for this or any previous visit (from the past 24 hour(s)). No results found for: VALF2, VALAC, VALP, VALPR, DS6, CRBAM, CRBAMP, CARB2, XCRBAM  No results found for: LITHM   RADIOLOGY REPORTS:(reviewed/updated 7/7/2019)  No results found.        MEDICATIONS     ALL MEDICATIONS:   Current Facility-Administered Medications   Medication Dose Route Frequency    metoprolol tartrate (LOPRESSOR) tablet 25 mg  25 mg Oral Q12H    risperiDONE (RisperDAL) tablet 2 mg  2 mg Oral Q12H    clonazePAM (KlonoPIN) tablet 1 mg  1 mg Oral BID and QHS    ziprasidone (GEODON) capsule 20 mg  20 mg Oral Q8H PRN    cloNIDine HCl (CATAPRES) tablet 0.1 mg  0.1 mg Oral Q2H PRN    levothyroxine (SYNTHROID) tablet 50 mcg  50 mcg Oral ACB    traZODone (DESYREL) tablet 300 mg  300 mg Oral QHS    zolpidem (AMBIEN) tablet 10 mg  10 mg Oral QHS PRN    ziprasidone (GEODON) 20 mg in sterile water (preservative free) 1 mL injection  20 mg IntraMUSCular BID PRN    benztropine (COGENTIN) tablet 2 mg  2 mg Oral BID PRN    benztropine (COGENTIN) injection 2 mg  2 mg IntraMUSCular BID PRN    LORazepam (ATIVAN) injection 2 mg  2 mg IntraMUSCular Q4H PRN    acetaminophen (TYLENOL) tablet 650 mg  650 mg Oral Q4H PRN    ibuprofen (MOTRIN) tablet 400 mg  400 mg Oral Q8H PRN    magnesium hydroxide (MILK OF MAGNESIA) 400 mg/5 mL oral suspension 30 mL  30 mL Oral DAILY PRN    nicotine (NICODERM CQ) 21 mg/24 hr patch 1 Patch  1 Patch TransDERmal DAILY PRN    hydrOXYzine HCl (ATARAX) tablet 50 mg  50 mg Oral Q6H PRN      SCHEDULED MEDICATIONS:   Current Facility-Administered Medications   Medication Dose Route Frequency    metoprolol tartrate (LOPRESSOR) tablet 25 mg  25 mg Oral Q12H    risperiDONE (RisperDAL) tablet 2 mg  2 mg Oral Q12H    clonazePAM (KlonoPIN) tablet 1 mg  1 mg Oral BID and QHS    levothyroxine (SYNTHROID) tablet 50 mcg  50 mcg Oral ACB    traZODone (DESYREL) tablet 300 mg  300 mg Oral QHS          ASSESSMENT & PLAN     DIAGNOSES REQUIRING ACTIVE TREATMENT AND MONITORING: (reviewed/updated 7/7/2019)  Patient Active Hospital Problem List:   Schizoaffective disorder ([facility] [address] (6/18/2019)    Assessment: patient with ongoing psychotic presentation, had previously been stabilized on Invega and Seroquel, now on Risperdal s/p rhabdomyolysis and SHYLA due to dehydration vs iatrogenic.  Will continue psychiatric stabilization and monitor vitals. Patient still tachycardic, may require additional IV fluids.   - CONTINUE Risperdal 2 mg Q12H for psychosis  - CONTINUE Klonopin 1 mg TID for agitation and anxiety  - f/u CT head w/o contrast  - f/u NMDA Ab  - IGM therapy as tolerated  - Expand database / obtain collateral  - Dispo planning    In summary, Shannan Barron, is a 29 y.o.  male who presents with a severe exacerbation of the principal diagnosis of Schizoaffective disorder ([facility] [address]    Patient's condition is not improving. Patient requires continued inpatient hospitalization for further stabilization, safety monitoring and medication management. I will continue to coordinate the provision of individual, milieu, occupational, group, and substance abuse therapies to address target symptoms/diagnoses as deemed appropriate for the individual patient. A coordinated, multidisplinary treatment team round was conducted with the patient (this team consists of the nurse, psychiatric unit pharmcist,  and writer). Complete current electronic health record for patient has been reviewed today including consultant notes, ancillary staff notes, nurses and psychiatric tech notes. Suicide risk assessment completed and patient deemed to be of low risk for suicide at this time. The following regarding medications was addressed during rounds with patient:   the risks and benefits of the proposed medication. The patient was given the opportunity to ask questions. Informed consent given to the use of the above medications. Will continue to adjust psychiatric and non-psychiatric medications (see above \"medication\" section and orders section for details) as deemed appropriate & based upon diagnoses and response to treatment. I will continue to order blood tests/labs and diagnostic tests as deemed appropriate and review results as they become available (see orders for details and above listed lab/test results).     I will order psychiatric records from previous [facility] hospitals to further elucidate the nature of patient's psychopathology and review once available. I will gather additional collateral information from friends, family and o/p treatment team to further elucidate the nature of patient's psychopathology and baselline level of psychiatric functioning. I certify that this patient's inpatient psychiatric hospital services furnished since the previous certification were, and continue to be, required for treatment that could reasonably be expected to improve the patient's condition, or for diagnostic study, and that the patient continues to need, on a daily basis, active treatment furnished directly by or requiring the supervision of inpatient psychiatric facility personnel. In addition, the hospital records show that services furnished were intensive treatment services, admission or related services, or equivalent services.     EXPECTED DISCHARGE DATE/DAY: TBD     DISPOSITION: Home       Signed By:   Ana Goodwin MD  7/7/2019

## 2019-07-08 PROCEDURE — 65220000003 HC RM SEMIPRIVATE PSYCH

## 2019-07-08 PROCEDURE — 74011250637 HC RX REV CODE- 250/637: Performed by: PSYCHIATRY & NEUROLOGY

## 2019-07-08 PROCEDURE — 74011250637 HC RX REV CODE- 250/637: Performed by: FAMILY MEDICINE

## 2019-07-08 RX ORDER — RISPERIDONE 3 MG/1
3 TABLET, FILM COATED ORAL EVERY 12 HOURS
Status: DISCONTINUED | OUTPATIENT
Start: 2019-07-08 | End: 2019-07-18

## 2019-07-08 RX ADMIN — HYDROXYZINE HYDROCHLORIDE 50 MG: 25 TABLET, FILM COATED ORAL at 13:34

## 2019-07-08 RX ADMIN — LEVOTHYROXINE SODIUM 50 MCG: 50 TABLET ORAL at 06:07

## 2019-07-08 RX ADMIN — CLONAZEPAM 1 MG: 1 TABLET ORAL at 21:55

## 2019-07-08 RX ADMIN — RISPERIDONE 3 MG: 3 TABLET ORAL at 21:55

## 2019-07-08 RX ADMIN — CLONAZEPAM 1 MG: 1 TABLET ORAL at 17:31

## 2019-07-08 RX ADMIN — METOPROLOL TARTRATE 25 MG: 25 TABLET ORAL at 21:55

## 2019-07-08 RX ADMIN — METOPROLOL TARTRATE 25 MG: 25 TABLET ORAL at 08:28

## 2019-07-08 RX ADMIN — CLONAZEPAM 1 MG: 1 TABLET ORAL at 08:28

## 2019-07-08 RX ADMIN — HYDROXYZINE HYDROCHLORIDE 50 MG: 25 TABLET, FILM COATED ORAL at 07:14

## 2019-07-08 RX ADMIN — RISPERIDONE 2 MG: 1 TABLET ORAL at 08:28

## 2019-07-08 RX ADMIN — ZIPRASIDONE HYDROCHLORIDE 20 MG: 20 CAPSULE ORAL at 13:34

## 2019-07-08 RX ADMIN — TRAZODONE HYDROCHLORIDE 300 MG: 100 TABLET ORAL at 21:55

## 2019-07-08 NOTE — BH NOTES
0800 pt is visible on the unit pacing. Report given. 0900 pt ate breakfast. Remains disorganized. Requires redirection. Medication and meal complaint. Complaint of anxiety. Pt is medication focused. 1000 pt is visible on the unit. pacing. 1100 pt is visible on the unit. 1200 pt is visible on the unit. Ate lunch. 1300 pt continues to request medication. Remains disorganized. . Visible on the unit. 1400 pt continues to pace the unit. 1500 pt is visible on the unit    1600 pt remains medications focused. visible on the unit. 1700 pt ate dinner. Complaint of anxiety. Given scheduled medications. 1800 pt is resting in room.

## 2019-07-08 NOTE — PROGRESS NOTES
Denilson. Dionne Ly care of patient; received report from Vel Álvarez RN.     0600 Pt rested with eyes closed and even respirations for 8 hours, NAD, will continue to monitor q 15 minute safety checks.

## 2019-07-08 NOTE — BH NOTES
GROUP THERAPY PROGRESS NOTE    The patient Casper Wolfe a 29 y.o. male is participating in [facility]. Group time: 45 minutes    Personal goal for participation: To participate in chair exercise routine    Goal orientation:  personal    Group therapy participation: active    Therapeutic interventions reviewed and discussed: benefits of exercise    Impression of participation:  The patient was attentive.     Pio Dyer  7/8/2019  2:05 PM

## 2019-07-08 NOTE — BH NOTES
GROUP THERAPY PROGRESS NOTE    The patient Becca carias 29 y.o. male is participating in [employer]. Group time: 1 hour    Personal goal for participation: To concentrate on selected task    Goal orientation: social    Group therapy participation: active    Therapeutic interventions reviewed and discussed: Crafts, games, music    Impression of participation: The patient was attentive.     Ana Oleary  7/8/2019  5:42 PM

## 2019-07-09 LAB
ALBUMIN SERPL-MCNC: 3.2 G/DL (ref 3.5–5)
ALBUMIN/GLOB SERPL: 1.1 {RATIO} (ref 1.1–2.2)
ALP SERPL-CCNC: 73 U/L (ref 45–117)
ALT SERPL-CCNC: 74 U/L (ref 12–78)
ANION GAP SERPL CALC-SCNC: 8 MMOL/L (ref 5–15)
AST SERPL-CCNC: 31 U/L (ref 15–37)
ATRIAL RATE: 59 BPM
BILIRUB SERPL-MCNC: 0.2 MG/DL (ref 0.2–1)
BUN SERPL-MCNC: 16 MG/DL (ref 6–20)
BUN/CREAT SERPL: 15 (ref 12–20)
CALCIUM SERPL-MCNC: 8.7 MG/DL (ref 8.5–10.1)
CALCULATED P AXIS, ECG09: 61 DEGREES
CALCULATED R AXIS, ECG10: 29 DEGREES
CALCULATED T AXIS, ECG11: 41 DEGREES
CHLORIDE SERPL-SCNC: 108 MMOL/L (ref 97–108)
CO2 SERPL-SCNC: 28 MMOL/L (ref 21–32)
CREAT SERPL-MCNC: 1.05 MG/DL (ref 0.7–1.3)
DIAGNOSIS, 93000: NORMAL
GLOBULIN SER CALC-MCNC: 2.9 G/DL (ref 2–4)
GLUCOSE SERPL-MCNC: 87 MG/DL (ref 65–100)
P-R INTERVAL, ECG05: 150 MS
POTASSIUM SERPL-SCNC: 4.4 MMOL/L (ref 3.5–5.1)
PROT SERPL-MCNC: 6.1 G/DL (ref 6.4–8.2)
Q-T INTERVAL, ECG07: 412 MS
QRS DURATION, ECG06: 84 MS
QTC CALCULATION (BEZET), ECG08: 407 MS
SODIUM SERPL-SCNC: 144 MMOL/L (ref 136–145)
VENTRICULAR RATE, ECG03: 59 BPM

## 2019-07-09 PROCEDURE — 74011250637 HC RX REV CODE- 250/637: Performed by: PSYCHIATRY & NEUROLOGY

## 2019-07-09 PROCEDURE — 74011250637 HC RX REV CODE- 250/637: Performed by: FAMILY MEDICINE

## 2019-07-09 PROCEDURE — 93005 ELECTROCARDIOGRAM TRACING: CPT

## 2019-07-09 PROCEDURE — 36415 COLL VENOUS BLD VENIPUNCTURE: CPT

## 2019-07-09 PROCEDURE — 65220000003 HC RM SEMIPRIVATE PSYCH

## 2019-07-09 PROCEDURE — 80053 COMPREHEN METABOLIC PANEL: CPT

## 2019-07-09 RX ADMIN — TRAZODONE HYDROCHLORIDE 300 MG: 100 TABLET ORAL at 21:19

## 2019-07-09 RX ADMIN — METOPROLOL TARTRATE 25 MG: 25 TABLET ORAL at 21:21

## 2019-07-09 RX ADMIN — CLONAZEPAM 1 MG: 1 TABLET ORAL at 21:19

## 2019-07-09 RX ADMIN — RISPERIDONE 3 MG: 3 TABLET ORAL at 21:18

## 2019-07-09 RX ADMIN — CLONAZEPAM 1 MG: 1 TABLET ORAL at 17:02

## 2019-07-09 RX ADMIN — RISPERIDONE 3 MG: 3 TABLET ORAL at 08:50

## 2019-07-09 RX ADMIN — IBUPROFEN 400 MG: 400 TABLET ORAL at 12:20

## 2019-07-09 RX ADMIN — METOPROLOL TARTRATE 25 MG: 25 TABLET ORAL at 08:51

## 2019-07-09 RX ADMIN — CLONAZEPAM 1 MG: 1 TABLET ORAL at 08:51

## 2019-07-09 RX ADMIN — ACETAMINOPHEN 650 MG: 325 TABLET, FILM COATED ORAL at 08:54

## 2019-07-09 RX ADMIN — LEVOTHYROXINE SODIUM 50 MCG: 50 TABLET ORAL at 05:41

## 2019-07-09 RX ADMIN — HYDROXYZINE HYDROCHLORIDE 50 MG: 25 TABLET, FILM COATED ORAL at 12:20

## 2019-07-09 NOTE — PROGRESS NOTES
ekg completed after explnation, staff present. Pt to day room after ekg.  Nurse ramon felton. ekg on pts chart for md.

## 2019-07-09 NOTE — PROGRESS NOTES
Rec'd report from offgoing RN. Pt denies SI/HI/AVH. Pt accepted medications as scheduled. Rested ~8 hours during the night. NAD noted. Up this morning for labs. Blood specimen obtained and sent to lab. Q15min checks for safety continue per protocol.

## 2019-07-09 NOTE — BH NOTES
GROUP THERAPY PROGRESS NOTE    The patient Jed Marte a 29 y.o. male is participating in [employer]. Group time: 45 minutes    Personal goal for participation: To participate in mental health AFTER-MOUSE game    Goal orientation:  personal    Group therapy participation: active    Therapeutic interventions reviewed and discussed: choices in recovery    Impression of participation:  The patient was attentive.     Ari Nobles  7/9/2019  1:49 PM

## 2019-07-09 NOTE — BH NOTES
PSYCHIATRIC PROGRESS NOTE         Patient Name  Mariza Alvarez   Date of Birth 1985   [facility] [id number]   Medical Record Number  [id number]      Age  29 y.o. PCP None   Admit date:  7/1/2019    Room Number  327/01  @ [facility]   Date of Service  7/9/2019         E & M PROGRESS NOTE:         HISTORY       CC:  \"psychosis\"  HISTORY OF PRESENT ILLNESS/INTERVAL HISTORY:  (reviewed/updated 7/9/2019). per initial evaluation: The patient, Mariza Alvarez, is a 29 y.o. WHITE [place]  male with a past psychiatric history significant for schizoaffective disorder vs schizophrenia, who presents at this time with complaints of (and/or evidence of) the following emotional symptoms: agitation, delusions and psychotic behavior. Additional symptomatology include AH and VH. The above symptoms have been present for 2+ weeks. These symptoms are of moderate to high severity. These symptoms are constant in nature. The patient's condition has been precipitated by psychosocial stressors. Patient's condition made worse by treatment noncompliance. UDS: negative; BAL=0.      The patient was readmitted from medical unit following rhabdomyolysis and SHYLA which resolved with IV hydration. He is grossly psychotic and has been noted to be confused. Patient is markedly disorganized and confused on interview.  He cannot state the days of the week backward despite effort.     From patient's medical admission:     The patient is a 44-year-old  man, who is currently admitted to the medical floor at [address] [place] [address] Drive was initially admitted on 06/17, to Dr. Jackie Alejandro service on the psychiatry floor. Tony Mustafas a couple of days, his CPK was found to be elevated to 1729 and he was transferred to the medical floor for treatment of rhabdomyolysis.  At that time, he did not have any rigidity, hyperthermia, or leukocytosis, which are indicative of neuroleptic malignant syndrome.  His CKs dropped immediately with hydration. [facility] has improved markedly and currently reports feeling better in a physical sense.  Also during his stay, his liver enzymes increased although, they were normal at baseline. [facility] does have a history of being positive for hepatitis C and has been treated with Isoniazid for latent tuberculosis in the past.  Currently, he remains on one-to-one observation and was agitated and aggressive at times yesterday. [facility] had to be given p.r.n.'s. Crystal Catherine had started him on a low dose of risperidone yesterday given the low likelihood of him having had NMS.  His CPK most recently was 1. [facility] continues to exhibit disorganized behavior and delusional thinking.  When I asked him, he stated that he wanted to leave and go stay with his mother but when Dr. Merlin Ambrosia, the hospitalist, asked him he stated that he wanted to go to the psychiatric floor because he does not feel safe going home.  At the present time, if the patient requests to be discharged home, my opinion is that Crisis should be called for TDO evaluation. [facility] denies any auditory hallucinations today, but was yesterday noted to be talking to himself. [facility] is a poor historian and unable to provide much history. 7/3- patient has remained largely illogical, at times appears to be hallucinating in his room, but is able to coherently describe his experience. Patient given PRN Geodon and Zyprexa with limited effect. Per mother Megan Rodriguez patient was very sedated yesterday following Zyprexa administration, and does better with Geodon historically as a PRN. Patient continues to appear ill, with tachycardia unresolved despite fluid rehydration. 7/4- patient more visible, has been more coherent than 24 hours prior. Vitals still concerning, tachycardic and with autonomic instability evident. Medicine following. Patient endorses disorganized thoughts and anxiety.   7/5- patient largely unchanged, repeatedly asking for help from staff due to \"something in my head\" but coherent, slept 7 hours. Patient continues to endorse anxiety and restlessness. Patient still noted to be in moderate distress. Patient medication compliant. Repeatedly states he is dying. 7/6-Presents anxious and remains needy. Staff familiar to him reports slow improvement is noticed. Remains pre-occupied but compliant with meds. Prn used-Atarax. 7/7-Pt was moved to the General side yesterday. Continues to maintain improvement, less anxious. Slept 7 hrs. No prn's were used. 7/8- no acute overnight events. Patient has been visible, odd, standing with or at nursing station most of the day, continues to endorse problems \"in my head\" but has been pleasant, smiling inappropriately. Patient received CT scan of head, unremarkable 7/5. Medication compliant, continues to complain of anxiety. SIDE EFFECTS: (reviewed/updated 7/9/2019)  None reported or admitted to. ALLERGIES:(reviewed/updated 7/9/2019)  No Known Allergies   MEDICATIONS PRIOR TO ADMISSION:(reviewed/updated 7/9/2019)  Medications Prior to Admission   Medication Sig    levothyroxine (SYNTHROID) 50 mcg tablet Take 50 mcg by mouth Daily (before breakfast).  risperiDONE (RISPERDAL) 0.5 mg tablet Take 1.5 mg by mouth nightly. Risperidone 0.5 mg tabs: Take 3 tabs po every evening    pyridoxine, vitamin B6, (VITAMIN B-6) 50 mg tablet Take 50 mg by mouth daily.  isoniazid (NYDRAZID) 300 mg tablet Take 300 mg by mouth daily.  traZODone (DESYREL) 150 mg tablet Take 300 mg by mouth nightly. Indications: Insomnia      PAST MEDICAL HISTORY: Past medical history from the initial psychiatric evaluation has been reviewed (reviewed/updated 7/9/2019) with no additional updates (I asked patient and no additional past medical history provided). No past medical history on file. No past surgical history on file.    SOCIAL HISTORY: Social history from the initial psychiatric evaluation has been reviewed (reviewed/updated 7/9/2019) with no additional updates (I asked patient and no additional social history provided). Social History     Socioeconomic History    Marital status: SINGLE     Spouse name: Not on file    Number of children: Not on file    Years of education: Not on file    Highest education level: Not on file   Occupational History    Not on file   Social Needs    Financial resource strain: Not on file    Food insecurity:     Worry: Not on file     Inability: Not on file    Transportation needs:     Medical: Not on file     Non-medical: Not on file   Tobacco Use    Smoking status: Not on file   Substance and Sexual Activity    Alcohol use: Not on file    Drug use: Not on file    Sexual activity: Not on file   Lifestyle    Physical activity:     Days per week: Not on file     Minutes per session: Not on file    Stress: Not on file   Relationships    Social connections:     Talks on phone: Not on file     Gets together: Not on file     Attends Restoration service: Not on file     Active member of club or organization: Not on file     Attends meetings of clubs or organizations: Not on file     Relationship status: Not on file    Intimate partner violence:     Fear of current or ex partner: Not on file     Emotionally abused: Not on file     Physically abused: Not on file     Forced sexual activity: Not on file   Other Topics Concern    Not on file   Social History Narrative    Not on file      FAMILY HISTORY: Family history from the initial psychiatric evaluation has been reviewed (reviewed/updated 7/9/2019) with no additional updates (I asked patient and no additional family history provided). No family history on file.     REVIEW OF SYSTEMS: (reviewed/updated 7/9/2019)  Appetite:poor   Sleep: poor with DIMS (difficulty initiating & maintaining sleep)   All other Review of Systems: Negative except confusion per HPI         [address] (MSE):    MSE FINDINGS ARE WITHIN NORMAL LIMITS (WNL) UNLESS OTHERWISE STATED BELOW. ( ALL OF THE BELOW CATEGORIES OF THE MSE HAVE BEEN REVIEWED (reviewed 7/9/2019) AND UPDATED AS DEEMED APPROPRIATE )  General Presentation age appropriate, cooperative   Orientation confused, disorganized, disoriented   Vital Signs  See below (reviewed 7/9/2019); Vital Signs (BP, Pulse, & Temp) are within normal limits if not listed below.    Gait and Station Stable/steady, no ataxia   Musculoskeletal System No extrapyramidal symptoms (EPS); no abnormal muscular movements or Tardive Dyskinesia (TD); muscle strength and tone are within normal limits   Language No aphasia or dysarthria   Speech:  hypoverbal and normal volume   Thought Processes illogical; normal rate of thoughts; poor abstract reasoning/computation   Thought Associations loose associations   Thought Content visual hallucinations and preoccupations   Suicidal Ideations none   Homicidal Ideations none   Mood:  anxious    Affect:  constricted and mood-congruent   Memory recent  impaired   Memory remote:  fair   Concentration/Attention:  impaired   Fund of Knowledge average   Insight:  limited   Reliability fair   Judgment:  impaired due to active psychosis          VITALS:     Patient Vitals for the past 24 hrs:   Temp Pulse Resp BP SpO2   07/08/19 2016 98.3 °F (36.8 °C) 78 16 119/88 99 %   07/08/19 1728 98 °F (36.7 °C) 82 16 105/60 99 %   07/08/19 0727 98 °F (36.7 °C) 70 16 113/83 99 %     Wt Readings from Last 3 Encounters:   07/01/19 92.9 kg (204 lb 11.2 oz)   06/28/19 113.4 kg (250 lb)   06/18/19 113.4 kg (250 lb)     Temp Readings from Last 3 Encounters:   07/08/19 98.3 °F (36.8 °C)   07/01/19 98 °F (36.7 °C)   06/25/19 99.7 °F (37.6 °C)     BP Readings from Last 3 Encounters:   07/08/19 119/88   07/01/19 114/69   06/25/19 145/85     Pulse Readings from Last 3 Encounters:   07/08/19 78   07/01/19 87   06/25/19 (!) 107            DATA     LABORATORY DATA:(reviewed/updated 7/9/2019)  No results found for this or any previous visit (from the past 24 hour(s)). No results found for: VALF2, VALAC, VALP, VALPR, DS6, CRBAM, CRBAMP, CARB2, XCRBAM  No results found for: LITHM   RADIOLOGY REPORTS:(reviewed/updated 7/9/2019)  No results found.        MEDICATIONS     ALL MEDICATIONS:   Current Facility-Administered Medications   Medication Dose Route Frequency    risperiDONE (RisperDAL) tablet 3 mg  3 mg Oral Q12H    metoprolol tartrate (LOPRESSOR) tablet 25 mg  25 mg Oral Q12H    clonazePAM (KlonoPIN) tablet 1 mg  1 mg Oral BID and QHS    ziprasidone (GEODON) capsule 20 mg  20 mg Oral Q8H PRN    cloNIDine HCl (CATAPRES) tablet 0.1 mg  0.1 mg Oral Q2H PRN    levothyroxine (SYNTHROID) tablet 50 mcg  50 mcg Oral ACB    traZODone (DESYREL) tablet 300 mg  300 mg Oral QHS    zolpidem (AMBIEN) tablet 10 mg  10 mg Oral QHS PRN    ziprasidone (GEODON) 20 mg in sterile water (preservative free) 1 mL injection  20 mg IntraMUSCular BID PRN    benztropine (COGENTIN) tablet 2 mg  2 mg Oral BID PRN    benztropine (COGENTIN) injection 2 mg  2 mg IntraMUSCular BID PRN    LORazepam (ATIVAN) injection 2 mg  2 mg IntraMUSCular Q4H PRN    acetaminophen (TYLENOL) tablet 650 mg  650 mg Oral Q4H PRN    ibuprofen (MOTRIN) tablet 400 mg  400 mg Oral Q8H PRN    magnesium hydroxide (MILK OF MAGNESIA) 400 mg/5 mL oral suspension 30 mL  30 mL Oral DAILY PRN    nicotine (NICODERM CQ) 21 mg/24 hr patch 1 Patch  1 Patch TransDERmal DAILY PRN    hydrOXYzine HCl (ATARAX) tablet 50 mg  50 mg Oral Q6H PRN      SCHEDULED MEDICATIONS:   Current Facility-Administered Medications   Medication Dose Route Frequency    risperiDONE (RisperDAL) tablet 3 mg  3 mg Oral Q12H    metoprolol tartrate (LOPRESSOR) tablet 25 mg  25 mg Oral Q12H    clonazePAM (KlonoPIN) tablet 1 mg  1 mg Oral BID and QHS    levothyroxine (SYNTHROID) tablet 50 mcg  50 mcg Oral ACB    traZODone (DESYREL) tablet 300 mg  300 mg Oral QHS          ASSESSMENT & PLAN     DIAGNOSES REQUIRING ACTIVE TREATMENT AND MONITORING: (reviewed/updated 7/9/2019)  Patient Active Hospital Problem List:   Schizoaffective disorder [facility]) (6/18/2019)    Assessment: patient with ongoing psychotic presentation, had previously been stabilized on Invega and Seroquel, now on Risperdal s/p rhabdomyolysis and SHYLA due to dehydration vs iatrogenic. Will continue psychiatric stabilization and monitor vitals. Patient still tachycardic, may require additional IV fluids.   - CONTINUE Risperdal 2 mg Q12H for psychosis  - CONTINUE Klonopin 1 mg TID for agitation and anxiety  - f/u NMDA Ab  - f/u CMP 7/9  - IGM therapy as tolerated  - Expand database / obtain collateral  - Dispo planning    In summary, Pieter Fountain, is a 29 y.o.  male who presents with a severe exacerbation of the principal diagnosis of Schizoaffective disorder ([facility] [address]    Patient's condition is improving. Patient requires continued inpatient hospitalization for further stabilization, safety monitoring and medication management. I will continue to coordinate the provision of individual, milieu, occupational, group, and substance abuse therapies to address target symptoms/diagnoses as deemed appropriate for the individual patient. A coordinated, multidisplinary treatment team round was conducted with the patient (this team consists of the nurse, psychiatric unit pharmcist,  and writer). Complete current electronic health record for patient has been reviewed today including consultant notes, ancillary staff notes, nurses and psychiatric tech notes. Suicide risk assessment completed and patient deemed to be of low risk for suicide at this time. The following regarding medications was addressed during rounds with patient:   the risks and benefits of the proposed medication. The patient was given the opportunity to ask questions. Informed consent given to the use of the above medications.  Will continue to adjust psychiatric and non-psychiatric medications (see above \"medication\" section and orders section for details) as deemed appropriate & based upon diagnoses and response to treatment. I will continue to order blood tests/labs and diagnostic tests as deemed appropriate and review results as they become available (see orders for details and above listed lab/test results). I will order psychiatric records from previous [facility] hospitals to further elucidate the nature of patient's psychopathology and review once available. I will gather additional collateral information from friends, family and o/p treatment team to further elucidate the nature of patient's psychopathology and baselline level of psychiatric functioning. I certify that this patient's inpatient psychiatric hospital services furnished since the previous certification were, and continue to be, required for treatment that could reasonably be expected to improve the patient's condition, or for diagnostic study, and that the patient continues to need, on a daily basis, active treatment furnished directly by or requiring the supervision of inpatient psychiatric facility personnel. In addition, the hospital records show that services furnished were intensive treatment services, admission or related services, or equivalent services.     EXPECTED DISCHARGE DATE/DAY: TBD     DISPOSITION: Home       Signed By:   Sonia Sanabria MD  7/9/2019

## 2019-07-09 NOTE — BH NOTES
GROUP THERAPY PROGRESS NOTE    The patient Janee carias 29 y.o. male is participating in [employer]. Group time: 1 hour    Personal goal for participation: To concentrate on selected task    Goal orientation: social    Group therapy participation: active    Therapeutic interventions reviewed and discussed: Crafts, games, music    Impression of participation: The patient was attentive.     Consuelo Nobles  7/9/2019  6:11 PM

## 2019-07-09 NOTE — BH NOTES
PSYCHIATRIC PROGRESS NOTE         Patient Name  Jamshid Lazo   Date of Birth 1985   [facility] [id number]   Medical Record Number  [id number]      Age  29 y.o. PCP None   Admit date:  7/1/2019    Room Number  327/01  @ [facility]   Date of Service  7/9/2019         E & M PROGRESS NOTE:         HISTORY       CC:  \"psychosis\"  HISTORY OF PRESENT ILLNESS/INTERVAL HISTORY:  (reviewed/updated 7/9/2019). per initial evaluation: The patient, Jamshid Lazo, is a 29 y.o. WHITE [place]  male with a past psychiatric history significant for schizoaffective disorder vs schizophrenia, who presents at this time with complaints of (and/or evidence of) the following emotional symptoms: agitation, delusions and psychotic behavior. Additional symptomatology include AH and VH. The above symptoms have been present for 2+ weeks. These symptoms are of moderate to high severity. These symptoms are constant in nature. The patient's condition has been precipitated by psychosocial stressors. Patient's condition made worse by treatment noncompliance. UDS: negative; BAL=0.      The patient was readmitted from medical unit following rhabdomyolysis and SHYLA which resolved with IV hydration. He is grossly psychotic and has been noted to be confused. Patient is markedly disorganized and confused on interview.  He cannot state the days of the week backward despite effort.     From patient's medical admission:     The patient is a 60-year-old  man, who is currently admitted to the medical floor at [address] [place] [address] Drive was initially admitted on 06/17, to Dr. Catie Pittman service on the psychiatry floor. Luisa Little a couple of days, his CPK was found to be elevated to 1729 and he was transferred to the medical floor for treatment of rhabdomyolysis.  At that time, he did not have any rigidity, hyperthermia, or leukocytosis, which are indicative of neuroleptic malignant syndrome.  His CKs dropped immediately with hydration. Naila Azevedo has improved markedly and currently reports feeling better in a physical sense.  Also during his stay, his liver enzymes increased although, they were normal at baseline. Naila Azevedo does have a history of being positive for hepatitis C and has been treated with Isoniazid for latent tuberculosis in the past.  Currently, he remains on one-to-one observation and was agitated and aggressive at times yesterday. Naila Azevedo had to be given p.r.n.'s. Eva Del Cid had started him on a low dose of risperidone yesterday given the low likelihood of him having had NMS.  His CPK most recently was 1. Naila Azevedo continues to exhibit disorganized behavior and delusional thinking.  When I asked him, he stated that he wanted to leave and go stay with his mother but when Dr. Maryjane Murray, the hospitalist, asked him he stated that he wanted to go to the psychiatric floor because he does not feel safe going home.  At the present time, if the patient requests to be discharged home, my opinion is that Crisis should be called for TDO evaluation. Naila Azevedo denies any auditory hallucinations today, but was yesterday noted to be talking to himself. Naila Azevedo is a poor historian and unable to provide much history. 7/3- patient has remained largely illogical, at times appears to be hallucinating in his room, but is able to coherently describe his experience. Patient given PRN Geodon and Zyprexa with limited effect. Per mother [place] patient was very sedated yesterday following Zyprexa administration, and does better with Geodon historically as a PRN. Patient continues to appear ill, with tachycardia unresolved despite fluid rehydration. 7/4- patient more visible, has been more coherent than 24 hours prior. Vitals still concerning, tachycardic and with autonomic instability evident. Medicine following. Patient endorses disorganized thoughts and anxiety.   7/5- patient largely unchanged, repeatedly asking for help from staff due to \"something in my head\" but coherent, slept 7 hours. Patient continues to endorse anxiety and restlessness. Patient still noted to be in moderate distress. Patient medication compliant. Repeatedly states he is dying. 7/6-Presents anxious and remains needy. Staff familiar to him reports slow improvement is noticed. Remains pre-occupied but compliant with meds. Prn used-Atarax. 7/7-Pt was moved to the General side yesterday. Continues to maintain improvement, less anxious. Slept 7 hrs. No prn's were used. 7/8- no acute overnight events. Patient has been visible, odd, standing with or at nursing station most of the day, continues to endorse problems \"in my head\" but has been pleasant, smiling inappropriately. Patient received CT scan of head, unremarkable 7/5. Medication compliant, continues to complain of anxiety. 7/9- patient slept 8 hours overnight, did not get PRNs, states he is doing better. Still odd, visible, standing by nursing station much of the day, confused for much of the day but with no specific complaints. EKG performed, QTc 407. SHYLA resolved. SIDE EFFECTS: (reviewed/updated 7/9/2019)  None reported or admitted to. ALLERGIES:(reviewed/updated 7/9/2019)  No Known Allergies   MEDICATIONS PRIOR TO ADMISSION:(reviewed/updated 7/9/2019)  Medications Prior to Admission   Medication Sig    levothyroxine (SYNTHROID) 50 mcg tablet Take 50 mcg by mouth Daily (before breakfast).  risperiDONE (RISPERDAL) 0.5 mg tablet Take 1.5 mg by mouth nightly. Risperidone 0.5 mg tabs: Take 3 tabs po every evening    pyridoxine, vitamin B6, (VITAMIN B-6) 50 mg tablet Take 50 mg by mouth daily.  isoniazid (NYDRAZID) 300 mg tablet Take 300 mg by mouth daily.  traZODone (DESYREL) 150 mg tablet Take 300 mg by mouth nightly. Indications:  Insomnia      PAST MEDICAL HISTORY: Past medical history from the initial psychiatric evaluation has been reviewed (reviewed/updated 7/9/2019) with no additional updates (I asked patient and no additional past medical history provided). No past medical history on file. No past surgical history on file. SOCIAL HISTORY: Social history from the initial psychiatric evaluation has been reviewed (reviewed/updated 7/9/2019) with no additional updates (I asked patient and no additional social history provided). Social History     Socioeconomic History    Marital status: SINGLE     Spouse name: Not on file    Number of children: Not on file    Years of education: Not on file    Highest education level: Not on file   Occupational History    Not on file   Social Needs    Financial resource strain: Not on file    Food insecurity:     Worry: Not on file     Inability: Not on file    Transportation needs:     Medical: Not on file     Non-medical: Not on file   Tobacco Use    Smoking status: Not on file   Substance and Sexual Activity    Alcohol use: Not on file    Drug use: Not on file    Sexual activity: Not on file   Lifestyle    Physical activity:     Days per week: Not on file     Minutes per session: Not on file    Stress: Not on file   Relationships    Social connections:     Talks on phone: Not on file     Gets together: Not on file     Attends [employer] service: Not on file     Active member of club or organization: Not on file     Attends meetings of clubs or organizations: Not on file     Relationship status: Not on file    Intimate partner violence:     Fear of current or ex partner: Not on file     Emotionally abused: Not on file     Physically abused: Not on file     Forced sexual activity: Not on file   Other Topics Concern    Not on file   Social History Narrative    Not on file      FAMILY HISTORY: Family history from the initial psychiatric evaluation has been reviewed (reviewed/updated 7/9/2019) with no additional updates (I asked patient and no additional family history provided). No family history on file.     REVIEW OF SYSTEMS: (reviewed/updated 7/9/2019)  Appetite:poor   Sleep: poor with DIMS (difficulty initiating & maintaining sleep)   All other Review of Systems: Negative except confusion per HPI         [facility] (MSE):    MSE FINDINGS ARE WITHIN NORMAL LIMITS (WNL) UNLESS OTHERWISE STATED BELOW. ( ALL OF THE BELOW CATEGORIES OF THE MSE HAVE BEEN REVIEWED (reviewed 7/9/2019) AND UPDATED AS DEEMED APPROPRIATE )  General Presentation age appropriate, cooperative   Orientation confused, disorganized, disoriented   Vital Signs  See below (reviewed 7/9/2019); Vital Signs (BP, Pulse, & Temp) are within normal limits if not listed below.    Gait and Station Stable/steady, no ataxia   Musculoskeletal System No extrapyramidal symptoms (EPS); no abnormal muscular movements or Tardive Dyskinesia (TD); muscle strength and tone are within normal limits   Language No aphasia or dysarthria   Speech:  hypoverbal and normal volume   Thought Processes illogical; normal rate of thoughts; poor abstract reasoning/computation   Thought Associations loose associations   Thought Content visual hallucinations and preoccupations   Suicidal Ideations none   Homicidal Ideations none   Mood:  anxious    Affect:  constricted and mood-congruent   Memory recent  impaired   Memory remote:  fair   Concentration/Attention:  impaired   Fund of Knowledge average   Insight:  limited   Reliability fair   Judgment:  impaired due to active psychosis          VITALS:     Patient Vitals for the past 24 hrs:   Temp Pulse Resp BP SpO2   07/09/19 0800 98.8 °F (37.1 °C) 89 18 123/74 100 %   07/08/19 2016 98.3 °F (36.8 °C) 78 16 119/88 99 %   07/08/19 1728 98 °F (36.7 °C) 82 16 105/60 99 %     Wt Readings from Last 3 Encounters:   07/01/19 92.9 kg (204 lb 11.2 oz)   06/28/19 113.4 kg (250 lb)   06/18/19 113.4 kg (250 lb)     Temp Readings from Last 3 Encounters:   07/09/19 98.8 °F (37.1 °C)   07/01/19 98 °F (36.7 °C)   06/25/19 99.7 °F (37.6 °C)     BP Readings from Last 3 Encounters:   07/09/19 123/74 07/01/19 114/69   06/25/19 145/85     Pulse Readings from Last 3 Encounters:   07/09/19 89   07/01/19 87   06/25/19 (!) 107            DATA     LABORATORY DATA:(reviewed/updated 7/9/2019)  Recent Results (from the past 24 hour(s))   METABOLIC PANEL, COMPREHENSIVE    Collection Time: 07/09/19  5:36 AM   Result Value Ref Range    Sodium 144 136 - 145 mmol/L    Potassium 4.4 3.5 - 5.1 mmol/L    Chloride 108 97 - 108 mmol/L    CO2 28 21 - 32 mmol/L    Anion gap 8 5 - 15 mmol/L    Glucose 87 65 - 100 mg/dL    BUN 16 6 - 20 MG/DL    Creatinine 1.05 0.70 - 1.30 MG/DL    BUN/Creatinine ratio 15 12 - 20      GFR est AA >60 >60 ml/min/1.73m2    GFR est non-AA >60 >60 ml/min/1.73m2    Calcium 8.7 8.5 - 10.1 MG/DL    Bilirubin, total 0.2 0.2 - 1.0 MG/DL    ALT (SGPT) 74 12 - 78 U/L    AST (SGOT) 31 15 - 37 U/L    Alk. phosphatase 73 45 - 117 U/L    Protein, total 6.1 (L) 6.4 - 8.2 g/dL    Albumin 3.2 (L) 3.5 - 5.0 g/dL    Globulin 2.9 2.0 - 4.0 g/dL    A-G Ratio 1.1 1.1 - 2.2     EKG, 12 LEAD, SUBSEQUENT    Collection Time: 07/09/19  7:41 AM   Result Value Ref Range    Ventricular Rate 59 BPM    Atrial Rate 59 BPM    P-R Interval 150 ms    QRS Duration 84 ms    Q-T Interval 412 ms    QTC Calculation (Bezet) 407 ms    Calculated P Axis 61 degrees    Calculated R Axis 29 degrees    Calculated T Axis 41 degrees    Diagnosis       Sinus bradycardia  Otherwise normal ECG  When compared with ECG of 24-JUN-2019 20:16,  Vent. rate has decreased BY  57 BPM       No results found for: VALF2, VALAC, VALP, VALPR, DS6, CRBAM, CRBAMP, CARB2, XCRBAM  No results found for: LITHM   RADIOLOGY REPORTS:(reviewed/updated 7/9/2019)  No results found.        MEDICATIONS     ALL MEDICATIONS:   Current Facility-Administered Medications   Medication Dose Route Frequency    risperiDONE (RisperDAL) tablet 3 mg  3 mg Oral Q12H    metoprolol tartrate (LOPRESSOR) tablet 25 mg  25 mg Oral Q12H    clonazePAM (KlonoPIN) tablet 1 mg  1 mg Oral BID and QHS  ziprasidone (GEODON) capsule 20 mg  20 mg Oral Q8H PRN    cloNIDine HCl (CATAPRES) tablet 0.1 mg  0.1 mg Oral Q2H PRN    levothyroxine (SYNTHROID) tablet 50 mcg  50 mcg Oral ACB    traZODone (DESYREL) tablet 300 mg  300 mg Oral QHS    zolpidem (AMBIEN) tablet 10 mg  10 mg Oral QHS PRN    ziprasidone (GEODON) 20 mg in sterile water (preservative free) 1 mL injection  20 mg IntraMUSCular BID PRN    benztropine (COGENTIN) tablet 2 mg  2 mg Oral BID PRN    benztropine (COGENTIN) injection 2 mg  2 mg IntraMUSCular BID PRN    LORazepam (ATIVAN) injection 2 mg  2 mg IntraMUSCular Q4H PRN    acetaminophen (TYLENOL) tablet 650 mg  650 mg Oral Q4H PRN    ibuprofen (MOTRIN) tablet 400 mg  400 mg Oral Q8H PRN    magnesium hydroxide (MILK OF MAGNESIA) 400 mg/5 mL oral suspension 30 mL  30 mL Oral DAILY PRN    nicotine (NICODERM CQ) 21 mg/24 hr patch 1 Patch  1 Patch TransDERmal DAILY PRN    hydrOXYzine HCl (ATARAX) tablet 50 mg  50 mg Oral Q6H PRN      SCHEDULED MEDICATIONS:   Current Facility-Administered Medications   Medication Dose Route Frequency    risperiDONE (RisperDAL) tablet 3 mg  3 mg Oral Q12H    metoprolol tartrate (LOPRESSOR) tablet 25 mg  25 mg Oral Q12H    clonazePAM (KlonoPIN) tablet 1 mg  1 mg Oral BID and QHS    levothyroxine (SYNTHROID) tablet 50 mcg  50 mcg Oral ACB    traZODone (DESYREL) tablet 300 mg  300 mg Oral QHS          ASSESSMENT & PLAN     DIAGNOSES REQUIRING ACTIVE TREATMENT AND MONITORING: (reviewed/updated 7/9/2019)  Patient Active Hospital Problem List:   Schizoaffective disorder ([facility] [address] (6/18/2019)    Assessment: patient with ongoing psychotic presentation, had previously been stabilized on Invega and Seroquel, now on Risperdal s/p rhabdomyolysis and SHYLA due to dehydration vs iatrogenic. Will continue psychiatric stabilization and monitor vitals.  Patient still tachycardic, may require additional IV fluids.   - CONTINUE Risperdal 2 mg Q12H for psychosis  - TAPER  Klonopin to 0.5 mg BID and 1 mg QHS for agitation and anxiety  - f/u NMDA Ab  - IGM therapy as tolerated  - Expand database / obtain collateral  - Dispo planning    In summary, Fanta Jose, is a 29 y.o.  male who presents with a severe exacerbation of the principal diagnosis of Schizoaffective disorder ([facility] [address]    Patient's condition is improving. Patient requires continued inpatient hospitalization for further stabilization, safety monitoring and medication management. I will continue to coordinate the provision of individual, milieu, occupational, group, and substance abuse therapies to address target symptoms/diagnoses as deemed appropriate for the individual patient. A coordinated, multidisplinary treatment team round was conducted with the patient (this team consists of the nurse, psychiatric unit pharmcist,  and writer). Complete current electronic health record for patient has been reviewed today including consultant notes, ancillary staff notes, nurses and psychiatric tech notes. Suicide risk assessment completed and patient deemed to be of low risk for suicide at this time. The following regarding medications was addressed during rounds with patient:   the risks and benefits of the proposed medication. The patient was given the opportunity to ask questions. Informed consent given to the use of the above medications. Will continue to adjust psychiatric and non-psychiatric medications (see above \"medication\" section and orders section for details) as deemed appropriate & based upon diagnoses and response to treatment. I will continue to order blood tests/labs and diagnostic tests as deemed appropriate and review results as they become available (see orders for details and above listed lab/test results). I will order psychiatric records from previous [facility] hospitals to further elucidate the nature of patient's psychopathology and review once available.     I will gather additional collateral information from friends, family and o/p treatment team to further elucidate the nature of patient's psychopathology and baselline level of psychiatric functioning. I certify that this patient's inpatient psychiatric hospital services furnished since the previous certification were, and continue to be, required for treatment that could reasonably be expected to improve the patient's condition, or for diagnostic study, and that the patient continues to need, on a daily basis, active treatment furnished directly by or requiring the supervision of inpatient psychiatric facility personnel. In addition, the hospital records show that services furnished were intensive treatment services, admission or related services, or equivalent services.     EXPECTED DISCHARGE DATE/DAY: 7/12/2019     DISPOSITION: Home       Signed By:   Jcarlos Britt MD  7/9/2019

## 2019-07-09 NOTE — BH NOTES
GROUP THERAPY PROGRESS NOTE    Pamela Terrazas is participating in Substance abuse group. Group time: 1 hour    Personal goal for participation: To understand addiction, criteria for diagnosis, and identify triggers and coping skills. Goal orientation: personal    Group therapy participation: passive    Therapeutic interventions reviewed and discussed: Group discussion of substance use, abuse, and dependence and the DSM 5 criteria for a substance use disorder. Patients were able to self-rate themselves based on the 11 criteria for a substance use disorder and explore their own level of addiction for cigarettes, alcohol, heroin, and other substances. Group discussed how they feel when they are unable to use and ways substance use has hindered their lives. Triggers for use and coping skills to avoid use or manage symptoms until craving subsides were discussed. Impression of participation: Quinten Carr was present during group but sat to himself in the corner. He did follow along on the hand out and listened attentively to other patient who shared. He would laugh with the group and smile during funny moments. He did not share or engage in discussion.     José Antonio Bain LPC [facility]

## 2019-07-10 PROCEDURE — 74011250637 HC RX REV CODE- 250/637: Performed by: PSYCHIATRY & NEUROLOGY

## 2019-07-10 PROCEDURE — 65220000003 HC RM SEMIPRIVATE PSYCH

## 2019-07-10 PROCEDURE — 74011250637 HC RX REV CODE- 250/637: Performed by: FAMILY MEDICINE

## 2019-07-10 RX ORDER — CLONAZEPAM 0.5 MG/1
0.5 TABLET ORAL 2 TIMES DAILY
Status: DISCONTINUED | OUTPATIENT
Start: 2019-07-10 | End: 2019-07-11

## 2019-07-10 RX ORDER — CLONAZEPAM 1 MG/1
1 TABLET ORAL
Status: DISCONTINUED | OUTPATIENT
Start: 2019-07-10 | End: 2019-07-11

## 2019-07-10 RX ORDER — MAGNESIUM CITRATE
296 SOLUTION, ORAL ORAL
Status: COMPLETED | OUTPATIENT
Start: 2019-07-10 | End: 2019-07-10

## 2019-07-10 RX ORDER — AMOXICILLIN 250 MG
1 CAPSULE ORAL DAILY
Status: DISCONTINUED | OUTPATIENT
Start: 2019-07-10 | End: 2019-07-19

## 2019-07-10 RX ADMIN — MAGNESIUM CITRATE 296 ML: 1.75 LIQUID ORAL at 11:05

## 2019-07-10 RX ADMIN — TRAZODONE HYDROCHLORIDE 300 MG: 100 TABLET ORAL at 20:40

## 2019-07-10 RX ADMIN — LEVOTHYROXINE SODIUM 50 MCG: 50 TABLET ORAL at 05:44

## 2019-07-10 RX ADMIN — SENNOSIDES AND DOCUSATE SODIUM 1 TABLET: 8.6; 5 TABLET ORAL at 11:05

## 2019-07-10 RX ADMIN — RISPERIDONE 3 MG: 3 TABLET ORAL at 08:15

## 2019-07-10 RX ADMIN — RISPERIDONE 3 MG: 3 TABLET ORAL at 20:40

## 2019-07-10 RX ADMIN — CLONAZEPAM 0.5 MG: 0.5 TABLET ORAL at 16:55

## 2019-07-10 RX ADMIN — CLONAZEPAM 1 MG: 1 TABLET ORAL at 08:15

## 2019-07-10 RX ADMIN — CLONAZEPAM 1 MG: 1 TABLET ORAL at 20:40

## 2019-07-10 RX ADMIN — HYDROXYZINE HYDROCHLORIDE 50 MG: 25 TABLET, FILM COATED ORAL at 20:40

## 2019-07-10 RX ADMIN — METOPROLOL TARTRATE 25 MG: 25 TABLET ORAL at 08:15

## 2019-07-10 RX ADMIN — METOPROLOL TARTRATE 25 MG: 25 TABLET ORAL at 20:40

## 2019-07-10 NOTE — BH NOTES
GROUP THERAPY PROGRESS NOTE    The patient Eladia carias 29 y.o. male is participating in Creative Expression Group. Group time: 1 hour    Personal goal for participation: To concentrate on selected task    Goal orientation: social    Group therapy participation: active    Therapeutic interventions reviewed and discussed: Crafts, games, music    Impression of participation: The patient was attentive.     Jac Nobles  7/10/2019  6:14 PM

## 2019-07-10 NOTE — PROGRESS NOTES
GROUP THERAPY PROGRESS NOTE      Sindy Omalley was present for medication group. GROUP TIME: 45 minutes, Wednesdays 2pm    PERSONAL GOAL FOR PARTICIPATION: To be present for group, participate in discussion, and answer patient-directed questions. GOAL ORIENTATION: Personal    THERAPEUTIC INTERVENTIONS REVIEWED AND DISCUSSED: The following topics were presented: storage of medications, how to remember to refill medications and keep up with doctor appointments, relapse prevention, keeping a record of all medication including prescription and non-prescription drugs, and who to contact with medication questions. Patients were given time to ask questions regarding their current therapy. IMPRESSION OF PARTICIPATION: Belkis Betancur arrived to group late, in time to play medication bingo. He sat with the group for about 5-10 minutes and then left. He required assistance with medication bingo. He was given a pillbox at the conclusion of group.        John Ortiz, NJD, BCPS

## 2019-07-10 NOTE — BH NOTES
0700-Patient report received from Bianka Green LPN.     9994-FTEAQCT walking the halls and denies SI/HI and AV/HV. Patient denies pain. 0900-Patient eating breakfast and was med compliant. 1000-Patient sitting in the dayroom talking to me about his kids and him wanting to do better for them. Patient attending spiritual group as well. 1107-Patient in the dayroom attending group. 1200-Patient ate lunch    1326-Patient resting in his room    1450-Patient walking the halls. 1719-Patient in the dayroom eating dinner. Patient gets up periodically to walk the hallways.      1835-Patient continues to wander the halls

## 2019-07-10 NOTE — BH NOTES
1940- Received report from off going [address] Patient in dayroom watching tv and talking with peers. 2130-tolerated scheduled medications as prescribed. No complaints or concerns noted about pain. Denies SI/HI/AVH at this time. 0600- Q15 minutes rounds continue for safety as per policy and protocol. Tolerated 0600 am meds. Approx 8.00 of rest in bed with eyes close.

## 2019-07-10 NOTE — PROGRESS NOTES
1900 Assumed care of pt. Little change noted. Pt continues to need frequent reassurance related to somatic complaints. 2100 Med compliant. PRN Atarax given with HS medications for anxiety with moderate effect  2300 Appears to be sleeping  0100 Appears to be sleeping  0500 Appears to be sleeping  0600 Pt visible in hallway.  Slept 7 hours  Hourly rounds and Q15 minute checks maintained and to continue

## 2019-07-10 NOTE — BH NOTES
PSYCHIATRIC PROGRESS NOTE         Patient Name  Will Le   Date of Birth 1985   [facility] [id number]   Medical Record Number  [id number]      Age  29 y.o. PCP None   Admit date:  7/1/2019    Room Number  327/01  @ [facility]   Date of Service  7/10/2019         E & M PROGRESS NOTE:         HISTORY       CC:  \"psychosis\"  HISTORY OF PRESENT ILLNESS/INTERVAL HISTORY:  (reviewed/updated 7/10/2019). per initial evaluation: The patient, Will Le, is a 29 y.o. WHITE [place]  male with a past psychiatric history significant for schizoaffective disorder vs schizophrenia, who presents at this time with complaints of (and/or evidence of) the following emotional symptoms: agitation, delusions and psychotic behavior. Additional symptomatology include AH and VH. The above symptoms have been present for 2+ weeks. These symptoms are of moderate to high severity. These symptoms are constant in nature. The patient's condition has been precipitated by psychosocial stressors. Patient's condition made worse by treatment noncompliance. UDS: negative; BAL=0.      The patient was readmitted from medical unit following rhabdomyolysis and SHYLA which resolved with IV hydration. He is grossly psychotic and has been noted to be confused. Patient is markedly disorganized and confused on interview.  He cannot state the days of the week backward despite effort.     From patient's medical admission:     The patient is a 61-year-old  man, who is currently admitted to the medical floor at [address] [place] [address] Drive was initially admitted on 06/17, to Dr. Madison Burton service on the psychiatry floor. Ho Hayre a couple of days, his CPK was found to be elevated to 1729 and he was transferred to the medical floor for treatment of rhabdomyolysis.  At that time, he did not have any rigidity, hyperthermia, or leukocytosis, which are indicative of neuroleptic malignant syndrome.  His CKs dropped immediately with hydration. John Steiner has improved markedly and currently reports feeling better in a physical sense.  Also during his stay, his liver enzymes increased although, they were normal at baseline. John Steiner does have a history of being positive for hepatitis C and has been treated with Isoniazid for latent tuberculosis in the past.  Currently, he remains on one-to-one observation and was agitated and aggressive at times yesterday. John Steiner had to be given p.r.n.'s. Parish Lozada had started him on a low dose of risperidone yesterday given the low likelihood of him having had NMS.  His CPK most recently was 1. John Steiner continues to exhibit disorganized behavior and delusional thinking.  When I asked him, he stated that he wanted to leave and go stay with his mother but when Dr. Alexandra Goodman, the hospitalist, asked him he stated that he wanted to go to the psychiatric floor because he does not feel safe going home.  At the present time, if the patient requests to be discharged home, my opinion is that Crisis should be called for TDO evaluation. John Steiner denies any auditory hallucinations today, but was yesterday noted to be talking to himself. John Steiner is a poor historian and unable to provide much history. 7/3- patient has remained largely illogical, at times appears to be hallucinating in his room, but is able to coherently describe his experience. Patient given PRN Geodon and Zyprexa with limited effect. Per mother Bennett Marcelo patient was very sedated yesterday following Zyprexa administration, and does better with Geodon historically as a PRN. Patient continues to appear ill, with tachycardia unresolved despite fluid rehydration. - patient more visible, has been more coherent than 24 hours prior. Vitals still concerning, tachycardic and with autonomic instability evident. Medicine following. Patient endorses disorganized thoughts and anxiety.   75- patient largely unchanged, repeatedly asking for help from staff due to \"something in my head\" but coherent, slept 7 hours. Patient continues to endorse anxiety and restlessness. Patient still noted to be in moderate distress. Patient medication compliant. Repeatedly states he is dying. 7/6-Presents anxious and remains needy. Staff familiar to him reports slow improvement is noticed. Remains pre-occupied but compliant with meds. Prn used-Atarax. 7/7-Pt was moved to the General side yesterday. Continues to maintain improvement, less anxious. Slept 7 hrs. No prn's were used. 7/8- no acute overnight events. Patient has been visible, odd, standing with or at nursing station most of the day, continues to endorse problems \"in my head\" but has been pleasant, smiling inappropriately. Patient received CT scan of head, unremarkable 7/5. Medication compliant, continues to complain of anxiety. 7/9- patient slept 8 hours overnight, did not get PRNs, states he is doing better. Still odd, visible, standing by nursing station much of the day, confused for much of the day but with no specific complaints. EKG performed, QTc 407. SHYLA resolved. 7/10- patient visible, slept 8 hours medication compliant. He remains discharged focused, c/o constipation. Patient still disorganized but more coherent, no agitation or hallucinations x24 hours did not get PRNs for psychosis. SIDE EFFECTS: (reviewed/updated 7/10/2019)  None reported or admitted to. ALLERGIES:(reviewed/updated 7/10/2019)  No Known Allergies   MEDICATIONS PRIOR TO ADMISSION:(reviewed/updated 7/10/2019)  Medications Prior to Admission   Medication Sig    levothyroxine (SYNTHROID) 50 mcg tablet Take 50 mcg by mouth Daily (before breakfast).  risperiDONE (RISPERDAL) 0.5 mg tablet Take 1.5 mg by mouth nightly. Risperidone 0.5 mg tabs: Take 3 tabs po every evening    pyridoxine, vitamin B6, (VITAMIN B-6) 50 mg tablet Take 50 mg by mouth daily.  isoniazid (NYDRAZID) 300 mg tablet Take 300 mg by mouth daily.     traZODone (DESYREL) 150 mg tablet Take 300 mg by mouth nightly. Indications: Insomnia      PAST MEDICAL HISTORY: Past medical history from the initial psychiatric evaluation has been reviewed (reviewed/updated 7/10/2019) with no additional updates (I asked patient and no additional past medical history provided). No past medical history on file. No past surgical history on file. SOCIAL HISTORY: Social history from the initial psychiatric evaluation has been reviewed (reviewed/updated 7/10/2019) with no additional updates (I asked patient and no additional social history provided).    Social History     Socioeconomic History    Marital status: SINGLE     Spouse name: Not on file    Number of children: Not on file    Years of education: Not on file    Highest education level: Not on file   Occupational History    Not on file   Social Needs    Financial resource strain: Not on file    Food insecurity:     Worry: Not on file     Inability: Not on file    Transportation needs:     Medical: Not on file     Non-medical: Not on file   Tobacco Use    Smoking status: Not on file   Substance and Sexual Activity    Alcohol use: Not on file    Drug use: Not on file    Sexual activity: Not on file   Lifestyle    Physical activity:     Days per week: Not on file     Minutes per session: Not on file    Stress: Not on file   Relationships    Social connections:     Talks on phone: Not on file     Gets together: Not on file     Attends Baptism service: Not on file     Active member of club or organization: Not on file     Attends meetings of clubs or organizations: Not on file     Relationship status: Not on file    Intimate partner violence:     Fear of current or ex partner: Not on file     Emotionally abused: Not on file     Physically abused: Not on file     Forced sexual activity: Not on file   Other Topics Concern    Not on file   Social History Narrative    Not on file      FAMILY HISTORY: Family history from the initial psychiatric evaluation has been reviewed (reviewed/updated 7/10/2019) with no additional updates (I asked patient and no additional family history provided). No family history on file. REVIEW OF SYSTEMS: (reviewed/updated 7/10/2019)  Appetite:poor   Sleep: poor with DIMS (difficulty initiating & maintaining sleep)   All other Review of Systems: Negative except confusion per HPI         [facility] (MSE):    MSE FINDINGS ARE WITHIN NORMAL LIMITS (WNL) UNLESS OTHERWISE STATED BELOW. ( ALL OF THE BELOW CATEGORIES OF THE MSE HAVE BEEN REVIEWED (reviewed 7/10/2019) AND UPDATED AS DEEMED APPROPRIATE )  General Presentation age appropriate, cooperative   Orientation confused, disorganized, disoriented   Vital Signs  See below (reviewed 7/10/2019); Vital Signs (BP, Pulse, & Temp) are within normal limits if not listed below.    Gait and Station Stable/steady, no ataxia   Musculoskeletal System No extrapyramidal symptoms (EPS); no abnormal muscular movements or Tardive Dyskinesia (TD); muscle strength and tone are within normal limits   Language No aphasia or dysarthria   Speech:  hypoverbal and normal volume   Thought Processes illogical; normal rate of thoughts; poor abstract reasoning/computation   Thought Associations loose associations   Thought Content visual hallucinations and preoccupations   Suicidal Ideations none   Homicidal Ideations none   Mood:  anxious    Affect:  constricted and mood-congruent   Memory recent  impaired   Memory remote:  fair   Concentration/Attention:  impaired   Fund of Knowledge average   Insight:  limited   Reliability fair   Judgment:  impaired due to active psychosis          VITALS:     Patient Vitals for the past 24 hrs:   Temp Pulse Resp BP SpO2   07/10/19 0725 97.5 °F (36.4 °C) 84 18 110/64 98 %   07/09/19 2000 98 °F (36.7 °C) 81 18 122/82 99 %     Wt Readings from Last 3 Encounters:   07/01/19 92.9 kg (204 lb 11.2 oz)   06/28/19 113.4 kg (250 lb)   06/18/19 113.4 kg (250 lb) Temp Readings from Last 3 Encounters:   07/10/19 97.5 °F (36.4 °C)   07/01/19 98 °F (36.7 °C)   06/25/19 99.7 °F (37.6 °C)     BP Readings from Last 3 Encounters:   07/10/19 110/64   07/01/19 114/69   06/25/19 145/85     Pulse Readings from Last 3 Encounters:   07/10/19 84   07/01/19 87   06/25/19 (!) 107            DATA     LABORATORY DATA:(reviewed/updated 7/10/2019)  No results found for this or any previous visit (from the past 24 hour(s)). No results found for: VALF2, VALAC, VALP, VALPR, DS6, CRBAM, CRBAMP, CARB2, XCRBAM  No results found for: LITHM   RADIOLOGY REPORTS:(reviewed/updated 7/10/2019)  No results found.        MEDICATIONS     ALL MEDICATIONS:   Current Facility-Administered Medications   Medication Dose Route Frequency    senna-docusate (PERICOLACE) 8.6-50 mg per tablet 1 Tab  1 Tab Oral DAILY    risperiDONE (RisperDAL) tablet 3 mg  3 mg Oral Q12H    metoprolol tartrate (LOPRESSOR) tablet 25 mg  25 mg Oral Q12H    clonazePAM (KlonoPIN) tablet 1 mg  1 mg Oral BID and QHS    ziprasidone (GEODON) capsule 20 mg  20 mg Oral Q8H PRN    cloNIDine HCl (CATAPRES) tablet 0.1 mg  0.1 mg Oral Q2H PRN    levothyroxine (SYNTHROID) tablet 50 mcg  50 mcg Oral ACB    traZODone (DESYREL) tablet 300 mg  300 mg Oral QHS    zolpidem (AMBIEN) tablet 10 mg  10 mg Oral QHS PRN    ziprasidone (GEODON) 20 mg in sterile water (preservative free) 1 mL injection  20 mg IntraMUSCular BID PRN    benztropine (COGENTIN) tablet 2 mg  2 mg Oral BID PRN    benztropine (COGENTIN) injection 2 mg  2 mg IntraMUSCular BID PRN    LORazepam (ATIVAN) injection 2 mg  2 mg IntraMUSCular Q4H PRN    acetaminophen (TYLENOL) tablet 650 mg  650 mg Oral Q4H PRN    ibuprofen (MOTRIN) tablet 400 mg  400 mg Oral Q8H PRN    magnesium hydroxide (MILK OF MAGNESIA) 400 mg/5 mL oral suspension 30 mL  30 mL Oral DAILY PRN    nicotine (NICODERM CQ) 21 mg/24 hr patch 1 Patch  1 Patch TransDERmal DAILY PRN    hydrOXYzine HCl (ATARAX) tablet 50 mg  50 mg Oral Q6H PRN      SCHEDULED MEDICATIONS:   Current Facility-Administered Medications   Medication Dose Route Frequency    senna-docusate (PERICOLACE) 8.6-50 mg per tablet 1 Tab  1 Tab Oral DAILY    risperiDONE (RisperDAL) tablet 3 mg  3 mg Oral Q12H    metoprolol tartrate (LOPRESSOR) tablet 25 mg  25 mg Oral Q12H    clonazePAM (KlonoPIN) tablet 1 mg  1 mg Oral BID and QHS    levothyroxine (SYNTHROID) tablet 50 mcg  50 mcg Oral ACB    traZODone (DESYREL) tablet 300 mg  300 mg Oral QHS          ASSESSMENT & PLAN     DIAGNOSES REQUIRING ACTIVE TREATMENT AND MONITORING: (reviewed/updated 7/10/2019)  Patient Active Hospital Problem List:   Schizoaffective disorder ([facility] 75.) (6/18/2019)    Assessment: patient with ongoing psychotic presentation, had previously been stabilized on Invega and Seroquel, now on Risperdal s/p rhabdomyolysis and SHYLA due to dehydration vs iatrogenic. Will continue psychiatric stabilization and monitor vitals. Patient still tachycardic, may require additional IV fluids.   - CONTINUE Risperdal 2 mg Q12H for psychosis  - TAPER  Klonopin to 0.5 mg BID and 1 mg QHS for agitation and anxiety  - f/u NMDA Ab  - IGM therapy as tolerated  - Expand database / obtain collateral  - Dispo planning    In summary, Jacque Reynolds, is a 29 y.o.  male who presents with a severe exacerbation of the principal diagnosis of Schizoaffective disorder ([facility]    Patient's condition is improving. Patient requires continued inpatient hospitalization for further stabilization, safety monitoring and medication management. I will continue to coordinate the provision of individual, milieu, occupational, group, and substance abuse therapies to address target symptoms/diagnoses as deemed appropriate for the individual patient. A coordinated, multidisplinary treatment team round was conducted with the patient (this team consists of the nurse, psychiatric unit pharmcist,  and writer). Complete current electronic health record for patient has been reviewed today including consultant notes, ancillary staff notes, nurses and psychiatric tech notes. Suicide risk assessment completed and patient deemed to be of low risk for suicide at this time. The following regarding medications was addressed during rounds with patient:   the risks and benefits of the proposed medication. The patient was given the opportunity to ask questions. Informed consent given to the use of the above medications. Will continue to adjust psychiatric and non-psychiatric medications (see above \"medication\" section and orders section for details) as deemed appropriate & based upon diagnoses and response to treatment. I will continue to order blood tests/labs and diagnostic tests as deemed appropriate and review results as they become available (see orders for details and above listed lab/test results). I will order psychiatric records from previous [facility] hospitals to further elucidate the nature of patient's psychopathology and review once available. I will gather additional collateral information from friends, family and o/p treatment team to further elucidate the nature of patient's psychopathology and baselline level of psychiatric functioning. I certify that this patient's inpatient psychiatric hospital services furnished since the previous certification were, and continue to be, required for treatment that could reasonably be expected to improve the patient's condition, or for diagnostic study, and that the patient continues to need, on a daily basis, active treatment furnished directly by or requiring the supervision of inpatient psychiatric facility personnel. In addition, the hospital records show that services furnished were intensive treatment services, admission or related services, or equivalent services.     EXPECTED DISCHARGE DATE/DAY: 7/12/2019     DISPOSITION: Home       Signed By: Julián Plummer MD  7/10/2019

## 2019-07-11 LAB — N-METHYL-D-ASPARTATE RECPT IGG, 806372: NORMAL

## 2019-07-11 PROCEDURE — 74011250637 HC RX REV CODE- 250/637: Performed by: PSYCHIATRY & NEUROLOGY

## 2019-07-11 PROCEDURE — 74011250637 HC RX REV CODE- 250/637: Performed by: FAMILY MEDICINE

## 2019-07-11 PROCEDURE — 65220000003 HC RM SEMIPRIVATE PSYCH

## 2019-07-11 RX ORDER — CLONAZEPAM 0.5 MG/1
0.5 TABLET ORAL
Status: DISCONTINUED | OUTPATIENT
Start: 2019-07-11 | End: 2019-07-17

## 2019-07-11 RX ADMIN — TRAZODONE HYDROCHLORIDE 300 MG: 100 TABLET ORAL at 21:15

## 2019-07-11 RX ADMIN — ZOLPIDEM TARTRATE 10 MG: 10 TABLET ORAL at 22:02

## 2019-07-11 RX ADMIN — CLONAZEPAM 0.5 MG: 0.5 TABLET ORAL at 21:15

## 2019-07-11 RX ADMIN — CLONAZEPAM 0.5 MG: 0.5 TABLET ORAL at 16:13

## 2019-07-11 RX ADMIN — CLONAZEPAM 0.5 MG: 0.5 TABLET ORAL at 07:49

## 2019-07-11 RX ADMIN — RISPERIDONE 3 MG: 3 TABLET ORAL at 07:50

## 2019-07-11 RX ADMIN — RISPERIDONE 3 MG: 3 TABLET ORAL at 21:15

## 2019-07-11 RX ADMIN — METOPROLOL TARTRATE 25 MG: 25 TABLET ORAL at 21:15

## 2019-07-11 RX ADMIN — SENNOSIDES AND DOCUSATE SODIUM 1 TABLET: 8.6; 5 TABLET ORAL at 07:50

## 2019-07-11 RX ADMIN — METOPROLOL TARTRATE 25 MG: 25 TABLET ORAL at 07:52

## 2019-07-11 RX ADMIN — LEVOTHYROXINE SODIUM 50 MCG: 50 TABLET ORAL at 05:21

## 2019-07-11 RX ADMIN — HYDROXYZINE HYDROCHLORIDE 50 MG: 25 TABLET, FILM COATED ORAL at 22:02

## 2019-07-11 NOTE — BH NOTES
GROUP THERAPY PROGRESS NOTE    The patient Janneth carias 29 y.o. male is participating in [employer]. Group time: 1 hour    Personal goal for participation: To concentrate on selected task    Goal orientation: social    Group therapy participation: active    Therapeutic interventions reviewed and discussed: Crafts, games, music    Impression of participation: The patient was attentive.     Isra Olson  7/11/2019  6:52 PM

## 2019-07-11 NOTE — BH NOTES
PSYCHIATRIC PROGRESS NOTE         Patient Name  Sindy Omalley   Date of Birth 1985   [facility] [id number]   Medical Record Number  [id number]      Age  29 y.o. PCP None   Admit date:  7/1/2019    Room Number  327/01  @ [facility]   Date of Service  7/11/2019         E & M PROGRESS NOTE:         HISTORY       CC:  \"psychosis\"  HISTORY OF PRESENT ILLNESS/INTERVAL HISTORY:  (reviewed/updated 7/11/2019). per initial evaluation: The patient, Sindy Omalley, is a 29 y.o. WHITE [place]  male with a past psychiatric history significant for schizoaffective disorder vs schizophrenia, who presents at this time with complaints of (and/or evidence of) the following emotional symptoms: agitation, delusions and psychotic behavior. Additional symptomatology include AH and VH. The above symptoms have been present for 2+ weeks. These symptoms are of moderate to high severity. These symptoms are constant in nature. The patient's condition has been precipitated by psychosocial stressors. Patient's condition made worse by treatment noncompliance. UDS: negative; BAL=0.      The patient was readmitted from medical unit following rhabdomyolysis and SHYLA which resolved with IV hydration. He is grossly psychotic and has been noted to be confused. Patient is markedly disorganized and confused on interview.  He cannot state the days of the week backward despite effort.     From patient's medical admission:     The patient is a 30-year-old  man, who is currently admitted to the medical floor at [address] [place] [address] Drive was initially admitted on 06/17, to Dr. Marina Dorsey service on the psychiatry floor. [place] a couple of days, his CPK was found to be elevated to 1729 and he was transferred to the medical floor for treatment of rhabdomyolysis.  At that time, he did not have any rigidity, hyperthermia, or leukocytosis, which are indicative of neuroleptic malignant syndrome.  His CKs dropped immediately with hydration. Peggye Duverney has improved markedly and currently reports feeling better in a physical sense.  Also during his stay, his liver enzymes increased although, they were normal at baseline. Peggye Duverney does have a history of being positive for hepatitis C and has been treated with Isoniazid for latent tuberculosis in the past.  Currently, he remains on one-to-one observation and was agitated and aggressive at times yesterday. Peggye Duverney had to be given p.r.n.'s. Rosaura Comer had started him on a low dose of risperidone yesterday given the low likelihood of him having had NMS.  His CPK most recently was 1. Peggye Duverney continues to exhibit disorganized behavior and delusional thinking.  When I asked him, he stated that he wanted to leave and go stay with his mother but when Dr. Anil Akers, the hospitalist, asked him he stated that he wanted to go to the psychiatric floor because he does not feel safe going home.  At the present time, if the patient requests to be discharged home, my opinion is that Crisis should be called for TDO evaluation. Peggye Duverney denies any auditory hallucinations today, but was yesterday noted to be talking to himself. Peggye Duverney is a poor historian and unable to provide much history. 7/3- patient has remained largely illogical, at times appears to be hallucinating in his room, but is able to coherently describe his experience. Patient given PRN Geodon and Zyprexa with limited effect. Per mother Lasandra August patient was very sedated yesterday following Zyprexa administration, and does better with Geodon historically as a PRN. Patient continues to appear ill, with tachycardia unresolved despite fluid rehydration. 7/4- patient more visible, has been more coherent than 24 hours prior. Vitals still concerning, tachycardic and with autonomic instability evident. Medicine following. Patient endorses disorganized thoughts and anxiety.   7/5- patient largely unchanged, repeatedly asking for help from staff due to \"something in my head\" but coherent, slept 7 hours. Patient continues to endorse anxiety and restlessness. Patient still noted to be in moderate distress. Patient medication compliant. Repeatedly states he is dying. 7/6-Presents anxious and remains needy. Staff familiar to him reports slow improvement is noticed. Remains pre-occupied but compliant with meds. Prn used-Atarax. 7/7-Pt was moved to the General side yesterday. Continues to maintain improvement, less anxious. Slept 7 hrs. No prn's were used. 7/8- no acute overnight events. Patient has been visible, odd, standing with or at nursing station most of the day, continues to endorse problems \"in my head\" but has been pleasant, smiling inappropriately. Patient received CT scan of head, unremarkable 7/5. Medication compliant, continues to complain of anxiety. 7/9- patient slept 8 hours overnight, did not get PRNs, states he is doing better. Still odd, visible, standing by nursing station much of the day, confused for much of the day but with no specific complaints. EKG performed, QTc 407. SHYLA resolved. 7/10- patient visible, slept 8 hours medication compliant. He remains discharged focused, c/o constipation. Patient still disorganized but more coherent, no agitation or hallucinations x24 hours did not get PRNs for psychosis. 7/11- patient calm and cooperative, medication compliant. Has been visible, still odd, disorganized. Patient with no specific concerns, discharge focused. Per mom, patient still not at his baseline, but improved since admission. Constipation resolved though pt still c/o hard stools. NMDAr Ab negative. SIDE EFFECTS: (reviewed/updated 7/11/2019)  None reported or admitted to. ALLERGIES:(reviewed/updated 7/11/2019)  No Known Allergies   MEDICATIONS PRIOR TO ADMISSION:(reviewed/updated 7/11/2019)  Medications Prior to Admission   Medication Sig    levothyroxine (SYNTHROID) 50 mcg tablet Take 50 mcg by mouth Daily (before breakfast).     risperiDONE (RISPERDAL) 0.5 mg tablet Take 1.5 mg by mouth nightly. Risperidone 0.5 mg tabs: Take 3 tabs po every evening    pyridoxine, vitamin B6, (VITAMIN B-6) 50 mg tablet Take 50 mg by mouth daily.  isoniazid (NYDRAZID) 300 mg tablet Take 300 mg by mouth daily.  traZODone (DESYREL) 150 mg tablet Take 300 mg by mouth nightly. Indications: Insomnia      PAST MEDICAL HISTORY: Past medical history from the initial psychiatric evaluation has been reviewed (reviewed/updated 7/11/2019) with no additional updates (I asked patient and no additional past medical history provided). No past medical history on file. No past surgical history on file. SOCIAL HISTORY: Social history from the initial psychiatric evaluation has been reviewed (reviewed/updated 7/11/2019) with no additional updates (I asked patient and no additional social history provided).    Social History     Socioeconomic History    Marital status: SINGLE     Spouse name: Not on file    Number of children: Not on file    Years of education: Not on file    Highest education level: Not on file   Occupational History    Not on file   Social Needs    Financial resource strain: Not on file    Food insecurity:     Worry: Not on file     Inability: Not on file    Transportation needs:     Medical: Not on file     Non-medical: Not on file   Tobacco Use    Smoking status: Not on file   Substance and Sexual Activity    Alcohol use: Not on file    Drug use: Not on file    Sexual activity: Not on file   Lifestyle    Physical activity:     Days per week: Not on file     Minutes per session: Not on file    Stress: Not on file   Relationships    Social connections:     Talks on phone: Not on file     Gets together: Not on file     Attends Caodaism service: Not on file     Active member of club or organization: Not on file     Attends meetings of clubs or organizations: Not on file     Relationship status: Not on file    Intimate partner violence:     Fear of current or ex partner: Not on file     Emotionally abused: Not on file     Physically abused: Not on file     Forced sexual activity: Not on file   Other Topics Concern    Not on file   Social History Narrative    Not on file      FAMILY HISTORY: Family history from the initial psychiatric evaluation has been reviewed (reviewed/updated 7/11/2019) with no additional updates (I asked patient and no additional family history provided). No family history on file. REVIEW OF SYSTEMS: (reviewed/updated 7/11/2019)  Appetite:poor   Sleep: poor with DIMS (difficulty initiating & maintaining sleep)   All other Review of Systems: Negative except confusion per HPI         [facility] (MSE):    MSE FINDINGS ARE WITHIN NORMAL LIMITS (WNL) UNLESS OTHERWISE STATED BELOW. ( ALL OF THE BELOW CATEGORIES OF THE MSE HAVE BEEN REVIEWED (reviewed 7/11/2019) AND UPDATED AS DEEMED APPROPRIATE )  General Presentation age appropriate, cooperative   Orientation confused, disorganized, disoriented   Vital Signs  See below (reviewed 7/11/2019); Vital Signs (BP, Pulse, & Temp) are within normal limits if not listed below.    Gait and Station Stable/steady, no ataxia   Musculoskeletal System No extrapyramidal symptoms (EPS); no abnormal muscular movements or Tardive Dyskinesia (TD); muscle strength and tone are within normal limits   Language No aphasia or dysarthria   Speech:  hypoverbal and normal volume   Thought Processes illogical; normal rate of thoughts; poor abstract reasoning/computation   Thought Associations loose associations   Thought Content visual hallucinations and preoccupations   Suicidal Ideations none   Homicidal Ideations none   Mood:  anxious    Affect:  constricted and mood-congruent   Memory recent  impaired   Memory remote:  fair   Concentration/Attention:  impaired   Fund of Knowledge average   Insight:  limited   Reliability fair   Judgment:  impaired due to active psychosis          VITALS: Patient Vitals for the past 24 hrs:   Temp Pulse Resp BP SpO2   07/11/19 0748 97.3 °F (36.3 °C) 81 16 122/76 100 %   07/10/19 1930 98 °F (36.7 °C) 80 18 (!) 132/94 100 %     Wt Readings from Last 3 Encounters:   07/10/19 91.4 kg (201 lb 8 oz)   06/28/19 113.4 kg (250 lb)   06/18/19 113.4 kg (250 lb)     Temp Readings from Last 3 Encounters:   07/11/19 97.3 °F (36.3 °C)   07/01/19 98 °F (36.7 °C)   06/25/19 99.7 °F (37.6 °C)     BP Readings from Last 3 Encounters:   07/11/19 122/76   07/01/19 114/69   06/25/19 145/85     Pulse Readings from Last 3 Encounters:   07/11/19 81   07/01/19 87   06/25/19 (!) 107            DATA     LABORATORY DATA:(reviewed/updated 7/11/2019)  No results found for this or any previous visit (from the past 24 hour(s)). No results found for: VALF2, VALAC, VALP, VALPR, DS6, CRBAM, CRBAMP, CARB2, XCRBAM  No results found for: LITHM   RADIOLOGY REPORTS:(reviewed/updated 7/11/2019)  No results found.        MEDICATIONS     ALL MEDICATIONS:   Current Facility-Administered Medications   Medication Dose Route Frequency    clonazePAM (KlonoPIN) tablet 0.5 mg  0.5 mg Oral BID and QHS    senna-docusate (PERICOLACE) 8.6-50 mg per tablet 1 Tab  1 Tab Oral DAILY    risperiDONE (RisperDAL) tablet 3 mg  3 mg Oral Q12H    metoprolol tartrate (LOPRESSOR) tablet 25 mg  25 mg Oral Q12H    ziprasidone (GEODON) capsule 20 mg  20 mg Oral Q8H PRN    cloNIDine HCl (CATAPRES) tablet 0.1 mg  0.1 mg Oral Q2H PRN    levothyroxine (SYNTHROID) tablet 50 mcg  50 mcg Oral ACB    traZODone (DESYREL) tablet 300 mg  300 mg Oral QHS    zolpidem (AMBIEN) tablet 10 mg  10 mg Oral QHS PRN    ziprasidone (GEODON) 20 mg in sterile water (preservative free) 1 mL injection  20 mg IntraMUSCular BID PRN    benztropine (COGENTIN) tablet 2 mg  2 mg Oral BID PRN    benztropine (COGENTIN) injection 2 mg  2 mg IntraMUSCular BID PRN    LORazepam (ATIVAN) injection 2 mg  2 mg IntraMUSCular Q4H PRN    acetaminophen (TYLENOL) tablet 650 mg  650 mg Oral Q4H PRN    ibuprofen (MOTRIN) tablet 400 mg  400 mg Oral Q8H PRN    magnesium hydroxide (MILK OF MAGNESIA) 400 mg/5 mL oral suspension 30 mL  30 mL Oral DAILY PRN    nicotine (NICODERM CQ) 21 mg/24 hr patch 1 Patch  1 Patch TransDERmal DAILY PRN    hydrOXYzine HCl (ATARAX) tablet 50 mg  50 mg Oral Q6H PRN      SCHEDULED MEDICATIONS:   Current Facility-Administered Medications   Medication Dose Route Frequency    clonazePAM (KlonoPIN) tablet 0.5 mg  0.5 mg Oral BID and QHS    senna-docusate (PERICOLACE) 8.6-50 mg per tablet 1 Tab  1 Tab Oral DAILY    risperiDONE (RisperDAL) tablet 3 mg  3 mg Oral Q12H    metoprolol tartrate (LOPRESSOR) tablet 25 mg  25 mg Oral Q12H    levothyroxine (SYNTHROID) tablet 50 mcg  50 mcg Oral ACB    traZODone (DESYREL) tablet 300 mg  300 mg Oral QHS          ASSESSMENT & PLAN     DIAGNOSES REQUIRING ACTIVE TREATMENT AND MONITORING: (reviewed/updated 7/11/2019)  Patient Active Hospital Problem List:   Schizoaffective disorder ([facility] 75.) (6/18/2019)    Assessment: patient with ongoing psychotic presentation, had previously been stabilized on Invega and Seroquel, now on Risperdal s/p rhabdomyolysis and SHYLA due to dehydration vs iatrogenic. Will continue psychiatric stabilization and monitor vitals. Patient still tachycardic, may require additional IV fluids.   - CONTINUE Risperdal 2 mg Q12H for psychosis  - CONTINUE Klonopin 0.5 mg TID for agitation and anxiety  - CONTINUE bowel regimen  - IGM therapy as tolerated  - Expand database / obtain collateral  - Dispo planning    In summary, Jacque Reynolds, is a 29 y.o.  male who presents with a severe exacerbation of the principal diagnosis of Schizoaffective disorder ([facility]    Patient's condition is improving. Patient requires continued inpatient hospitalization for further stabilization, safety monitoring and medication management.   I will continue to coordinate the provision of individual, milieu, occupational, group, and substance abuse therapies to address target symptoms/diagnoses as deemed appropriate for the individual patient. A coordinated, multidisplinary treatment team round was conducted with the patient (this team consists of the nurse, psychiatric unit pharmcist,  and writer). Complete current electronic health record for patient has been reviewed today including consultant notes, ancillary staff notes, nurses and psychiatric tech notes. Suicide risk assessment completed and patient deemed to be of low risk for suicide at this time. The following regarding medications was addressed during rounds with patient:   the risks and benefits of the proposed medication. The patient was given the opportunity to ask questions. Informed consent given to the use of the above medications. Will continue to adjust psychiatric and non-psychiatric medications (see above \"medication\" section and orders section for details) as deemed appropriate & based upon diagnoses and response to treatment. I will continue to order blood tests/labs and diagnostic tests as deemed appropriate and review results as they become available (see orders for details and above listed lab/test results). I will order psychiatric records from previous [facility] hospitals to further elucidate the nature of patient's psychopathology and review once available. I will gather additional collateral information from friends, family and o/p treatment team to further elucidate the nature of patient's psychopathology and baselline level of psychiatric functioning.          I certify that this patient's inpatient psychiatric hospital services furnished since the previous certification were, and continue to be, required for treatment that could reasonably be expected to improve the patient's condition, or for diagnostic study, and that the patient continues to need, on a daily basis, active treatment furnished directly by or requiring the supervision of inpatient psychiatric facility personnel. In addition, the hospital records show that services furnished were intensive treatment services, admission or related services, or equivalent services.     EXPECTED DISCHARGE DATE/DAY: 7/12/2019     DISPOSITION: Home       Signed By:   Víctor Redd MD  7/11/2019

## 2019-07-11 NOTE — BH NOTES
0800 pt is visible on the unit.     0900 pt is medication complaint. Remains disorganized. Needy. Restless. Require redirection. Visible on the unit. Ate breakfast.     1000 pt is visible on the unit. 1100 pt is visible on the unit. 1200 pt is visible on the unit. Ate lunch. 1300 pt is visible on the unit    1400 pt is visible on the unit. 1500 pt is visible on the unit. 1600 pt is visible on the unit.

## 2019-07-11 NOTE — BH NOTES
GROUP THERAPY PROGRESS NOTE    The patient Casper Wolfe a 29 y.o. male is participating in Oculo Therapy. Group time: 45 minutes    Personal goal for participation: To participate in anger management [employer] game    Goal orientation:  personal    Group therapy participation: active    Therapeutic interventions reviewed and discussed: things pertaining to anger    Impression of participation:  The patient was attentive.     Pio Dyer  7/11/2019  6:33 PM

## 2019-07-12 PROCEDURE — 74011250637 HC RX REV CODE- 250/637: Performed by: FAMILY MEDICINE

## 2019-07-12 PROCEDURE — 74011250637 HC RX REV CODE- 250/637: Performed by: PSYCHIATRY & NEUROLOGY

## 2019-07-12 PROCEDURE — 65220000003 HC RM SEMIPRIVATE PSYCH

## 2019-07-12 RX ORDER — QUETIAPINE FUMARATE 25 MG/1
50 TABLET, FILM COATED ORAL
Status: DISCONTINUED | OUTPATIENT
Start: 2019-07-12 | End: 2019-07-15

## 2019-07-12 RX ADMIN — LEVOTHYROXINE SODIUM 50 MCG: 50 TABLET ORAL at 05:47

## 2019-07-12 RX ADMIN — CLONIDINE HYDROCHLORIDE 0.1 MG: 0.1 TABLET ORAL at 19:41

## 2019-07-12 RX ADMIN — CLONAZEPAM 0.5 MG: 0.5 TABLET ORAL at 08:07

## 2019-07-12 RX ADMIN — ZOLPIDEM TARTRATE 10 MG: 10 TABLET ORAL at 21:21

## 2019-07-12 RX ADMIN — SENNOSIDES AND DOCUSATE SODIUM 1 TABLET: 8.6; 5 TABLET ORAL at 08:07

## 2019-07-12 RX ADMIN — RISPERIDONE 3 MG: 3 TABLET ORAL at 21:21

## 2019-07-12 RX ADMIN — HYDROXYZINE HYDROCHLORIDE 50 MG: 25 TABLET, FILM COATED ORAL at 19:41

## 2019-07-12 RX ADMIN — METOPROLOL TARTRATE 25 MG: 25 TABLET ORAL at 21:21

## 2019-07-12 RX ADMIN — TRAZODONE HYDROCHLORIDE 300 MG: 100 TABLET ORAL at 21:21

## 2019-07-12 RX ADMIN — CLONAZEPAM 0.5 MG: 0.5 TABLET ORAL at 17:12

## 2019-07-12 RX ADMIN — CLONAZEPAM 0.5 MG: 0.5 TABLET ORAL at 21:21

## 2019-07-12 RX ADMIN — METOPROLOL TARTRATE 25 MG: 25 TABLET ORAL at 08:09

## 2019-07-12 RX ADMIN — QUETIAPINE FUMARATE 50 MG: 25 TABLET ORAL at 21:26

## 2019-07-12 RX ADMIN — RISPERIDONE 3 MG: 3 TABLET ORAL at 08:07

## 2019-07-12 NOTE — BH NOTES
PSYCHIATRIC PROGRESS NOTE         Patient Name  Avery Leyva   Date of Birth 1985   [facility] [id number]   Medical Record Number  [id number]      Age  29 y.o. PCP None   Admit date:  7/1/2019    Room Number  327/01  @ [facility]   Date of Service  7/12/2019         E & M PROGRESS NOTE:         HISTORY       CC:  \"psychosis\"  HISTORY OF PRESENT ILLNESS/INTERVAL HISTORY:  (reviewed/updated 7/12/2019). per initial evaluation: The patient, Avery Leyva, is a 29 y.o. WHITE [place]  male with a past psychiatric history significant for schizoaffective disorder vs schizophrenia, who presents at this time with complaints of (and/or evidence of) the following emotional symptoms: agitation, delusions and psychotic behavior. Additional symptomatology include AH and VH. The above symptoms have been present for 2+ weeks. These symptoms are of moderate to high severity. These symptoms are constant in nature. The patient's condition has been precipitated by psychosocial stressors. Patient's condition made worse by treatment noncompliance. UDS: negative; BAL=0.      The patient was readmitted from medical unit following rhabdomyolysis and SHYLA which resolved with IV hydration. He is grossly psychotic and has been noted to be confused. Patient is markedly disorganized and confused on interview.  He cannot state the days of the week backward despite effort.     From patient's medical admission:     The patient is a 60-year-old  man, who is currently admitted to the medical floor at [address] [place] [address] Drive was initially admitted on 06/17, to Dr. Veronica Porter service on the psychiatry floor. [place] a couple of days, his CPK was found to be elevated to 1729 and he was transferred to the medical floor for treatment of rhabdomyolysis.  At that time, he did not have any rigidity, hyperthermia, or leukocytosis, which are indicative of neuroleptic malignant syndrome.  His CKs dropped immediately with hydration. Daniela Feng has improved markedly and currently reports feeling better in a physical sense.  Also during his stay, his liver enzymes increased although, they were normal at baseline. Daniela Feng does have a history of being positive for hepatitis C and has been treated with Isoniazid for latent tuberculosis in the past.  Currently, he remains on one-to-one observation and was agitated and aggressive at times yesterday. Daniela Feng had to be given p.r.n.'s. [facility] had started him on a low dose of risperidone yesterday given the low likelihood of him having had NMS.  His CPK most recently was 1. Daniela Feng continues to exhibit disorganized behavior and delusional thinking.  When I asked him, he stated that he wanted to leave and go stay with his mother but when Dr. Danisha Saldivar, the hospitalist, asked him he stated that he wanted to go to the psychiatric floor because he does not feel safe going home.  At the present time, if the patient requests to be discharged home, my opinion is that Crisis should be called for TDO evaluation. Daniela Feng denies any auditory hallucinations today, but was yesterday noted to be talking to himself. Daniela Feng is a poor historian and unable to provide much history. 7/3- patient has remained largely illogical, at times appears to be hallucinating in his room, but is able to coherently describe his experience. Patient given PRN Geodon and Zyprexa with limited effect. Per mother Kady Vyas patient was very sedated yesterday following Zyprexa administration, and does better with Geodon historically as a PRN. Patient continues to appear ill, with tachycardia unresolved despite fluid rehydration. 7/4- patient more visible, has been more coherent than 24 hours prior. Vitals still concerning, tachycardic and with autonomic instability evident. Medicine following. Patient endorses disorganized thoughts and anxiety.   7/5- patient largely unchanged, repeatedly asking for help from staff due to \"something in my head\" but coherent, slept 7 hours. Patient continues to endorse anxiety and restlessness. Patient still noted to be in moderate distress. Patient medication compliant. Repeatedly states he is dying. 7/6-Presents anxious and remains needy. Staff familiar to him reports slow improvement is noticed. Remains pre-occupied but compliant with meds. Prn used-Atarax. 7/7-Pt was moved to the General side yesterday. Continues to maintain improvement, less anxious. Slept 7 hrs. No prn's were used. 7/8- no acute overnight events. Patient has been visible, odd, standing with or at nursing station most of the day, continues to endorse problems \"in my head\" but has been pleasant, smiling inappropriately. Patient received CT scan of head, unremarkable 7/5. Medication compliant, continues to complain of anxiety. 7/9- patient slept 8 hours overnight, did not get PRNs, states he is doing better. Still odd, visible, standing by nursing station much of the day, confused for much of the day but with no specific complaints. EKG performed, QTc 407. SHYLA resolved. 7/10- patient visible, slept 8 hours medication compliant. He remains discharged focused, c/o constipation. Patient still disorganized but more coherent, no agitation or hallucinations x24 hours did not get PRNs for psychosis. 7/11- patient calm and cooperative, medication compliant. Has been visible, still odd, disorganized. Patient with no specific concerns, discharge focused. Per mom, patient still not at his baseline, but improved since admission. Constipation resolved though pt still c/o hard stools. NMDAr Ab negative. 7/12- no acute overnight events. Patient for discharge this morning but made grossly paranoid statements to treatment team (\"the nurses are trying to kill me\") and continues to be anxious. The decision was made to hold the patient for further observation and titrate antipsychotic given ongoing PI and limited home resources over the weekend.         SIDE EFFECTS: (reviewed/updated 7/12/2019)  None reported or admitted to. ALLERGIES:(reviewed/updated 7/12/2019)  No Known Allergies   MEDICATIONS PRIOR TO ADMISSION:(reviewed/updated 7/12/2019)  Medications Prior to Admission   Medication Sig    levothyroxine (SYNTHROID) 50 mcg tablet Take 50 mcg by mouth Daily (before breakfast).  risperiDONE (RISPERDAL) 0.5 mg tablet Take 1.5 mg by mouth nightly. Risperidone 0.5 mg tabs: Take 3 tabs po every evening    pyridoxine, vitamin B6, (VITAMIN B-6) 50 mg tablet Take 50 mg by mouth daily.  isoniazid (NYDRAZID) 300 mg tablet Take 300 mg by mouth daily.  traZODone (DESYREL) 150 mg tablet Take 300 mg by mouth nightly. Indications: Insomnia      PAST MEDICAL HISTORY: Past medical history from the initial psychiatric evaluation has been reviewed (reviewed/updated 7/12/2019) with no additional updates (I asked patient and no additional past medical history provided). No past medical history on file. No past surgical history on file. SOCIAL HISTORY: Social history from the initial psychiatric evaluation has been reviewed (reviewed/updated 7/12/2019) with no additional updates (I asked patient and no additional social history provided).    Social History     Socioeconomic History    Marital status: SINGLE     Spouse name: Not on file    Number of children: Not on file    Years of education: Not on file    Highest education level: Not on file   Occupational History    Not on file   Social Needs    Financial resource strain: Not on file    Food insecurity:     Worry: Not on file     Inability: Not on file    Transportation needs:     Medical: Not on file     Non-medical: Not on file   Tobacco Use    Smoking status: Not on file   Substance and Sexual Activity    Alcohol use: Not on file    Drug use: Not on file    Sexual activity: Not on file   Lifestyle    Physical activity:     Days per week: Not on file     Minutes per session: Not on file    Stress: Not on file Relationships    Social connections:     Talks on phone: Not on file     Gets together: Not on file     Attends Hinduism service: Not on file     Active member of club or organization: Not on file     Attends meetings of clubs or organizations: Not on file     Relationship status: Not on file    Intimate partner violence:     Fear of current or ex partner: Not on file     Emotionally abused: Not on file     Physically abused: Not on file     Forced sexual activity: Not on file   Other Topics Concern    Not on file   Social History Narrative    Not on file      FAMILY HISTORY: Family history from the initial psychiatric evaluation has been reviewed (reviewed/updated 7/12/2019) with no additional updates (I asked patient and no additional family history provided). No family history on file. REVIEW OF SYSTEMS: (reviewed/updated 7/12/2019)  Appetite:poor   Sleep: poor with DIMS (difficulty initiating & maintaining sleep)   All other Review of Systems: Negative except confusion per HPI         [facility] (MSE):    MSE FINDINGS ARE WITHIN NORMAL LIMITS (WNL) UNLESS OTHERWISE STATED BELOW. ( ALL OF THE BELOW CATEGORIES OF THE MSE HAVE BEEN REVIEWED (reviewed 7/12/2019) AND UPDATED AS DEEMED APPROPRIATE )  General Presentation age appropriate, cooperative   Orientation confused, disorganized, disoriented   Vital Signs  See below (reviewed 7/12/2019); Vital Signs (BP, Pulse, & Temp) are within normal limits if not listed below.    Gait and Station Stable/steady, no ataxia   Musculoskeletal System No extrapyramidal symptoms (EPS); no abnormal muscular movements or Tardive Dyskinesia (TD); muscle strength and tone are within normal limits   Language No aphasia or dysarthria   Speech:  hypoverbal and normal volume   Thought Processes illogical; normal rate of thoughts; poor abstract reasoning/computation   Thought Associations loose associations   Thought Content visual hallucinations and preoccupations   Suicidal Ideations none   Homicidal Ideations none   Mood:  anxious    Affect:  constricted and mood-congruent   Memory recent  impaired   Memory remote:  fair   Concentration/Attention:  impaired   Fund of Knowledge average   Insight:  limited   Reliability fair   Judgment:  impaired due to active psychosis          VITALS:     Patient Vitals for the past 24 hrs:   Temp Pulse Resp BP SpO2   07/12/19 0721 98 °F (36.7 °C) 98 16 124/89 99 %   07/11/19 1935 98.3 °F (36.8 °C) 86 17 120/88    07/11/19 1614  78  128/79      Wt Readings from Last 3 Encounters:   07/10/19 91.4 kg (201 lb 8 oz)   06/28/19 113.4 kg (250 lb)   06/18/19 113.4 kg (250 lb)     Temp Readings from Last 3 Encounters:   07/12/19 98 °F (36.7 °C)   07/01/19 98 °F (36.7 °C)   06/25/19 99.7 °F (37.6 °C)     BP Readings from Last 3 Encounters:   07/12/19 124/89   07/01/19 114/69   06/25/19 145/85     Pulse Readings from Last 3 Encounters:   07/12/19 98   07/01/19 87   06/25/19 (!) 107            DATA     LABORATORY DATA:(reviewed/updated 7/12/2019)  No results found for this or any previous visit (from the past 24 hour(s)). No results found for: VALF2, VALAC, VALP, VALPR, DS6, CRBAM, CRBAMP, CARB2, XCRBAM  No results found for: LITHM   RADIOLOGY REPORTS:(reviewed/updated 7/12/2019)  No results found.        MEDICATIONS     ALL MEDICATIONS:   Current Facility-Administered Medications   Medication Dose Route Frequency    clonazePAM (KlonoPIN) tablet 0.5 mg  0.5 mg Oral BID and QHS    senna-docusate (PERICOLACE) 8.6-50 mg per tablet 1 Tab  1 Tab Oral DAILY    risperiDONE (RisperDAL) tablet 3 mg  3 mg Oral Q12H    metoprolol tartrate (LOPRESSOR) tablet 25 mg  25 mg Oral Q12H    ziprasidone (GEODON) capsule 20 mg  20 mg Oral Q8H PRN    cloNIDine HCl (CATAPRES) tablet 0.1 mg  0.1 mg Oral Q2H PRN    levothyroxine (SYNTHROID) tablet 50 mcg  50 mcg Oral ACB    traZODone (DESYREL) tablet 300 mg  300 mg Oral QHS    zolpidem (AMBIEN) tablet 10 mg  10 mg Oral QHS PRN    ziprasidone (GEODON) 20 mg in sterile water (preservative free) 1 mL injection  20 mg IntraMUSCular BID PRN    benztropine (COGENTIN) tablet 2 mg  2 mg Oral BID PRN    benztropine (COGENTIN) injection 2 mg  2 mg IntraMUSCular BID PRN    LORazepam (ATIVAN) injection 2 mg  2 mg IntraMUSCular Q4H PRN    acetaminophen (TYLENOL) tablet 650 mg  650 mg Oral Q4H PRN    ibuprofen (MOTRIN) tablet 400 mg  400 mg Oral Q8H PRN    magnesium hydroxide (MILK OF MAGNESIA) 400 mg/5 mL oral suspension 30 mL  30 mL Oral DAILY PRN    nicotine (NICODERM CQ) 21 mg/24 hr patch 1 Patch  1 Patch TransDERmal DAILY PRN    hydrOXYzine HCl (ATARAX) tablet 50 mg  50 mg Oral Q6H PRN      SCHEDULED MEDICATIONS:   Current Facility-Administered Medications   Medication Dose Route Frequency    clonazePAM (KlonoPIN) tablet 0.5 mg  0.5 mg Oral BID and QHS    senna-docusate (PERICOLACE) 8.6-50 mg per tablet 1 Tab  1 Tab Oral DAILY    risperiDONE (RisperDAL) tablet 3 mg  3 mg Oral Q12H    metoprolol tartrate (LOPRESSOR) tablet 25 mg  25 mg Oral Q12H    levothyroxine (SYNTHROID) tablet 50 mcg  50 mcg Oral ACB    traZODone (DESYREL) tablet 300 mg  300 mg Oral QHS          ASSESSMENT & PLAN     DIAGNOSES REQUIRING ACTIVE TREATMENT AND MONITORING: (reviewed/updated 7/12/2019)  Patient Active Hospital Problem List:   Schizoaffective disorder ([facility] [address] (6/18/2019)    Assessment: patient with ongoing psychotic presentation, had previously been stabilized on Invega and Seroquel, now on Risperdal s/p rhabdomyolysis and SHYLA due to dehydration vs iatrogenic. Will continue psychiatric stabilization and monitor vitals. Patient still tachycardic, may require additional IV fluids.    - INCREASE Risperdal to 3 mg Q12H for psychosis  - START Seroquel 50 mg QHS for psychosis, adjunct  - CONTINUE Klonopin 0.5 mg TID for agitation and anxiety  - CONTINUE bowel regimen  - IGM therapy as tolerated  - Expand database / obtain collateral  - Dispo planning    Patient discharged on two antipsychotics now due to relative refractoriness to treatment thus far. Prior to admission patient had THREE or more failed trails of monotherapy, including: Haldol, Seroquel and Risperdal     Patient is a very slow responder to medications. Risks and benefits in the use of two antipsychotics have been weighed in full, including the risk of metabolic syndrome and the potential increased risk of QTc  Based on this analysis, it is considered favorable for the utilization of two antipsychotics. In the future, once stable on the two antipsychotics, patient can possibly be tapered to one of the chosen antipsychotics. Will check on EKG results tomorrow to monitor QTc. In summary, Kristina Ledezma, is a 29 y.o.  male who presents with a severe exacerbation of the principal diagnosis of Schizoaffective disorder ([facility] [address]    Patient's condition is worsening. Patient requires continued inpatient hospitalization for further stabilization, safety monitoring and medication management. I will continue to coordinate the provision of individual, milieu, occupational, group, and substance abuse therapies to address target symptoms/diagnoses as deemed appropriate for the individual patient. A coordinated, multidisplinary treatment team round was conducted with the patient (this team consists of the nurse, psychiatric unit pharmcist,  and writer). Complete current electronic health record for patient has been reviewed today including consultant notes, ancillary staff notes, nurses and psychiatric tech notes. Suicide risk assessment completed and patient deemed to be of low risk for suicide at this time. The following regarding medications was addressed during rounds with patient:   the risks and benefits of the proposed medication. The patient was given the opportunity to ask questions.  Informed consent given to the use of the above medications. Will continue to adjust psychiatric and non-psychiatric medications (see above \"medication\" section and orders section for details) as deemed appropriate & based upon diagnoses and response to treatment. I will continue to order blood tests/labs and diagnostic tests as deemed appropriate and review results as they become available (see orders for details and above listed lab/test results). I will order psychiatric records from previous [facility] hospitals to further elucidate the nature of patient's psychopathology and review once available. I will gather additional collateral information from friends, family and o/p treatment team to further elucidate the nature of patient's psychopathology and baselline level of psychiatric functioning. I certify that this patient's inpatient psychiatric hospital services furnished since the previous certification were, and continue to be, required for treatment that could reasonably be expected to improve the patient's condition, or for diagnostic study, and that the patient continues to need, on a daily basis, active treatment furnished directly by or requiring the supervision of inpatient psychiatric facility personnel. In addition, the hospital records show that services furnished were intensive treatment services, admission or related services, or equivalent services.     EXPECTED DISCHARGE DATE/DAY: 7/15/2019     DISPOSITION: Home       Signed By:   Zoë Nelson MD  7/12/2019

## 2019-07-12 NOTE — BH NOTES
GROUP THERAPY PROGRESS NOTE    Pieter Fountain is participating in [facility] [place] group. Group time: 1 hour    Personal goal for participation: To provide education about professionals who work in the mental health system. To decrease admission through knowledge of different programs and providers and help participants determine which treatment options best meet their needs upon discharge. Goal orientation: personal    Group therapy participation: minimal    Therapeutic interventions reviewed and discussed: Group discussion of different providers that may be involved in their mental health care. Education and group discussion about different treatment options and programs that may be applicable to their care. Discussion about self-advocacy and the need to follow up with services upon discharge. Impression of participation: Patient attended group and attempted to participate, however he appeared internally preoccupied and at times made statements that did not make sense.       Melani Rangel MA

## 2019-07-12 NOTE — BH NOTES
Patient has been standing at the nursing station on and off saying he needs his blood pressure taken saying I need medical help. Eventually he had a small nose bleed that stopped after a short time.  Patient exhibiting anxiety PRN atarax and ambien were given  Hourly rounding done  Slept 6.5 hours

## 2019-07-12 NOTE — BH NOTES
0800 pt is visible on the unit.     0900 pt is medication complaint. Remains disorganized in conversation. Meal complaint. 1000 pt visible on the unit pacing. 1100 pt is visible on the unit. 1200 pt is visible on the unit. Meal complaint. 1300 pt is visible on the unit. 1400 pt is visible on the unit. 1500 pt is visible on the unit. 1600 pt is visible on the unit. 1700 pt is visible on the unit. Meal complaint    1800 pt is visible on the unit.

## 2019-07-12 NOTE — BH NOTES
GROUP THERAPY PROGRESS NOTE    The patient Yakelin Bass a 29 y.o. male is participating in [employer] Group. Group time: 1 hour    Personal goal for participation: To concentrate on selected task    Goal orientation: social    Group therapy participation: active    Therapeutic interventions reviewed and discussed: Crafts, games, music    Impression of participation: The patient was attentive.     [facility]  7/12/2019  4:22 PM

## 2019-07-12 NOTE — BH NOTES
GROUP THERAPY PROGRESS NOTE    The patient Severiano Bumps a 29 y.o. male is participating in [employer]. Group time: 45 minutes    Personal goal for participation: To participate in self esteem booster    Goal orientation:  personal    Group therapy participation: active    Therapeutic interventions reviewed and discussed: personal affirmations    Impression of participation:  The patient was attentive.     Keri Courser Giuliano  7/12/2019  2:15 PM

## 2019-07-13 PROCEDURE — 74011250637 HC RX REV CODE- 250/637: Performed by: FAMILY MEDICINE

## 2019-07-13 PROCEDURE — 65220000003 HC RM SEMIPRIVATE PSYCH

## 2019-07-13 PROCEDURE — 74011000250 HC RX REV CODE- 250: Performed by: PSYCHIATRY & NEUROLOGY

## 2019-07-13 PROCEDURE — 74011250637 HC RX REV CODE- 250/637: Performed by: PSYCHIATRY & NEUROLOGY

## 2019-07-13 PROCEDURE — 74011250636 HC RX REV CODE- 250/636: Performed by: PSYCHIATRY & NEUROLOGY

## 2019-07-13 RX ADMIN — CLONAZEPAM 0.5 MG: 0.5 TABLET ORAL at 21:41

## 2019-07-13 RX ADMIN — BENZTROPINE MESYLATE 2 MG: 2 TABLET ORAL at 12:40

## 2019-07-13 RX ADMIN — TRAZODONE HYDROCHLORIDE 300 MG: 100 TABLET ORAL at 21:41

## 2019-07-13 RX ADMIN — CLONAZEPAM 0.5 MG: 0.5 TABLET ORAL at 09:00

## 2019-07-13 RX ADMIN — HYDROXYZINE HYDROCHLORIDE 50 MG: 25 TABLET, FILM COATED ORAL at 17:36

## 2019-07-13 RX ADMIN — HYDROXYZINE HYDROCHLORIDE 50 MG: 25 TABLET, FILM COATED ORAL at 12:38

## 2019-07-13 RX ADMIN — METOPROLOL TARTRATE 25 MG: 25 TABLET ORAL at 21:41

## 2019-07-13 RX ADMIN — WATER 20 MG: 1 INJECTION INTRAMUSCULAR; INTRAVENOUS; SUBCUTANEOUS at 13:10

## 2019-07-13 RX ADMIN — CLONAZEPAM 0.5 MG: 0.5 TABLET ORAL at 17:36

## 2019-07-13 RX ADMIN — RISPERIDONE 3 MG: 3 TABLET ORAL at 21:41

## 2019-07-13 RX ADMIN — LEVOTHYROXINE SODIUM 50 MCG: 50 TABLET ORAL at 06:33

## 2019-07-13 RX ADMIN — RISPERIDONE 3 MG: 3 TABLET ORAL at 09:00

## 2019-07-13 RX ADMIN — QUETIAPINE FUMARATE 50 MG: 25 TABLET ORAL at 21:41

## 2019-07-13 RX ADMIN — METOPROLOL TARTRATE 25 MG: 25 TABLET ORAL at 09:00

## 2019-07-13 RX ADMIN — SENNOSIDES AND DOCUSATE SODIUM 1 TABLET: 8.6; 5 TABLET ORAL at 09:00

## 2019-07-13 NOTE — BH NOTES
1900 Assumed care of Pt at this time. 2000 VSS Mood and affect are labile NAD  2200 Medication compliant  0100 Appears to be sleeping  0240 Appears to be sleeping  0630 Has appeared to sleep ~7 hours this shift. Hourly nursing rounds done per protocol.

## 2019-07-13 NOTE — BH NOTES
Pt appears very tense and anxious. Expressing some paranoia about some staff stealing his identity. Verdis Nose is very illogical.  Starts to say something then stops and acts like he forgets what he was going to say.

## 2019-07-13 NOTE — BH NOTES
PSYCHIATRIC PROGRESS NOTE     Weekend Coverage    Patient Name  Erlin Victoria   Date of Birth 1985   [facility] [id number]   Medical Record Number  [id number]      Age  29 y.o. PCP None   Admit date:  7/1/2019    Room Number  327/01  @ [facility]   Date of Service  7/13/2019         E & M PROGRESS NOTE:         HISTORY       CC:  \"psychosis\"  HISTORY OF PRESENT ILLNESS/INTERVAL HISTORY:  (reviewed/updated 7/13/2019). per initial evaluation: The patient, Erlin Victoria, is a 29 y.o. WHITE [place]  male with a past psychiatric history significant for schizoaffective disorder vs schizophrenia, who presents at this time with complaints of (and/or evidence of) the following emotional symptoms: agitation, delusions and psychotic behavior. Additional symptomatology include AH and VH. The above symptoms have been present for 2+ weeks. These symptoms are of moderate to high severity. These symptoms are constant in nature. The patient's condition has been precipitated by psychosocial stressors. Patient's condition made worse by treatment noncompliance. UDS: negative; BAL=0.      The patient was readmitted from medical unit following rhabdomyolysis and SHYLA which resolved with IV hydration. He is grossly psychotic and has been noted to be confused. Patient is markedly disorganized and confused on interview.  He cannot state the days of the week backward despite effort.     From patient's medical admission:     The patient is a 44-year-old  man, who is currently admitted to the medical floor at [address] [place] [address] Drive was initially admitted on 06/17, to Dr. Cassidy Leyva service on the psychiatry floor. Stacey Lockeford a couple of days, his CPK was found to be elevated to 1729 and he was transferred to the medical floor for treatment of rhabdomyolysis.  At that time, he did not have any rigidity, hyperthermia, or leukocytosis, which are indicative of neuroleptic malignant syndrome.  His CKs dropped immediately with hydration. Kevin Correia has improved markedly and currently reports feeling better in a physical sense.  Also during his stay, his liver enzymes increased although, they were normal at baseline. Kevin Correia does have a history of being positive for hepatitis C and has been treated with Isoniazid for latent tuberculosis in the past.  Currently, he remains on one-to-one observation and was agitated and aggressive at times yesterday. Kevin Correia had to be given p.r.n.'s. Low Salmon had started him on a low dose of risperidone yesterday given the low likelihood of him having had NMS.  His CPK most recently was 1. Kevin Correia continues to exhibit disorganized behavior and delusional thinking.  When I asked him, he stated that he wanted to leave and go stay with his mother but when Dr. Iam Smith, the hospitalist, asked him he stated that he wanted to go to the psychiatric floor because he does not feel safe going home.  At the present time, if the patient requests to be discharged home, my opinion is that Crisis should be called for TDO evaluation. Kevin Correia denies any auditory hallucinations today, but was yesterday noted to be talking to himself. Kevin Correia is a poor historian and unable to provide much history. 7/3- patient has remained largely illogical, at times appears to be hallucinating in his room, but is able to coherently describe his experience. Patient given PRN Geodon and Zyprexa with limited effect. Per mother Doroteo Oconnell patient was very sedated yesterday following Zyprexa administration, and does better with Geodon historically as a PRN. Patient continues to appear ill, with tachycardia unresolved despite fluid rehydration. 7/4- patient more visible, has been more coherent than 24 hours prior. Vitals still concerning, tachycardic and with autonomic instability evident. Medicine following. Patient endorses disorganized thoughts and anxiety.   7/5- patient largely unchanged, repeatedly asking for help from staff due to \"something in my head\" but coherent, slept 7 hours. Patient continues to endorse anxiety and restlessness. Patient still noted to be in moderate distress. Patient medication compliant. Repeatedly states he is dying. 7/6-Presents anxious and remains needy. Staff familiar to him reports slow improvement is noticed. Remains pre-occupied but compliant with meds. Prn used-Atarax. 7/7-Pt was moved to the General side yesterday. Continues to maintain improvement, less anxious. Slept 7 hrs. No prn's were used. 7/8- no acute overnight events. Patient has been visible, odd, standing with or at nursing station most of the day, continues to endorse problems \"in my head\" but has been pleasant, smiling inappropriately. Patient received CT scan of head, unremarkable 7/5. Medication compliant, continues to complain of anxiety. 7/9- patient slept 8 hours overnight, did not get PRNs, states he is doing better. Still odd, visible, standing by nursing station much of the day, confused for much of the day but with no specific complaints. EKG performed, QTc 407. SHYLA resolved. 7/10- patient visible, slept 8 hours medication compliant. He remains discharged focused, c/o constipation. Patient still disorganized but more coherent, no agitation or hallucinations x24 hours did not get PRNs for psychosis. 7/11- patient calm and cooperative, medication compliant. Has been visible, still odd, disorganized. Patient with no specific concerns, discharge focused. Per mom, patient still not at his baseline, but improved since admission. Constipation resolved though pt still c/o hard stools. NMDAr Ab negative. 7/12- no acute overnight events. Patient for discharge this morning but made grossly paranoid statements to treatment team (\"the nurses are trying to kill me\") and continues to be anxious. The decision was made to hold the patient for further observation and titrate antipsychotic given ongoing PI and limited home resources over the weekend.   7/13Odetcarmella Bark reports feeling well and moods are alright. Continues to be bizarre and paranoid. Denies SI/HI/AH/VH. No aggression or violence. Appropriately interactive and aware. Tolerating medications well. Eating and sleeping fairly. SIDE EFFECTS: (reviewed/updated 7/13/2019)  None reported or admitted to. ALLERGIES:(reviewed/updated 7/13/2019)  No Known Allergies   MEDICATIONS PRIOR TO ADMISSION:(reviewed/updated 7/13/2019)  Medications Prior to Admission   Medication Sig    levothyroxine (SYNTHROID) 50 mcg tablet Take 50 mcg by mouth Daily (before breakfast).  risperiDONE (RISPERDAL) 0.5 mg tablet Take 1.5 mg by mouth nightly. Risperidone 0.5 mg tabs: Take 3 tabs po every evening    pyridoxine, vitamin B6, (VITAMIN B-6) 50 mg tablet Take 50 mg by mouth daily.  isoniazid (NYDRAZID) 300 mg tablet Take 300 mg by mouth daily.  traZODone (DESYREL) 150 mg tablet Take 300 mg by mouth nightly. Indications: Insomnia      PAST MEDICAL HISTORY: Past medical history from the initial psychiatric evaluation has been reviewed (reviewed/updated 7/13/2019) with no additional updates (I asked patient and no additional past medical history provided). No past medical history on file. No past surgical history on file. SOCIAL HISTORY: Social history from the initial psychiatric evaluation has been reviewed (reviewed/updated 7/13/2019) with no additional updates (I asked patient and no additional social history provided).    Social History     Socioeconomic History    Marital status: SINGLE     Spouse name: Not on file    Number of children: Not on file    Years of education: Not on file    Highest education level: Not on file   Occupational History    Not on file   Social Needs    Financial resource strain: Not on file    Food insecurity:     Worry: Not on file     Inability: Not on file    Transportation needs:     Medical: Not on file     Non-medical: Not on file   Tobacco Use    Smoking status: Not on file Substance and Sexual Activity    Alcohol use: Not on file    Drug use: Not on file    Sexual activity: Not on file   Lifestyle    Physical activity:     Days per week: Not on file     Minutes per session: Not on file    Stress: Not on file   Relationships    Social connections:     Talks on phone: Not on file     Gets together: Not on file     Attends Yazdanism service: Not on file     Active member of club or organization: Not on file     Attends meetings of clubs or organizations: Not on file     Relationship status: Not on file    Intimate partner violence:     Fear of current or ex partner: Not on file     Emotionally abused: Not on file     Physically abused: Not on file     Forced sexual activity: Not on file   Other Topics Concern    Not on file   Social History Narrative    Not on file      FAMILY HISTORY: Family history from the initial psychiatric evaluation has been reviewed (reviewed/updated 7/13/2019) with no additional updates (I asked patient and no additional family history provided). No family history on file. REVIEW OF SYSTEMS: (reviewed/updated 7/13/2019)  Appetite:poor   Sleep: poor with DIMS (difficulty initiating & maintaining sleep)   All other Review of Systems: Negative except confusion per HPI         [facility] (MSE):    MSE FINDINGS ARE WITHIN NORMAL LIMITS (WNL) UNLESS OTHERWISE STATED BELOW. ( ALL OF THE BELOW CATEGORIES OF THE MSE HAVE BEEN REVIEWED (reviewed 7/13/2019) AND UPDATED AS DEEMED APPROPRIATE )  General Presentation age appropriate, cooperative   Orientation confused, disorganized, disoriented   Vital Signs  See below (reviewed 7/13/2019); Vital Signs (BP, Pulse, & Temp) are within normal limits if not listed below.    Gait and Station Stable/steady, no ataxia   Musculoskeletal System No extrapyramidal symptoms (EPS); no abnormal muscular movements or Tardive Dyskinesia (TD); muscle strength and tone are within normal limits   Language No aphasia or dysarthria   Speech:  hypoverbal and normal volume   Thought Processes illogical; normal rate of thoughts; poor abstract reasoning/computation   Thought Associations loose associations   Thought Content visual hallucinations and preoccupations   Suicidal Ideations none   Homicidal Ideations none   Mood:  anxious    Affect:  constricted and mood-congruent   Memory recent  impaired   Memory remote:  fair   Concentration/Attention:  impaired   Fund of Knowledge average   Insight:  limited   Reliability fair   Judgment:  impaired due to active psychosis          VITALS:     Patient Vitals for the past 24 hrs:   Temp Pulse Resp BP SpO2   07/13/19 0859 98.1 °F (36.7 °C) 98 18 132/62 97 %   07/12/19 2050  86  111/78    07/12/19 1936    (!) 121/99    07/12/19 1931 97.9 °F (36.6 °C) (!) 135 16 (!) 138/100 100 %   07/12/19 1710  79  125/80      Wt Readings from Last 3 Encounters:   07/10/19 91.4 kg (201 lb 8 oz)   06/28/19 113.4 kg (250 lb)   06/18/19 113.4 kg (250 lb)     Temp Readings from Last 3 Encounters:   07/13/19 98.1 °F (36.7 °C)   07/01/19 98 °F (36.7 °C)   06/25/19 99.7 °F (37.6 °C)     BP Readings from Last 3 Encounters:   07/13/19 132/62   07/01/19 114/69   06/25/19 145/85     Pulse Readings from Last 3 Encounters:   07/13/19 98   07/01/19 87   06/25/19 (!) 107            DATA     LABORATORY DATA:(reviewed/updated 7/13/2019)  No results found for this or any previous visit (from the past 24 hour(s)). No results found for: VALF2, VALAC, VALP, VALPR, DS6, CRBAM, CRBAMP, CARB2, XCRBAM  No results found for: LITHM   RADIOLOGY REPORTS:(reviewed/updated 7/13/2019)  No results found.        MEDICATIONS     ALL MEDICATIONS:   Current Facility-Administered Medications   Medication Dose Route Frequency    QUEtiapine (SEROquel) tablet 50 mg  50 mg Oral QHS    clonazePAM (KlonoPIN) tablet 0.5 mg  0.5 mg Oral BID and QHS    senna-docusate (PERICOLACE) 8.6-50 mg per tablet 1 Tab  1 Tab Oral DAILY    risperiDONE (RisperDAL) tablet 3 mg  3 mg Oral Q12H    metoprolol tartrate (LOPRESSOR) tablet 25 mg  25 mg Oral Q12H    ziprasidone (GEODON) capsule 20 mg  20 mg Oral Q8H PRN    cloNIDine HCl (CATAPRES) tablet 0.1 mg  0.1 mg Oral Q2H PRN    levothyroxine (SYNTHROID) tablet 50 mcg  50 mcg Oral ACB    traZODone (DESYREL) tablet 300 mg  300 mg Oral QHS    zolpidem (AMBIEN) tablet 10 mg  10 mg Oral QHS PRN    ziprasidone (GEODON) 20 mg in sterile water (preservative free) 1 mL injection  20 mg IntraMUSCular BID PRN    benztropine (COGENTIN) tablet 2 mg  2 mg Oral BID PRN    benztropine (COGENTIN) injection 2 mg  2 mg IntraMUSCular BID PRN    LORazepam (ATIVAN) injection 2 mg  2 mg IntraMUSCular Q4H PRN    acetaminophen (TYLENOL) tablet 650 mg  650 mg Oral Q4H PRN    ibuprofen (MOTRIN) tablet 400 mg  400 mg Oral Q8H PRN    magnesium hydroxide (MILK OF MAGNESIA) 400 mg/5 mL oral suspension 30 mL  30 mL Oral DAILY PRN    nicotine (NICODERM CQ) 21 mg/24 hr patch 1 Patch  1 Patch TransDERmal DAILY PRN    hydrOXYzine HCl (ATARAX) tablet 50 mg  50 mg Oral Q6H PRN      SCHEDULED MEDICATIONS:   Current Facility-Administered Medications   Medication Dose Route Frequency    QUEtiapine (SEROquel) tablet 50 mg  50 mg Oral QHS    clonazePAM (KlonoPIN) tablet 0.5 mg  0.5 mg Oral BID and QHS    senna-docusate (PERICOLACE) 8.6-50 mg per tablet 1 Tab  1 Tab Oral DAILY    risperiDONE (RisperDAL) tablet 3 mg  3 mg Oral Q12H    metoprolol tartrate (LOPRESSOR) tablet 25 mg  25 mg Oral Q12H    levothyroxine (SYNTHROID) tablet 50 mcg  50 mcg Oral ACB    traZODone (DESYREL) tablet 300 mg  300 mg Oral QHS          ASSESSMENT & PLAN     DIAGNOSES REQUIRING ACTIVE TREATMENT AND MONITORING: (reviewed/updated 7/13/2019)  Patient Active Hospital Problem List:   [facility] (6/18/2019)    Assessment: patient with ongoing psychotic presentation, had previously been stabilized on [place] and Seroquel, now on Risperdal s/p rhabdomyolysis and SHYLA due to dehydration vs iatrogenic. Will continue psychiatric stabilization and monitor vitals. Patient still tachycardic, may require additional IV fluids.   - Continue Risperdal to 3 mg Q12H for psychosis  - Continue Seroquel 50 mg QHS for psychosis, adjunct  - CONTINUE Klonopin 0.5 mg TID for agitation and anxiety  - CONTINUE bowel regimen  - IGM therapy as tolerated  - Expand database / obtain collateral  - Dispo planning    Patient discharged on two antipsychotics now due to relative refractoriness to treatment thus far. Prior to admission patient had THREE or more failed trails of monotherapy, including: Haldol, Seroquel and Risperdal     Patient is a very slow responder to medications. Risks and benefits in the use of two antipsychotics have been weighed in full, including the risk of metabolic syndrome and the potential increased risk of QTc  Based on this analysis, it is considered favorable for the utilization of two antipsychotics. In the future, once stable on the two antipsychotics, patient can possibly be tapered to one of the chosen antipsychotics. Will check on EKG results tomorrow to monitor QTc. In summary, Janneth James, is a 29 y.o.  male who presents with a severe exacerbation of the principal diagnosis of Schizoaffective disorder ([facility]    Patient's condition is worsening. Patient requires continued inpatient hospitalization for further stabilization, safety monitoring and medication management. I will continue to coordinate the provision of individual, milieu, occupational, group, and substance abuse therapies to address target symptoms/diagnoses as deemed appropriate for the individual patient. A coordinated, multidisplinary treatment team round was conducted with the patient (this team consists of the nurse, psychiatric unit pharmcist,  and writer).      Complete current electronic health record for patient has been reviewed today including consultant notes, ancillary staff notes, nurses and psychiatric tech notes. Suicide risk assessment completed and patient deemed to be of low risk for suicide at this time. The following regarding medications was addressed during rounds with patient:   the risks and benefits of the proposed medication. The patient was given the opportunity to ask questions. Informed consent given to the use of the above medications. Will continue to adjust psychiatric and non-psychiatric medications (see above \"medication\" section and orders section for details) as deemed appropriate & based upon diagnoses and response to treatment. I will continue to order blood tests/labs and diagnostic tests as deemed appropriate and review results as they become available (see orders for details and above listed lab/test results). I will order psychiatric records from previous [facility] to further elucidate the nature of patient's psychopathology and review once available. I will gather additional collateral information from friends, family and o/p treatment team to further elucidate the nature of patient's psychopathology and baselline level of psychiatric functioning. I certify that this patient's inpatient psychiatric hospital services furnished since the previous certification were, and continue to be, required for treatment that could reasonably be expected to improve the patient's condition, or for diagnostic study, and that the patient continues to need, on a daily basis, active treatment furnished directly by or requiring the supervision of inpatient psychiatric facility personnel. In addition, the hospital records show that services furnished were intensive treatment services, admission or related services, or equivalent services.     EXPECTED DISCHARGE DATE/DAY: 7/15/2019     DISPOSITION: Home       Signed By:   Janice Cox MD  7/13/2019

## 2019-07-13 NOTE — BH NOTES
0700 - Report received from [address] [place] [address] - Patient sitting in room - appears anxious. Denies SI/HI. Distracted. 0900 - Patient calm, cooperative and not in distress. 1035 - Patient resting in room comfortably. 1220 - Patient pacing through hallways. 1240 - PRN Cogentin 2mg given PRN for complaints of muscle stiffness to arms. PRN Hydroxyzine given for anxiety. 1310 - Patient continues to pace throughout hallways and verbalize paranoia. Unable to redirect patient. PRN Geodon IM given per orders. 1400 - Pacing through hallways. 1700 - Eating dinner. 1736 - Patient anxious and pacing halls. PRN hydroxyzine given.

## 2019-07-14 PROCEDURE — 74011250637 HC RX REV CODE- 250/637: Performed by: FAMILY MEDICINE

## 2019-07-14 PROCEDURE — 65220000003 HC RM SEMIPRIVATE PSYCH

## 2019-07-14 PROCEDURE — 74011250637 HC RX REV CODE- 250/637: Performed by: PSYCHIATRY & NEUROLOGY

## 2019-07-14 PROCEDURE — 74011250636 HC RX REV CODE- 250/636: Performed by: PSYCHIATRY & NEUROLOGY

## 2019-07-14 RX ORDER — GUANFACINE HYDROCHLORIDE 1 MG/1
1 TABLET ORAL DAILY
Status: DISCONTINUED | OUTPATIENT
Start: 2019-07-14 | End: 2019-07-19

## 2019-07-14 RX ADMIN — QUETIAPINE FUMARATE 50 MG: 25 TABLET ORAL at 20:41

## 2019-07-14 RX ADMIN — ZIPRASIDONE HYDROCHLORIDE 20 MG: 20 CAPSULE ORAL at 14:34

## 2019-07-14 RX ADMIN — HYDROXYZINE HYDROCHLORIDE 50 MG: 25 TABLET, FILM COATED ORAL at 06:21

## 2019-07-14 RX ADMIN — CLONAZEPAM 0.5 MG: 0.5 TABLET ORAL at 16:25

## 2019-07-14 RX ADMIN — LEVOTHYROXINE SODIUM 50 MCG: 50 TABLET ORAL at 06:21

## 2019-07-14 RX ADMIN — RISPERIDONE 3 MG: 3 TABLET ORAL at 08:34

## 2019-07-14 RX ADMIN — CLONAZEPAM 0.5 MG: 0.5 TABLET ORAL at 20:40

## 2019-07-14 RX ADMIN — METOPROLOL TARTRATE 25 MG: 25 TABLET ORAL at 20:41

## 2019-07-14 RX ADMIN — TRAZODONE HYDROCHLORIDE 300 MG: 100 TABLET ORAL at 20:40

## 2019-07-14 RX ADMIN — GUANFACINE 1 MG: 1 TABLET ORAL at 09:52

## 2019-07-14 RX ADMIN — RISPERIDONE 3 MG: 3 TABLET ORAL at 20:41

## 2019-07-14 RX ADMIN — METOPROLOL TARTRATE 25 MG: 25 TABLET ORAL at 08:34

## 2019-07-14 RX ADMIN — HYDROXYZINE HYDROCHLORIDE 50 MG: 25 TABLET, FILM COATED ORAL at 14:34

## 2019-07-14 RX ADMIN — SENNOSIDES AND DOCUSATE SODIUM 1 TABLET: 8.6; 5 TABLET ORAL at 08:34

## 2019-07-14 RX ADMIN — CLONAZEPAM 0.5 MG: 0.5 TABLET ORAL at 08:34

## 2019-07-14 RX ADMIN — LORAZEPAM 2 MG: 2 INJECTION INTRAMUSCULAR; INTRAVENOUS at 18:24

## 2019-07-14 NOTE — BH NOTES
0800 pt is visible on the unit. Pacing.     0900 pt remains disorganized. Pacing. Medication and meal complaint. 1000 visible on the unit. Pacing. 1100 visible on the unit. 1200 visible on the unit. Meal complaint. 1300 pt is visible on the unit. 1400 pt is visible on the unit. Pacing. Posturing and demanding shots. pt was educated and given Geodon po and atarax po.     1500 pt continues to pace and be agitated. Threatening. Paranoid. Pt transferred to acute unit.

## 2019-07-14 NOTE — BH NOTES
PSYCHIATRIC PROGRESS NOTE     Weekend Coverage    Patient Name  Candelaria Alpers   Date of Birth 1985   [facility] [id number]   Medical Record Number  [id number]      Age  29 y.o. PCP None   Admit date:  7/1/2019    Room Number  327/01  @ [facility]   Date of Service  7/14/2019         E & M PROGRESS NOTE:         HISTORY       CC:  \"psychosis\"  HISTORY OF PRESENT ILLNESS/INTERVAL HISTORY:  (reviewed/updated 7/14/2019). per initial evaluation: The patient, Candelaria Alpers, is a 29 y.o. WHITE [place]  male with a past psychiatric history significant for schizoaffective disorder vs schizophrenia, who presents at this time with complaints of (and/or evidence of) the following emotional symptoms: agitation, delusions and psychotic behavior. Additional symptomatology include AH and VH. The above symptoms have been present for 2+ weeks. These symptoms are of moderate to high severity. These symptoms are constant in nature. The patient's condition has been precipitated by psychosocial stressors. Patient's condition made worse by treatment noncompliance. UDS: negative; BAL=0.      The patient was readmitted from medical unit following rhabdomyolysis and SHYLA which resolved with IV hydration. He is grossly psychotic and has been noted to be confused. Patient is markedly disorganized and confused on interview.  He cannot state the days of the week backward despite effort.     From patient's medical admission:     The patient is a 52-year-old  man, who is currently admitted to the medical floor at [address] [place] [address] Drive was initially admitted on 06/17, to Dr. Chandan Thompson service on the psychiatry floor. Vernadine Shed a couple of days, his CPK was found to be elevated to 1729 and he was transferred to the medical floor for treatment of rhabdomyolysis.  At that time, he did not have any rigidity, hyperthermia, or leukocytosis, which are indicative of neuroleptic malignant syndrome.  His CKs dropped immediately with hydration. Karina Pollack has improved markedly and currently reports feeling better in a physical sense.  Also during his stay, his liver enzymes increased although, they were normal at baseline. Karina Pollack does have a history of being positive for hepatitis C and has been treated with Isoniazid for latent tuberculosis in the past.  Currently, he remains on one-to-one observation and was agitated and aggressive at times yesterday. Karina Pollakc had to be given p.r.n.'s. Priscila Lima had started him on a low dose of risperidone yesterday given the low likelihood of him having had NMS.  His CPK most recently was 1. Karina Pollack continues to exhibit disorganized behavior and delusional thinking.  When I asked him, he stated that he wanted to leave and go stay with his mother but when Dr. Jeane Amado, the hospitalist, asked him he stated that he wanted to go to the psychiatric floor because he does not feel safe going home.  At the present time, if the patient requests to be discharged home, my opinion is that Crisis should be called for TDO evaluation. Karina Pollack denies any auditory hallucinations today, but was yesterday noted to be talking to himself. Karina Pollack is a poor historian and unable to provide much history. 7/3- patient has remained largely illogical, at times appears to be hallucinating in his room, but is able to coherently describe his experience. Patient given PRN Geodon and Zyprexa with limited effect. Per mother Jaylin Mirpklisa patient was very sedated yesterday following Zyprexa administration, and does better with Geodon historically as a PRN. Patient continues to appear ill, with tachycardia unresolved despite fluid rehydration. 7/4- patient more visible, has been more coherent than 24 hours prior. Vitals still concerning, tachycardic and with autonomic instability evident. Medicine following. Patient endorses disorganized thoughts and anxiety.   7/5- patient largely unchanged, repeatedly asking for help from staff due to \"something in my head\" but coherent, slept 7 hours. Patient continues to endorse anxiety and restlessness. Patient still noted to be in moderate distress. Patient medication compliant. Repeatedly states he is dying. 7/6-Presents anxious and remains needy. Staff familiar to him reports slow improvement is noticed. Remains pre-occupied but compliant with meds. Prn used-Atarax. 7/7-Pt was moved to the General side yesterday. Continues to maintain improvement, less anxious. Slept 7 hrs. No prn's were used. 7/8- no acute overnight events. Patient has been visible, odd, standing with or at nursing station most of the day, continues to endorse problems \"in my head\" but has been pleasant, smiling inappropriately. Patient received CT scan of head, unremarkable 7/5. Medication compliant, continues to complain of anxiety. 7/9- patient slept 8 hours overnight, did not get PRNs, states he is doing better. Still odd, visible, standing by nursing station much of the day, confused for much of the day but with no specific complaints. EKG performed, QTc 407. SHYLA resolved. 7/10- patient visible, slept 8 hours medication compliant. He remains discharged focused, c/o constipation. Patient still disorganized but more coherent, no agitation or hallucinations x24 hours did not get PRNs for psychosis. 7/11- patient calm and cooperative, medication compliant. Has been visible, still odd, disorganized. Patient with no specific concerns, discharge focused. Per mom, patient still not at his baseline, but improved since admission. Constipation resolved though pt still c/o hard stools. NMDAr Ab negative. 7/12- no acute overnight events. Patient for discharge this morning but made grossly paranoid statements to treatment team (\"the nurses are trying to kill me\") and continues to be anxious. The decision was made to hold the patient for further observation and titrate antipsychotic given ongoing PI and limited home resources over the weekend.   7/13Thedore Birmingham reports feeling well and moods are alright. Continues to be bizarre and paranoid. Denies SI/HI/AH/VH. No aggression or violence. Appropriately interactive and aware. Tolerating medications well. Eating and sleeping fairly. 7/14Noe Olivo reports feeling restless and needs something for his ADHD. Pacing the unit despite receiving his prns. No aggression or violence. Seems to be driven to move. Denies SI/HI/AH/VH. SIDE EFFECTS: (reviewed/updated 7/14/2019)  None reported or admitted to. ALLERGIES:(reviewed/updated 7/14/2019)  No Known Allergies   MEDICATIONS PRIOR TO ADMISSION:(reviewed/updated 7/14/2019)  Medications Prior to Admission   Medication Sig    levothyroxine (SYNTHROID) 50 mcg tablet Take 50 mcg by mouth Daily (before breakfast).  risperiDONE (RISPERDAL) 0.5 mg tablet Take 1.5 mg by mouth nightly. Risperidone 0.5 mg tabs: Take 3 tabs po every evening    pyridoxine, vitamin B6, (VITAMIN B-6) 50 mg tablet Take 50 mg by mouth daily.  isoniazid (NYDRAZID) 300 mg tablet Take 300 mg by mouth daily.  traZODone (DESYREL) 150 mg tablet Take 300 mg by mouth nightly. Indications: Insomnia      PAST MEDICAL HISTORY: Past medical history from the initial psychiatric evaluation has been reviewed (reviewed/updated 7/14/2019) with no additional updates (I asked patient and no additional past medical history provided). No past medical history on file. No past surgical history on file. SOCIAL HISTORY: Social history from the initial psychiatric evaluation has been reviewed (reviewed/updated 7/14/2019) with no additional updates (I asked patient and no additional social history provided).    Social History     Socioeconomic History    Marital status: SINGLE     Spouse name: Not on file    Number of children: Not on file    Years of education: Not on file    Highest education level: Not on file   Occupational History    Not on file   Social Needs    Financial resource strain: Not on file  Food insecurity:     Worry: Not on file     Inability: Not on file    Transportation needs:     Medical: Not on file     Non-medical: Not on file   Tobacco Use    Smoking status: Not on file   Substance and Sexual Activity    Alcohol use: Not on file    Drug use: Not on file    Sexual activity: Not on file   Lifestyle    Physical activity:     Days per week: Not on file     Minutes per session: Not on file    Stress: Not on file   Relationships    Social connections:     Talks on phone: Not on file     Gets together: Not on file     Attends Hoahaoism service: Not on file     Active member of club or organization: Not on file     Attends meetings of clubs or organizations: Not on file     Relationship status: Not on file    Intimate partner violence:     Fear of current or ex partner: Not on file     Emotionally abused: Not on file     Physically abused: Not on file     Forced sexual activity: Not on file   Other Topics Concern    Not on file   Social History Narrative    Not on file      FAMILY HISTORY: Family history from the initial psychiatric evaluation has been reviewed (reviewed/updated 7/14/2019) with no additional updates (I asked patient and no additional family history provided). No family history on file. REVIEW OF SYSTEMS: (reviewed/updated 7/14/2019)  Appetite:poor   Sleep: poor with DIMS (difficulty initiating & maintaining sleep)   All other Review of Systems: Negative except confusion per HPI         [address] (MSE):    MSE FINDINGS ARE WITHIN NORMAL LIMITS (WNL) UNLESS OTHERWISE STATED BELOW. ( ALL OF THE BELOW CATEGORIES OF THE MSE HAVE BEEN REVIEWED (reviewed 7/14/2019) AND UPDATED AS DEEMED APPROPRIATE )  General Presentation age appropriate, cooperative   Orientation confused, disorganized, disoriented   Vital Signs  See below (reviewed 7/14/2019); Vital Signs (BP, Pulse, & Temp) are within normal limits if not listed below.    Gait and Station Stable/steady, no ataxia   Musculoskeletal System No extrapyramidal symptoms (EPS); no abnormal muscular movements or Tardive Dyskinesia (TD); muscle strength and tone are within normal limits   Language No aphasia or dysarthria   Speech:  hypoverbal and normal volume   Thought Processes illogical; normal rate of thoughts; poor abstract reasoning/computation   Thought Associations loose associations   Thought Content visual hallucinations and preoccupations   Suicidal Ideations none   Homicidal Ideations none   Mood:  anxious    Affect:  constricted and mood-congruent   Memory recent  impaired   Memory remote:  fair   Concentration/Attention:  impaired   Fund of Knowledge average   Insight:  limited   Reliability fair   Judgment:  impaired due to active psychosis          VITALS:     Patient Vitals for the past 24 hrs:   Temp Pulse Resp BP SpO2   07/14/19 0735 97 °F (36.1 °C) 89 16 110/90 99 %   07/13/19 2004 98 °F (36.7 °C) (!) 108 20 110/68 100 %     Wt Readings from Last 3 Encounters:   07/10/19 91.4 kg (201 lb 8 oz)   06/28/19 113.4 kg (250 lb)   06/18/19 113.4 kg (250 lb)     Temp Readings from Last 3 Encounters:   07/14/19 97 °F (36.1 °C)   07/01/19 98 °F (36.7 °C)   06/25/19 99.7 °F (37.6 °C)     BP Readings from Last 3 Encounters:   07/14/19 110/90   07/01/19 114/69   06/25/19 145/85     Pulse Readings from Last 3 Encounters:   07/14/19 89   07/01/19 87   06/25/19 (!) 107            DATA     LABORATORY DATA:(reviewed/updated 7/14/2019)  No results found for this or any previous visit (from the past 24 hour(s)). No results found for: VALF2, VALAC, VALP, VALPR, DS6, CRBAM, CRBAMP, CARB2, XCRBAM  No results found for: LITHM   RADIOLOGY REPORTS:(reviewed/updated 7/14/2019)  No results found.        MEDICATIONS     ALL MEDICATIONS:   Current Facility-Administered Medications   Medication Dose Route Frequency    guanFACINE IR (TENEX) tablet 1 mg  1 mg Oral DAILY    QUEtiapine (SEROquel) tablet 50 mg  50 mg Oral QHS    clonazePAM (KlonoPIN) tablet 0.5 mg  0.5 mg Oral BID and QHS    senna-docusate (PERICOLACE) 8.6-50 mg per tablet 1 Tab  1 Tab Oral DAILY    risperiDONE (RisperDAL) tablet 3 mg  3 mg Oral Q12H    metoprolol tartrate (LOPRESSOR) tablet 25 mg  25 mg Oral Q12H    ziprasidone (GEODON) capsule 20 mg  20 mg Oral Q8H PRN    cloNIDine HCl (CATAPRES) tablet 0.1 mg  0.1 mg Oral Q2H PRN    levothyroxine (SYNTHROID) tablet 50 mcg  50 mcg Oral ACB    traZODone (DESYREL) tablet 300 mg  300 mg Oral QHS    zolpidem (AMBIEN) tablet 10 mg  10 mg Oral QHS PRN    ziprasidone (GEODON) 20 mg in sterile water (preservative free) 1 mL injection  20 mg IntraMUSCular BID PRN    benztropine (COGENTIN) tablet 2 mg  2 mg Oral BID PRN    benztropine (COGENTIN) injection 2 mg  2 mg IntraMUSCular BID PRN    LORazepam (ATIVAN) injection 2 mg  2 mg IntraMUSCular Q4H PRN    acetaminophen (TYLENOL) tablet 650 mg  650 mg Oral Q4H PRN    ibuprofen (MOTRIN) tablet 400 mg  400 mg Oral Q8H PRN    magnesium hydroxide (MILK OF MAGNESIA) 400 mg/5 mL oral suspension 30 mL  30 mL Oral DAILY PRN    nicotine (NICODERM CQ) 21 mg/24 hr patch 1 Patch  1 Patch TransDERmal DAILY PRN    hydrOXYzine HCl (ATARAX) tablet 50 mg  50 mg Oral Q6H PRN      SCHEDULED MEDICATIONS:   Current Facility-Administered Medications   Medication Dose Route Frequency    guanFACINE IR (TENEX) tablet 1 mg  1 mg Oral DAILY    QUEtiapine (SEROquel) tablet 50 mg  50 mg Oral QHS    clonazePAM (KlonoPIN) tablet 0.5 mg  0.5 mg Oral BID and QHS    senna-docusate (PERICOLACE) 8.6-50 mg per tablet 1 Tab  1 Tab Oral DAILY    risperiDONE (RisperDAL) tablet 3 mg  3 mg Oral Q12H    metoprolol tartrate (LOPRESSOR) tablet 25 mg  25 mg Oral Q12H    levothyroxine (SYNTHROID) tablet 50 mcg  50 mcg Oral ACB    traZODone (DESYREL) tablet 300 mg  300 mg Oral QHS          ASSESSMENT & PLAN     DIAGNOSES REQUIRING ACTIVE TREATMENT AND MONITORING: (reviewed/updated 7/14/2019)  Patient Active Hospital Problem List:   Schizoaffective disorder ([facility] (6/18/2019)    Assessment: patient with ongoing psychotic presentation, had previously been stabilized on Invega and Seroquel, now on Risperdal s/p rhabdomyolysis and SHYLA due to dehydration vs iatrogenic. Will continue psychiatric stabilization and monitor vitals. Patient still tachycardic, may require additional IV fluids. - Tenex 1 mg PO Q daily for ADHD  - Continue Risperdal to 3 mg Q12H for psychosis  - Continue Seroquel 50 mg QHS for psychosis, adjunct  - CONTINUE Klonopin 0.5 mg TID for agitation and anxiety  - CONTINUE bowel regimen  - IGM therapy as tolerated  - Expand database / obtain collateral  - Dispo planning    Patient discharged on two antipsychotics now due to relative refractoriness to treatment thus far. Prior to admission patient had THREE or more failed trails of monotherapy, including: Haldol, Seroquel and Risperdal     Patient is a very slow responder to medications. Risks and benefits in the use of two antipsychotics have been weighed in full, including the risk of metabolic syndrome and the potential increased risk of QTc  Based on this analysis, it is considered favorable for the utilization of two antipsychotics. In the future, once stable on the two antipsychotics, patient can possibly be tapered to one of the chosen antipsychotics. Will check on EKG results tomorrow to monitor QTc. In summary, Hilary Wright, is a 29 y.o.  male who presents with a severe exacerbation of the principal diagnosis of Schizoaffective disorder ([facility]    Patient's condition is worsening. Patient requires continued inpatient hospitalization for further stabilization, safety monitoring and medication management. I will continue to coordinate the provision of individual, milieu, occupational, group, and substance abuse therapies to address target symptoms/diagnoses as deemed appropriate for the individual patient.   A coordinated, multidisplinary treatment team round was conducted with the patient (this team consists of the nurse, psychiatric unit pharmcist,  and writer). Complete current electronic health record for patient has been reviewed today including consultant notes, ancillary staff notes, nurses and psychiatric tech notes. Suicide risk assessment completed and patient deemed to be of low risk for suicide at this time. The following regarding medications was addressed during rounds with patient:   the risks and benefits of the proposed medication. The patient was given the opportunity to ask questions. Informed consent given to the use of the above medications. Will continue to adjust psychiatric and non-psychiatric medications (see above \"medication\" section and orders section for details) as deemed appropriate & based upon diagnoses and response to treatment. I will continue to order blood tests/labs and diagnostic tests as deemed appropriate and review results as they become available (see orders for details and above listed lab/test results). I will order psychiatric records from previous [facility] hospitals to further elucidate the nature of patient's psychopathology and review once available. I will gather additional collateral information from friends, family and o/p treatment team to further elucidate the nature of patient's psychopathology and baselline level of psychiatric functioning. I certify that this patient's inpatient psychiatric hospital services furnished since the previous certification were, and continue to be, required for treatment that could reasonably be expected to improve the patient's condition, or for diagnostic study, and that the patient continues to need, on a daily basis, active treatment furnished directly by or requiring the supervision of inpatient psychiatric facility personnel.  In addition, the hospital records show that services furnished were intensive treatment services, admission or related services, or equivalent services.     EXPECTED DISCHARGE DATE/DAY: 7/15/2019     DISPOSITION: Home       Signed By:   María Blackburn MD  7/14/2019

## 2019-07-14 NOTE — BH NOTES
Accepted care of patient  2200 Medication Compliant  VSS. NAD Alert and confused. 0300  Appears to be sleeping. sleeping  0530 Awake at this time. 0645 Slept ~ 7 hours this shift.

## 2019-07-14 NOTE — PROGRESS NOTES
1435  Pt came to the nurses station demanding a shot of ativan. He was agitated and disorganized. Pt was given Geodon 20 and hydroxyzine. 1700 Report received from [facility]    2480  Pt had continued to escalate. Pt was offered and accepted Ativan 2mg    1930  Pt continues to verbalize frustration that he is being set up by the FBI, nurses, anyone he sees on the unit. He is paranoid. 2039  Pt has calmed somewhat. He continues to perseverate on people lying to him and doing him wrong.

## 2019-07-15 PROCEDURE — 74011250637 HC RX REV CODE- 250/637: Performed by: FAMILY MEDICINE

## 2019-07-15 PROCEDURE — 93005 ELECTROCARDIOGRAM TRACING: CPT

## 2019-07-15 PROCEDURE — 74011250637 HC RX REV CODE- 250/637: Performed by: PSYCHIATRY & NEUROLOGY

## 2019-07-15 PROCEDURE — 65220000003 HC RM SEMIPRIVATE PSYCH

## 2019-07-15 RX ORDER — QUETIAPINE FUMARATE 100 MG/1
100 TABLET, FILM COATED ORAL
Status: DISCONTINUED | OUTPATIENT
Start: 2019-07-15 | End: 2019-07-17

## 2019-07-15 RX ADMIN — HYDROXYZINE HYDROCHLORIDE 50 MG: 25 TABLET, FILM COATED ORAL at 13:30

## 2019-07-15 RX ADMIN — LEVOTHYROXINE SODIUM 50 MCG: 50 TABLET ORAL at 06:10

## 2019-07-15 RX ADMIN — CLONAZEPAM 0.5 MG: 0.5 TABLET ORAL at 17:05

## 2019-07-15 RX ADMIN — METOPROLOL TARTRATE 25 MG: 25 TABLET ORAL at 08:28

## 2019-07-15 RX ADMIN — METOPROLOL TARTRATE 25 MG: 25 TABLET ORAL at 20:58

## 2019-07-15 RX ADMIN — CLONAZEPAM 0.5 MG: 0.5 TABLET ORAL at 08:28

## 2019-07-15 RX ADMIN — CLONAZEPAM 0.5 MG: 0.5 TABLET ORAL at 20:58

## 2019-07-15 RX ADMIN — GUANFACINE 1 MG: 1 TABLET ORAL at 08:28

## 2019-07-15 RX ADMIN — TRAZODONE HYDROCHLORIDE 300 MG: 100 TABLET ORAL at 20:58

## 2019-07-15 RX ADMIN — QUETIAPINE FUMARATE 100 MG: 100 TABLET ORAL at 20:59

## 2019-07-15 RX ADMIN — RISPERIDONE 3 MG: 3 TABLET ORAL at 20:58

## 2019-07-15 RX ADMIN — SENNOSIDES AND DOCUSATE SODIUM 1 TABLET: 8.6; 5 TABLET ORAL at 08:28

## 2019-07-15 RX ADMIN — RISPERIDONE 3 MG: 3 TABLET ORAL at 08:28

## 2019-07-15 RX ADMIN — ZIPRASIDONE HYDROCHLORIDE 20 MG: 20 CAPSULE ORAL at 18:10

## 2019-07-15 RX ADMIN — ZOLPIDEM TARTRATE 10 MG: 10 TABLET ORAL at 00:50

## 2019-07-15 NOTE — BH NOTES
0700-Patient report received from Brianna Givens RN    0800-Patient in the dayroom eating breakfast. Patient denies SI/HI and AV/HV. Patient denies pain. 0900-Patient in the dayroom watching tv    1000-Patient had his ekg completed. 1123-Patient standing in the hallway talking to staff. 1237-Patient becoming increasingly paranoid. Patient states that we are trying to trick his mind. Patient given prn atarax    1548-Patient in the hallway talking with staff, needing redirection. Patient keeps saying he needs medicine but can't explain what he needs medication for. Patient redirected and walked away to his room. 1712-Patient walking the halls aggravated, patient redirected and was walking the halls with this nurse talking about calming techniques.      1804-Patient given prn geodon po

## 2019-07-15 NOTE — BH NOTES
0000 Assumed care of patient, pt appeared to be sleeping during nursing round  8564-0527 Pt up in hallway multiple time, calm and appears tired but continues to say he is unable to sleep. PRN Ambien 10 mg given at 0050, reviewed medication teaching and need for patient to attempt to lie down to rest.     0545- Pt pacing between hallway and dayroom. Pt increased restlessness making statements about the [employer], stating \"I'm all [place]. .I'm going to have to show off today. \"   [phone]- Pt given new patient ID ashely. Pt willingly took his morning medication. 0630- Pt walking between his room, dayroom and nurses station interacting more with staff and less paranoid. 0700-Pt up to nurses station requesting medication. Pt verbally redirectable, no PRNs given at this time. Pt appeared to be sleeping for 6 hours this shift. Pt's sleep was interrupted. Will continue to monitor the pt Q 15 minutes for safety and support.

## 2019-07-15 NOTE — BH NOTES
GROUP THERAPY PROGRESS NOTE    The patient Shannan carias 29 y.o. male is participating in IID. Group time: 45 minutes    Personal goal for participation:  To participate in mental health journey game    Goal orientation:  personal    Group therapy participation: active    Therapeutic interventions reviewed and discussed: choices in recovery    Impression of participation:  The patient was attentive-required prompting    Maxx Awan  7/15/2019  5:19 PM

## 2019-07-15 NOTE — BH NOTES
GROUP THERAPY PROGRESS NOTE    The patient Jayden Kowalski a 29 y.o. male is participating in Creative Expression Group. Group time: 1 hour    Personal goal for participation:  To concentrate on selected task    Goal orientation: social    Group therapy participation: minimal    Therapeutic interventions reviewed and discussed: Crafts, games, music    Impression of participation: The patient was present-left early    Elaine Flores  7/15/2019  5:53 PM

## 2019-07-15 NOTE — BH NOTES
PSYCHIATRIC PROGRESS NOTE     Weekend Coverage    Patient Name  Erlin Victoria   Date of Birth 1985   [facility] [id number]   Medical Record Number  [id number]      Age  29 y.o. PCP None   Admit date:  7/1/2019    Room Number  324/31  @ [facility]   Date of Service  7/15/2019         E & M PROGRESS NOTE:         HISTORY       CC:  \"psychosis\"  HISTORY OF PRESENT ILLNESS/INTERVAL HISTORY:  (reviewed/updated 7/15/2019). per initial evaluation: The patient, Erlin Victoria, is a 29 y.o. WHITE [place]  male with a past psychiatric history significant for schizoaffective disorder vs schizophrenia, who presents at this time with complaints of (and/or evidence of) the following emotional symptoms: agitation, delusions and psychotic behavior. Additional symptomatology include AH and VH. The above symptoms have been present for 2+ weeks. These symptoms are of moderate to high severity. These symptoms are constant in nature. The patient's condition has been precipitated by psychosocial stressors. Patient's condition made worse by treatment noncompliance. UDS: negative; BAL=0.      The patient was readmitted from medical unit following rhabdomyolysis and SHYLA which resolved with IV hydration. He is grossly psychotic and has been noted to be confused. Patient is markedly disorganized and confused on interview.  He cannot state the days of the week backward despite effort.     From patient's medical admission:     The patient is a 52-year-old  man, who is currently admitted to the medical floor at [address] [place] [address] Drive was initially admitted on 06/17, to Dr. Cassidy Leyva service on the psychiatry floor. Stacey Peyton a couple of days, his CPK was found to be elevated to 1729 and he was transferred to the medical floor for treatment of rhabdomyolysis.  At that time, he did not have any rigidity, hyperthermia, or leukocytosis, which are indicative of neuroleptic malignant syndrome.  His CKs dropped immediately with hydration. [facility] has improved markedly and currently reports feeling better in a physical sense.  Also during his stay, his liver enzymes increased although, they were normal at baseline. [facility] does have a history of being positive for hepatitis C and has been treated with Isoniazid for latent tuberculosis in the past.  Currently, he remains on one-to-one observation and was agitated and aggressive at times yesterday. [facility] had to be given p.r.n.'s. Mayda See had started him on a low dose of risperidone yesterday given the low likelihood of him having had NMS.  His CPK most recently was 1. [facility] continues to exhibit disorganized behavior and delusional thinking.  When I asked him, he stated that he wanted to leave and go stay with his mother but when Dr. Maria E Constantino, the hospitalist, asked him he stated that he wanted to go to the psychiatric floor because he does not feel safe going home.  At the present time, if the patient requests to be discharged home, my opinion is that Crisis should be called for TDO evaluation. [facility] denies any auditory hallucinations today, but was yesterday noted to be talking to himself. [facility] is a poor historian and unable to provide much history. 7/3- patient has remained largely illogical, at times appears to be hallucinating in his room, but is able to coherently describe his experience. Patient given PRN Geodon and Zyprexa with limited effect. Per mother [place] patient was very sedated yesterday following Zyprexa administration, and does better with Geodon historically as a PRN. Patient continues to appear ill, with tachycardia unresolved despite fluid rehydration. 7/4- patient more visible, has been more coherent than 24 hours prior. Vitals still concerning, tachycardic and with autonomic instability evident. Medicine following. Patient endorses disorganized thoughts and anxiety.   7/5- patient largely unchanged, repeatedly asking for help from staff due to \"something in my head\" but coherent, slept 7 hours. Patient continues to endorse anxiety and restlessness. Patient still noted to be in moderate distress. Patient medication compliant. Repeatedly states he is dying. 7/6-Presents anxious and remains needy. Staff familiar to him reports slow improvement is noticed. Remains pre-occupied but compliant with meds. Prn used-Atarax. 7/7-Pt was moved to the General side yesterday. Continues to maintain improvement, less anxious. Slept 7 hrs. No prn's were used. 7/8- no acute overnight events. Patient has been visible, odd, standing with or at nursing station most of the day, continues to endorse problems \"in my head\" but has been pleasant, smiling inappropriately. Patient received CT scan of head, unremarkable 7/5. Medication compliant, continues to complain of anxiety. 7/9- patient slept 8 hours overnight, did not get PRNs, states he is doing better. Still odd, visible, standing by nursing station much of the day, confused for much of the day but with no specific complaints. EKG performed, QTc 407. SHYLA resolved. 7/10- patient visible, slept 8 hours medication compliant. He remains discharged focused, c/o constipation. Patient still disorganized but more coherent, no agitation or hallucinations x24 hours did not get PRNs for psychosis. 7/11- patient calm and cooperative, medication compliant. Has been visible, still odd, disorganized. Patient with no specific concerns, discharge focused. Per mom, patient still not at his baseline, but improved since admission. Constipation resolved though pt still c/o hard stools. NMDAr Ab negative. 7/12- no acute overnight events. Patient for discharge this morning but made grossly paranoid statements to treatment team (\"the nurses are trying to kill me\") and continues to be anxious. The decision was made to hold the patient for further observation and titrate antipsychotic given ongoing PI and limited home resources over the weekend.   7/13Beti Amador reports feeling well and moods are alright. Continues to be bizarre and paranoid. Denies SI/HI/AH/VH. No aggression or violence. Appropriately interactive and aware. Tolerating medications well. Eating and sleeping fairly. 7/14Cjonatan Ramírez reports feeling restless and needs something for his ADHD. Pacing the unit despite receiving his prns. No aggression or violence. Seems to be driven to move. Denies SI/HI/AH/VH.    7/15- patient was moved over the acute side of the unit following an episode of aggression, was noted to be confused, irritable. EKG WNL. Slept 6 hours, pleasant on interview this morning. Acknowledges agitated, stating he is feeling anxious. Patient continues to express PI regarding the FBI. SIDE EFFECTS: (reviewed/updated 7/15/2019)  None reported or admitted to. ALLERGIES:(reviewed/updated 7/15/2019)  No Known Allergies   MEDICATIONS PRIOR TO ADMISSION:(reviewed/updated 7/15/2019)  Medications Prior to Admission   Medication Sig    levothyroxine (SYNTHROID) 50 mcg tablet Take 50 mcg by mouth Daily (before breakfast).  risperiDONE (RISPERDAL) 0.5 mg tablet Take 1.5 mg by mouth nightly. Risperidone 0.5 mg tabs: Take 3 tabs po every evening    pyridoxine, vitamin B6, (VITAMIN B-6) 50 mg tablet Take 50 mg by mouth daily.  isoniazid (NYDRAZID) 300 mg tablet Take 300 mg by mouth daily.  traZODone (DESYREL) 150 mg tablet Take 300 mg by mouth nightly. Indications: Insomnia      PAST MEDICAL HISTORY: Past medical history from the initial psychiatric evaluation has been reviewed (reviewed/updated 7/15/2019) with no additional updates (I asked patient and no additional past medical history provided). No past medical history on file. No past surgical history on file. SOCIAL HISTORY: Social history from the initial psychiatric evaluation has been reviewed (reviewed/updated 7/15/2019) with no additional updates (I asked patient and no additional social history provided).    Social History     Socioeconomic History    Marital status: SINGLE     Spouse name: Not on file    Number of children: Not on file    Years of education: Not on file    Highest education level: Not on file   Occupational History    Not on file   Social Needs    Financial resource strain: Not on file    Food insecurity:     Worry: Not on file     Inability: Not on file    Transportation needs:     Medical: Not on file     Non-medical: Not on file   Tobacco Use    Smoking status: Not on file   Substance and Sexual Activity    Alcohol use: Not on file    Drug use: Not on file    Sexual activity: Not on file   Lifestyle    Physical activity:     Days per week: Not on file     Minutes per session: Not on file    Stress: Not on file   Relationships    Social connections:     Talks on phone: Not on file     Gets together: Not on file     Attends Shinto service: Not on file     Active member of club or organization: Not on file     Attends meetings of clubs or organizations: Not on file     Relationship status: Not on file    Intimate partner violence:     Fear of current or ex partner: Not on file     Emotionally abused: Not on file     Physically abused: Not on file     Forced sexual activity: Not on file   Other Topics Concern    Not on file   Social History Narrative    Not on file      FAMILY HISTORY: Family history from the initial psychiatric evaluation has been reviewed (reviewed/updated 7/15/2019) with no additional updates (I asked patient and no additional family history provided). No family history on file.     REVIEW OF SYSTEMS: (reviewed/updated 7/15/2019)  Appetite:poor   Sleep: poor with DIMS (difficulty initiating & maintaining sleep)   All other Review of Systems: Negative except confusion per HPI         [facility] (MSE):    MSE FINDINGS ARE WITHIN NORMAL LIMITS (WNL) UNLESS OTHERWISE STATED BELOW. ( ALL OF THE BELOW CATEGORIES OF THE MSE HAVE BEEN REVIEWED (reviewed 7/15/2019) AND UPDATED AS DEEMED APPROPRIATE )  General Presentation age appropriate, cooperative   Orientation confused, disorganized, disoriented   Vital Signs  See below (reviewed 7/15/2019); Vital Signs (BP, Pulse, & Temp) are within normal limits if not listed below. Gait and Station Stable/steady, no ataxia   Musculoskeletal System No extrapyramidal symptoms (EPS); no abnormal muscular movements or Tardive Dyskinesia (TD); muscle strength and tone are within normal limits   Language No aphasia or dysarthria   Speech:  hypoverbal and normal volume   Thought Processes illogical; normal rate of thoughts; poor abstract reasoning/computation   Thought Associations loose associations   Thought Content visual hallucinations and preoccupations   Suicidal Ideations none   Homicidal Ideations none   Mood:  anxious    Affect:  constricted and mood-congruent   Memory recent  impaired   Memory remote:  fair   Concentration/Attention:  impaired   Fund of Knowledge average   Insight:  limited   Reliability fair   Judgment:  impaired due to active psychosis          VITALS:     Patient Vitals for the past 24 hrs:   Temp Pulse Resp BP SpO2   07/15/19 0726 97.6 °F (36.4 °C) 80 18 114/81 99 %   07/14/19 1949 97.6 °F (36.4 °C) 68 18 126/74 99 %   07/14/19 1538    110/70      Wt Readings from Last 3 Encounters:   07/10/19 91.4 kg (201 lb 8 oz)   06/28/19 113.4 kg (250 lb)   06/18/19 113.4 kg (250 lb)     Temp Readings from Last 3 Encounters:   07/15/19 97.6 °F (36.4 °C)   07/01/19 98 °F (36.7 °C)   06/25/19 99.7 °F (37.6 °C)     BP Readings from Last 3 Encounters:   07/15/19 114/81   07/01/19 114/69   06/25/19 145/85     Pulse Readings from Last 3 Encounters:   07/15/19 80   07/01/19 87   06/25/19 (!) 107            DATA     LABORATORY DATA:(reviewed/updated 7/15/2019)  No results found for this or any previous visit (from the past 24 hour(s)).   No results found for: VALF2, VALAC, VALP, VALPR, DS6, CRBAM, CRBAMP, CARB2, XCRBAM  No results found for: LITHM   RADIOLOGY REPORTS:(reviewed/updated 7/15/2019)  No results found.        MEDICATIONS     ALL MEDICATIONS:   Current Facility-Administered Medications   Medication Dose Route Frequency    QUEtiapine (SEROquel) tablet 100 mg  100 mg Oral QHS    guanFACINE IR (TENEX) tablet 1 mg  1 mg Oral DAILY    clonazePAM (KlonoPIN) tablet 0.5 mg  0.5 mg Oral BID and QHS    senna-docusate (PERICOLACE) 8.6-50 mg per tablet 1 Tab  1 Tab Oral DAILY    risperiDONE (RisperDAL) tablet 3 mg  3 mg Oral Q12H    metoprolol tartrate (LOPRESSOR) tablet 25 mg  25 mg Oral Q12H    ziprasidone (GEODON) capsule 20 mg  20 mg Oral Q8H PRN    cloNIDine HCl (CATAPRES) tablet 0.1 mg  0.1 mg Oral Q2H PRN    levothyroxine (SYNTHROID) tablet 50 mcg  50 mcg Oral ACB    traZODone (DESYREL) tablet 300 mg  300 mg Oral QHS    zolpidem (AMBIEN) tablet 10 mg  10 mg Oral QHS PRN    ziprasidone (GEODON) 20 mg in sterile water (preservative free) 1 mL injection  20 mg IntraMUSCular BID PRN    benztropine (COGENTIN) tablet 2 mg  2 mg Oral BID PRN    benztropine (COGENTIN) injection 2 mg  2 mg IntraMUSCular BID PRN    LORazepam (ATIVAN) injection 2 mg  2 mg IntraMUSCular Q4H PRN    acetaminophen (TYLENOL) tablet 650 mg  650 mg Oral Q4H PRN    ibuprofen (MOTRIN) tablet 400 mg  400 mg Oral Q8H PRN    magnesium hydroxide (MILK OF MAGNESIA) 400 mg/5 mL oral suspension 30 mL  30 mL Oral DAILY PRN    nicotine (NICODERM CQ) 21 mg/24 hr patch 1 Patch  1 Patch TransDERmal DAILY PRN    hydrOXYzine HCl (ATARAX) tablet 50 mg  50 mg Oral Q6H PRN      SCHEDULED MEDICATIONS:   Current Facility-Administered Medications   Medication Dose Route Frequency    QUEtiapine (SEROquel) tablet 100 mg  100 mg Oral QHS    guanFACINE IR (TENEX) tablet 1 mg  1 mg Oral DAILY    clonazePAM (KlonoPIN) tablet 0.5 mg  0.5 mg Oral BID and QHS    senna-docusate (PERICOLACE) 8.6-50 mg per tablet 1 Tab  1 Tab Oral DAILY    risperiDONE (RisperDAL) tablet 3 mg  3 mg Oral Q12H    metoprolol tartrate (LOPRESSOR) tablet 25 mg  25 mg Oral Q12H    levothyroxine (SYNTHROID) tablet 50 mcg  50 mcg Oral ACB    traZODone (DESYREL) tablet 300 mg  300 mg Oral QHS          ASSESSMENT & PLAN     DIAGNOSES REQUIRING ACTIVE TREATMENT AND MONITORING: (reviewed/updated 7/15/2019)  Patient C/[facility]isco[facility] Problem List:   Schizoaffective disorder ([facility] 75.) (6/18/2019)    Assessment: patient with ongoing psychotic presentation, had previously been stabilized on Invega and Seroquel, now on Risperdal s/p rhabdomyolysis and SHYLA due to dehydration vs iatrogenic. Will continue psychiatric stabilization and monitor vitals. Patient still tachycardic, may require additional IV fluids.   - CONTINUE Tenex 1 mg PO Q daily for ADHD  - CONTINUE Risperdal 3 mg Q12H for psychosis  - INCREASE Seroquel to 100 mg QHS for psychosis, adjunct  - CONTINUE Klonopin 0.5 mg TID for agitation and anxiety  - CONTINUE bowel regimen  - IGM therapy as tolerated  - Expand database / obtain collateral  - Dispo planning    Patient discharged on two antipsychotics now due to relative refractoriness to treatment thus far. Prior to admission patient had THREE or more failed trails of monotherapy, including: Haldol, Seroquel and Risperdal     Patient is a very slow responder to medications. Risks and benefits in the use of two antipsychotics have been weighed in full, including the risk of metabolic syndrome and the potential increased risk of QTc  Based on this analysis, it is considered favorable for the utilization of two antipsychotics. In the future, once stable on the two antipsychotics, patient can possibly be tapered to one of the chosen antipsychotics. Will check on EKG results tomorrow to monitor QTc.     In summary, Becca Werner, is a 29 y.o.  male who presents with a severe exacerbation of the principal diagnosis of Schizoaffective disorder ([facility] 75.)    Patient's condition is worsening. Patient requires continued inpatient hospitalization for further stabilization, safety monitoring and medication management. I will continue to coordinate the provision of individual, milieu, occupational, group, and substance abuse therapies to address target symptoms/diagnoses as deemed appropriate for the individual patient. A coordinated, multidisplinary treatment team round was conducted with the patient (this team consists of the nurse, psychiatric unit pharmcist,  and writer). Complete current electronic health record for patient has been reviewed today including consultant notes, ancillary staff notes, nurses and psychiatric tech notes. Suicide risk assessment completed and patient deemed to be of low risk for suicide at this time. The following regarding medications was addressed during rounds with patient:   the risks and benefits of the proposed medication. The patient was given the opportunity to ask questions. Informed consent given to the use of the above medications. Will continue to adjust psychiatric and non-psychiatric medications (see above \"medication\" section and orders section for details) as deemed appropriate & based upon diagnoses and response to treatment. I will continue to order blood tests/labs and diagnostic tests as deemed appropriate and review results as they become available (see orders for details and above listed lab/test results). I will order psychiatric records from previous [facility] hospitals to further elucidate the nature of patient's psychopathology and review once available. I will gather additional collateral information from friends, family and o/p treatment team to further elucidate the nature of patient's psychopathology and baselline level of psychiatric functioning.          I certify that this patient's inpatient psychiatric hospital services furnished since the previous certification were, and continue to be, required for treatment that could reasonably be expected to improve the patient's condition, or for diagnostic study, and that the patient continues to need, on a daily basis, active treatment furnished directly by or requiring the supervision of inpatient psychiatric facility personnel. In addition, the hospital records show that services furnished were intensive treatment services, admission or related services, or equivalent services.     EXPECTED DISCHARGE DATE/DAY: 7/18/2019     DISPOSITION: Home       Signed By:   Carol Valadez MD  7/15/2019

## 2019-07-16 PROCEDURE — 74011250637 HC RX REV CODE- 250/637: Performed by: PSYCHIATRY & NEUROLOGY

## 2019-07-16 PROCEDURE — 74011250637 HC RX REV CODE- 250/637: Performed by: FAMILY MEDICINE

## 2019-07-16 PROCEDURE — 74011250636 HC RX REV CODE- 250/636: Performed by: PSYCHIATRY & NEUROLOGY

## 2019-07-16 PROCEDURE — 65220000003 HC RM SEMIPRIVATE PSYCH

## 2019-07-16 RX ADMIN — GUANFACINE 1 MG: 1 TABLET ORAL at 08:10

## 2019-07-16 RX ADMIN — CLONAZEPAM 0.5 MG: 0.5 TABLET ORAL at 20:27

## 2019-07-16 RX ADMIN — HYDROXYZINE HYDROCHLORIDE 50 MG: 25 TABLET, FILM COATED ORAL at 11:16

## 2019-07-16 RX ADMIN — RISPERIDONE 3 MG: 3 TABLET ORAL at 20:27

## 2019-07-16 RX ADMIN — RISPERIDONE 3 MG: 3 TABLET ORAL at 08:10

## 2019-07-16 RX ADMIN — METOPROLOL TARTRATE 25 MG: 25 TABLET ORAL at 20:27

## 2019-07-16 RX ADMIN — METOPROLOL TARTRATE 25 MG: 25 TABLET ORAL at 08:10

## 2019-07-16 RX ADMIN — QUETIAPINE FUMARATE 100 MG: 100 TABLET ORAL at 20:28

## 2019-07-16 RX ADMIN — LORAZEPAM 2 MG: 2 INJECTION INTRAMUSCULAR; INTRAVENOUS at 11:55

## 2019-07-16 RX ADMIN — LEVOTHYROXINE SODIUM 50 MCG: 50 TABLET ORAL at 05:46

## 2019-07-16 RX ADMIN — CLONAZEPAM 0.5 MG: 0.5 TABLET ORAL at 08:10

## 2019-07-16 RX ADMIN — CLONAZEPAM 0.5 MG: 0.5 TABLET ORAL at 17:04

## 2019-07-16 RX ADMIN — TRAZODONE HYDROCHLORIDE 300 MG: 100 TABLET ORAL at 20:27

## 2019-07-16 RX ADMIN — LORAZEPAM 2 MG: 2 INJECTION INTRAMUSCULAR; INTRAVENOUS at 16:54

## 2019-07-16 RX ADMIN — SENNOSIDES AND DOCUSATE SODIUM 1 TABLET: 8.6; 5 TABLET ORAL at 08:10

## 2019-07-16 NOTE — BH NOTES
Report received from Jnaes Cotton RN at 0700. Patient currently in hallway pacing and talking, patient continues to come in and out of room. Patient compliant with morning medications. Patient calm and cooperative with morning mediations. 1058  Patient standing in hallway, continues to be focused on having vitals checked. Patient met with Dr. Severiano Dial who stated patient seems a bit more disorganized today. Patient continues to be paranoid about the FBI, but can't verbalized what the Dallin Whitney is going to do or why they are after him. Patient called mom and spoke to her on phone. 1116  Patient given Atarax 50mg for anxiety. 1155  Patient requested Ativan for agitation. Patient given Ativan 2mg/1ml. 1194  Patient remained calm for majority of day, continuing to focus on vitals and stating \"I'm dead\" over and over. Patient did request Ativan for severe agitation, Ativan 2mg IM givven at 1624. Patient now standing on at nurses station again stating \"I'm dead\". Patient reassured numerous times he is not dead.

## 2019-07-16 NOTE — BH NOTES
Nicolás Blank continues with a labile mood. He is frequently anxious and at other time he has short lived bouts of anger. Patient was redirected when he yelled out in front of the nurses station and pounded his fist on the counter top. Writer offered assistance and explained his behavior was inappropriate. \"But I need a shot of Ativan. Or you can give me Geodon. Will it kill me? \" Writer explained to Abundio Melgar he could have prn medication but it would be in a pill form as an injection was not necessary. \" Patient accepted po medication. He was observed responding to internal stimuli. He makes fleeting statement about wanting to die. He denies he will hurt himself or anyone else. Q 15 minute safety checks continue.

## 2019-07-16 NOTE — BH NOTES
PSYCHIATRIC PROGRESS NOTE     Weekend Coverage    Patient Name  Indy Manriquez   Date of Birth 1985   [facility] [id number]   Medical Record Number  [id number]      Age  29 y.o. PCP None   Admit date:  7/1/2019    Room Number  691/73  @ [facility]   Date of Service  7/16/2019         E & M PROGRESS NOTE:         HISTORY       CC:  \"psychosis\"  HISTORY OF PRESENT ILLNESS/INTERVAL HISTORY:  (reviewed/updated 7/16/2019). per initial evaluation: The patient, Indy Manriquez, is a 29 y.o. WHITE [place]  male with a past psychiatric history significant for schizoaffective disorder vs schizophrenia, who presents at this time with complaints of (and/or evidence of) the following emotional symptoms: agitation, delusions and psychotic behavior. Additional symptomatology include AH and VH. The above symptoms have been present for 2+ weeks. These symptoms are of moderate to high severity. These symptoms are constant in nature. The patient's condition has been precipitated by psychosocial stressors. Patient's condition made worse by treatment noncompliance. UDS: negative; BAL=0.      The patient was readmitted from medical unit following rhabdomyolysis and SHYLA which resolved with IV hydration. He is grossly psychotic and has been noted to be confused. Patient is markedly disorganized and confused on interview.  He cannot state the days of the week backward despite effort.     From patient's medical admission:     The patient is a 31-year-old  man, who is currently admitted to the medical floor at [address] [place] [address] Drive was initially admitted on 06/17, to Dr. Sugar Sanchez service on the psychiatry floor. Norma Serrano a couple of days, his CPK was found to be elevated to 1729 and he was transferred to the medical floor for treatment of rhabdomyolysis.  At that time, he did not have any rigidity, hyperthermia, or leukocytosis, which are indicative of neuroleptic malignant syndrome.  His CKs dropped immediately with hydration. [facility] has improved markedly and currently reports feeling better in a physical sense.  Also during his stay, his liver enzymes increased although, they were normal at baseline. [facility] does have a history of being positive for hepatitis C and has been treated with Isoniazid for latent tuberculosis in the past.  Currently, he remains on one-to-one observation and was agitated and aggressive at times yesterday. [facility] had to be given p.r.n.'s. Deena Argueta had started him on a low dose of risperidone yesterday given the low likelihood of him having had NMS.  His CPK most recently was 1. [facility] continues to exhibit disorganized behavior and delusional thinking.  When I asked him, he stated that he wanted to leave and go stay with his mother but when Dr. Cristina Bañuelos, the hospitalist, asked him he stated that he wanted to go to the psychiatric floor because he does not feel safe going home.  At the present time, if the patient requests to be discharged home, my opinion is that Crisis should be called for TDO evaluation. [facility] denies any auditory hallucinations today, but was yesterday noted to be talking to himself. [facility] is a poor historian and unable to provide much history. 7/3- patient has remained largely illogical, at times appears to be hallucinating in his room, but is able to coherently describe his experience. Patient given PRN Geodon and Zyprexa with limited effect. Per mother Segundo Nazario patient was very sedated yesterday following Zyprexa administration, and does better with Geodon historically as a PRN. Patient continues to appear ill, with tachycardia unresolved despite fluid rehydration. 7/4- patient more visible, has been more coherent than 24 hours prior. Vitals still concerning, tachycardic and with autonomic instability evident. Medicine following. Patient endorses disorganized thoughts and anxiety.   7/5- patient largely unchanged, repeatedly asking for help from staff due to \"something in my head\" but coherent, slept 7 hours. Patient continues to endorse anxiety and restlessness. Patient still noted to be in moderate distress. Patient medication compliant. Repeatedly states he is dying. 7/6-Presents anxious and remains needy. Staff familiar to him reports slow improvement is noticed. Remains pre-occupied but compliant with meds. Prn used-Atarax. 7/7-Pt was moved to the General side yesterday. Continues to maintain improvement, less anxious. Slept 7 hrs. No prn's were used. 7/8- no acute overnight events. Patient has been visible, odd, standing with or at nursing station most of the day, continues to endorse problems \"in my head\" but has been pleasant, smiling inappropriately. Patient received CT scan of head, unremarkable 7/5. Medication compliant, continues to complain of anxiety. 7/9- patient slept 8 hours overnight, did not get PRNs, states he is doing better. Still odd, visible, standing by nursing station much of the day, confused for much of the day but with no specific complaints. EKG performed, QTc 407. SHYLA resolved. 7/10- patient visible, slept 8 hours medication compliant. He remains discharged focused, c/o constipation. Patient still disorganized but more coherent, no agitation or hallucinations x24 hours did not get PRNs for psychosis. 7/11- patient calm and cooperative, medication compliant. Has been visible, still odd, disorganized. Patient with no specific concerns, discharge focused. Per mom, patient still not at his baseline, but improved since admission. Constipation resolved though pt still c/o hard stools. NMDAr Ab negative. 7/12- no acute overnight events. Patient for discharge this morning but made grossly paranoid statements to treatment team (\"the nurses are trying to kill me\") and continues to be anxious. The decision was made to hold the patient for further observation and titrate antipsychotic given ongoing PI and limited home resources over the weekend.   7/13Shai Fields reports feeling well and moods are alright. Continues to be bizarre and paranoid. Denies SI/HI/AH/VH. No aggression or violence. Appropriately interactive and aware. Tolerating medications well. Eating and sleeping fairly. 7/14Praveen Yanez reports feeling restless and needs something for his ADHD. Pacing the unit despite receiving his prns. No aggression or violence. Seems to be driven to move. Denies SI/HI/AH/VH.    7/15- patient was moved over the acute side of the unit following an episode of aggression, was noted to be confused, irritable. EKG WNL. Slept 6 hours, pleasant on interview this morning. Acknowledges agitated, stating he is feeling anxious. Patient continues to express PI regarding the [employer]. 7/16- patient is visible, remains confused. Patient continues to report PI, states that the nurses are \"law enforcement\" but can reality test.        SIDE EFFECTS: (reviewed/updated 7/16/2019)  None reported or admitted to. ALLERGIES:(reviewed/updated 7/16/2019)  No Known Allergies   MEDICATIONS PRIOR TO ADMISSION:(reviewed/updated 7/16/2019)  Medications Prior to Admission   Medication Sig    levothyroxine (SYNTHROID) 50 mcg tablet Take 50 mcg by mouth Daily (before breakfast).  risperiDONE (RISPERDAL) 0.5 mg tablet Take 1.5 mg by mouth nightly. Risperidone 0.5 mg tabs: Take 3 tabs po every evening    pyridoxine, vitamin B6, (VITAMIN B-6) 50 mg tablet Take 50 mg by mouth daily.  isoniazid (NYDRAZID) 300 mg tablet Take 300 mg by mouth daily.  traZODone (DESYREL) 150 mg tablet Take 300 mg by mouth nightly. Indications: Insomnia      PAST MEDICAL HISTORY: Past medical history from the initial psychiatric evaluation has been reviewed (reviewed/updated 7/16/2019) with no additional updates (I asked patient and no additional past medical history provided). No past medical history on file. No past surgical history on file.    SOCIAL HISTORY: Social history from the initial psychiatric evaluation has been reviewed (reviewed/updated 7/16/2019) with no additional updates (I asked patient and no additional social history provided). Social History     Socioeconomic History    Marital status: SINGLE     Spouse name: Not on file    Number of children: Not on file    Years of education: Not on file    Highest education level: Not on file   Occupational History    Not on file   Social Needs    Financial resource strain: Not on file    Food insecurity:     Worry: Not on file     Inability: Not on file    Transportation needs:     Medical: Not on file     Non-medical: Not on file   Tobacco Use    Smoking status: Not on file   Substance and Sexual Activity    Alcohol use: Not on file    Drug use: Not on file    Sexual activity: Not on file   Lifestyle    Physical activity:     Days per week: Not on file     Minutes per session: Not on file    Stress: Not on file   Relationships    Social connections:     Talks on phone: Not on file     Gets together: Not on file     Attends Anabaptism service: Not on file     Active member of club or organization: Not on file     Attends meetings of clubs or organizations: Not on file     Relationship status: Not on file    Intimate partner violence:     Fear of current or ex partner: Not on file     Emotionally abused: Not on file     Physically abused: Not on file     Forced sexual activity: Not on file   Other Topics Concern    Not on file   Social History Narrative    Not on file      FAMILY HISTORY: Family history from the initial psychiatric evaluation has been reviewed (reviewed/updated 7/16/2019) with no additional updates (I asked patient and no additional family history provided). No family history on file.     REVIEW OF SYSTEMS: (reviewed/updated 7/16/2019)  Appetite:poor   Sleep: poor with DIMS (difficulty initiating & maintaining sleep)   All other Review of Systems: Negative except confusion per HPI         [facility] (MSE):    MSE FINDINGS ARE WITHIN NORMAL LIMITS (WNL) UNLESS OTHERWISE STATED BELOW. ( ALL OF THE BELOW CATEGORIES OF THE MSE HAVE BEEN REVIEWED (reviewed 7/16/2019) AND UPDATED AS DEEMED APPROPRIATE )  General Presentation age appropriate, cooperative   Orientation confused, disorganized, disoriented   Vital Signs  See below (reviewed 7/16/2019); Vital Signs (BP, Pulse, & Temp) are within normal limits if not listed below.    Gait and Station Stable/steady, no ataxia   Musculoskeletal System No extrapyramidal symptoms (EPS); no abnormal muscular movements or Tardive Dyskinesia (TD); muscle strength and tone are within normal limits   Language No aphasia or dysarthria   Speech:  hypoverbal and normal volume   Thought Processes illogical; normal rate of thoughts; poor abstract reasoning/computation   Thought Associations loose associations   Thought Content visual hallucinations and preoccupations   Suicidal Ideations none   Homicidal Ideations none   Mood:  anxious    Affect:  constricted and mood-congruent   Memory recent  impaired   Memory remote:  fair   Concentration/Attention:  impaired   Fund of Knowledge average   Insight:  limited   Reliability fair   Judgment:  impaired due to active psychosis          VITALS:     Patient Vitals for the past 24 hrs:   Temp Pulse Resp BP SpO2   07/16/19 1017    92/60    07/16/19 0800 98.2 °F (36.8 °C) 88 18 (!) 132/97 98 %   07/15/19 2002 97.4 °F (36.3 °C) 81 18 125/82 100 %     Wt Readings from Last 3 Encounters:   07/10/19 91.4 kg (201 lb 8 oz)   06/28/19 113.4 kg (250 lb)   06/18/19 113.4 kg (250 lb)     Temp Readings from Last 3 Encounters:   07/16/19 98.2 °F (36.8 °C)   07/01/19 98 °F (36.7 °C)   06/25/19 99.7 °F (37.6 °C)     BP Readings from Last 3 Encounters:   07/16/19 92/60   07/01/19 114/69   06/25/19 145/85     Pulse Readings from Last 3 Encounters:   07/16/19 88   07/01/19 87   06/25/19 (!) 107            DATA     LABORATORY DATA:(reviewed/updated 7/16/2019)  No results found for this or any previous visit (from the past 24 hour(s)). No results found for: VALF2, VALAC, VALP, VALPR, DS6, CRBAM, CRBAMP, CARB2, XCRBAM  No results found for: LITHM   RADIOLOGY REPORTS:(reviewed/updated 7/16/2019)  No results found.        MEDICATIONS     ALL MEDICATIONS:   Current Facility-Administered Medications   Medication Dose Route Frequency    QUEtiapine (SEROquel) tablet 100 mg  100 mg Oral QHS    guanFACINE IR (TENEX) tablet 1 mg  1 mg Oral DAILY    clonazePAM (KlonoPIN) tablet 0.5 mg  0.5 mg Oral BID and QHS    senna-docusate (PERICOLACE) 8.6-50 mg per tablet 1 Tab  1 Tab Oral DAILY    risperiDONE (RisperDAL) tablet 3 mg  3 mg Oral Q12H    metoprolol tartrate (LOPRESSOR) tablet 25 mg  25 mg Oral Q12H    ziprasidone (GEODON) capsule 20 mg  20 mg Oral Q8H PRN    cloNIDine HCl (CATAPRES) tablet 0.1 mg  0.1 mg Oral Q2H PRN    levothyroxine (SYNTHROID) tablet 50 mcg  50 mcg Oral ACB    traZODone (DESYREL) tablet 300 mg  300 mg Oral QHS    zolpidem (AMBIEN) tablet 10 mg  10 mg Oral QHS PRN    ziprasidone (GEODON) 20 mg in sterile water (preservative free) 1 mL injection  20 mg IntraMUSCular BID PRN    benztropine (COGENTIN) tablet 2 mg  2 mg Oral BID PRN    benztropine (COGENTIN) injection 2 mg  2 mg IntraMUSCular BID PRN    LORazepam (ATIVAN) injection 2 mg  2 mg IntraMUSCular Q4H PRN    acetaminophen (TYLENOL) tablet 650 mg  650 mg Oral Q4H PRN    ibuprofen (MOTRIN) tablet 400 mg  400 mg Oral Q8H PRN    magnesium hydroxide (MILK OF MAGNESIA) 400 mg/5 mL oral suspension 30 mL  30 mL Oral DAILY PRN    nicotine (NICODERM CQ) 21 mg/24 hr patch 1 Patch  1 Patch TransDERmal DAILY PRN    hydrOXYzine HCl (ATARAX) tablet 50 mg  50 mg Oral Q6H PRN      SCHEDULED MEDICATIONS:   Current Facility-Administered Medications   Medication Dose Route Frequency    QUEtiapine (SEROquel) tablet 100 mg  100 mg Oral QHS    guanFACINE IR (TENEX) tablet 1 mg  1 mg Oral DAILY    clonazePAM (KlonoPIN) tablet 0.5 mg  0.5 mg Oral BID and QHS    senna-docusate (PERICOLACE) 8.6-50 mg per tablet 1 Tab  1 Tab Oral DAILY    risperiDONE (RisperDAL) tablet 3 mg  3 mg Oral Q12H    metoprolol tartrate (LOPRESSOR) tablet 25 mg  25 mg Oral Q12H    levothyroxine (SYNTHROID) tablet 50 mcg  50 mcg Oral ACB    traZODone (DESYREL) tablet 300 mg  300 mg Oral QHS          ASSESSMENT & PLAN     DIAGNOSES REQUIRING ACTIVE TREATMENT AND MONITORING: (reviewed/updated 7/16/2019)  Patient Active Hospital Problem List:   Schizoaffective disorder ([facility] [address] (6/18/2019)    Assessment: patient with ongoing psychotic presentation, had previously been stabilized on Invega and Seroquel, now on Risperdal s/p rhabdomyolysis and SHYLA due to dehydration vs iatrogenic. Will continue psychiatric stabilization and monitor vitals. Patient still tachycardic, may require additional IV fluids.   - CONTINUE Tenex 1 mg PO QDAY for ADHD  - CONTINUE Risperdal 3 mg Q12H for psychosis  - CONTINUE Seroquel 100 mg QHS for psychosis, adjunct  - CONTINUE Klonopin 0.5 mg TID for agitation and anxiety  - CONTINUE bowel regimen  - IGM therapy as tolerated  - Expand database / obtain collateral  - Dispo planning    Patient discharged on two antipsychotics now due to relative refractoriness to treatment thus far. Prior to admission patient had THREE or more failed trails of monotherapy, including: Haldol, Seroquel and Risperdal     Patient is a very slow responder to medications. Risks and benefits in the use of two antipsychotics have been weighed in full, including the risk of metabolic syndrome and the potential increased risk of QTc  Based on this analysis, it is considered favorable for the utilization of two antipsychotics. In the future, once stable on the two antipsychotics, patient can possibly be tapered to one of the chosen antipsychotics. Will check on EKG results tomorrow to monitor QTc. In summary, Carlos Walden, is a 29 y.o. male who presents with a severe exacerbation of the principal diagnosis of Schizoaffective disorder ([facility] [address]    Patient's condition is not improving. Patient requires continued inpatient hospitalization for further stabilization, safety monitoring and medication management. I will continue to coordinate the provision of individual, milieu, occupational, group, and substance abuse therapies to address target symptoms/diagnoses as deemed appropriate for the individual patient. A coordinated, multidisplinary treatment team round was conducted with the patient (this team consists of the nurse, psychiatric unit pharmcist,  and writer). Complete current electronic health record for patient has been reviewed today including consultant notes, ancillary staff notes, nurses and psychiatric tech notes. Suicide risk assessment completed and patient deemed to be of low risk for suicide at this time. The following regarding medications was addressed during rounds with patient:   the risks and benefits of the proposed medication. The patient was given the opportunity to ask questions. Informed consent given to the use of the above medications. Will continue to adjust psychiatric and non-psychiatric medications (see above \"medication\" section and orders section for details) as deemed appropriate & based upon diagnoses and response to treatment. I will continue to order blood tests/labs and diagnostic tests as deemed appropriate and review results as they become available (see orders for details and above listed lab/test results). I will order psychiatric records from previous [facility] hospitals to further elucidate the nature of patient's psychopathology and review once available. I will gather additional collateral information from friends, family and o/p treatment team to further elucidate the nature of patient's psychopathology and baselline level of psychiatric functioning.          I certify that this patient's inpatient psychiatric hospital services furnished since the previous certification were, and continue to be, required for treatment that could reasonably be expected to improve the patient's condition, or for diagnostic study, and that the patient continues to need, on a daily basis, active treatment furnished directly by or requiring the supervision of inpatient psychiatric facility personnel. In addition, the hospital records show that services furnished were intensive treatment services, admission or related services, or equivalent services.     EXPECTED DISCHARGE DATE/DAY: 7/18/2019     DISPOSITION: Home       Signed By:   Rula Patiño MD  7/16/2019

## 2019-07-17 LAB
ATRIAL RATE: 76 BPM
CALCULATED P AXIS, ECG09: 55 DEGREES
CALCULATED R AXIS, ECG10: 25 DEGREES
CALCULATED T AXIS, ECG11: 41 DEGREES
DIAGNOSIS, 93000: NORMAL
P-R INTERVAL, ECG05: 152 MS
Q-T INTERVAL, ECG07: 376 MS
QRS DURATION, ECG06: 88 MS
QTC CALCULATION (BEZET), ECG08: 423 MS
VENTRICULAR RATE, ECG03: 76 BPM

## 2019-07-17 PROCEDURE — 74011250636 HC RX REV CODE- 250/636: Performed by: PSYCHIATRY & NEUROLOGY

## 2019-07-17 PROCEDURE — 74011250637 HC RX REV CODE- 250/637: Performed by: PSYCHIATRY & NEUROLOGY

## 2019-07-17 PROCEDURE — 74011000250 HC RX REV CODE- 250: Performed by: PSYCHIATRY & NEUROLOGY

## 2019-07-17 PROCEDURE — 65220000003 HC RM SEMIPRIVATE PSYCH

## 2019-07-17 PROCEDURE — 74011250637 HC RX REV CODE- 250/637: Performed by: FAMILY MEDICINE

## 2019-07-17 RX ORDER — MAGNESIUM CITRATE
296 SOLUTION, ORAL ORAL
Status: COMPLETED | OUTPATIENT
Start: 2019-07-17 | End: 2019-07-17

## 2019-07-17 RX ORDER — TRAZODONE HYDROCHLORIDE 100 MG/1
100 TABLET ORAL
Status: DISCONTINUED | OUTPATIENT
Start: 2019-07-17 | End: 2019-07-19

## 2019-07-17 RX ORDER — CLONAZEPAM 1 MG/1
1 TABLET ORAL
Status: DISCONTINUED | OUTPATIENT
Start: 2019-07-17 | End: 2019-07-19

## 2019-07-17 RX ORDER — DIPHENHYDRAMINE HYDROCHLORIDE 50 MG/ML
50 INJECTION, SOLUTION INTRAMUSCULAR; INTRAVENOUS ONCE
Status: COMPLETED | OUTPATIENT
Start: 2019-07-17 | End: 2019-07-17

## 2019-07-17 RX ORDER — QUETIAPINE FUMARATE 200 MG/1
200 TABLET, FILM COATED ORAL
Status: DISCONTINUED | OUTPATIENT
Start: 2019-07-17 | End: 2019-07-18

## 2019-07-17 RX ADMIN — ZIPRASIDONE HYDROCHLORIDE 20 MG: 20 CAPSULE ORAL at 13:11

## 2019-07-17 RX ADMIN — METOPROLOL TARTRATE 25 MG: 25 TABLET ORAL at 08:39

## 2019-07-17 RX ADMIN — HYDROXYZINE HYDROCHLORIDE 50 MG: 25 TABLET, FILM COATED ORAL at 05:33

## 2019-07-17 RX ADMIN — SENNOSIDES AND DOCUSATE SODIUM 1 TABLET: 8.6; 5 TABLET ORAL at 08:39

## 2019-07-17 RX ADMIN — GUANFACINE 1 MG: 1 TABLET ORAL at 08:39

## 2019-07-17 RX ADMIN — HYDROXYZINE HYDROCHLORIDE 50 MG: 25 TABLET, FILM COATED ORAL at 12:45

## 2019-07-17 RX ADMIN — LEVOTHYROXINE SODIUM 50 MCG: 50 TABLET ORAL at 05:32

## 2019-07-17 RX ADMIN — CLONAZEPAM 0.5 MG: 0.5 TABLET ORAL at 08:39

## 2019-07-17 RX ADMIN — RISPERIDONE 3 MG: 3 TABLET ORAL at 08:39

## 2019-07-17 RX ADMIN — DIPHENHYDRAMINE HYDROCHLORIDE 50 MG: 50 INJECTION INTRAMUSCULAR; INTRAVENOUS at 18:43

## 2019-07-17 RX ADMIN — WATER 20 MG: 1 INJECTION INTRAMUSCULAR; INTRAVENOUS; SUBCUTANEOUS at 18:43

## 2019-07-17 RX ADMIN — CLONAZEPAM 1 MG: 1 TABLET ORAL at 17:15

## 2019-07-17 RX ADMIN — MAGNESIUM CITRATE 296 ML: 1.75 LIQUID ORAL at 10:49

## 2019-07-17 NOTE — BH NOTES
GROUP THERAPY PROGRESS NOTE    The patient Pieter Fountain a 29 y.o. male is participating in [employer]. Group time: 45 minutes    Personal goal for participation:  To participate in healthy relationships bingo game    Goal orientation:  personal    Group therapy participation: minimal and disruptive    Therapeutic interventions reviewed and discussed: things pertaining to healthy relationships    Impression of participation:  The patient was present-distracted/very short attention span-had difficulty sitting still;up and down a lot in his chair-required redirection    Refugio Ellis  7/17/2019  1:54 PM

## 2019-07-17 NOTE — PROGRESS NOTES
Genna Locke actively participated in Spirituality Group about Compassion on 7/17/19 Behavioral Health unit at [facility]. Rev.  Jolena Gitelman, EdD MDiv     For  Assistance Page [phone]

## 2019-07-17 NOTE — BH NOTES
GROUP THERAPY PROGRESS NOTE    The patient Shannan carias 29 y.o. male is participating in Creative Expression Group. Group time: 1 hour    Personal goal for participation:  To concentrate on selected task    Goal orientation: social    Group therapy participation: active    Therapeutic interventions reviewed and discussed: Crafts, games, music    Impression of participation: The patient was attentive-required prompting    Maxx Awan  7/17/2019  5:25 PM

## 2019-07-17 NOTE — BH NOTES
PSYCHIATRIC PROGRESS NOTE     Weekend Coverage    Patient Name  Elen Reyna   Date of Birth 1985   [facility] [id number]   Medical Record Number  [id number]      Age  29 y.o. PCP None   Admit date:  7/1/2019    Room Number  738/78  @ [facility]   Date of Service  7/17/2019         E & M PROGRESS NOTE:         HISTORY       CC:  \"psychosis\"  HISTORY OF PRESENT ILLNESS/INTERVAL HISTORY:  (reviewed/updated 7/17/2019). per initial evaluation: The patient, Elen Reyna, is a 29 y.o. WHITE [place]  male with a past psychiatric history significant for schizoaffective disorder vs schizophrenia, who presents at this time with complaints of (and/or evidence of) the following emotional symptoms: agitation, delusions and psychotic behavior. Additional symptomatology include AH and VH. The above symptoms have been present for 2+ weeks. These symptoms are of moderate to high severity. These symptoms are constant in nature. The patient's condition has been precipitated by psychosocial stressors. Patient's condition made worse by treatment noncompliance. UDS: negative; BAL=0.      The patient was readmitted from medical unit following rhabdomyolysis and SHYLA which resolved with IV hydration. He is grossly psychotic and has been noted to be confused. Patient is markedly disorganized and confused on interview.  He cannot state the days of the week backward despite effort.     From patient's medical admission:     The patient is a 81-year-old  man, who is currently admitted to the medical floor at [address] [place] [address] Drive was initially admitted on 06/17, to Dr. Onel Gonzalez service on the psychiatry floor. Holly Bodily a couple of days, his CPK was found to be elevated to 1729 and he was transferred to the medical floor for treatment of rhabdomyolysis.  At that time, he did not have any rigidity, hyperthermia, or leukocytosis, which are indicative of neuroleptic malignant syndrome.  His CKs dropped immediately with hydration. [facility] has improved markedly and currently reports feeling better in a physical sense.  Also during his stay, his liver enzymes increased although, they were normal at baseline. [facility] does have a history of being positive for hepatitis C and has been treated with Isoniazid for latent tuberculosis in the past.  Currently, he remains on one-to-one observation and was agitated and aggressive at times yesterday. [facility] had to be given p.r.n.'s. Deena Argueta had started him on a low dose of risperidone yesterday given the low likelihood of him having had NMS.  His CPK most recently was 1. [facility] continues to exhibit disorganized behavior and delusional thinking.  When I asked him, he stated that he wanted to leave and go stay with his mother but when Dr. Cristina Bañuelos, the hospitalist, asked him he stated that he wanted to go to the psychiatric floor because he does not feel safe going home.  At the present time, if the patient requests to be discharged home, my opinion is that Crisis should be called for TDO evaluation. [facility] denies any auditory hallucinations today, but was yesterday noted to be talking to himself. [facility] is a poor historian and unable to provide much history. 7/3- patient has remained largely illogical, at times appears to be hallucinating in his room, but is able to coherently describe his experience. Patient given PRN Geodon and Zyprexa with limited effect. Per mother Segundo Nazario patient was very sedated yesterday following Zyprexa administration, and does better with Geodon historically as a PRN. Patient continues to appear ill, with tachycardia unresolved despite fluid rehydration. 7/4- patient more visible, has been more coherent than 24 hours prior. Vitals still concerning, tachycardic and with autonomic instability evident. Medicine following. Patient endorses disorganized thoughts and anxiety.   7/5- patient largely unchanged, repeatedly asking for help from staff due to \"something in my head\" but coherent, slept 7 hours. Patient continues to endorse anxiety and restlessness. Patient still noted to be in moderate distress. Patient medication compliant. Repeatedly states he is dying. 7/6-Presents anxious and remains needy. Staff familiar to him reports slow improvement is noticed. Remains pre-occupied but compliant with meds. Prn used-Atarax. 7/7-Pt was moved to the General side yesterday. Continues to maintain improvement, less anxious. Slept 7 hrs. No prn's were used. 7/8- no acute overnight events. Patient has been visible, odd, standing with or at nursing station most of the day, continues to endorse problems \"in my head\" but has been pleasant, smiling inappropriately. Patient received CT scan of head, unremarkable 7/5. Medication compliant, continues to complain of anxiety. 7/9- patient slept 8 hours overnight, did not get PRNs, states he is doing better. Still odd, visible, standing by nursing station much of the day, confused for much of the day but with no specific complaints. EKG performed, QTc 407. SHYLA resolved. 7/10- patient visible, slept 8 hours medication compliant. He remains discharged focused, c/o constipation. Patient still disorganized but more coherent, no agitation or hallucinations x24 hours did not get PRNs for psychosis. 7/11- patient calm and cooperative, medication compliant. Has been visible, still odd, disorganized. Patient with no specific concerns, discharge focused. Per mom, patient still not at his baseline, but improved since admission. Constipation resolved though pt still c/o hard stools. NMDAr Ab negative. 7/12- no acute overnight events. Patient for discharge this morning but made grossly paranoid statements to treatment team (\"the nurses are trying to kill me\") and continues to be anxious. The decision was made to hold the patient for further observation and titrate antipsychotic given ongoing PI and limited home resources over the weekend.   7/13Ana Carrasco reports feeling well and moods are alright. Continues to be bizarre and paranoid. Denies SI/HI/AH/VH. No aggression or violence. Appropriately interactive and aware. Tolerating medications well. Eating and sleeping fairly. 7/14Marthann Goldberg reports feeling restless and needs something for his ADHD. Pacing the unit despite receiving his prns. No aggression or violence. Seems to be driven to move. Denies SI/HI/AH/VH.    7/15- patient was moved over the acute side of the unit following an episode of aggression, was noted to be confused, irritable. EKG WNL. Slept 6 hours, pleasant on interview this morning. Acknowledges agitated, stating he is feeling anxious. Patient continues to express PI regarding the [employer]. 7/16- patient is visible, remains confused. Patient continues to report PI, states that the nurses are \"law enforcement\" but can reality test.  7/17- patient has been visible, confused, got Ativan IM x2 doses due to agitation and psychosis. He remains grossly disorganized, c/o constipation. Patient with prominent PI towards nurses. SIDE EFFECTS: (reviewed/updated 7/17/2019)  None reported or admitted to. ALLERGIES:(reviewed/updated 7/17/2019)  No Known Allergies   MEDICATIONS PRIOR TO ADMISSION:(reviewed/updated 7/17/2019)  Medications Prior to Admission   Medication Sig    levothyroxine (SYNTHROID) 50 mcg tablet Take 50 mcg by mouth Daily (before breakfast).  risperiDONE (RISPERDAL) 0.5 mg tablet Take 1.5 mg by mouth nightly. Risperidone 0.5 mg tabs: Take 3 tabs po every evening    pyridoxine, vitamin B6, (VITAMIN B-6) 50 mg tablet Take 50 mg by mouth daily.  isoniazid (NYDRAZID) 300 mg tablet Take 300 mg by mouth daily.  traZODone (DESYREL) 150 mg tablet Take 300 mg by mouth nightly. Indications:  Insomnia      PAST MEDICAL HISTORY: Past medical history from the initial psychiatric evaluation has been reviewed (reviewed/updated 7/17/2019) with no additional updates (I asked patient and no additional past medical history provided). No past medical history on file. No past surgical history on file. SOCIAL HISTORY: Social history from the initial psychiatric evaluation has been reviewed (reviewed/updated 7/17/2019) with no additional updates (I asked patient and no additional social history provided). Social History     Socioeconomic History    Marital status: SINGLE     Spouse name: Not on file    Number of children: Not on file    Years of education: Not on file    Highest education level: Not on file   Occupational History    Not on file   Social Needs    Financial resource strain: Not on file    Food insecurity:     Worry: Not on file     Inability: Not on file    Transportation needs:     Medical: Not on file     Non-medical: Not on file   Tobacco Use    Smoking status: Not on file   Substance and Sexual Activity    Alcohol use: Not on file    Drug use: Not on file    Sexual activity: Not on file   Lifestyle    Physical activity:     Days per week: Not on file     Minutes per session: Not on file    Stress: Not on file   Relationships    Social connections:     Talks on phone: Not on file     Gets together: Not on file     Attends Jain service: Not on file     Active member of club or organization: Not on file     Attends meetings of clubs or organizations: Not on file     Relationship status: Not on file    Intimate partner violence:     Fear of current or ex partner: Not on file     Emotionally abused: Not on file     Physically abused: Not on file     Forced sexual activity: Not on file   Other Topics Concern    Not on file   Social History Narrative    Not on file      FAMILY HISTORY: Family history from the initial psychiatric evaluation has been reviewed (reviewed/updated 7/17/2019) with no additional updates (I asked patient and no additional family history provided). No family history on file.     REVIEW OF SYSTEMS: (reviewed/updated 7/17/2019)  Appetite:poor Sleep: poor with DIMS (difficulty initiating & maintaining sleep)   All other Review of Systems: Negative except confusion per HPI         [facility] (MSE):    MSE FINDINGS ARE WITHIN NORMAL LIMITS (WNL) UNLESS OTHERWISE STATED BELOW. ( ALL OF THE BELOW CATEGORIES OF THE MSE HAVE BEEN REVIEWED (reviewed 7/17/2019) AND UPDATED AS DEEMED APPROPRIATE )  General Presentation age appropriate, cooperative   Orientation confused, disorganized, disoriented   Vital Signs  See below (reviewed 7/17/2019); Vital Signs (BP, Pulse, & Temp) are within normal limits if not listed below.    Gait and Station Stable/steady, no ataxia   Musculoskeletal System No extrapyramidal symptoms (EPS); no abnormal muscular movements or Tardive Dyskinesia (TD); muscle strength and tone are within normal limits   Language No aphasia or dysarthria   Speech:  hypoverbal and normal volume   Thought Processes illogical; normal rate of thoughts; poor abstract reasoning/computation   Thought Associations loose associations   Thought Content visual hallucinations and preoccupations   Suicidal Ideations none   Homicidal Ideations none   Mood:  anxious    Affect:  constricted and mood-congruent   Memory recent  impaired   Memory remote:  fair   Concentration/Attention:  impaired   Fund of Knowledge average   Insight:  limited   Reliability fair   Judgment:  impaired due to active psychosis          VITALS:     Patient Vitals for the past 24 hrs:   Temp Pulse Resp BP SpO2   07/17/19 0733 97.5 °F (36.4 °C) 72 16 115/61 100 %     Wt Readings from Last 3 Encounters:   07/10/19 91.4 kg (201 lb 8 oz)   06/28/19 113.4 kg (250 lb)   06/18/19 113.4 kg (250 lb)     Temp Readings from Last 3 Encounters:   07/17/19 97.5 °F (36.4 °C)   07/01/19 98 °F (36.7 °C)   06/25/19 99.7 °F (37.6 °C)     BP Readings from Last 3 Encounters:   07/17/19 115/61   07/01/19 114/69   06/25/19 145/85     Pulse Readings from Last 3 Encounters: 07/17/19 72   07/01/19 87   06/25/19 (!) 107            DATA     LABORATORY DATA:(reviewed/updated 7/17/2019)  No results found for this or any previous visit (from the past 24 hour(s)). No results found for: VALF2, VALAC, VALP, VALPR, DS6, CRBAM, CRBAMP, CARB2, XCRBAM  No results found for: LITHM   RADIOLOGY REPORTS:(reviewed/updated 7/17/2019)  No results found.        MEDICATIONS     ALL MEDICATIONS:   Current Facility-Administered Medications   Medication Dose Route Frequency    QUEtiapine (SEROquel) tablet 200 mg  200 mg Oral QHS    traZODone (DESYREL) tablet 100 mg  100 mg Oral QHS    clonazePAM (KlonoPIN) tablet 1 mg  1 mg Oral BID and QHS    guanFACINE IR (TENEX) tablet 1 mg  1 mg Oral DAILY    senna-docusate (PERICOLACE) 8.6-50 mg per tablet 1 Tab  1 Tab Oral DAILY    risperiDONE (RisperDAL) tablet 3 mg  3 mg Oral Q12H    metoprolol tartrate (LOPRESSOR) tablet 25 mg  25 mg Oral Q12H    ziprasidone (GEODON) capsule 20 mg  20 mg Oral Q8H PRN    cloNIDine HCl (CATAPRES) tablet 0.1 mg  0.1 mg Oral Q2H PRN    levothyroxine (SYNTHROID) tablet 50 mcg  50 mcg Oral ACB    zolpidem (AMBIEN) tablet 10 mg  10 mg Oral QHS PRN    ziprasidone (GEODON) 20 mg in sterile water (preservative free) 1 mL injection  20 mg IntraMUSCular BID PRN    benztropine (COGENTIN) tablet 2 mg  2 mg Oral BID PRN    benztropine (COGENTIN) injection 2 mg  2 mg IntraMUSCular BID PRN    LORazepam (ATIVAN) injection 2 mg  2 mg IntraMUSCular Q4H PRN    acetaminophen (TYLENOL) tablet 650 mg  650 mg Oral Q4H PRN    ibuprofen (MOTRIN) tablet 400 mg  400 mg Oral Q8H PRN    magnesium hydroxide (MILK OF MAGNESIA) 400 mg/5 mL oral suspension 30 mL  30 mL Oral DAILY PRN    nicotine (NICODERM CQ) 21 mg/24 hr patch 1 Patch  1 Patch TransDERmal DAILY PRN    hydrOXYzine HCl (ATARAX) tablet 50 mg  50 mg Oral Q6H PRN      SCHEDULED MEDICATIONS:   Current Facility-Administered Medications   Medication Dose Route Frequency    QUEtiapine (SEROquel) tablet 200 mg  200 mg Oral QHS    traZODone (DESYREL) tablet 100 mg  100 mg Oral QHS    clonazePAM (KlonoPIN) tablet 1 mg  1 mg Oral BID and QHS    guanFACINE IR (TENEX) tablet 1 mg  1 mg Oral DAILY    senna-docusate (PERICOLACE) 8.6-50 mg per tablet 1 Tab  1 Tab Oral DAILY    risperiDONE (RisperDAL) tablet 3 mg  3 mg Oral Q12H    metoprolol tartrate (LOPRESSOR) tablet 25 mg  25 mg Oral Q12H    levothyroxine (SYNTHROID) tablet 50 mcg  50 mcg Oral ACB          ASSESSMENT & PLAN     DIAGNOSES REQUIRING ACTIVE TREATMENT AND MONITORING: (reviewed/updated 7/17/2019)  Patient Active Hospital Problem List:   Schizoaffective disorder ([facility] [address] (6/18/2019)    Assessment: patient with ongoing psychotic presentation, had previously been stabilized on Invega and Seroquel, now on Risperdal s/p rhabdomyolysis and SHYLA due to dehydration vs iatrogenic. Will continue psychiatric stabilization and monitor vitals. Patient still tachycardic, may require additional IV fluids.   - CONTINUE Tenex 1 mg PO QDAY for ADHD  - CONTINUE Risperdal 3 mg Q12H for psychosis  - INCREASE Seroquel to 200 mg QHS for psychosis, adjunct  - TAPER Trazodone to 150 mg for insomnia  - INCREASE Klonopin to 1 mg TID for agitation and anxiety  - CONTINUE bowel regimen  - IGM therapy as tolerated  - Expand database / obtain collateral  - Dispo planning    Patient discharged on two antipsychotics now due to relative refractoriness to treatment thus far. Prior to admission patient had THREE or more failed trails of monotherapy, including: Haldol, Seroquel and Risperdal     Patient is a very slow responder to medications. Risks and benefits in the use of two antipsychotics have been weighed in full, including the risk of metabolic syndrome and the potential increased risk of QTc  Based on this analysis, it is considered favorable for the utilization of two antipsychotics.   In the future, once stable on the two antipsychotics, patient can possibly be tapered to one of the chosen antipsychotics. Will check on EKG results tomorrow to monitor QTc. In summary, Avery Leyva, is a 29 y.o.  male who presents with a severe exacerbation of the principal diagnosis of Schizoaffective disorder ([address]    Patient's condition is not improving. Patient requires continued inpatient hospitalization for further stabilization, safety monitoring and medication management. I will continue to coordinate the provision of individual, milieu, occupational, group, and substance abuse therapies to address target symptoms/diagnoses as deemed appropriate for the individual patient. A coordinated, multidisplinary treatment team round was conducted with the patient (this team consists of the nurse, psychiatric unit pharmcist,  and writer). Complete current electronic health record for patient has been reviewed today including consultant notes, ancillary staff notes, nurses and psychiatric tech notes. Suicide risk assessment completed and patient deemed to be of low risk for suicide at this time. The following regarding medications was addressed during rounds with patient:   the risks and benefits of the proposed medication. The patient was given the opportunity to ask questions. Informed consent given to the use of the above medications. Will continue to adjust psychiatric and non-psychiatric medications (see above \"medication\" section and orders section for details) as deemed appropriate & based upon diagnoses and response to treatment. I will continue to order blood tests/labs and diagnostic tests as deemed appropriate and review results as they become available (see orders for details and above listed lab/test results). I will order psychiatric records from previous [facility] hospitals to further elucidate the nature of patient's psychopathology and review once available.     I will gather additional collateral information from friends, family and o/p treatment team to further elucidate the nature of patient's psychopathology and baselline level of psychiatric functioning. I certify that this patient's inpatient psychiatric hospital services furnished since the previous certification were, and continue to be, required for treatment that could reasonably be expected to improve the patient's condition, or for diagnostic study, and that the patient continues to need, on a daily basis, active treatment furnished directly by or requiring the supervision of inpatient psychiatric facility personnel. In addition, the hospital records show that services furnished were intensive treatment services, admission or related services, or equivalent services.     EXPECTED DISCHARGE DATE/DAY: 7/19/2019     DISPOSITION: Home       Signed By:   Sonny Calvo RN  7/17/2019

## 2019-07-17 NOTE — BH NOTES
Social Work - Pt was seen in treatment team this morning. Pt is alert and oriented. Pt denies SI/HI. Pt's mood is anxious, affect is mood congruent. Pt's thought process is grossly disorganized with paranoid delusions. Pt's insight and judgment is limited, reliability is fair. Awaiting return call from mother to see how conversations have been going with pt. Social work department will continue to coordinate discharge plans.

## 2019-07-17 NOTE — BH NOTES
PSYCHIATRIC PROGRESS NOTE     Weekend Coverage    Patient Name  Janee Ye   Date of Birth 1985   [facility] [id number]   Medical Record Number  [id number]      Age  29 y.o. PCP None   Admit date:  7/1/2019    Room Number  397/09  @ [facility]   Date of Service  7/17/2019         E & M PROGRESS NOTE:         HISTORY       CC:  \"psychosis\"  HISTORY OF PRESENT ILLNESS/INTERVAL HISTORY:  (reviewed/updated 7/17/2019). per initial evaluation: The patient, Janee Ye, is a 29 y.o. WHITE [place]  male with a past psychiatric history significant for schizoaffective disorder vs schizophrenia, who presents at this time with complaints of (and/or evidence of) the following emotional symptoms: agitation, delusions and psychotic behavior. Additional symptomatology include AH and VH. The above symptoms have been present for 2+ weeks. These symptoms are of moderate to high severity. These symptoms are constant in nature. The patient's condition has been precipitated by psychosocial stressors. Patient's condition made worse by treatment noncompliance. UDS: negative; BAL=0.      The patient was readmitted from medical unit following rhabdomyolysis and SHYLA which resolved with IV hydration. He is grossly psychotic and has been noted to be confused. Patient is markedly disorganized and confused on interview.  He cannot state the days of the week backward despite effort.     From patient's medical admission:     The patient is a 80-year-old  man, who is currently admitted to the medical floor at [address] [place] [address] Drive was initially admitted on 06/17, to Dr. Winston Elena service on the psychiatry floor. [facility] a couple of days, his CPK was found to be elevated to 1729 and he was transferred to the medical floor for treatment of rhabdomyolysis.  At that time, he did not have any rigidity, hyperthermia, or leukocytosis, which are indicative of neuroleptic malignant syndrome.  His CKs dropped immediately with hydration. Felicity Erwin has improved markedly and currently reports feeling better in a physical sense.  Also during his stay, his liver enzymes increased although, they were normal at baseline. Felicity Erwin does have a history of being positive for hepatitis C and has been treated with Isoniazid for latent tuberculosis in the past.  Currently, he remains on one-to-one observation and was agitated and aggressive at times yesterday. eFlicity Erwin had to be given p.r.n.'s. Karyle Huge had started him on a low dose of risperidone yesterday given the low likelihood of him having had NMS.  His CPK most recently was 1. Felicity Erwin continues to exhibit disorganized behavior and delusional thinking.  When I asked him, he stated that he wanted to leave and go stay with his mother but when Dr. Grace Chavira, the hospitalist, asked him he stated that he wanted to go to the psychiatric floor because he does not feel safe going home.  At the present time, if the patient requests to be discharged home, my opinion is that Crisis should be called for TDO evaluation. Felicity Erwin denies any auditory hallucinations today, but was yesterday noted to be talking to himself. Felicity Erwin is a poor historian and unable to provide much history. 7/3- patient has remained largely illogical, at times appears to be hallucinating in his room, but is able to coherently describe his experience. Patient given PRN Geodon and Zyprexa with limited effect. Per mother Norva Dre patient was very sedated yesterday following Zyprexa administration, and does better with Geodon historically as a PRN. Patient continues to appear ill, with tachycardia unresolved despite fluid rehydration. 7/4- patient more visible, has been more coherent than 24 hours prior. Vitals still concerning, tachycardic and with autonomic instability evident. Medicine following. Patient endorses disorganized thoughts and anxiety.   7/5- patient largely unchanged, repeatedly asking for help from staff due to \"something in my head\" but coherent, slept 7 hours. Patient continues to endorse anxiety and restlessness. Patient still noted to be in moderate distress. Patient medication compliant. Repeatedly states he is dying. 7/6-Presents anxious and remains needy. Staff familiar to him reports slow improvement is noticed. Remains pre-occupied but compliant with meds. Prn used-Atarax. 7/7-Pt was moved to the General side yesterday. Continues to maintain improvement, less anxious. Slept 7 hrs. No prn's were used. 7/8- no acute overnight events. Patient has been visible, odd, standing with or at nursing station most of the day, continues to endorse problems \"in my head\" but has been pleasant, smiling inappropriately. Patient received CT scan of head, unremarkable 7/5. Medication compliant, continues to complain of anxiety. 7/9- patient slept 8 hours overnight, did not get PRNs, states he is doing better. Still odd, visible, standing by nursing station much of the day, confused for much of the day but with no specific complaints. EKG performed, QTc 407. SHYLA resolved. 7/10- patient visible, slept 8 hours medication compliant. He remains discharged focused, c/o constipation. Patient still disorganized but more coherent, no agitation or hallucinations x24 hours did not get PRNs for psychosis. 7/11- patient calm and cooperative, medication compliant. Has been visible, still odd, disorganized. Patient with no specific concerns, discharge focused. Per mom, patient still not at his baseline, but improved since admission. Constipation resolved though pt still c/o hard stools. NMDAr Ab negative. 7/12- no acute overnight events. Patient for discharge this morning but made grossly paranoid statements to treatment team (\"the nurses are trying to kill me\") and continues to be anxious. The decision was made to hold the patient for further observation and titrate antipsychotic given ongoing PI and limited home resources over the weekend.   7/13Deena Thapa reports feeling well and moods are alright. Continues to be bizarre and paranoid. Denies SI/HI/AH/VH. No aggression or violence. Appropriately interactive and aware. Tolerating medications well. Eating and sleeping fairly. 7/14Lamalex Reyes reports feeling restless and needs something for his ADHD. Pacing the unit despite receiving his prns. No aggression or violence. Seems to be driven to move. Denies SI/HI/AH/VH.    7/15- patient was moved over the acute side of the unit following an episode of aggression, was noted to be confused, irritable. EKG WNL. Slept 6 hours, pleasant on interview this morning. Acknowledges agitated, stating he is feeling anxious. Patient continues to express PI regarding the FBI. 7/16- patient is visible, remains confused. Patient continues to report PI, states that the nurses are \"law enforcement\" but can reality test.  7/17- patient has been visible, confused, got Ativan IM x2 doses due to agitation and psychosis. He remains grossly disorganized, c/o constipation. Patient with prominent PI towards nurses. SIDE EFFECTS: (reviewed/updated 7/17/2019)  None reported or admitted to. ALLERGIES:(reviewed/updated 7/17/2019)  No Known Allergies   MEDICATIONS PRIOR TO ADMISSION:(reviewed/updated 7/17/2019)  Medications Prior to Admission   Medication Sig    levothyroxine (SYNTHROID) 50 mcg tablet Take 50 mcg by mouth Daily (before breakfast).  risperiDONE (RISPERDAL) 0.5 mg tablet Take 1.5 mg by mouth nightly. Risperidone 0.5 mg tabs: Take 3 tabs po every evening    pyridoxine, vitamin B6, (VITAMIN B-6) 50 mg tablet Take 50 mg by mouth daily.  isoniazid (NYDRAZID) 300 mg tablet Take 300 mg by mouth daily.  traZODone (DESYREL) 150 mg tablet Take 300 mg by mouth nightly. Indications:  Insomnia      PAST MEDICAL HISTORY: Past medical history from the initial psychiatric evaluation has been reviewed (reviewed/updated 7/17/2019) with no additional updates (I asked patient and no additional past medical history provided). No past medical history on file. No past surgical history on file. SOCIAL HISTORY: Social history from the initial psychiatric evaluation has been reviewed (reviewed/updated 7/17/2019) with no additional updates (I asked patient and no additional social history provided). Social History     Socioeconomic History    Marital status: SINGLE     Spouse name: Not on file    Number of children: Not on file    Years of education: Not on file    Highest education level: Not on file   Occupational History    Not on file   Social Needs    Financial resource strain: Not on file    Food insecurity:     Worry: Not on file     Inability: Not on file    Transportation needs:     Medical: Not on file     Non-medical: Not on file   Tobacco Use    Smoking status: Not on file   Substance and Sexual Activity    Alcohol use: Not on file    Drug use: Not on file    Sexual activity: Not on file   Lifestyle    Physical activity:     Days per week: Not on file     Minutes per session: Not on file    Stress: Not on file   Relationships    Social connections:     Talks on phone: Not on file     Gets together: Not on file     Attends Buddhist service: Not on file     Active member of club or organization: Not on file     Attends meetings of clubs or organizations: Not on file     Relationship status: Not on file    Intimate partner violence:     Fear of current or ex partner: Not on file     Emotionally abused: Not on file     Physically abused: Not on file     Forced sexual activity: Not on file   Other Topics Concern    Not on file   Social History Narrative    Not on file      FAMILY HISTORY: Family history from the initial psychiatric evaluation has been reviewed (reviewed/updated 7/17/2019) with no additional updates (I asked patient and no additional family history provided). No family history on file.     REVIEW OF SYSTEMS: (reviewed/updated 7/17/2019)  Appetite:poor Sleep: poor with DIMS (difficulty initiating & maintaining sleep)   All other Review of Systems: Negative except confusion per HPI         [facility] (MSE):    MSE FINDINGS ARE WITHIN NORMAL LIMITS (WNL) UNLESS OTHERWISE STATED BELOW. ( ALL OF THE BELOW CATEGORIES OF THE MSE HAVE BEEN REVIEWED (reviewed 7/17/2019) AND UPDATED AS DEEMED APPROPRIATE )  General Presentation age appropriate, cooperative   Orientation confused, disorganized, disoriented   Vital Signs  See below (reviewed 7/17/2019); Vital Signs (BP, Pulse, & Temp) are within normal limits if not listed below.    Gait and Station Stable/steady, no ataxia   Musculoskeletal System No extrapyramidal symptoms (EPS); no abnormal muscular movements or Tardive Dyskinesia (TD); muscle strength and tone are within normal limits   Language No aphasia or dysarthria   Speech:  hypoverbal and normal volume   Thought Processes illogical; normal rate of thoughts; poor abstract reasoning/computation   Thought Associations loose associations   Thought Content visual hallucinations and preoccupations   Suicidal Ideations none   Homicidal Ideations none   Mood:  anxious    Affect:  constricted and mood-congruent   Memory recent  impaired   Memory remote:  fair   Concentration/Attention:  impaired   Fund of Knowledge average   Insight:  limited   Reliability fair   Judgment:  impaired due to active psychosis          VITALS:     Patient Vitals for the past 24 hrs:   Temp Pulse Resp BP SpO2   07/17/19 0733 97.5 °F (36.4 °C) 72 16 115/61 100 %     Wt Readings from Last 3 Encounters:   07/10/19 91.4 kg (201 lb 8 oz)   06/28/19 113.4 kg (250 lb)   06/18/19 113.4 kg (250 lb)     Temp Readings from Last 3 Encounters:   07/17/19 97.5 °F (36.4 °C)   07/01/19 98 °F (36.7 °C)   06/25/19 99.7 °F (37.6 °C)     BP Readings from Last 3 Encounters:   07/17/19 115/61   07/01/19 114/69   06/25/19 145/85     Pulse Readings from Last 3 Encounters: 07/17/19 72   07/01/19 87   06/25/19 (!) 107            DATA     LABORATORY DATA:(reviewed/updated 7/17/2019)  No results found for this or any previous visit (from the past 24 hour(s)). No results found for: VALF2, VALAC, VALP, VALPR, DS6, CRBAM, CRBAMP, CARB2, XCRBAM  No results found for: LITHM   RADIOLOGY REPORTS:(reviewed/updated 7/17/2019)  No results found.        MEDICATIONS     ALL MEDICATIONS:   Current Facility-Administered Medications   Medication Dose Route Frequency    QUEtiapine (SEROquel) tablet 200 mg  200 mg Oral QHS    traZODone (DESYREL) tablet 100 mg  100 mg Oral QHS    clonazePAM (KlonoPIN) tablet 1 mg  1 mg Oral BID and QHS    guanFACINE IR (TENEX) tablet 1 mg  1 mg Oral DAILY    senna-docusate (PERICOLACE) 8.6-50 mg per tablet 1 Tab  1 Tab Oral DAILY    risperiDONE (RisperDAL) tablet 3 mg  3 mg Oral Q12H    metoprolol tartrate (LOPRESSOR) tablet 25 mg  25 mg Oral Q12H    ziprasidone (GEODON) capsule 20 mg  20 mg Oral Q8H PRN    cloNIDine HCl (CATAPRES) tablet 0.1 mg  0.1 mg Oral Q2H PRN    levothyroxine (SYNTHROID) tablet 50 mcg  50 mcg Oral ACB    zolpidem (AMBIEN) tablet 10 mg  10 mg Oral QHS PRN    ziprasidone (GEODON) 20 mg in sterile water (preservative free) 1 mL injection  20 mg IntraMUSCular BID PRN    benztropine (COGENTIN) tablet 2 mg  2 mg Oral BID PRN    benztropine (COGENTIN) injection 2 mg  2 mg IntraMUSCular BID PRN    LORazepam (ATIVAN) injection 2 mg  2 mg IntraMUSCular Q4H PRN    acetaminophen (TYLENOL) tablet 650 mg  650 mg Oral Q4H PRN    ibuprofen (MOTRIN) tablet 400 mg  400 mg Oral Q8H PRN    magnesium hydroxide (MILK OF MAGNESIA) 400 mg/5 mL oral suspension 30 mL  30 mL Oral DAILY PRN    nicotine (NICODERM CQ) 21 mg/24 hr patch 1 Patch  1 Patch TransDERmal DAILY PRN    hydrOXYzine HCl (ATARAX) tablet 50 mg  50 mg Oral Q6H PRN      SCHEDULED MEDICATIONS:   Current Facility-Administered Medications   Medication Dose Route Frequency    QUEtiapine (SEROquel) tablet 200 mg  200 mg Oral QHS    traZODone (DESYREL) tablet 100 mg  100 mg Oral QHS    clonazePAM (KlonoPIN) tablet 1 mg  1 mg Oral BID and QHS    guanFACINE IR (TENEX) tablet 1 mg  1 mg Oral DAILY    senna-docusate (PERICOLACE) 8.6-50 mg per tablet 1 Tab  1 Tab Oral DAILY    risperiDONE (RisperDAL) tablet 3 mg  3 mg Oral Q12H    metoprolol tartrate (LOPRESSOR) tablet 25 mg  25 mg Oral Q12H    levothyroxine (SYNTHROID) tablet 50 mcg  50 mcg Oral ACB          ASSESSMENT & PLAN     DIAGNOSES REQUIRING ACTIVE TREATMENT AND MONITORING: (reviewed/updated 7/17/2019)  Patient Active Hospital Problem List:   Schizoaffective disorder ([facility] [address] (6/18/2019)    Assessment: patient with ongoing psychotic presentation, had previously been stabilized on Invega and Seroquel, now on Risperdal s/p rhabdomyolysis and SHYLA due to dehydration vs iatrogenic. Will continue psychiatric stabilization and monitor vitals. Patient still tachycardic, may require additional IV fluids.   - CONTINUE Tenex 1 mg PO QDAY for ADHD  - CONTINUE Risperdal 3 mg Q12H for psychosis  - INCREASE Seroquel to 200 mg QHS for psychosis, adjunct  - TAPER Trazodone to 150 mg for insomnia  - INCREASE Klonopin to 1 mg TID for agitation and anxiety  - CONTINUE bowel regimen  - IGM therapy as tolerated  - Expand database / obtain collateral  - Dispo planning    Patient discharged on two antipsychotics now due to relative refractoriness to treatment thus far. Prior to admission patient had THREE or more failed trails of monotherapy, including: Haldol, Seroquel and Risperdal     Patient is a very slow responder to medications. Risks and benefits in the use of two antipsychotics have been weighed in full, including the risk of metabolic syndrome and the potential increased risk of QTc  Based on this analysis, it is considered favorable for the utilization of two antipsychotics.   In the future, once stable on the two antipsychotics, patient can possibly be tapered to one of the chosen antipsychotics. Will check on EKG results tomorrow to monitor QTc. In summary, Pastora Shea, is a 29 y.o.  male who presents with a severe exacerbation of the principal diagnosis of Schizoaffective disorder ([address]    Patient's condition is not improving. Patient requires continued inpatient hospitalization for further stabilization, safety monitoring and medication management. I will continue to coordinate the provision of individual, milieu, occupational, group, and substance abuse therapies to address target symptoms/diagnoses as deemed appropriate for the individual patient. A coordinated, multidisplinary treatment team round was conducted with the patient (this team consists of the nurse, psychiatric unit pharmcist,  and writer). Complete current electronic health record for patient has been reviewed today including consultant notes, ancillary staff notes, nurses and psychiatric tech notes. Suicide risk assessment completed and patient deemed to be of low risk for suicide at this time. The following regarding medications was addressed during rounds with patient:   the risks and benefits of the proposed medication. The patient was given the opportunity to ask questions. Informed consent given to the use of the above medications. Will continue to adjust psychiatric and non-psychiatric medications (see above \"medication\" section and orders section for details) as deemed appropriate & based upon diagnoses and response to treatment. I will continue to order blood tests/labs and diagnostic tests as deemed appropriate and review results as they become available (see orders for details and above listed lab/test results). I will order psychiatric records from previous [facility] hospitals to further elucidate the nature of patient's psychopathology and review once available.     I will gather additional collateral information from friends, family and o/p treatment team to further elucidate the nature of patient's psychopathology and baselline level of psychiatric functioning. I certify that this patient's inpatient psychiatric hospital services furnished since the previous certification were, and continue to be, required for treatment that could reasonably be expected to improve the patient's condition, or for diagnostic study, and that the patient continues to need, on a daily basis, active treatment furnished directly by or requiring the supervision of inpatient psychiatric facility personnel. In addition, the hospital records show that services furnished were intensive treatment services, admission or related services, or equivalent services.     EXPECTED DISCHARGE DATE/DAY: 7/19/2019     DISPOSITION: Home       Signed By:   Vanessa Espinosa MD  7/17/2019

## 2019-07-17 NOTE — PROGRESS NOTES
Wolf Yates RN received report from [facility].     5050 Pt presents with a cooperative attitude, labile affect, and labile mood. Pt in hallway responding to internal stimuli. Pt denies SI, HI, and AVH. However, pt does respond to internal stimuli. Pt is med and meal compliant. Pt requested an injection because he said he is \"not doing good. \" Pt elaborated on his statement to say that he needed his vitals checked. Pt's vitals are within normal limits. Pt endorsed anxiety. Pt is paranoid and delusional. Pt stated \"Im dying, I know it. \" This writer informed the pt that he is not dying. 0809 Pt continues to present with delusions. Pt stated \"People think I'm dead. \" Pt walked away before he elaborated on his statement. Pt came back to this writer with flight of ideas and paranoia. Pt stated \"It's because I'm white isn't it, why don't y'all like white people. I was over there now I'm over here. \" This writer assured the pt that he has not been discriminated against.     0815 Pt talking on the phone to his mother. Pt interacting well with staff and peers. 8763 Pt actively participating in spirituality group. Pt interacting well with staff and peers. Pt left the group early. 1134 Pt in room responding to internal stimuli. Pt cursing and talking loudly. 1205 Pt in room resting. 1240 Pt pacing the unit. Pt went in the room and slammed the door. Pt responding to internal stimuli as he entered the room. Pt given prn atrax 50 mg for anxiety. 1255 Pt in room loudly responding to internal stimuli. Pt hit the window. Pt's hands appear to be without scratches and bruising. Window is cracked. 1310 Pt in room crying. Pt stated I'm dying. Pt stated \"I just heard you say you don't like me. \" This writer assured the pt that this writer did not say that. Pt received PO 20 mg Geodon for psychosis. 26 Pt is more calm with no psychosis. Prn 20 mg Geodon effective.  Pt continues to delusional. Pt stated \"I'm dead. \" Pt continues to require assurance that his vitals are within normal range. Pt stated \"I need some medicine. \"     1721 This writer questioned the pt to determine if the pt has had a bowel movement since receiving the laxative. Pt stated that he has not had a bowel movement since receiving the stat laxative earlier today. This writer encouraged the pt to drink some water. 1732 Pt in hallway paranoid. Pt stated \"You tried to set me up. \" To this writer. This writer informed the pt that this writer did not try to set him up, and he is safe on the unit. 1820 Pt is posturing, intrusive at the nursing station. Pt cursing and jumping at nursing station. Pt went to room then back at nurses station. Pt is severely agitated. Pt's attending doctor contacted. Pt paranoid and accusing nursing staff of various things. 1821 Pt received orders from Dr. Ricki Dimas to give the pt 50 mg IM Benadryl an 20 mg IM Geodon. 1843 Pt in room crying, cursing, and hitting the bed. Pt received prn 50 mg IM benadryl, and 20 mg Geodon IM.

## 2019-07-17 NOTE — BH NOTES
Patient has a liable mood. Patient requires multiple episode of redirection and verbal de-escalation because of is irritability and anxiety. \"No one wants to help me. Why can't I call my mother. Why did you move me from the other side. This side sucks. It's because I'm white, isn't it. \" Patient became agitated and slam both fists of the nurse's station countertop twice. Patient was offered to prn medication as patient continued to verbalize \"I just need a shot\". Patient was offered oral medications which he initially refused. \"I want a shot! \" Writer explained to patient an injection was not necessary at this point and the pill form of his medication would help his anxiety and help him fall asleep. He accepted the medication after walking away from the medication window several times. He denies any current thoughts of SI or HI. He denies auditory and visual hallucinations. Q 15 minutes safety checks continue.

## 2019-07-18 PROCEDURE — 74011250637 HC RX REV CODE- 250/637: Performed by: FAMILY MEDICINE

## 2019-07-18 PROCEDURE — 74011250637 HC RX REV CODE- 250/637: Performed by: PSYCHIATRY & NEUROLOGY

## 2019-07-18 PROCEDURE — 65220000003 HC RM SEMIPRIVATE PSYCH

## 2019-07-18 PROCEDURE — 74011250636 HC RX REV CODE- 250/636: Performed by: PSYCHIATRY & NEUROLOGY

## 2019-07-18 RX ORDER — RISPERIDONE 1 MG/1
2 TABLET, FILM COATED ORAL EVERY 12 HOURS
Status: DISCONTINUED | OUTPATIENT
Start: 2019-07-18 | End: 2019-07-19

## 2019-07-18 RX ADMIN — ZIPRASIDONE HYDROCHLORIDE 20 MG: 20 CAPSULE ORAL at 12:46

## 2019-07-18 RX ADMIN — SENNOSIDES AND DOCUSATE SODIUM 1 TABLET: 8.6; 5 TABLET ORAL at 08:02

## 2019-07-18 RX ADMIN — CLONAZEPAM 1 MG: 1 TABLET ORAL at 22:39

## 2019-07-18 RX ADMIN — LORAZEPAM 2 MG: 2 INJECTION INTRAMUSCULAR; INTRAVENOUS at 03:56

## 2019-07-18 RX ADMIN — QUETIAPINE FUMARATE 200 MG: 200 TABLET ORAL at 03:41

## 2019-07-18 RX ADMIN — QUETIAPINE FUMARATE 300 MG: 200 TABLET ORAL at 22:38

## 2019-07-18 RX ADMIN — GUANFACINE 1 MG: 1 TABLET ORAL at 08:03

## 2019-07-18 RX ADMIN — CLONAZEPAM 1 MG: 1 TABLET ORAL at 17:30

## 2019-07-18 RX ADMIN — ZIPRASIDONE HYDROCHLORIDE 20 MG: 20 CAPSULE ORAL at 03:41

## 2019-07-18 RX ADMIN — LEVOTHYROXINE SODIUM 50 MCG: 50 TABLET ORAL at 05:35

## 2019-07-18 RX ADMIN — RISPERIDONE 2 MG: 1 TABLET ORAL at 22:41

## 2019-07-18 RX ADMIN — METOPROLOL TARTRATE 25 MG: 25 TABLET ORAL at 08:03

## 2019-07-18 RX ADMIN — CLONAZEPAM 1 MG: 1 TABLET ORAL at 08:02

## 2019-07-18 RX ADMIN — TRAZODONE HYDROCHLORIDE 100 MG: 100 TABLET ORAL at 22:39

## 2019-07-18 RX ADMIN — HYDROXYZINE HYDROCHLORIDE 50 MG: 25 TABLET, FILM COATED ORAL at 03:41

## 2019-07-18 RX ADMIN — RISPERIDONE 3 MG: 3 TABLET ORAL at 08:03

## 2019-07-18 NOTE — BH NOTES
GROUP THERAPY PROGRESS NOTE    The patient Martiniquais Minus a 29 y.o. male is participating in Creative Expression Group. Group time: 1 hour    Personal goal for participation:  To concentrate on selected task    Goal orientation: social    Group therapy participation: active    Therapeutic interventions reviewed and discussed: Crafts, games, music    Impression of participation: The patient was attentive-required prompting    Zonia Anguiano  7/18/2019  5:54 PM

## 2019-07-18 NOTE — BH NOTES
PSYCHIATRIC PROGRESS NOTE     Weekend Coverage    Patient Name  Candelaria Alpers   Date of Birth 1985   [facility] [id number]   Medical Record Number  [id number]      Age  29 y.o. PCP None   Admit date:  7/1/2019    Room Number  814/51  @ [facility] [place]   Date of Service  7/18/2019         E & M PROGRESS NOTE:         HISTORY       CC:  \"psychosis\"  HISTORY OF PRESENT ILLNESS/INTERVAL HISTORY:  (reviewed/updated 7/18/2019). per initial evaluation: The patient, Candelaria Alpers, is a 29 y.o. WHITE [place]  male with a past psychiatric history significant for schizoaffective disorder vs schizophrenia, who presents at this time with complaints of (and/or evidence of) the following emotional symptoms: agitation, delusions and psychotic behavior. Additional symptomatology include AH and VH. The above symptoms have been present for 2+ weeks. These symptoms are of moderate to high severity. These symptoms are constant in nature. The patient's condition has been precipitated by psychosocial stressors. Patient's condition made worse by treatment noncompliance. UDS: negative; BAL=0.      The patient was readmitted from medical unit following rhabdomyolysis and SHYLA which resolved with IV hydration. He is grossly psychotic and has been noted to be confused. Patient is markedly disorganized and confused on interview.  He cannot state the days of the week backward despite effort.     From patient's medical admission:     The patient is a 67-year-old  man, who is currently admitted to the medical floor at [address] [place] [address] Drive was initially admitted on 06/17, to Dr. Chandan Thompson service on the psychiatry floor. Vernadine Shed a couple of days, his CPK was found to be elevated to 1729 and he was transferred to the medical floor for treatment of rhabdomyolysis.  At that time, he did not have any rigidity, hyperthermia, or leukocytosis, which are indicative of neuroleptic malignant syndrome.  His CKs dropped immediately with hydration. John Steiner has improved markedly and currently reports feeling better in a physical sense.  Also during his stay, his liver enzymes increased although, they were normal at baseline. Jonh Steiner does have a history of being positive for hepatitis C and has been treated with Isoniazid for latent tuberculosis in the past.  Currently, he remains on one-to-one observation and was agitated and aggressive at times yesterday. John Steiner had to be given p.r.n.'s. Parish Lozada had started him on a low dose of risperidone yesterday given the low likelihood of him having had NMS.  His CPK most recently was 1. John Steiner continues to exhibit disorganized behavior and delusional thinking.  When I asked him, he stated that he wanted to leave and go stay with his mother but when Dr. Alexandra Goodman, the hospitalist, asked him he stated that he wanted to go to the psychiatric floor because he does not feel safe going home.  At the present time, if the patient requests to be discharged home, my opinion is that Crisis should be called for TDO evaluation. John Steiner denies any auditory hallucinations today, but was yesterday noted to be talking to himself. John Steiner is a poor historian and unable to provide much history. 73- patient has remained largely illogical, at times appears to be hallucinating in his room, but is able to coherently describe his experience. Patient given PRN Geodon and Zyprexa with limited effect. Per mother Bennett Marcelo patient was very sedated yesterday following Zyprexa administration, and does better with Geodon historically as a PRN. Patient continues to appear ill, with tachycardia unresolved despite fluid rehydration. 7/4- patient more visible, has been more coherent than 24 hours prior. Vitals still concerning, tachycardic and with autonomic instability evident. Medicine following. Patient endorses disorganized thoughts and anxiety.   5- patient largely unchanged, repeatedly asking for help from staff due to \"something in my head\" but coherent, slept 7 hours. Patient continues to endorse anxiety and restlessness. Patient still noted to be in moderate distress. Patient medication compliant. Repeatedly states he is dying. 7/6-Presents anxious and remains needy. Staff familiar to him reports slow improvement is noticed. Remains pre-occupied but compliant with meds. Prn used-Atarax. 7/7-Pt was moved to the General side yesterday. Continues to maintain improvement, less anxious. Slept 7 hrs. No prn's were used. 7/8- no acute overnight events. Patient has been visible, odd, standing with or at nursing station most of the day, continues to endorse problems \"in my head\" but has been pleasant, smiling inappropriately. Patient received CT scan of head, unremarkable 7/5. Medication compliant, continues to complain of anxiety. 7/9- patient slept 8 hours overnight, did not get PRNs, states he is doing better. Still odd, visible, standing by nursing station much of the day, confused for much of the day but with no specific complaints. EKG performed, QTc 407. SHYLA resolved. 7/10- patient visible, slept 8 hours medication compliant. He remains discharged focused, c/o constipation. Patient still disorganized but more coherent, no agitation or hallucinations x24 hours did not get PRNs for psychosis. 7/11- patient calm and cooperative, medication compliant. Has been visible, still odd, disorganized. Patient with no specific concerns, discharge focused. Per mom, patient still not at his baseline, but improved since admission. Constipation resolved though pt still c/o hard stools. NMDAr Ab negative. 7/12- no acute overnight events. Patient for discharge this morning but made grossly paranoid statements to treatment team (\"the nurses are trying to kill me\") and continues to be anxious. The decision was made to hold the patient for further observation and titrate antipsychotic given ongoing PI and limited home resources over the weekend.   7/13Komal Rasheed reports feeling well and moods are alright. Continues to be bizarre and paranoid. Denies SI/HI/AH/VH. No aggression or violence. Appropriately interactive and aware. Tolerating medications well. Eating and sleeping fairly. 7/14Marthann Goldberg reports feeling restless and needs something for his ADHD. Pacing the unit despite receiving his prns. No aggression or violence. Seems to be driven to move. Denies SI/HI/AH/VH.    7/15- patient was moved over the acute side of the unit following an episode of aggression, was noted to be confused, irritable. EKG WNL. Slept 6 hours, pleasant on interview this morning. Acknowledges agitated, stating he is feeling anxious. Patient continues to express PI regarding the FBI. 7/16- patient is visible, remains confused. Patient continues to report PI, states that the nurses are \"law enforcement\" but can reality test.  7/17- patient has been visible, confused, got Ativan IM x2 doses due to agitation and psychosis. He remains grossly disorganized, c/o constipation. Patient with prominent PI towards nurses. 7/18- patient received multiple PRNs for agitation, gross psychosis, has been visible, posturing, with paranoid ideation. He is calm and cooperative in treatment team and states his previous agitation was due to the 'nurses being police' cannot elaborate further. SIDE EFFECTS: (reviewed/updated 7/18/2019)  None reported or admitted to. ALLERGIES:(reviewed/updated 7/18/2019)  No Known Allergies   MEDICATIONS PRIOR TO ADMISSION:(reviewed/updated 7/18/2019)  Medications Prior to Admission   Medication Sig    levothyroxine (SYNTHROID) 50 mcg tablet Take 50 mcg by mouth Daily (before breakfast).  risperiDONE (RISPERDAL) 0.5 mg tablet Take 1.5 mg by mouth nightly. Risperidone 0.5 mg tabs: Take 3 tabs po every evening    pyridoxine, vitamin B6, (VITAMIN B-6) 50 mg tablet Take 50 mg by mouth daily.  isoniazid (NYDRAZID) 300 mg tablet Take 300 mg by mouth daily.     traZODone (DESYREL) 150 mg tablet Take 300 mg by mouth nightly. Indications: Insomnia      PAST MEDICAL HISTORY: Past medical history from the initial psychiatric evaluation has been reviewed (reviewed/updated 7/18/2019) with no additional updates (I asked patient and no additional past medical history provided). No past medical history on file. No past surgical history on file. SOCIAL HISTORY: Social history from the initial psychiatric evaluation has been reviewed (reviewed/updated 7/18/2019) with no additional updates (I asked patient and no additional social history provided).    Social History     Socioeconomic History    Marital status: SINGLE     Spouse name: Not on file    Number of children: Not on file    Years of education: Not on file    Highest education level: Not on file   Occupational History    Not on file   Social Needs    Financial resource strain: Not on file    Food insecurity:     Worry: Not on file     Inability: Not on file    Transportation needs:     Medical: Not on file     Non-medical: Not on file   Tobacco Use    Smoking status: Not on file   Substance and Sexual Activity    Alcohol use: Not on file    Drug use: Not on file    Sexual activity: Not on file   Lifestyle    Physical activity:     Days per week: Not on file     Minutes per session: Not on file    Stress: Not on file   Relationships    Social connections:     Talks on phone: Not on file     Gets together: Not on file     Attends Mu-ism service: Not on file     Active member of club or organization: Not on file     Attends meetings of clubs or organizations: Not on file     Relationship status: Not on file    Intimate partner violence:     Fear of current or ex partner: Not on file     Emotionally abused: Not on file     Physically abused: Not on file     Forced sexual activity: Not on file   Other Topics Concern    Not on file   Social History Narrative    Not on file      FAMILY HISTORY: Family history from the initial psychiatric evaluation has been reviewed (reviewed/updated 7/18/2019) with no additional updates (I asked patient and no additional family history provided). No family history on file. REVIEW OF SYSTEMS: (reviewed/updated 7/18/2019)  Appetite:poor   Sleep: poor with DIMS (difficulty initiating & maintaining sleep)   All other Review of Systems: Negative except confusion per HPI         [facility] (MSE):    MSE FINDINGS ARE WITHIN NORMAL LIMITS (WNL) UNLESS OTHERWISE STATED BELOW. ( ALL OF THE BELOW CATEGORIES OF THE MSE HAVE BEEN REVIEWED (reviewed 7/18/2019) AND UPDATED AS DEEMED APPROPRIATE )  General Presentation age appropriate, cooperative   Orientation confused, disorganized, disoriented   Vital Signs  See below (reviewed 7/18/2019); Vital Signs (BP, Pulse, & Temp) are within normal limits if not listed below.    Gait and Station Stable/steady, no ataxia   Musculoskeletal System No extrapyramidal symptoms (EPS); no abnormal muscular movements or Tardive Dyskinesia (TD); muscle strength and tone are within normal limits   Language No aphasia or dysarthria   Speech:  hypoverbal and soft   Thought Processes illogical; normal rate of thoughts; poor abstract reasoning/computation   Thought Associations loose associations   Thought Content visual hallucinations and preoccupations   Suicidal Ideations none   Homicidal Ideations none   Mood:  anxious    Affect:  constricted and mood-congruent   Memory recent  impaired   Memory remote:  fair   Concentration/Attention:  impaired   Fund of Knowledge average   Insight:  limited   Reliability fair   Judgment:  impaired due to active psychosis          VITALS:     Patient Vitals for the past 24 hrs:   Temp Pulse Resp BP SpO2   07/18/19 0804 97.5 °F (36.4 °C) 85 18 110/65 100 %     Wt Readings from Last 3 Encounters:   07/10/19 91.4 kg (201 lb 8 oz)   06/28/19 113.4 kg (250 lb)   06/18/19 113.4 kg (250 lb)     Temp Readings from Last 3 Encounters:   07/18/19 97.5 °F (36.4 °C)   07/01/19 98 °F (36.7 °C)   06/25/19 99.7 °F (37.6 °C)     BP Readings from Last 3 Encounters:   07/18/19 110/65   07/01/19 114/69   06/25/19 145/85     Pulse Readings from Last 3 Encounters:   07/18/19 85   07/01/19 87   06/25/19 (!) 107            DATA     LABORATORY DATA:(reviewed/updated 7/18/2019)  No results found for this or any previous visit (from the past 24 hour(s)). No results found for: VALF2, VALAC, VALP, VALPR, DS6, CRBAM, CRBAMP, CARB2, XCRBAM  No results found for: LITHM   RADIOLOGY REPORTS:(reviewed/updated 7/18/2019)  No results found.        MEDICATIONS     ALL MEDICATIONS:   Current Facility-Administered Medications   Medication Dose Route Frequency    risperiDONE (RisperDAL) tablet 2 mg  2 mg Oral Q12H    QUEtiapine (SEROquel) tablet 300 mg  300 mg Oral QHS    traZODone (DESYREL) tablet 100 mg  100 mg Oral QHS    clonazePAM (KlonoPIN) tablet 1 mg  1 mg Oral BID and QHS    guanFACINE IR (TENEX) tablet 1 mg  1 mg Oral DAILY    senna-docusate (PERICOLACE) 8.6-50 mg per tablet 1 Tab  1 Tab Oral DAILY    metoprolol tartrate (LOPRESSOR) tablet 25 mg  25 mg Oral Q12H    ziprasidone (GEODON) capsule 20 mg  20 mg Oral Q8H PRN    cloNIDine HCl (CATAPRES) tablet 0.1 mg  0.1 mg Oral Q2H PRN    levothyroxine (SYNTHROID) tablet 50 mcg  50 mcg Oral ACB    zolpidem (AMBIEN) tablet 10 mg  10 mg Oral QHS PRN    ziprasidone (GEODON) 20 mg in sterile water (preservative free) 1 mL injection  20 mg IntraMUSCular BID PRN    benztropine (COGENTIN) tablet 2 mg  2 mg Oral BID PRN    benztropine (COGENTIN) injection 2 mg  2 mg IntraMUSCular BID PRN    LORazepam (ATIVAN) injection 2 mg  2 mg IntraMUSCular Q4H PRN    acetaminophen (TYLENOL) tablet 650 mg  650 mg Oral Q4H PRN    ibuprofen (MOTRIN) tablet 400 mg  400 mg Oral Q8H PRN    magnesium hydroxide (MILK OF MAGNESIA) 400 mg/5 mL oral suspension 30 mL  30 mL Oral DAILY PRN    nicotine (NICODERM CQ) 21 mg/24 hr patch 1 Patch  1 Patch TransDERmal DAILY PRN    hydrOXYzine HCl (ATARAX) tablet 50 mg  50 mg Oral Q6H PRN      SCHEDULED MEDICATIONS:   Current Facility-Administered Medications   Medication Dose Route Frequency    risperiDONE (RisperDAL) tablet 2 mg  2 mg Oral Q12H    QUEtiapine (SEROquel) tablet 300 mg  300 mg Oral QHS    traZODone (DESYREL) tablet 100 mg  100 mg Oral QHS    clonazePAM (KlonoPIN) tablet 1 mg  1 mg Oral BID and QHS    guanFACINE IR (TENEX) tablet 1 mg  1 mg Oral DAILY    senna-docusate (PERICOLACE) 8.6-50 mg per tablet 1 Tab  1 Tab Oral DAILY    metoprolol tartrate (LOPRESSOR) tablet 25 mg  25 mg Oral Q12H    levothyroxine (SYNTHROID) tablet 50 mcg  50 mcg Oral ACB          ASSESSMENT & PLAN     DIAGNOSES REQUIRING ACTIVE TREATMENT AND MONITORING: (reviewed/updated 7/18/2019)  Patient Active Hospital Problem List:   Schizoaffective disorder ([facility] [address] (6/18/2019)    Assessment: patient with ongoing psychotic presentation, had previously been stabilized on Invega and Seroquel, now on Risperdal s/p rhabdomyolysis and SHYLA due to dehydration vs iatrogenic. Will continue psychiatric stabilization and monitor vitals. Patient with marked EPS vs akathisia.  - CONTINUE Tenex 1 mg PO QDAY for ADHD  - TAPER Risperdal to 2 mg Q12H for psychosis  - INCREASE Seroquel to 300 mg QHS for psychosis, adjunct  - TAPER Trazodone to 100 mg for insomnia  - CONTINUE Klonopin 1 mg TID for agitation and anxiety  - CONTINUE bowel regimen  - IGM therapy as tolerated  - Expand database / obtain collateral  - Dispo planning    Patient discharged on two antipsychotics now due to relative refractoriness to treatment thus far. Prior to admission patient had THREE or more failed trails of monotherapy, including: Haldol, Seroquel and Risperdal     Patient is a very slow responder to medications.   Risks and benefits in the use of two antipsychotics have been weighed in full, including the risk of metabolic syndrome and the potential increased risk of QTc  Based on this analysis, it is considered favorable for the utilization of two antipsychotics. In the future, once stable on the two antipsychotics, patient can possibly be tapered to one of the chosen antipsychotics. Will check on EKG results tomorrow to monitor QTc. In summary, Phoenix Pennington, is a 29 y.o.  male who presents with a severe exacerbation of the principal diagnosis of Schizoaffective disorder ([facility] [address]    Patient's condition is not improving. Patient requires continued inpatient hospitalization for further stabilization, safety monitoring and medication management. I will continue to coordinate the provision of individual, milieu, occupational, group, and substance abuse therapies to address target symptoms/diagnoses as deemed appropriate for the individual patient. A coordinated, multidisplinary treatment team round was conducted with the patient (this team consists of the nurse, psychiatric unit pharmcist,  and writer). Complete current electronic health record for patient has been reviewed today including consultant notes, ancillary staff notes, nurses and psychiatric tech notes. Suicide risk assessment completed and patient deemed to be of low risk for suicide at this time. The following regarding medications was addressed during rounds with patient:   the risks and benefits of the proposed medication. The patient was given the opportunity to ask questions. Informed consent given to the use of the above medications. Will continue to adjust psychiatric and non-psychiatric medications (see above \"medication\" section and orders section for details) as deemed appropriate & based upon diagnoses and response to treatment. I will continue to order blood tests/labs and diagnostic tests as deemed appropriate and review results as they become available (see orders for details and above listed lab/test results).     I will order psychiatric records from previous [facility] hospitals to further elucidate the nature of patient's psychopathology and review once available. I will gather additional collateral information from friends, family and o/p treatment team to further elucidate the nature of patient's psychopathology and baselline level of psychiatric functioning. I certify that this patient's inpatient psychiatric hospital services furnished since the previous certification were, and continue to be, required for treatment that could reasonably be expected to improve the patient's condition, or for diagnostic study, and that the patient continues to need, on a daily basis, active treatment furnished directly by or requiring the supervision of inpatient psychiatric facility personnel. In addition, the hospital records show that services furnished were intensive treatment services, admission or related services, or equivalent services.     EXPECTED DISCHARGE DATE/DAY: 7/19/2019     DISPOSITION: Home       Signed By:   Denia Anguiano MD  7/18/2019

## 2019-07-18 NOTE — BH NOTES
Patient was seen by the [facility] for a Recommitment hearing the ending result of hearing  Continuing hospitalization was granted for 45 days.

## 2019-07-18 NOTE — BH NOTES
Patient was anxious at the beginning of the shift. He received prn Geodon IM. Soon after HS snacks patient went to sleep. He slept through scheduled HS medication. Q 15 minute safety checks continue.

## 2019-07-18 NOTE — BH NOTES
0700 Assumed care of the patient    0830 Medication and meal compliant    1000 Patient alert and verbal. Delusional and paranoid. Easily redirected. Denies SI/HI. Denies AVH. 1200 Meal compliant for lunch    1400 Patient restless. Continuously seek staff for various reasons. Paranoid, thinking people are out to get him and that the government is against him. Easily redirectied, however, continuous redirection is needed. 1500 Participating in afternoon group    1700 Meal compliant    1830 Medication compliant.  Sitting in dayroom quietly

## 2019-07-18 NOTE — BH NOTES
GROUP THERAPY PROGRESS NOTE    The patient Lora carias 29 y.o. male is participating in [address] [place] [address]. Group time: 45 minutes    Personal goal for participation:  To participate in self esteem booster    Goal orientation:  personal    Group therapy participation: minimal    Therapeutic interventions reviewed and discussed: resilience building techniques    Impression of participation:  The patient was present-confused,short attention span-left session early    Leila Mckeon  7/18/2019  4:50 PM

## 2019-07-19 LAB
ALBUMIN SERPL-MCNC: 4.2 G/DL (ref 3.5–5)
ALBUMIN/GLOB SERPL: 1.4 {RATIO} (ref 1.1–2.2)
ALP SERPL-CCNC: 105 U/L (ref 45–117)
ALT SERPL-CCNC: 77 U/L (ref 12–78)
AMMONIA PLAS-SCNC: <10 UMOL/L
ANION GAP SERPL CALC-SCNC: 10 MMOL/L (ref 5–15)
AST SERPL-CCNC: 53 U/L (ref 15–37)
BILIRUB SERPL-MCNC: 0.5 MG/DL (ref 0.2–1)
BUN SERPL-MCNC: 14 MG/DL (ref 6–20)
BUN/CREAT SERPL: 14 (ref 12–20)
CALCIUM SERPL-MCNC: 9.6 MG/DL (ref 8.5–10.1)
CHLORIDE SERPL-SCNC: 103 MMOL/L (ref 97–108)
CK SERPL-CCNC: 878 U/L (ref 39–308)
CO2 SERPL-SCNC: 28 MMOL/L (ref 21–32)
CREAT SERPL-MCNC: 1.02 MG/DL (ref 0.7–1.3)
GLOBULIN SER CALC-MCNC: 3 G/DL (ref 2–4)
GLUCOSE SERPL-MCNC: 104 MG/DL (ref 65–100)
POTASSIUM SERPL-SCNC: 4.2 MMOL/L (ref 3.5–5.1)
PROT SERPL-MCNC: 7.2 G/DL (ref 6.4–8.2)
SODIUM SERPL-SCNC: 141 MMOL/L (ref 136–145)

## 2019-07-19 PROCEDURE — 74011250637 HC RX REV CODE- 250/637: Performed by: PSYCHIATRY & NEUROLOGY

## 2019-07-19 PROCEDURE — 74011250636 HC RX REV CODE- 250/636: Performed by: PSYCHIATRY & NEUROLOGY

## 2019-07-19 PROCEDURE — 74011000250 HC RX REV CODE- 250: Performed by: PSYCHIATRY & NEUROLOGY

## 2019-07-19 PROCEDURE — 82550 ASSAY OF CK (CPK): CPT

## 2019-07-19 PROCEDURE — 36415 COLL VENOUS BLD VENIPUNCTURE: CPT

## 2019-07-19 PROCEDURE — 74011250637 HC RX REV CODE- 250/637: Performed by: FAMILY MEDICINE

## 2019-07-19 PROCEDURE — 65220000003 HC RM SEMIPRIVATE PSYCH

## 2019-07-19 PROCEDURE — 80053 COMPREHEN METABOLIC PANEL: CPT

## 2019-07-19 PROCEDURE — 82140 ASSAY OF AMMONIA: CPT

## 2019-07-19 RX ORDER — DIPHENHYDRAMINE HYDROCHLORIDE 50 MG/ML
50 INJECTION, SOLUTION INTRAMUSCULAR; INTRAVENOUS
Status: DISCONTINUED | OUTPATIENT
Start: 2019-07-19 | End: 2019-07-30 | Stop reason: HOSPADM

## 2019-07-19 RX ORDER — MAGNESIUM CITRATE
296 SOLUTION, ORAL ORAL
Status: COMPLETED | OUTPATIENT
Start: 2019-07-19 | End: 2019-07-19

## 2019-07-19 RX ORDER — AMOXICILLIN 250 MG
2 CAPSULE ORAL DAILY
Status: DISCONTINUED | OUTPATIENT
Start: 2019-07-20 | End: 2019-07-30 | Stop reason: HOSPADM

## 2019-07-19 RX ORDER — CLONAZEPAM 0.5 MG/1
0.5 TABLET ORAL
Status: DISCONTINUED | OUTPATIENT
Start: 2019-07-19 | End: 2019-07-19

## 2019-07-19 RX ORDER — CHLORDIAZEPOXIDE HYDROCHLORIDE 25 MG/1
25 CAPSULE, GELATIN COATED ORAL EVERY 12 HOURS
Status: DISCONTINUED | OUTPATIENT
Start: 2019-07-19 | End: 2019-07-22

## 2019-07-19 RX ORDER — QUETIAPINE 300 MG/1
300 TABLET, FILM COATED, EXTENDED RELEASE ORAL
Status: DISCONTINUED | OUTPATIENT
Start: 2019-07-19 | End: 2019-07-24

## 2019-07-19 RX ORDER — RISPERIDONE 1 MG/1
1 TABLET, FILM COATED ORAL EVERY 12 HOURS
Status: DISCONTINUED | OUTPATIENT
Start: 2019-07-19 | End: 2019-07-24

## 2019-07-19 RX ORDER — TRAZODONE HYDROCHLORIDE 100 MG/1
100 TABLET ORAL
Status: DISCONTINUED | OUTPATIENT
Start: 2019-07-19 | End: 2019-07-30 | Stop reason: HOSPADM

## 2019-07-19 RX ADMIN — LORAZEPAM 2 MG: 2 INJECTION INTRAMUSCULAR; INTRAVENOUS at 12:04

## 2019-07-19 RX ADMIN — RISPERIDONE 1 MG: 1 TABLET ORAL at 21:38

## 2019-07-19 RX ADMIN — WATER 20 MG: 1 INJECTION INTRAMUSCULAR; INTRAVENOUS; SUBCUTANEOUS at 12:04

## 2019-07-19 RX ADMIN — GUANFACINE 1 MG: 1 TABLET ORAL at 08:10

## 2019-07-19 RX ADMIN — RISPERIDONE 2 MG: 1 TABLET ORAL at 08:49

## 2019-07-19 RX ADMIN — TRAZODONE HYDROCHLORIDE 100 MG: 100 TABLET ORAL at 21:41

## 2019-07-19 RX ADMIN — BENZTROPINE MESYLATE 2 MG: 2 TABLET ORAL at 17:25

## 2019-07-19 RX ADMIN — DIPHENHYDRAMINE HYDROCHLORIDE 50 MG: 50 INJECTION INTRAMUSCULAR; INTRAVENOUS at 12:30

## 2019-07-19 RX ADMIN — HYDROXYZINE HYDROCHLORIDE 50 MG: 25 TABLET, FILM COATED ORAL at 05:08

## 2019-07-19 RX ADMIN — METOPROLOL TARTRATE 25 MG: 25 TABLET ORAL at 21:38

## 2019-07-19 RX ADMIN — LEVOTHYROXINE SODIUM 50 MCG: 50 TABLET ORAL at 06:06

## 2019-07-19 RX ADMIN — CHLORDIAZEPOXIDE HYDROCHLORIDE 25 MG: 25 CAPSULE ORAL at 21:38

## 2019-07-19 RX ADMIN — CLONAZEPAM 1 MG: 1 TABLET ORAL at 08:10

## 2019-07-19 RX ADMIN — QUETIAPINE FUMARATE 300 MG: 300 TABLET, EXTENDED RELEASE ORAL at 21:41

## 2019-07-19 RX ADMIN — METOPROLOL TARTRATE 25 MG: 25 TABLET ORAL at 08:20

## 2019-07-19 RX ADMIN — SENNOSIDES AND DOCUSATE SODIUM 1 TABLET: 8.6; 5 TABLET ORAL at 08:49

## 2019-07-19 RX ADMIN — HYDROXYZINE HYDROCHLORIDE 50 MG: 25 TABLET, FILM COATED ORAL at 10:22

## 2019-07-19 RX ADMIN — MAGNESIUM CITRATE 296 ML: 1.75 LIQUID ORAL at 10:42

## 2019-07-19 NOTE — BH NOTES
PSYCHIATRIC PROGRESS NOTE     Weekend Coverage    Patient Name  Emelia Mercer   Date of Birth 1985   [facility] [id number]   Medical Record Number  [id number]      Age  29 y.o. PCP None   Admit date:  7/1/2019    Room Number  635/21  @ [facility]   Date of Service  7/19/2019         E & M PROGRESS NOTE:         HISTORY       CC:  \"psychosis\"  HISTORY OF PRESENT ILLNESS/INTERVAL HISTORY:  (reviewed/updated 7/19/2019). per initial evaluation: The patient, Emelia Mercer, is a 29 y.o. WHITE [place]  male with a past psychiatric history significant for schizoaffective disorder vs schizophrenia, who presents at this time with complaints of (and/or evidence of) the following emotional symptoms: agitation, delusions and psychotic behavior. Additional symptomatology include AH and VH. The above symptoms have been present for 2+ weeks. These symptoms are of moderate to high severity. These symptoms are constant in nature. The patient's condition has been precipitated by psychosocial stressors. Patient's condition made worse by treatment noncompliance. UDS: negative; BAL=0.      The patient was readmitted from medical unit following rhabdomyolysis and SHYLA which resolved with IV hydration. He is grossly psychotic and has been noted to be confused. Patient is markedly disorganized and confused on interview.  He cannot state the days of the week backward despite effort.     From patient's medical admission:     The patient is a 27-year-old  man, who is currently admitted to the medical floor at [address] [place] [address] Drive was initially admitted on 06/17, to Dr. Essence Solitario service on the psychiatry floor. Betsy Ripa a couple of days, his CPK was found to be elevated to 1729 and he was transferred to the medical floor for treatment of rhabdomyolysis.  At that time, he did not have any rigidity, hyperthermia, or leukocytosis, which are indicative of neuroleptic malignant syndrome.  His CKs dropped immediately with hydration. Gisele Spurling has improved markedly and currently reports feeling better in a physical sense.  Also during his stay, his liver enzymes increased although, they were normal at baseline. Gisele Spurling does have a history of being positive for hepatitis C and has been treated with Isoniazid for latent tuberculosis in the past.  Currently, he remains on one-to-one observation and was agitated and aggressive at times yesterday. Gisele Spurling had to be given p.r.n.'s. Polo Reyes had started him on a low dose of risperidone yesterday given the low likelihood of him having had NMS.  His CPK most recently was 1. Gisele Spurling continues to exhibit disorganized behavior and delusional thinking.  When I asked him, he stated that he wanted to leave and go stay with his mother but when Dr. Rene Castañeda, the hospitalist, asked him he stated that he wanted to go to the psychiatric floor because he does not feel safe going home.  At the present time, if the patient requests to be discharged home, my opinion is that Crisis should be called for TDO evaluation. Gisele Spurling denies any auditory hallucinations today, but was yesterday noted to be talking to himself. Gisele Spurling is a poor historian and unable to provide much history. 7/3- patient has remained largely illogical, at times appears to be hallucinating in his room, but is able to coherently describe his experience. Patient given PRN Geodon and Zyprexa with limited effect. Per mother Marcos Pal patient was very sedated yesterday following Zyprexa administration, and does better with Geodon historically as a PRN. Patient continues to appear ill, with tachycardia unresolved despite fluid rehydration. 7/4- patient more visible, has been more coherent than 24 hours prior. Vitals still concerning, tachycardic and with autonomic instability evident. Medicine following. Patient endorses disorganized thoughts and anxiety.   7/5- patient largely unchanged, repeatedly asking for help from staff due to \"something in my head\" but coherent, slept 7 hours. Patient continues to endorse anxiety and restlessness. Patient still noted to be in moderate distress. Patient medication compliant. Repeatedly states he is dying. 7/6-Presents anxious and remains needy. Staff familiar to him reports slow improvement is noticed. Remains pre-occupied but compliant with meds. Prn used-Atarax. 7/7-Pt was moved to the General side yesterday. Continues to maintain improvement, less anxious. Slept 7 hrs. No prn's were used. 7/8- no acute overnight events. Patient has been visible, odd, standing with or at nursing station most of the day, continues to endorse problems \"in my head\" but has been pleasant, smiling inappropriately. Patient received CT scan of head, unremarkable 7/5. Medication compliant, continues to complain of anxiety. 7/9- patient slept 8 hours overnight, did not get PRNs, states he is doing better. Still odd, visible, standing by nursing station much of the day, confused for much of the day but with no specific complaints. EKG performed, QTc 407. SHYLA resolved. 7/10- patient visible, slept 8 hours medication compliant. He remains discharged focused, c/o constipation. Patient still disorganized but more coherent, no agitation or hallucinations x24 hours did not get PRNs for psychosis. 7/11- patient calm and cooperative, medication compliant. Has been visible, still odd, disorganized. Patient with no specific concerns, discharge focused. Per mom, patient still not at his baseline, but improved since admission. Constipation resolved though pt still c/o hard stools. NMDAr Ab negative. 7/12- no acute overnight events. Patient for discharge this morning but made grossly paranoid statements to treatment team (\"the nurses are trying to kill me\") and continues to be anxious. The decision was made to hold the patient for further observation and titrate antipsychotic given ongoing PI and limited home resources over the weekend.   7/13Gypsy Gather reports feeling well and moods are alright. Continues to be bizarre and paranoid. Denies SI/HI/AH/VH. No aggression or violence. Appropriately interactive and aware. Tolerating medications well. Eating and sleeping fairly. 7/14Maya Olivera reports feeling restless and needs something for his ADHD. Pacing the unit despite receiving his prns. No aggression or violence. Seems to be driven to move. Denies SI/HI/AH/VH.    7/15- patient was moved over the acute side of the unit following an episode of aggression, was noted to be confused, irritable. EKG WNL. Slept 6 hours, pleasant on interview this morning. Acknowledges agitated, stating he is feeling anxious. Patient continues to express PI regarding the FBI. 7/16- patient is visible, remains confused. Patient continues to report PI, states that the nurses are \"law enforcement\" but can reality test.  7/17- patient has been visible, confused, got Ativan IM x2 doses due to agitation and psychosis. He remains grossly disorganized, c/o constipation. Patient with prominent PI towards nurses. 7/18- patient received multiple PRNs for agitation, gross psychosis, has been visible, posturing, with paranoid ideation. He is calm and cooperative in treatment team and states his previous agitation was due to the 'nurses being police' cannot elaborate further. 7/19- patient with worsening of paranoid ideation towards staff. Reports ongoing mistrust of the nursing staff, but medication compliant. Marked akathisia noted. CK elevated to 878 this morning. Patient still c/o constipation, confusion. SIDE EFFECTS: (reviewed/updated 7/19/2019)  None reported or admitted to. ALLERGIES:(reviewed/updated 7/19/2019)  No Known Allergies   MEDICATIONS PRIOR TO ADMISSION:(reviewed/updated 7/19/2019)  Medications Prior to Admission   Medication Sig    levothyroxine (SYNTHROID) 50 mcg tablet Take 50 mcg by mouth Daily (before breakfast).     risperiDONE (RISPERDAL) 0.5 mg tablet Take 1.5 mg by mouth nightly. Risperidone 0.5 mg tabs: Take 3 tabs po every evening    pyridoxine, vitamin B6, (VITAMIN B-6) 50 mg tablet Take 50 mg by mouth daily.  isoniazid (NYDRAZID) 300 mg tablet Take 300 mg by mouth daily.  traZODone (DESYREL) 150 mg tablet Take 300 mg by mouth nightly. Indications: Insomnia      PAST MEDICAL HISTORY: Past medical history from the initial psychiatric evaluation has been reviewed (reviewed/updated 7/19/2019) with no additional updates (I asked patient and no additional past medical history provided). No past medical history on file. No past surgical history on file. SOCIAL HISTORY: Social history from the initial psychiatric evaluation has been reviewed (reviewed/updated 7/19/2019) with no additional updates (I asked patient and no additional social history provided).    Social History     Socioeconomic History    Marital status: SINGLE     Spouse name: Not on file    Number of children: Not on file    Years of education: Not on file    Highest education level: Not on file   Occupational History    Not on file   Social Needs    Financial resource strain: Not on file    Food insecurity:     Worry: Not on file     Inability: Not on file    Transportation needs:     Medical: Not on file     Non-medical: Not on file   Tobacco Use    Smoking status: Not on file   Substance and Sexual Activity    Alcohol use: Not on file    Drug use: Not on file    Sexual activity: Not on file   Lifestyle    Physical activity:     Days per week: Not on file     Minutes per session: Not on file    Stress: Not on file   Relationships    Social connections:     Talks on phone: Not on file     Gets together: Not on file     Attends Evangelical service: Not on file     Active member of club or organization: Not on file     Attends meetings of clubs or organizations: Not on file     Relationship status: Not on file    Intimate partner violence:     Fear of current or ex partner: Not on file     Emotionally abused: Not on file     Physically abused: Not on file     Forced sexual activity: Not on file   Other Topics Concern    Not on file   Social History Narrative    Not on file      FAMILY HISTORY: Family history from the initial psychiatric evaluation has been reviewed (reviewed/updated 7/19/2019) with no additional updates (I asked patient and no additional family history provided). No family history on file. REVIEW OF SYSTEMS: (reviewed/updated 7/19/2019)  Appetite:poor   Sleep: poor with DIMS (difficulty initiating & maintaining sleep)   All other Review of Systems: Negative except confusion per HPI         [facility] (MSE):    MSE FINDINGS ARE WITHIN NORMAL LIMITS (WNL) UNLESS OTHERWISE STATED BELOW. ( ALL OF THE BELOW CATEGORIES OF THE MSE HAVE BEEN REVIEWED (reviewed 7/19/2019) AND UPDATED AS DEEMED APPROPRIATE )  General Presentation age appropriate, cooperative   Orientation confused, disorganized, disoriented   Vital Signs  See below (reviewed 7/19/2019); Vital Signs (BP, Pulse, & Temp) are within normal limits if not listed below.    Gait and Station Stable/steady, no ataxia   Musculoskeletal System No extrapyramidal symptoms (EPS); no abnormal muscular movements or Tardive Dyskinesia (TD); muscle strength and tone are within normal limits   Language No aphasia or dysarthria   Speech:  hypoverbal and soft   Thought Processes illogical; normal rate of thoughts; poor abstract reasoning/computation   Thought Associations loose associations   Thought Content visual hallucinations and preoccupations   Suicidal Ideations none   Homicidal Ideations none   Mood:  anxious    Affect:  constricted and mood-congruent   Memory recent  impaired   Memory remote:  fair   Concentration/Attention:  impaired   Fund of Knowledge average   Insight:  limited   Reliability fair   Judgment:  impaired due to active psychosis          VITALS:     Patient Vitals for the past 24 hrs:   Temp Pulse Resp BP SpO2   07/19/19 0905  100      07/19/19 0810  68  (!) 123/91    07/19/19 0725 97.9 °F (36.6 °C) 68 18 (!) 123/91 99 %   07/18/19 2210  62 16 92/62 98 %   07/18/19 1955 97.9 °F (36.6 °C) (!) 59 16 106/50 100 %     Wt Readings from Last 3 Encounters:   07/10/19 91.4 kg (201 lb 8 oz)   06/28/19 113.4 kg (250 lb)   06/18/19 113.4 kg (250 lb)     Temp Readings from Last 3 Encounters:   07/19/19 97.9 °F (36.6 °C)   07/01/19 98 °F (36.7 °C)   06/25/19 99.7 °F (37.6 °C)     BP Readings from Last 3 Encounters:   07/19/19 (!) 123/91   07/01/19 114/69   06/25/19 145/85     Pulse Readings from Last 3 Encounters:   07/19/19 100   07/01/19 87   06/25/19 (!) 107            DATA     LABORATORY DATA:(reviewed/updated 7/19/2019)  Recent Results (from the past 24 hour(s))   METABOLIC PANEL, COMPREHENSIVE    Collection Time: 07/19/19  5:29 AM   Result Value Ref Range    Sodium 141 136 - 145 mmol/L    Potassium 4.2 3.5 - 5.1 mmol/L    Chloride 103 97 - 108 mmol/L    CO2 28 21 - 32 mmol/L    Anion gap 10 5 - 15 mmol/L    Glucose 104 (H) 65 - 100 mg/dL    BUN 14 6 - 20 MG/DL    Creatinine 1.02 0.70 - 1.30 MG/DL    BUN/Creatinine ratio 14 12 - 20      GFR est AA >60 >60 ml/min/1.73m2    GFR est non-AA >60 >60 ml/min/1.73m2    Calcium 9.6 8.5 - 10.1 MG/DL    Bilirubin, total 0.5 0.2 - 1.0 MG/DL    ALT (SGPT) 77 12 - 78 U/L    AST (SGOT) 53 (H) 15 - 37 U/L    Alk. phosphatase 105 45 - 117 U/L    Protein, total 7.2 6.4 - 8.2 g/dL    Albumin 4.2 3.5 - 5.0 g/dL    Globulin 3.0 2.0 - 4.0 g/dL    A-G Ratio 1.4 1.1 - 2.2     AMMONIA    Collection Time: 07/19/19  5:29 AM   Result Value Ref Range    Ammonia <10 <32 UMOL/L   CK    Collection Time: 07/19/19  5:29 AM   Result Value Ref Range     (H) 39 - 308 U/L     No results found for: VALF2, VALAC, VALP, VALPR, DS6, CRBAM, CRBAMP, CARB2, XCRBAM  No results found for: LITHM   RADIOLOGY REPORTS:(reviewed/updated 7/19/2019)  No results found. MEDICATIONS     ALL MEDICATIONS:   Current Facility-Administered Medications   Medication Dose Route Frequency    risperiDONE (RisperDAL) tablet 2 mg  2 mg Oral Q12H    QUEtiapine (SEROquel) tablet 300 mg  300 mg Oral QHS    traZODone (DESYREL) tablet 100 mg  100 mg Oral QHS    clonazePAM (KlonoPIN) tablet 1 mg  1 mg Oral BID and QHS    guanFACINE IR (TENEX) tablet 1 mg  1 mg Oral DAILY    senna-docusate (PERICOLACE) 8.6-50 mg per tablet 1 Tab  1 Tab Oral DAILY    metoprolol tartrate (LOPRESSOR) tablet 25 mg  25 mg Oral Q12H    ziprasidone (GEODON) capsule 20 mg  20 mg Oral Q8H PRN    cloNIDine HCl (CATAPRES) tablet 0.1 mg  0.1 mg Oral Q2H PRN    levothyroxine (SYNTHROID) tablet 50 mcg  50 mcg Oral ACB    zolpidem (AMBIEN) tablet 10 mg  10 mg Oral QHS PRN    ziprasidone (GEODON) 20 mg in sterile water (preservative free) 1 mL injection  20 mg IntraMUSCular BID PRN    benztropine (COGENTIN) tablet 2 mg  2 mg Oral BID PRN    benztropine (COGENTIN) injection 2 mg  2 mg IntraMUSCular BID PRN    LORazepam (ATIVAN) injection 2 mg  2 mg IntraMUSCular Q4H PRN    acetaminophen (TYLENOL) tablet 650 mg  650 mg Oral Q4H PRN    ibuprofen (MOTRIN) tablet 400 mg  400 mg Oral Q8H PRN    magnesium hydroxide (MILK OF MAGNESIA) 400 mg/5 mL oral suspension 30 mL  30 mL Oral DAILY PRN    nicotine (NICODERM CQ) 21 mg/24 hr patch 1 Patch  1 Patch TransDERmal DAILY PRN    hydrOXYzine HCl (ATARAX) tablet 50 mg  50 mg Oral Q6H PRN      SCHEDULED MEDICATIONS:   Current Facility-Administered Medications   Medication Dose Route Frequency    risperiDONE (RisperDAL) tablet 2 mg  2 mg Oral Q12H    QUEtiapine (SEROquel) tablet 300 mg  300 mg Oral QHS    traZODone (DESYREL) tablet 100 mg  100 mg Oral QHS    clonazePAM (KlonoPIN) tablet 1 mg  1 mg Oral BID and QHS    guanFACINE IR (TENEX) tablet 1 mg  1 mg Oral DAILY    senna-docusate (PERICOLACE) 8.6-50 mg per tablet 1 Tab  1 Tab Oral DAILY    metoprolol tartrate (LOPRESSOR) tablet 25 mg  25 mg Oral Q12H    levothyroxine (SYNTHROID) tablet 50 mcg  50 mcg Oral ACB          ASSESSMENT & PLAN     DIAGNOSES REQUIRING ACTIVE TREATMENT AND MONITORING: (reviewed/updated 7/19/2019)  Patient Active Hospital Problem List:   Schizoaffective disorder ([facility] [address] (6/18/2019)    Assessment: patient with ongoing psychotic presentation, had previously been stabilized on Invega and Seroquel, now on Risperdal s/p rhabdomyolysis and SHYLA due to dehydration vs iatrogenic. Will continue psychiatric stabilization and monitor vitals. Patient with marked EPS vs akathisia, hypotensive, CK elevated again. Oriented but grossly paranoid. Goal is Seroquel monotherapy with adjunctive anxiolytics  - DISCONTINUE Tenex due to akathisia   - DISCONTINUE Klonopin 1 mg TID due to disinhibition, transaminitis  - START Librium 25 mg Q12H for anxiety  - TAPER Risperdal to 1 mg Q12H for psychosis (mitigation of akathisia)  - TAPER and CHANGE Trazodone to 50 mg QHS PRN insomnia  - CHANGE Seroquel to  mg QHS for psychosis  - CHANGE PRN for psychosis to Geodon + Benadryl  - f/u EKG for QTc monitoring  - CONTINUE bowel regimen  - IGM therapy as tolerated  - Expand database / obtain collateral  - Dispo planning    Patient discharged on two antipsychotics now due to relative refractoriness to treatment thus far. Prior to admission patient had THREE or more failed trails of monotherapy, including: Haldol, Seroquel and Risperdal     Patient is a very slow responder to medications. Risks and benefits in the use of two antipsychotics have been weighed in full, including the risk of metabolic syndrome and the potential increased risk of QTc  Based on this analysis, it is considered favorable for the utilization of two antipsychotics. In the future, once stable on the two antipsychotics, patient can possibly be tapered to one of the chosen antipsychotics. Will check on EKG results tomorrow to monitor QTc.     In summary, Leonel Hatfield, is a 29 y.o.  male who presents with a severe exacerbation of the principal diagnosis of Schizoaffective disorder ([address]    Patient's condition is not improving. Patient requires continued inpatient hospitalization for further stabilization, safety monitoring and medication management. I will continue to coordinate the provision of individual, milieu, occupational, group, and substance abuse therapies to address target symptoms/diagnoses as deemed appropriate for the individual patient. A coordinated, multidisplinary treatment team round was conducted with the patient (this team consists of the nurse, psychiatric unit pharmcist,  and writer). Complete current electronic health record for patient has been reviewed today including consultant notes, ancillary staff notes, nurses and psychiatric tech notes. Suicide risk assessment completed and patient deemed to be of low risk for suicide at this time. The following regarding medications was addressed during rounds with patient:   the risks and benefits of the proposed medication. The patient was given the opportunity to ask questions. Informed consent given to the use of the above medications. Will continue to adjust psychiatric and non-psychiatric medications (see above \"medication\" section and orders section for details) as deemed appropriate & based upon diagnoses and response to treatment. I will continue to order blood tests/labs and diagnostic tests as deemed appropriate and review results as they become available (see orders for details and above listed lab/test results). I will order psychiatric records from previous [facility] to further elucidate the nature of patient's psychopathology and review once available.     I will gather additional collateral information from friends, family and o/p treatment team to further elucidate the nature of patient's psychopathology and baselline level of psychiatric functioning. I certify that this patient's inpatient psychiatric hospital services furnished since the previous certification were, and continue to be, required for treatment that could reasonably be expected to improve the patient's condition, or for diagnostic study, and that the patient continues to need, on a daily basis, active treatment furnished directly by or requiring the supervision of inpatient psychiatric facility personnel. In addition, the hospital records show that services furnished were intensive treatment services, admission or related services, or equivalent services.     EXPECTED DISCHARGE DATE/DAY: 7/23/2019     DISPOSITION: Home       Signed By:   Jason Treadwell MD  7/19/2019

## 2019-07-19 NOTE — BH NOTES
Patients mother Dinah Gonzalezjen called and was concerned about Jayesh Ronan, as per his Mom his progress has gone backwards recently and waned to talk to his psychatrist about this.

## 2019-07-19 NOTE — BH NOTES
GROUP THERAPY PROGRESS NOTE    The patient Liz Mahoney a 29 y.o. male is participating in [facility]. Group time: 45 minutes    Personal goal for participation:  To participate in self esteem booster    Goal orientation:  personal    Group therapy participation: minimal    Therapeutic interventions reviewed and discussed: personal affirmations    Impression of participation:  The patient was present-anxious,preoccupied-left session early    Vivi Miner  7/19/2019  6:16 PM

## 2019-07-19 NOTE — BH NOTES
0700-Patient report received from Marifer Villagomez RN    0800-Patient ate breakfast in the dayroom. Patient denies SI/HI and AV/HV. Patient denies pain. Patient is extremely anxious today    1000-Patient needs constant redirection    1122-Patient walking the hallways highly agitated. 1215-Patient given im ativan and geodon, along with benadryl. Patient was clenching his fists, screaming and yelling and slamming doors and extremely paranoid. 1220-Patient now in the dayroom eating lunch    1504-Patient in his room appears to be sleeping. 1709-Patient in the dayroom eating dinner. Patient is still highly anxious and agitated, and needed some redirection. Patient called his mom and has since calmed down after speaking with her.     1722-Patient anxious and agitated once again. Patient given cogentin    1758-Patient took a shower. Patients clothes being washed. Patient had a bowel movement and needs constant redirection. 1839-Patient in the dayroom listening to music.

## 2019-07-19 NOTE — BH NOTES
Patient resting in bed sleeping was awakened for vital signs.  Patient had no compliants at this time  2200 metoporol was held held due to BP 92/60 pulse 62  Hourly rounding done  Slept 5 hours

## 2019-07-20 PROCEDURE — 74011250637 HC RX REV CODE- 250/637: Performed by: PSYCHIATRY & NEUROLOGY

## 2019-07-20 PROCEDURE — 74011000250 HC RX REV CODE- 250: Performed by: PSYCHIATRY & NEUROLOGY

## 2019-07-20 PROCEDURE — 74011250637 HC RX REV CODE- 250/637: Performed by: FAMILY MEDICINE

## 2019-07-20 PROCEDURE — 74011250636 HC RX REV CODE- 250/636: Performed by: PSYCHIATRY & NEUROLOGY

## 2019-07-20 PROCEDURE — 65220000003 HC RM SEMIPRIVATE PSYCH

## 2019-07-20 RX ADMIN — SENNOSIDES AND DOCUSATE SODIUM 2 TABLET: 8.6; 5 TABLET ORAL at 08:52

## 2019-07-20 RX ADMIN — ACETAMINOPHEN 650 MG: 325 TABLET, FILM COATED ORAL at 09:14

## 2019-07-20 RX ADMIN — LEVOTHYROXINE SODIUM 50 MCG: 50 TABLET ORAL at 06:50

## 2019-07-20 RX ADMIN — RISPERIDONE 1 MG: 1 TABLET ORAL at 08:53

## 2019-07-20 RX ADMIN — METOPROLOL TARTRATE 25 MG: 25 TABLET ORAL at 08:53

## 2019-07-20 RX ADMIN — QUETIAPINE FUMARATE 300 MG: 300 TABLET, EXTENDED RELEASE ORAL at 21:12

## 2019-07-20 RX ADMIN — HYDROXYZINE HYDROCHLORIDE 50 MG: 25 TABLET, FILM COATED ORAL at 02:37

## 2019-07-20 RX ADMIN — CHLORDIAZEPOXIDE HYDROCHLORIDE 25 MG: 25 CAPSULE ORAL at 08:52

## 2019-07-20 RX ADMIN — DIPHENHYDRAMINE HYDROCHLORIDE 50 MG: 50 INJECTION INTRAMUSCULAR; INTRAVENOUS at 04:35

## 2019-07-20 RX ADMIN — TRAZODONE HYDROCHLORIDE 100 MG: 100 TABLET ORAL at 21:12

## 2019-07-20 RX ADMIN — RISPERIDONE 1 MG: 1 TABLET ORAL at 21:10

## 2019-07-20 RX ADMIN — WATER 20 MG: 1 INJECTION INTRAMUSCULAR; INTRAVENOUS; SUBCUTANEOUS at 04:34

## 2019-07-20 RX ADMIN — METOPROLOL TARTRATE 25 MG: 25 TABLET ORAL at 21:10

## 2019-07-20 RX ADMIN — CHLORDIAZEPOXIDE HYDROCHLORIDE 25 MG: 25 CAPSULE ORAL at 21:10

## 2019-07-20 NOTE — BH NOTES
0700 Assumed care of the patient    Patient alert and verbal. Denies SI/HI. Remains suspicious, delusional and preoccupied. Restless. Spent entire shift out of room in dayroom or in hallway to seek reassurance of safety from nurses. Easily directed. Medication and meal complaint. Q 15 minute checks continue for safety.

## 2019-07-20 NOTE — BH NOTES
Received report from [facility]. Patient out of room in dayroom listening to the radio. Patient appears agitated but is unable to verbalize the cause of his agitation. He was compliant with his HS medications. He is noted to be responded to internal stimuli. Patient is resting quietly in bed with no acute distress noted. Will continue to monitor for safety per unit policy. 04:43 - Geodon and Benadryl given IM    06:53 - Patient slept approximately 6.25 hours this shift.

## 2019-07-20 NOTE — PROGRESS NOTES
Met with patient in weekend cross-coverage accompanied by staff    Objective:  Affect is anxious, obsessive  Persecutory delusions centering around feeling conspired against  Reassured pt of safety in milieu  Intermittently preoccupied with apparent thought-blocking  Denies SI or HI    Plan:  Ongoing medication intervention  Encourage reality orientation  Monitor safety due to unpredictability

## 2019-07-21 PROCEDURE — 74011250637 HC RX REV CODE- 250/637: Performed by: PSYCHIATRY & NEUROLOGY

## 2019-07-21 PROCEDURE — 74011250636 HC RX REV CODE- 250/636: Performed by: PSYCHIATRY & NEUROLOGY

## 2019-07-21 PROCEDURE — 74011250637 HC RX REV CODE- 250/637: Performed by: FAMILY MEDICINE

## 2019-07-21 PROCEDURE — 74011000250 HC RX REV CODE- 250: Performed by: PSYCHIATRY & NEUROLOGY

## 2019-07-21 PROCEDURE — 65220000003 HC RM SEMIPRIVATE PSYCH

## 2019-07-21 RX ADMIN — LEVOTHYROXINE SODIUM 50 MCG: 50 TABLET ORAL at 06:52

## 2019-07-21 RX ADMIN — DIPHENHYDRAMINE HYDROCHLORIDE 50 MG: 50 INJECTION INTRAMUSCULAR; INTRAVENOUS at 09:17

## 2019-07-21 RX ADMIN — TRAZODONE HYDROCHLORIDE 100 MG: 100 TABLET ORAL at 20:56

## 2019-07-21 RX ADMIN — WATER 20 MG: 1 INJECTION INTRAMUSCULAR; INTRAVENOUS; SUBCUTANEOUS at 09:16

## 2019-07-21 RX ADMIN — RISPERIDONE 1 MG: 1 TABLET ORAL at 08:04

## 2019-07-21 RX ADMIN — WATER: 1 INJECTION INTRAMUSCULAR; INTRAVENOUS; SUBCUTANEOUS at 20:58

## 2019-07-21 RX ADMIN — CHLORDIAZEPOXIDE HYDROCHLORIDE 25 MG: 25 CAPSULE ORAL at 21:01

## 2019-07-21 RX ADMIN — METOPROLOL TARTRATE 25 MG: 25 TABLET ORAL at 08:04

## 2019-07-21 RX ADMIN — QUETIAPINE FUMARATE 300 MG: 300 TABLET, EXTENDED RELEASE ORAL at 21:09

## 2019-07-21 RX ADMIN — RISPERIDONE 1 MG: 1 TABLET ORAL at 20:57

## 2019-07-21 RX ADMIN — CHLORDIAZEPOXIDE HYDROCHLORIDE 25 MG: 25 CAPSULE ORAL at 08:04

## 2019-07-21 RX ADMIN — METOPROLOL TARTRATE 25 MG: 25 TABLET ORAL at 20:57

## 2019-07-21 RX ADMIN — DIPHENHYDRAMINE HYDROCHLORIDE 50 MG: 50 INJECTION INTRAMUSCULAR; INTRAVENOUS at 20:58

## 2019-07-21 RX ADMIN — SENNOSIDES AND DOCUSATE SODIUM 2 TABLET: 8.6; 5 TABLET ORAL at 08:04

## 2019-07-21 NOTE — BH NOTES
0700 Assumed care of the patient    0800 Medication and meal compliant    0915 Patient agitated. Pacing halls. Paranoid and suspicious. States staff members are attacking him and out to get him. [place] banging on the walls in room. Refuses to take PO medication. IM geodon and benadry given    2588 In room resting quietly    1230 Meal compliant    1330 Remains delusional and paranoid. Seeks staff when anxious and fearful of safety. Easily redirected, however, constant redirection needed    1500 In dayroom listening to music    1700 Meal compliant    1800 Voices concerns about wellbeing and safety on unit. Reoriented to environment and treatment plan process.  Verbalized understanding

## 2019-07-21 NOTE — BH NOTES
Patient comes to nursing station asks questions seems somewhat clearer today asks better questions and giving better answers  Hourly rounding done  Slept 5.5 hours

## 2019-07-21 NOTE — PROGRESS NOTES
Met with patient again today accompanied by weekend staff    Objective:  Continues to struggle with paranoid ideations and delusions  \"They are out to get me\"  Required Prn medications earlier today for acuity    Plan:  Reality orientation  Monitor safety and behaviors in milieu due to unpredictability  Psychotropic medication intervention

## 2019-07-22 PROCEDURE — 65220000003 HC RM SEMIPRIVATE PSYCH

## 2019-07-22 PROCEDURE — 74011250637 HC RX REV CODE- 250/637: Performed by: FAMILY MEDICINE

## 2019-07-22 PROCEDURE — 74011250637 HC RX REV CODE- 250/637: Performed by: PSYCHIATRY & NEUROLOGY

## 2019-07-22 RX ORDER — CHLORDIAZEPOXIDE HYDROCHLORIDE 25 MG/1
25 CAPSULE, GELATIN COATED ORAL DAILY
Status: DISCONTINUED | OUTPATIENT
Start: 2019-07-23 | End: 2019-07-23

## 2019-07-22 RX ADMIN — METOPROLOL TARTRATE 25 MG: 25 TABLET ORAL at 08:14

## 2019-07-22 RX ADMIN — CHLORDIAZEPOXIDE HYDROCHLORIDE 25 MG: 25 CAPSULE ORAL at 08:14

## 2019-07-22 RX ADMIN — RISPERIDONE 1 MG: 1 TABLET ORAL at 08:14

## 2019-07-22 RX ADMIN — RISPERIDONE 1 MG: 1 TABLET ORAL at 21:24

## 2019-07-22 RX ADMIN — TRAZODONE HYDROCHLORIDE 100 MG: 100 TABLET ORAL at 21:23

## 2019-07-22 RX ADMIN — HYDROXYZINE HYDROCHLORIDE 50 MG: 25 TABLET, FILM COATED ORAL at 08:14

## 2019-07-22 RX ADMIN — SENNOSIDES AND DOCUSATE SODIUM 2 TABLET: 8.6; 5 TABLET ORAL at 08:14

## 2019-07-22 RX ADMIN — LEVOTHYROXINE SODIUM 50 MCG: 50 TABLET ORAL at 08:14

## 2019-07-22 RX ADMIN — METOPROLOL TARTRATE 25 MG: 25 TABLET ORAL at 21:23

## 2019-07-22 RX ADMIN — QUETIAPINE FUMARATE 300 MG: 300 TABLET, EXTENDED RELEASE ORAL at 21:24

## 2019-07-22 RX ADMIN — HYDROXYZINE HYDROCHLORIDE 50 MG: 25 TABLET, FILM COATED ORAL at 21:23

## 2019-07-22 NOTE — BH NOTES
PSYCHIATRIC PROGRESS NOTE     Weekend Coverage    Patient Name  Sindy Omalley   Date of Birth 1985   [facility] [id number]   Medical Record Number  [id number]      Age  29 y.o. PCP None   Admit date:  7/1/2019    Room Number  165/87  @ [facility]   Date of Service  7/22/2019         E & M PROGRESS NOTE:         HISTORY       CC:  \"psychosis\"  HISTORY OF PRESENT ILLNESS/INTERVAL HISTORY:  (reviewed/updated 7/22/2019). per initial evaluation: The patient, Sindy Omalley, is a 29 y.o. WHITE [place]  male with a past psychiatric history significant for schizoaffective disorder vs schizophrenia, who presents at this time with complaints of (and/or evidence of) the following emotional symptoms: agitation, delusions and psychotic behavior. Additional symptomatology include AH and VH. The above symptoms have been present for 2+ weeks. These symptoms are of moderate to high severity. These symptoms are constant in nature. The patient's condition has been precipitated by psychosocial stressors. Patient's condition made worse by treatment noncompliance. UDS: negative; BAL=0.      The patient was readmitted from medical unit following rhabdomyolysis and SHYLA which resolved with IV hydration. He is grossly psychotic and has been noted to be confused. Patient is markedly disorganized and confused on interview.  He cannot state the days of the week backward despite effort.     From patient's medical admission:     The patient is a 27-year-old  man, who is currently admitted to the medical floor at [address] [place] [address] Drive was initially admitted on 06/17, to Dr. Marina Dorsey service on the psychiatry floor. Ancelmo Fairborn a couple of days, his CPK was found to be elevated to 1729 and he was transferred to the medical floor for treatment of rhabdomyolysis.  At that time, he did not have any rigidity, hyperthermia, or leukocytosis, which are indicative of neuroleptic malignant syndrome.  His CKs dropped immediately with hydration. Daniela Feng has improved markedly and currently reports feeling better in a physical sense.  Also during his stay, his liver enzymes increased although, they were normal at baseline. Daniela Feng does have a history of being positive for hepatitis C and has been treated with Isoniazid for latent tuberculosis in the past.  Currently, he remains on one-to-one observation and was agitated and aggressive at times yesterday. Daniela Feng had to be given p.r.n.'s. [facility] had started him on a low dose of risperidone yesterday given the low likelihood of him having had NMS.  His CPK most recently was 1. Daniela Feng continues to exhibit disorganized behavior and delusional thinking.  When I asked him, he stated that he wanted to leave and go stay with his mother but when Dr. Danisha Saldivar, the hospitalist, asked him he stated that he wanted to go to the psychiatric floor because he does not feel safe going home.  At the present time, if the patient requests to be discharged home, my opinion is that Crisis should be called for TDO evaluation. Daniela Feng denies any auditory hallucinations today, but was yesterday noted to be talking to himself. Daniela Feng is a poor historian and unable to provide much history. 7/3- patient has remained largely illogical, at times appears to be hallucinating in his room, but is able to coherently describe his experience. Patient given PRN Geodon and Zyprexa with limited effect. Per mother [place] patient was very sedated yesterday following Zyprexa administration, and does better with Geodon historically as a PRN. Patient continues to appear ill, with tachycardia unresolved despite fluid rehydration. 7/4- patient more visible, has been more coherent than 24 hours prior. Vitals still concerning, tachycardic and with autonomic instability evident. Medicine following. Patient endorses disorganized thoughts and anxiety.   7/5- patient largely unchanged, repeatedly asking for help from staff due to \"something in my head\" but coherent, slept 7 hours. Patient continues to endorse anxiety and restlessness. Patient still noted to be in moderate distress. Patient medication compliant. Repeatedly states he is dying. 7/6-Presents anxious and remains needy. Staff familiar to him reports slow improvement is noticed. Remains pre-occupied but compliant with meds. Prn used-Atarax. 7/7-Pt was moved to the General side yesterday. Continues to maintain improvement, less anxious. Slept 7 hrs. No prn's were used. 7/8- no acute overnight events. Patient has been visible, odd, standing with or at nursing station most of the day, continues to endorse problems \"in my head\" but has been pleasant, smiling inappropriately. Patient received CT scan of head, unremarkable 7/5. Medication compliant, continues to complain of anxiety. 7/9- patient slept 8 hours overnight, did not get PRNs, states he is doing better. Still odd, visible, standing by nursing station much of the day, confused for much of the day but with no specific complaints. EKG performed, QTc 407. SHYLA resolved. 7/10- patient visible, slept 8 hours medication compliant. He remains discharged focused, c/o constipation. Patient still disorganized but more coherent, no agitation or hallucinations x24 hours did not get PRNs for psychosis. 7/11- patient calm and cooperative, medication compliant. Has been visible, still odd, disorganized. Patient with no specific concerns, discharge focused. Per mom, patient still not at his baseline, but improved since admission. Constipation resolved though pt still c/o hard stools. NMDAr Ab negative. 7/12- no acute overnight events. Patient for discharge this morning but made grossly paranoid statements to treatment team (\"the nurses are trying to kill me\") and continues to be anxious. The decision was made to hold the patient for further observation and titrate antipsychotic given ongoing PI and limited home resources over the weekend.   7/13Dion Lissett reports feeling well and moods are alright. Continues to be bizarre and paranoid. Denies SI/HI/AH/VH. No aggression or violence. Appropriately interactive and aware. Tolerating medications well. Eating and sleeping fairly. 7/14Gwecharanjit Saab reports feeling restless and needs something for his ADHD. Pacing the unit despite receiving his prns. No aggression or violence. Seems to be driven to move. Denies SI/HI/AH/VH.    7/15- patient was moved over the acute side of the unit following an episode of aggression, was noted to be confused, irritable. EKG WNL. Slept 6 hours, pleasant on interview this morning. Acknowledges agitated, stating he is feeling anxious. Patient continues to express PI regarding the FBI. 7/16- patient is visible, remains confused. Patient continues to report PI, states that the nurses are \"law enforcement\" but can reality test.  7/17- patient has been visible, confused, got Ativan IM x2 doses due to agitation and psychosis. He remains grossly disorganized, c/o constipation. Patient with prominent PI towards nurses. 7/18- patient received multiple PRNs for agitation, gross psychosis, has been visible, posturing, with paranoid ideation. He is calm and cooperative in treatment team and states his previous agitation was due to the 'nurses being police' cannot elaborate further. 7/19- patient with worsening of paranoid ideation towards staff. Reports ongoing mistrust of the nursing staff, but medication compliant. Marked akathisia noted. CK elevated to 878 this morning. Patient still c/o constipation, confusion. 7/22- no acute overnight events. Patient continues to be paranoid, misperceiving events with nursing staff, has been noted to have elevated BP at times. Patient somatic, confused about the nature of the admission. Patient states \"I'm dead\" when asked about his mood. SIDE EFFECTS: (reviewed/updated 7/22/2019)  None reported or admitted to.      ALLERGIES:(reviewed/updated 7/22/2019)  No Known Allergies   MEDICATIONS PRIOR TO ADMISSION:(reviewed/updated 7/22/2019)  Medications Prior to Admission   Medication Sig    levothyroxine (SYNTHROID) 50 mcg tablet Take 50 mcg by mouth Daily (before breakfast).  risperiDONE (RISPERDAL) 0.5 mg tablet Take 1.5 mg by mouth nightly. Risperidone 0.5 mg tabs: Take 3 tabs po every evening    pyridoxine, vitamin B6, (VITAMIN B-6) 50 mg tablet Take 50 mg by mouth daily.  isoniazid (NYDRAZID) 300 mg tablet Take 300 mg by mouth daily.  traZODone (DESYREL) 150 mg tablet Take 300 mg by mouth nightly. Indications: Insomnia      PAST MEDICAL HISTORY: Past medical history from the initial psychiatric evaluation has been reviewed (reviewed/updated 7/22/2019) with no additional updates (I asked patient and no additional past medical history provided). No past medical history on file. No past surgical history on file. SOCIAL HISTORY: Social history from the initial psychiatric evaluation has been reviewed (reviewed/updated 7/22/2019) with no additional updates (I asked patient and no additional social history provided).    Social History     Socioeconomic History    Marital status: SINGLE     Spouse name: Not on file    Number of children: Not on file    Years of education: Not on file    Highest education level: Not on file   Occupational History    Not on file   Social Needs    Financial resource strain: Not on file    Food insecurity:     Worry: Not on file     Inability: Not on file    Transportation needs:     Medical: Not on file     Non-medical: Not on file   Tobacco Use    Smoking status: Not on file   Substance and Sexual Activity    Alcohol use: Not on file    Drug use: Not on file    Sexual activity: Not on file   Lifestyle    Physical activity:     Days per week: Not on file     Minutes per session: Not on file    Stress: Not on file   Relationships    Social connections:     Talks on phone: Not on file     Gets together: Not on file     Attends Mandaeism service: Not on file     Active member of club or organization: Not on file     Attends meetings of clubs or organizations: Not on file     Relationship status: Not on file    Intimate partner violence:     Fear of current or ex partner: Not on file     Emotionally abused: Not on file     Physically abused: Not on file     Forced sexual activity: Not on file   Other Topics Concern    Not on file   Social History Narrative    Not on file      FAMILY HISTORY: Family history from the initial psychiatric evaluation has been reviewed (reviewed/updated 7/22/2019) with no additional updates (I asked patient and no additional family history provided). No family history on file. REVIEW OF SYSTEMS: (reviewed/updated 7/22/2019)  Appetite:poor   Sleep: poor with DIMS (difficulty initiating & maintaining sleep)   All other Review of Systems: Negative except confusion per HPI         [facility] (MSE):    MSE FINDINGS ARE WITHIN NORMAL LIMITS (WNL) UNLESS OTHERWISE STATED BELOW. ( ALL OF THE BELOW CATEGORIES OF THE MSE HAVE BEEN REVIEWED (reviewed 7/22/2019) AND UPDATED AS DEEMED APPROPRIATE )  General Presentation age appropriate, cooperative   Orientation confused, disorganized, disoriented   Vital Signs  See below (reviewed 7/22/2019); Vital Signs (BP, Pulse, & Temp) are within normal limits if not listed below.    Gait and Station Stable/steady, no ataxia   Musculoskeletal System No extrapyramidal symptoms (EPS); no abnormal muscular movements or Tardive Dyskinesia (TD); muscle strength and tone are within normal limits   Language No aphasia or dysarthria   Speech:  hypoverbal and soft   Thought Processes illogical; normal rate of thoughts; poor abstract reasoning/computation   Thought Associations loose associations   Thought Content visual hallucinations and preoccupations   Suicidal Ideations none   Homicidal Ideations none   Mood:  anxious Affect:  constricted and mood-congruent   Memory recent  impaired   Memory remote:  fair   Concentration/Attention:  impaired   Fund of Knowledge average   Insight:  limited   Reliability fair   Judgment:  impaired due to active psychosis          VITALS:     Patient Vitals for the past 24 hrs:   Temp Pulse Resp BP SpO2   07/22/19 0808 97.9 °F (36.6 °C) 90 18 143/84 99 %   07/22/19 0736 98 °F (36.7 °C) 94 16 (!) 152/112 97 %   07/21/19 2215 98 °F (36.7 °C) 76 18 (!) 120/97 100 %   07/21/19 2057  88  120/70      Wt Readings from Last 3 Encounters:   07/10/19 91.4 kg (201 lb 8 oz)   06/28/19 113.4 kg (250 lb)   06/18/19 113.4 kg (250 lb)     Temp Readings from Last 3 Encounters:   07/22/19 97.9 °F (36.6 °C)   07/01/19 98 °F (36.7 °C)   06/25/19 99.7 °F (37.6 °C)     BP Readings from Last 3 Encounters:   07/22/19 143/84   07/01/19 114/69   06/25/19 145/85     Pulse Readings from Last 3 Encounters:   07/22/19 90   07/01/19 87   06/25/19 (!) 107            DATA     LABORATORY DATA:(reviewed/updated 7/22/2019)  No results found for this or any previous visit (from the past 24 hour(s)). No results found for: VALF2, VALAC, VALP, VALPR, DS6, CRBAM, CRBAMP, CARB2, XCRBAM  No results found for: LITHM   RADIOLOGY REPORTS:(reviewed/updated 7/22/2019)  No results found.        MEDICATIONS     ALL MEDICATIONS:   Current Facility-Administered Medications   Medication Dose Route Frequency    risperiDONE (RisperDAL) tablet 1 mg  1 mg Oral Q12H    senna-docusate (PERICOLACE) 8.6-50 mg per tablet 2 Tab  2 Tab Oral DAILY    ziprasidone (GEODON) 20 mg in sterile water (preservative free) 1 mL injection  20 mg IntraMUSCular Q12H PRN    And    diphenhydrAMINE (BENADRYL) injection 50 mg  50 mg IntraMUSCular Q12H PRN    QUEtiapine SR (SEROquel XR) tablet 300 mg  300 mg Oral QHS    traZODone (DESYREL) tablet 100 mg  100 mg Oral QHS PRN    chlordiazePOXIDE (LIBRIUM) capsule 25 mg  25 mg Oral Q12H    metoprolol tartrate (LOPRESSOR) tablet 25 mg  25 mg Oral Q12H    ziprasidone (GEODON) capsule 20 mg  20 mg Oral Q8H PRN    cloNIDine HCl (CATAPRES) tablet 0.1 mg  0.1 mg Oral Q2H PRN    levothyroxine (SYNTHROID) tablet 50 mcg  50 mcg Oral ACB    benztropine (COGENTIN) tablet 2 mg  2 mg Oral BID PRN    benztropine (COGENTIN) injection 2 mg  2 mg IntraMUSCular BID PRN    acetaminophen (TYLENOL) tablet 650 mg  650 mg Oral Q4H PRN    ibuprofen (MOTRIN) tablet 400 mg  400 mg Oral Q8H PRN    magnesium hydroxide (MILK OF MAGNESIA) 400 mg/5 mL oral suspension 30 mL  30 mL Oral DAILY PRN    nicotine (NICODERM CQ) 21 mg/24 hr patch 1 Patch  1 Patch TransDERmal DAILY PRN    hydrOXYzine HCl (ATARAX) tablet 50 mg  50 mg Oral Q6H PRN      SCHEDULED MEDICATIONS:   Current Facility-Administered Medications   Medication Dose Route Frequency    risperiDONE (RisperDAL) tablet 1 mg  1 mg Oral Q12H    senna-docusate (PERICOLACE) 8.6-50 mg per tablet 2 Tab  2 Tab Oral DAILY    QUEtiapine SR (SEROquel XR) tablet 300 mg  300 mg Oral QHS    chlordiazePOXIDE (LIBRIUM) capsule 25 mg  25 mg Oral Q12H    metoprolol tartrate (LOPRESSOR) tablet 25 mg  25 mg Oral Q12H    levothyroxine (SYNTHROID) tablet 50 mcg  50 mcg Oral ACB          ASSESSMENT & PLAN     DIAGNOSES REQUIRING ACTIVE TREATMENT AND MONITORING: (reviewed/updated 7/22/2019)  Patient Active Hospital Problem List:   Schizoaffective disorder ([facility] (6/18/2019)    Assessment: patient with ongoing psychotic presentation, had previously been stabilized on Invega and Seroquel, now on Risperdal s/p rhabdomyolysis and SHYLA due to dehydration vs iatrogenic. Will continue psychiatric stabilization and monitor vitals. Patient with marked EPS vs akathisia, hypotensive, CK elevated again. Oriented but grossly paranoid.  Goal is Seroquel monotherapy with adjunctive anxiolytics    Plan:  - TAPER Librium to 25 mg QDAY for anxiety  - CONTINUE Risperdal 1 mg Q12H for psychosis (tapered due to mitigation of akathisia)  - CONTINUE Trazodone 50 mg QHS PRN insomnia  - CONTINUE Seroquel  mg QHS for psychosis  - f/u EKG for QTc monitoring  - CONTINUE bowel regimen  - IGM therapy as tolerated  - Expand database / obtain collateral  - Dispo planning    Patient discharged on two antipsychotics now due to relative refractoriness to treatment thus far. Prior to admission patient had THREE or more failed trails of monotherapy, including: Haldol, Seroquel and Risperdal     Patient is a very slow responder to medications. Risks and benefits in the use of two antipsychotics have been weighed in full, including the risk of metabolic syndrome and the potential increased risk of QTc  Based on this analysis, it is considered favorable for the utilization of two antipsychotics. In the future, once stable on the two antipsychotics, patient can possibly be tapered to one of the chosen antipsychotics. Will check on EKG results tomorrow to monitor QTc. In summary, Jamshid Lazo, is a 29 y.o.  male who presents with a severe exacerbation of the principal diagnosis of Schizoaffective disorder ([facility]    Patient's condition is not improving. Patient requires continued inpatient hospitalization for further stabilization, safety monitoring and medication management. I will continue to coordinate the provision of individual, milieu, occupational, group, and substance abuse therapies to address target symptoms/diagnoses as deemed appropriate for the individual patient. A coordinated, multidisplinary treatment team round was conducted with the patient (this team consists of the nurse, psychiatric unit pharmcist,  and writer). Complete current electronic health record for patient has been reviewed today including consultant notes, ancillary staff notes, nurses and psychiatric tech notes. Suicide risk assessment completed and patient deemed to be of low risk for suicide at this time.      The following regarding medications was addressed during rounds with patient:   the risks and benefits of the proposed medication. The patient was given the opportunity to ask questions. Informed consent given to the use of the above medications. Will continue to adjust psychiatric and non-psychiatric medications (see above \"medication\" section and orders section for details) as deemed appropriate & based upon diagnoses and response to treatment. I will continue to order blood tests/labs and diagnostic tests as deemed appropriate and review results as they become available (see orders for details and above listed lab/test results). I will order psychiatric records from previous [facility] hospitals to further elucidate the nature of patient's psychopathology and review once available. I will gather additional collateral information from friends, family and o/p treatment team to further elucidate the nature of patient's psychopathology and baselline level of psychiatric functioning. I certify that this patient's inpatient psychiatric hospital services furnished since the previous certification were, and continue to be, required for treatment that could reasonably be expected to improve the patient's condition, or for diagnostic study, and that the patient continues to need, on a daily basis, active treatment furnished directly by or requiring the supervision of inpatient psychiatric facility personnel. In addition, the hospital records show that services furnished were intensive treatment services, admission or related services, or equivalent services.     EXPECTED DISCHARGE DATE/DAY: 7/26/2019     DISPOSITION: Home       Signed By:   Nando Sampson MD  7/22/2019

## 2019-07-22 NOTE — BH NOTES
GROUP THERAPY PROGRESS NOTE    The patient Sindy carias 29 y.o. male is participating in [employer]. Group time: 1 hour    Personal goal for participation:  To concentrate on selected task    Goal orientation: social    Group therapy participation: active    Therapeutic interventions reviewed and discussed: Crafts, games, music    Impression of participation: The patient was attentive-required prompting-chose to listen to music selections    David Guillory  7/22/2019  6:03 PM

## 2019-07-22 NOTE — BH NOTES
0800 pt is visible on the unit. Pacing demanding medications. Require redirection. 1000 pt is visible on the unit. Continues to pace. Irritable. Medication and meal complaint. 1200 visible on the unit. Irritable. disorganized in conversation. Continues to require redirection. Meal compliant. 1400 pt is visible on the unit. encouraged to attend groups. 1600 pt is visible on the unit. 1800 pt is visible on the unit. Meal complaint.

## 2019-07-22 NOTE — BH NOTES
GROUP THERAPY PROGRESS NOTE    The patient Richard carias 29 y.o. male is participating in [address] [place] [address]. Group time: 45 minutes    Personal goal for participation: To participate in relaxation activity    Goal orientation:  relaxation    Group therapy participation: minimal    Therapeutic interventions reviewed and discussed: favorite ways to relax    Impression of participation:  The patient was present-anxious. preoccupied-left session early     Laurel Hunter  7/22/2019  5:30 PM

## 2019-07-22 NOTE — BH NOTES
Social Work -Pt was seen in treatment team this morning. Pt is alert and oriented. Pt denies SI/HI. Pt's mood is anxious, affect is mood congruent. Pt's thought process is illogical \"I'm dead. \"  Pt's insight and judgment is limited, reliability is fair. Pt's CM Laurence Earnest was updated with status. Social work department will continue to coordinate discharge plans.

## 2019-07-22 NOTE — PROGRESS NOTES
Follow Up Nutrition Assessment:     INTERVENTIONS/RECOMMENDATIONS:   · Meals/Snacks: General/healthful diet:  Continue regular diet. Enc compliance with meals.      ASSESSMENT:   7/22:  Chart reviewed; med noted for schiphrenia.  Admitted to the [address] [place] [address] documented po intake indicates good intake.  No new acute nutrition dx at this time. Weight updated now.       Diet Order: Regular  % Eaten:  90%. Pertinent Medications: [x]Reviewed []Other: milk of mag  Pertinent Labs: [x]Reviewed []Other   Food Allergies: [x]None []Other   Last BM: 7/1     [x]Active     []Hyperactive  []Hypoactive       [] Absent BS  Skin:     [x] Intact              [] Incision           [] Breakdown                [] Other:     Anthropometrics:   Height: 6' 2\" (188 cm)            Weight: 92.9 kg (204 lb 11.2 oz)         IBW (%IBW):   ( )        UBW (%UBW):   (  %)   Last Weight Metrics:  Weight Loss Metrics 7/22/2019 6/28/2019 6/25/2019 6/18/2019 6/17/2019   Today's Wt 204 lb 11.2 oz 250 lb - 250 lb -   BMI 26.28 kg/m2 - 31.25 kg/m2 - 31.25 kg/m2         BMI: Body mass index is 26.28 kg/m².             This BMI is indicative of:   []Underweight    []Normal    [x]Overweight    [] Obesity   [] Extreme Obesity (BMI>40)      Estimated Nutrition Needs (Based on):   1918 Kcals/day(BMR (1789) x 1.2AF) , 73 g(.8 g/kg bw) Protein  Carbohydrate: At Least 130 g/day  Fluids: 1900 mL/day (1ml/kcal)     NUTRITION DIAGNOSES:   Problem:  No nutritional diagnosis at this time      Etiology: related to       Signs/Symptoms: as evidenced by         NUTRITION INTERVENTIONS:  Meals/Snacks: General/healthful diet                  GOAL:   PO intake >50% of meals next 5-7 days.   Previous goal met.     LEARNING NEEDS (Diet, Food/Nutrient-Drug Interaction):    [x] None Identified   [] Identified and Education Provided/Documented   [] Identified and Pt declined/was not appropriate     Cultureal, Mandaen, [place] Ethnic Dietary Needs:    [x] None Identified   [] Identified and Addressed      [x] Interdisciplinary Care Plan Reviewed/Documented    [x] Discharge Planning:  Continue regular diet      MONITORING /EVALUATION:   Food/Nutrient Intake Outcomes:  Total energy intake     NUTRITION RISK:    [x] Patient At Nutritional Risk        [] Patient Not At Nutritional Risk     PT SEEN FOR:    []  MD Consult: []Calorie Count                                       []Diabetic Diet Education                                                                        []Diet Education                                      []Electrolyte Management                                      [x]General Nutrition Management and Supplements                                      []Management of Tube Feeding                                      []TPN Recommendations    []  RN Referral:             [x]MST score >=2                                      []Enteral/Parenteral Nutrition PTA                                      []Pregnant: Gestational DM or Multigestation                                      []Pressure Ulcer/Wound Care needs     []  Low BMI  []  DAISY Trejo, [address] [place] [address]  Pager [phone]

## 2019-07-22 NOTE — BH NOTES
REPORT RECEIVED FROM OFF GOING SHIFT. ASSUMED CARE OF KRISTYN. HE DENIES HI, SI, AND A/V/H. ENDORSES DEPRESSION AND ANXIETY 9/10. KRISTYN IS REQUESTING A \"SHOT\" FOR AGITATION. MEDICATED AS ORDERED.  WILL CONTINUE TO MONITOR CLOSELY FOR SAFETY

## 2019-07-23 LAB
ATRIAL RATE: 61 BPM
CALCULATED P AXIS, ECG09: 75 DEGREES
CALCULATED R AXIS, ECG10: 74 DEGREES
CALCULATED T AXIS, ECG11: 79 DEGREES
DIAGNOSIS, 93000: NORMAL
P-R INTERVAL, ECG05: 148 MS
Q-T INTERVAL, ECG07: 396 MS
QRS DURATION, ECG06: 96 MS
QTC CALCULATION (BEZET), ECG08: 398 MS
VENTRICULAR RATE, ECG03: 61 BPM

## 2019-07-23 PROCEDURE — 93005 ELECTROCARDIOGRAM TRACING: CPT

## 2019-07-23 PROCEDURE — 74011250637 HC RX REV CODE- 250/637: Performed by: PSYCHIATRY & NEUROLOGY

## 2019-07-23 PROCEDURE — 74011250637 HC RX REV CODE- 250/637: Performed by: FAMILY MEDICINE

## 2019-07-23 PROCEDURE — 65220000003 HC RM SEMIPRIVATE PSYCH

## 2019-07-23 RX ADMIN — METOPROLOL TARTRATE 25 MG: 25 TABLET ORAL at 20:24

## 2019-07-23 RX ADMIN — TRAZODONE HYDROCHLORIDE 100 MG: 100 TABLET ORAL at 21:51

## 2019-07-23 RX ADMIN — METOPROLOL TARTRATE 25 MG: 25 TABLET ORAL at 08:01

## 2019-07-23 RX ADMIN — CHLORDIAZEPOXIDE HYDROCHLORIDE 25 MG: 25 CAPSULE ORAL at 08:01

## 2019-07-23 RX ADMIN — SENNOSIDES AND DOCUSATE SODIUM 2 TABLET: 8.6; 5 TABLET ORAL at 08:01

## 2019-07-23 RX ADMIN — RISPERIDONE 1 MG: 1 TABLET ORAL at 08:01

## 2019-07-23 RX ADMIN — HYDROXYZINE HYDROCHLORIDE 50 MG: 25 TABLET, FILM COATED ORAL at 15:39

## 2019-07-23 RX ADMIN — QUETIAPINE FUMARATE 300 MG: 300 TABLET, EXTENDED RELEASE ORAL at 21:51

## 2019-07-23 RX ADMIN — LEVOTHYROXINE SODIUM 50 MCG: 50 TABLET ORAL at 06:48

## 2019-07-23 RX ADMIN — RISPERIDONE 1 MG: 1 TABLET ORAL at 20:24

## 2019-07-23 NOTE — BH NOTES
PSYCHIATRIC PROGRESS NOTE     Weekend Coverage    Patient Name  Janee Ye   Date of Birth 1985   [facility] [id number]   Medical Record Number  [id number]      Age  29 y.o. PCP None   Admit date:  7/1/2019    Room Number  772/71  @ [facility]   Date of Service  7/23/2019         E & M PROGRESS NOTE:         HISTORY       CC:  \"psychosis\"  HISTORY OF PRESENT ILLNESS/INTERVAL HISTORY:  (reviewed/updated 7/23/2019). per initial evaluation: The patient, Janee Ye, is a 29 y.o. WHITE [place]  male with a past psychiatric history significant for schizoaffective disorder vs schizophrenia, who presents at this time with complaints of (and/or evidence of) the following emotional symptoms: agitation, delusions and psychotic behavior. Additional symptomatology include AH and VH. The above symptoms have been present for 2+ weeks. These symptoms are of moderate to high severity. These symptoms are constant in nature. The patient's condition has been precipitated by psychosocial stressors. Patient's condition made worse by treatment noncompliance. UDS: negative; BAL=0.      The patient was readmitted from medical unit following rhabdomyolysis and SHYLA which resolved with IV hydration. He is grossly psychotic and has been noted to be confused. Patient is markedly disorganized and confused on interview.  He cannot state the days of the week backward despite effort.     From patient's medical admission:     The patient is a 27-year-old  man, who is currently admitted to the medical floor at [address] [place] [address] Drive was initially admitted on 06/17, to Dr. Winston Elena service on the psychiatry floor. Crhistine Stt a couple of days, his CPK was found to be elevated to 1729 and he was transferred to the medical floor for treatment of rhabdomyolysis.  At that time, he did not have any rigidity, hyperthermia, or leukocytosis, which are indicative of neuroleptic malignant syndrome.  His CKs dropped immediately with hydration. Ibis Lo has improved markedly and currently reports feeling better in a physical sense.  Also during his stay, his liver enzymes increased although, they were normal at baseline. Ibis Lo does have a history of being positive for hepatitis C and has been treated with Isoniazid for latent tuberculosis in the past.  Currently, he remains on one-to-one observation and was agitated and aggressive at times yesterday. Ibis Lo had to be given p.r.n.'s. Stef Rodas had started him on a low dose of risperidone yesterday given the low likelihood of him having had NMS.  His CPK most recently was 1. Ibis Lo continues to exhibit disorganized behavior and delusional thinking.  When I asked him, he stated that he wanted to leave and go stay with his mother but when Dr. Hannah Shah, the hospitalist, asked him he stated that he wanted to go to the psychiatric floor because he does not feel safe going home.  At the present time, if the patient requests to be discharged home, my opinion is that Crisis should be called for TDO evaluation. Ibis Lo denies any auditory hallucinations today, but was yesterday noted to be talking to himself. Ibis Lo is a poor historian and unable to provide much history. 7/3- patient has remained largely illogical, at times appears to be hallucinating in his room, but is able to coherently describe his experience. Patient given PRN Geodon and Zyprexa with limited effect. Per mother Danyel Soliman patient was very sedated yesterday following Zyprexa administration, and does better with Geodon historically as a PRN. Patient continues to appear ill, with tachycardia unresolved despite fluid rehydration. 7/4- patient more visible, has been more coherent than 24 hours prior. Vitals still concerning, tachycardic and with autonomic instability evident. Medicine following. Patient endorses disorganized thoughts and anxiety.   7/5- patient largely unchanged, repeatedly asking for help from staff due to \"something in my head\" but coherent, slept 7 hours. Patient continues to endorse anxiety and restlessness. Patient still noted to be in moderate distress. Patient medication compliant. Repeatedly states he is dying. 7/6-Presents anxious and remains needy. Staff familiar to him reports slow improvement is noticed. Remains pre-occupied but compliant with meds. Prn used-Atarax. 7/7-Pt was moved to the General side yesterday. Continues to maintain improvement, less anxious. Slept 7 hrs. No prn's were used. 7/8- no acute overnight events. Patient has been visible, odd, standing with or at nursing station most of the day, continues to endorse problems \"in my head\" but has been pleasant, smiling inappropriately. Patient received CT scan of head, unremarkable 7/5. Medication compliant, continues to complain of anxiety. 7/9- patient slept 8 hours overnight, did not get PRNs, states he is doing better. Still odd, visible, standing by nursing station much of the day, confused for much of the day but with no specific complaints. EKG performed, QTc 407. SHYLA resolved. 7/10- patient visible, slept 8 hours medication compliant. He remains discharged focused, c/o constipation. Patient still disorganized but more coherent, no agitation or hallucinations x24 hours did not get PRNs for psychosis. 7/11- patient calm and cooperative, medication compliant. Has been visible, still odd, disorganized. Patient with no specific concerns, discharge focused. Per mom, patient still not at his baseline, but improved since admission. Constipation resolved though pt still c/o hard stools. NMDAr Ab negative. 7/12- no acute overnight events. Patient for discharge this morning but made grossly paranoid statements to treatment team (\"the nurses are trying to kill me\") and continues to be anxious. The decision was made to hold the patient for further observation and titrate antipsychotic given ongoing PI and limited home resources over the weekend.   7/13Fredie Peak reports feeling well and moods are alright. Continues to be bizarre and paranoid. Denies SI/HI/AH/VH. No aggression or violence. Appropriately interactive and aware. Tolerating medications well. Eating and sleeping fairly. 7/14Kelvin Becerra reports feeling restless and needs something for his ADHD. Pacing the unit despite receiving his prns. No aggression or violence. Seems to be driven to move. Denies SI/HI/AH/VH.    7/15- patient was moved over the acute side of the unit following an episode of aggression, was noted to be confused, irritable. EKG WNL. Slept 6 hours, pleasant on interview this morning. Acknowledges agitated, stating he is feeling anxious. Patient continues to express PI regarding the FBI. 7/16- patient is visible, remains confused. Patient continues to report PI, states that the nurses are \"law enforcement\" but can reality test.  7/17- patient has been visible, confused, got Ativan IM x2 doses due to agitation and psychosis. He remains grossly disorganized, c/o constipation. Patient with prominent PI towards nurses. 7/18- patient received multiple PRNs for agitation, gross psychosis, has been visible, posturing, with paranoid ideation. He is calm and cooperative in treatment team and states his previous agitation was due to the 'nurses being police' cannot elaborate further. 7/19- patient with worsening of paranoid ideation towards staff. Reports ongoing mistrust of the nursing staff, but medication compliant. Marked akathisia noted. CK elevated to 878 this morning. Patient still c/o constipation, confusion. 7/22- no acute overnight events. Patient continues to be paranoid, misperceiving events with nursing staff, has been noted to have elevated BP at times. Patient somatic, confused about the nature of the admission. Patient states \"I'm dead\" when asked about his mood.    7/23- patient improved since yesterday,but per SW called his mother and hung up after telling her was trying to call 911. Patient medication compliant, has been visible, confused. Less paranoid ideation toward nursing. SIDE EFFECTS: (reviewed/updated 7/23/2019)  None reported or admitted to. ALLERGIES:(reviewed/updated 7/23/2019)  No Known Allergies   MEDICATIONS PRIOR TO ADMISSION:(reviewed/updated 7/23/2019)  Medications Prior to Admission   Medication Sig    levothyroxine (SYNTHROID) 50 mcg tablet Take 50 mcg by mouth Daily (before breakfast).  risperiDONE (RISPERDAL) 0.5 mg tablet Take 1.5 mg by mouth nightly. Risperidone 0.5 mg tabs: Take 3 tabs po every evening    pyridoxine, vitamin B6, (VITAMIN B-6) 50 mg tablet Take 50 mg by mouth daily.  isoniazid (NYDRAZID) 300 mg tablet Take 300 mg by mouth daily.  traZODone (DESYREL) 150 mg tablet Take 300 mg by mouth nightly. Indications: Insomnia      PAST MEDICAL HISTORY: Past medical history from the initial psychiatric evaluation has been reviewed (reviewed/updated 7/23/2019) with no additional updates (I asked patient and no additional past medical history provided). No past medical history on file. No past surgical history on file. SOCIAL HISTORY: Social history from the initial psychiatric evaluation has been reviewed (reviewed/updated 7/23/2019) with no additional updates (I asked patient and no additional social history provided).    Social History     Socioeconomic History    Marital status: SINGLE     Spouse name: Not on file    Number of children: Not on file    Years of education: Not on file    Highest education level: Not on file   Occupational History    Not on file   Social Needs    Financial resource strain: Not on file    Food insecurity:     Worry: Not on file     Inability: Not on file    Transportation needs:     Medical: Not on file     Non-medical: Not on file   Tobacco Use    Smoking status: Not on file   Substance and Sexual Activity    Alcohol use: Not on file    Drug use: Not on file    Sexual activity: Not on file Lifestyle    Physical activity:     Days per week: Not on file     Minutes per session: Not on file    Stress: Not on file   Relationships    Social connections:     Talks on phone: Not on file     Gets together: Not on file     Attends Jewish service: Not on file     Active member of club or organization: Not on file     Attends meetings of clubs or organizations: Not on file     Relationship status: Not on file    Intimate partner violence:     Fear of current or ex partner: Not on file     Emotionally abused: Not on file     Physically abused: Not on file     Forced sexual activity: Not on file   Other Topics Concern    Not on file   Social History Narrative    Not on file      FAMILY HISTORY: Family history from the initial psychiatric evaluation has been reviewed (reviewed/updated 7/23/2019) with no additional updates (I asked patient and no additional family history provided). No family history on file. REVIEW OF SYSTEMS: (reviewed/updated 7/23/2019)  Appetite:poor   Sleep: poor with DIMS (difficulty initiating & maintaining sleep)   All other Review of Systems: Negative except confusion per HPI         [facility] (MSE):    MSE FINDINGS ARE WITHIN NORMAL LIMITS (WNL) UNLESS OTHERWISE STATED BELOW. ( ALL OF THE BELOW CATEGORIES OF THE MSE HAVE BEEN REVIEWED (reviewed 7/23/2019) AND UPDATED AS DEEMED APPROPRIATE )  General Presentation age appropriate, cooperative   Orientation confused, disorganized, disoriented   Vital Signs  See below (reviewed 7/23/2019); Vital Signs (BP, Pulse, & Temp) are within normal limits if not listed below.    Gait and Station Stable/steady, no ataxia   Musculoskeletal System No extrapyramidal symptoms (EPS); no abnormal muscular movements or Tardive Dyskinesia (TD); muscle strength and tone are within normal limits   Language No aphasia or dysarthria   Speech:  hypoverbal and soft   Thought Processes illogical; normal rate of thoughts; poor abstract reasoning/computation   Thought Associations loose associations   Thought Content visual hallucinations and preoccupations   Suicidal Ideations none   Homicidal Ideations none   Mood:  anxious    Affect:  constricted and mood-congruent   Memory recent  impaired   Memory remote:  fair   Concentration/Attention:  impaired   Fund of Knowledge average   Insight:  limited   Reliability fair   Judgment:  impaired due to active psychosis          VITALS:     Patient Vitals for the past 24 hrs:   Temp Pulse Resp BP SpO2   07/23/19 0700 98.2 °F (36.8 °C) (!) 102 20 102/72 100 %   07/22/19 1930 98 °F (36.7 °C)  20 100/84 99 %     Wt Readings from Last 3 Encounters:   07/10/19 91.4 kg (201 lb 8 oz)   06/28/19 113.4 kg (250 lb)   06/18/19 113.4 kg (250 lb)     Temp Readings from Last 3 Encounters:   07/23/19 98.2 °F (36.8 °C)   07/01/19 98 °F (36.7 °C)   06/25/19 99.7 °F (37.6 °C)     BP Readings from Last 3 Encounters:   07/23/19 102/72   07/01/19 114/69   06/25/19 145/85     Pulse Readings from Last 3 Encounters:   07/23/19 (!) 102   07/01/19 87   06/25/19 (!) 107            DATA     LABORATORY DATA:(reviewed/updated 7/23/2019)  No results found for this or any previous visit (from the past 24 hour(s)). No results found for: VALF2, VALAC, VALP, VALPR, DS6, CRBAM, CRBAMP, CARB2, XCRBAM  No results found for: LITHM   RADIOLOGY REPORTS:(reviewed/updated 7/23/2019)  No results found.        MEDICATIONS     ALL MEDICATIONS:   Current Facility-Administered Medications   Medication Dose Route Frequency    chlordiazePOXIDE (LIBRIUM) capsule 25 mg  25 mg Oral DAILY    risperiDONE (RisperDAL) tablet 1 mg  1 mg Oral Q12H    senna-docusate (PERICOLACE) 8.6-50 mg per tablet 2 Tab  2 Tab Oral DAILY    ziprasidone (GEODON) 20 mg in sterile water (preservative free) 1 mL injection  20 mg IntraMUSCular Q12H PRN    And    diphenhydrAMINE (BENADRYL) injection 50 mg  50 mg IntraMUSCular Q12H PRN    QUEtiapine SR (SEROquel XR) tablet 300 mg  300 mg Oral QHS    traZODone (DESYREL) tablet 100 mg  100 mg Oral QHS PRN    metoprolol tartrate (LOPRESSOR) tablet 25 mg  25 mg Oral Q12H    ziprasidone (GEODON) capsule 20 mg  20 mg Oral Q8H PRN    cloNIDine HCl (CATAPRES) tablet 0.1 mg  0.1 mg Oral Q2H PRN    levothyroxine (SYNTHROID) tablet 50 mcg  50 mcg Oral ACB    benztropine (COGENTIN) tablet 2 mg  2 mg Oral BID PRN    benztropine (COGENTIN) injection 2 mg  2 mg IntraMUSCular BID PRN    acetaminophen (TYLENOL) tablet 650 mg  650 mg Oral Q4H PRN    ibuprofen (MOTRIN) tablet 400 mg  400 mg Oral Q8H PRN    magnesium hydroxide (MILK OF MAGNESIA) 400 mg/5 mL oral suspension 30 mL  30 mL Oral DAILY PRN    nicotine (NICODERM CQ) 21 mg/24 hr patch 1 Patch  1 Patch TransDERmal DAILY PRN    hydrOXYzine HCl (ATARAX) tablet 50 mg  50 mg Oral Q6H PRN      SCHEDULED MEDICATIONS:   Current Facility-Administered Medications   Medication Dose Route Frequency    chlordiazePOXIDE (LIBRIUM) capsule 25 mg  25 mg Oral DAILY    risperiDONE (RisperDAL) tablet 1 mg  1 mg Oral Q12H    senna-docusate (PERICOLACE) 8.6-50 mg per tablet 2 Tab  2 Tab Oral DAILY    QUEtiapine SR (SEROquel XR) tablet 300 mg  300 mg Oral QHS    metoprolol tartrate (LOPRESSOR) tablet 25 mg  25 mg Oral Q12H    levothyroxine (SYNTHROID) tablet 50 mcg  50 mcg Oral ACB          ASSESSMENT & PLAN     DIAGNOSES REQUIRING ACTIVE TREATMENT AND MONITORING: (reviewed/updated 7/23/2019)  Patient Active Hospital Problem List:   Schizoaffective disorder ([facility] [address] (6/18/2019)    Assessment: patient with ongoing psychotic presentation, had previously been stabilized on Invega and Seroquel, now on Risperdal s/p rhabdomyolysis and SHYLA due to dehydration vs iatrogenic. Will continue psychiatric stabilization and monitor vitals. Patient with marked EPS vs akathisia, hypotensive, CK elevated again. Oriented but grossly paranoid.  Goal is Seroquel monotherapy with adjunctive anxiolytics Plan:  -  Librium 25 mg QDAY for anxiety x1 dose  - CONTINUE Risperdal 1 mg Q12H for psychosis (tapered due to mitigation of akathisia)  - CONTINUE Trazodone 50 mg QHS PRN insomnia  - CONTINUE Seroquel  mg QHS for psychosis  - f/u EKG for QTc monitoring  - CONTINUE bowel regimen  - IGM therapy as tolerated  - Expand database / obtain collateral  - Dispo planning    Patient discharged on two antipsychotics now due to relative refractoriness to treatment thus far. Prior to admission patient had THREE or more failed trails of monotherapy, including: Haldol, Seroquel and Risperdal     Patient is a very slow responder to medications. Risks and benefits in the use of two antipsychotics have been weighed in full, including the risk of metabolic syndrome and the potential increased risk of QTc  Based on this analysis, it is considered favorable for the utilization of two antipsychotics. In the future, once stable on the two antipsychotics, patient can possibly be tapered to one of the chosen antipsychotics. Will check on EKG results tomorrow to monitor QTc. In summary, Jed Marte, is a 29 y.o.  male who presents with a severe exacerbation of the principal diagnosis of Schizoaffective disorder ([facility] [address]    Patient's condition is improving. Patient requires continued inpatient hospitalization for further stabilization, safety monitoring and medication management. I will continue to coordinate the provision of individual, milieu, occupational, group, and substance abuse therapies to address target symptoms/diagnoses as deemed appropriate for the individual patient. A coordinated, multidisplinary treatment team round was conducted with the patient (this team consists of the nurse, psychiatric unit pharmcist,  and writer).      Complete current electronic health record for patient has been reviewed today including consultant notes, ancillary staff notes, nurses and psychiatric tech notes.    Suicide risk assessment completed and patient deemed to be of low risk for suicide at this time. The following regarding medications was addressed during rounds with patient:   the risks and benefits of the proposed medication. The patient was given the opportunity to ask questions. Informed consent given to the use of the above medications. Will continue to adjust psychiatric and non-psychiatric medications (see above \"medication\" section and orders section for details) as deemed appropriate & based upon diagnoses and response to treatment. I will continue to order blood tests/labs and diagnostic tests as deemed appropriate and review results as they become available (see orders for details and above listed lab/test results). I will order psychiatric records from previous [facility] hospitals to further elucidate the nature of patient's psychopathology and review once available. I will gather additional collateral information from friends, family and o/p treatment team to further elucidate the nature of patient's psychopathology and baselline level of psychiatric functioning. I certify that this patient's inpatient psychiatric hospital services furnished since the previous certification were, and continue to be, required for treatment that could reasonably be expected to improve the patient's condition, or for diagnostic study, and that the patient continues to need, on a daily basis, active treatment furnished directly by or requiring the supervision of inpatient psychiatric facility personnel. In addition, the hospital records show that services furnished were intensive treatment services, admission or related services, or equivalent services.     EXPECTED DISCHARGE DATE/DAY: 7/26/2019     DISPOSITION: Home       Signed By:   Brayden Hansen MD  7/23/2019

## 2019-07-23 NOTE — BH NOTES
0715 am- received report from off going nurse Dell High RN. Patient in the hallway, walking up and down. He can get very agitated at times. Requesting his vitals be taken multiple times. 11:00am- He tolerates medication as ordered.

## 2019-07-23 NOTE — BH NOTES
GROUP THERAPY PROGRESS NOTE    The patient Carlos carias 29 y.o. male is participating in [employer]. Group time: 1 hour    Personal goal for participation:  To concentrate on selected task    Goal orientation: social    Group therapy participation: active    Therapeutic interventions reviewed and discussed: Crafts, games, music    Impression of participation: The patient was attentive-required prompting    Jorge April 7/23/2019  5:34 PM

## 2019-07-23 NOTE — BH NOTES
1920- Report received from [facility]. Patient walking in hallways. Denies SI/HI/AH/VH. Calm, pleasant and cooperative at this time. No complaints voiced. No distress noted. Will continue to monitor. 2300- Patient was compliant with medications. Patient in bed resting quietly. No distress noted. Will continue to monitor.

## 2019-07-23 NOTE — BH NOTES
GROUP THERAPY PROGRESS NOTE    The patient aKy carias 29 y.o. male is participating in [facility] [employer]. Group time: 45 minutes    Personal goal for participation:  To participate in Let's Talk game    Goal orientation:  personal    Group therapy participation: active    Therapeutic interventions reviewed and discussed: personal goals,coping strategies,experiences    Impression of participation:  The patient was attentive-required prompting    Regina Duong  7/23/2019  1:01 PM

## 2019-07-24 LAB
ALBUMIN SERPL-MCNC: 3.3 G/DL (ref 3.5–5)
ALBUMIN/GLOB SERPL: 1.2 {RATIO} (ref 1.1–2.2)
ALP SERPL-CCNC: 84 U/L (ref 45–117)
ALT SERPL-CCNC: 57 U/L (ref 12–78)
ANION GAP SERPL CALC-SCNC: 11 MMOL/L (ref 5–15)
AST SERPL-CCNC: 42 U/L (ref 15–37)
BILIRUB SERPL-MCNC: 0.3 MG/DL (ref 0.2–1)
BUN SERPL-MCNC: 17 MG/DL (ref 6–20)
BUN/CREAT SERPL: 18 (ref 12–20)
CALCIUM SERPL-MCNC: 8.7 MG/DL (ref 8.5–10.1)
CHLORIDE SERPL-SCNC: 107 MMOL/L (ref 97–108)
CK SERPL-CCNC: 849 U/L (ref 39–308)
CO2 SERPL-SCNC: 25 MMOL/L (ref 21–32)
CREAT SERPL-MCNC: 0.97 MG/DL (ref 0.7–1.3)
GLOBULIN SER CALC-MCNC: 2.8 G/DL (ref 2–4)
GLUCOSE SERPL-MCNC: 95 MG/DL (ref 65–100)
POTASSIUM SERPL-SCNC: 3.9 MMOL/L (ref 3.5–5.1)
PROT SERPL-MCNC: 6.1 G/DL (ref 6.4–8.2)
SODIUM SERPL-SCNC: 143 MMOL/L (ref 136–145)

## 2019-07-24 PROCEDURE — 65220000003 HC RM SEMIPRIVATE PSYCH

## 2019-07-24 PROCEDURE — 74011250637 HC RX REV CODE- 250/637: Performed by: PSYCHIATRY & NEUROLOGY

## 2019-07-24 PROCEDURE — 82550 ASSAY OF CK (CPK): CPT

## 2019-07-24 PROCEDURE — 74011250637 HC RX REV CODE- 250/637: Performed by: FAMILY MEDICINE

## 2019-07-24 PROCEDURE — 36415 COLL VENOUS BLD VENIPUNCTURE: CPT

## 2019-07-24 PROCEDURE — 80053 COMPREHEN METABOLIC PANEL: CPT

## 2019-07-24 RX ORDER — RISPERIDONE 1 MG/1
1 TABLET, FILM COATED ORAL DAILY
Status: DISCONTINUED | OUTPATIENT
Start: 2019-07-25 | End: 2019-07-30 | Stop reason: HOSPADM

## 2019-07-24 RX ORDER — QUETIAPINE 200 MG/1
400 TABLET, FILM COATED, EXTENDED RELEASE ORAL
Status: DISCONTINUED | OUTPATIENT
Start: 2019-07-24 | End: 2019-07-26

## 2019-07-24 RX ADMIN — RISPERIDONE 1 MG: 1 TABLET ORAL at 08:08

## 2019-07-24 RX ADMIN — METOPROLOL TARTRATE 25 MG: 25 TABLET ORAL at 20:47

## 2019-07-24 RX ADMIN — HYDROXYZINE HYDROCHLORIDE 50 MG: 25 TABLET, FILM COATED ORAL at 08:07

## 2019-07-24 RX ADMIN — ZIPRASIDONE HYDROCHLORIDE 20 MG: 20 CAPSULE ORAL at 20:46

## 2019-07-24 RX ADMIN — QUETIAPINE FUMARATE 400 MG: 200 TABLET, EXTENDED RELEASE ORAL at 20:47

## 2019-07-24 RX ADMIN — SENNOSIDES AND DOCUSATE SODIUM 2 TABLET: 8.6; 5 TABLET ORAL at 08:08

## 2019-07-24 RX ADMIN — TRAZODONE HYDROCHLORIDE 100 MG: 100 TABLET ORAL at 20:47

## 2019-07-24 RX ADMIN — METOPROLOL TARTRATE 25 MG: 25 TABLET ORAL at 08:08

## 2019-07-24 RX ADMIN — ZIPRASIDONE HYDROCHLORIDE 20 MG: 20 CAPSULE ORAL at 09:53

## 2019-07-24 RX ADMIN — HYDROXYZINE HYDROCHLORIDE 50 MG: 25 TABLET, FILM COATED ORAL at 20:47

## 2019-07-24 RX ADMIN — LEVOTHYROXINE SODIUM 50 MCG: 50 TABLET ORAL at 06:27

## 2019-07-24 NOTE — BH NOTES
GROUP THERAPY PROGRESS NOTE    The patient Mariza carias 29 y.o. male is participating in [employer]. Group time: 45 minutes    Personal goal for participation:  To participate in self esteem bingo game    Goal orientation:  personal    Group therapy participation: active    Therapeutic interventions reviewed and discussed: things pertaining to self esteem    Impression of participation:  The patient was attentive-required prompting    Eve Gr  7/24/2019  12:42 PM

## 2019-07-24 NOTE — BH NOTES
GROUP THERAPY PROGRESS NOTE    The patient Kassie Martin a 29 y.o. male is participating in [employer]. Group time: 1 hour    Personal goal for participation:  To concentrate on selected task    Goal orientation: social    Group therapy participation: active    Therapeutic interventions reviewed and discussed: Crafts, games, music    Impression of participation: The patient was attentive,tearful at times-offered support by staff    Lana Leslie  7/24/2019  6:11 PM

## 2019-07-24 NOTE — BH NOTES
0700 Assumed care of the patient    [phone] Patient pacing halls, [address] door. Suspicious of staff. Complaints voiced of feeling anxious. PRN atarax given along with scheduled medications. 0830 Sitting in dayroom tearful. Reassured and reality oriented. 0930 Siting in dayroom quietly    1000 Patient pacing halls once again. Visibly upset, stating staff stole his identity. Unable to sit still. Impulsive. Remains delusional, becoming difficult to redirect. PRN geodon given PO    1100 Patient appears less agitated, however, continues to pace the halls and is paranoid. Easily redirected at present. Denies SI/HI. Denies AVH. 1200 Meal compliant.  Attended morning group    1330 In room resting quietly    1430 Attending afternoon group    1530 In dayroom sitting quietly    1700 Meal compliant     1800 In room resting quietly

## 2019-07-24 NOTE — BH NOTES
PSYCHIATRIC PROGRESS NOTE     Weekend Coverage    Patient Name  Kay Nuñez   Date of Birth 1985   [facility] [id number]   Medical Record Number  [id number]      Age  29 y.o. PCP None   Admit date:  7/1/2019    Room Number  628/53  @ [facility]   Date of Service  7/24/2019         E & M PROGRESS NOTE:         HISTORY       CC:  \"psychosis\"  HISTORY OF PRESENT ILLNESS/INTERVAL HISTORY:  (reviewed/updated 7/24/2019). per initial evaluation: The patient, Kay Nuñez, is a 29 y.o. WHITE [place]  male with a past psychiatric history significant for schizoaffective disorder vs schizophrenia, who presents at this time with complaints of (and/or evidence of) the following emotional symptoms: agitation, delusions and psychotic behavior. Additional symptomatology include AH and VH. The above symptoms have been present for 2+ weeks. These symptoms are of moderate to high severity. These symptoms are constant in nature. The patient's condition has been precipitated by psychosocial stressors. Patient's condition made worse by treatment noncompliance. UDS: negative; BAL=0.      The patient was readmitted from medical unit following rhabdomyolysis and SHYLA which resolved with IV hydration. He is grossly psychotic and has been noted to be confused. Patient is markedly disorganized and confused on interview.  He cannot state the days of the week backward despite effort.     From patient's medical admission:     The patient is a 32-year-old  man, who is currently admitted to the medical floor at [address] [place] [address] Drive was initially admitted on 06/17, to Dr. Petey Lamb service on the psychiatry floor. Geovanny Palma a couple of days, his CPK was found to be elevated to 1729 and he was transferred to the medical floor for treatment of rhabdomyolysis.  At that time, he did not have any rigidity, hyperthermia, or leukocytosis, which are indicative of neuroleptic malignant syndrome.  His CKs dropped immediately with hydration. Marty German has improved markedly and currently reports feeling better in a physical sense.  Also during his stay, his liver enzymes increased although, they were normal at baseline. Marty German does have a history of being positive for hepatitis C and has been treated with Isoniazid for latent tuberculosis in the past.  Currently, he remains on one-to-one observation and was agitated and aggressive at times yesterday. Marty German had to be given p.r.n.'s. Kali Wise had started him on a low dose of risperidone yesterday given the low likelihood of him having had NMS.  His CPK most recently was 1. Marty German continues to exhibit disorganized behavior and delusional thinking.  When I asked him, he stated that he wanted to leave and go stay with his mother but when Dr. Katie Pablo, the hospitalist, asked him he stated that he wanted to go to the psychiatric floor because he does not feel safe going home.  At the present time, if the patient requests to be discharged home, my opinion is that Crisis should be called for TDO evaluation. Marty German denies any auditory hallucinations today, but was yesterday noted to be talking to himself. Marty German is a poor historian and unable to provide much history. 7/3- patient has remained largely illogical, at times appears to be hallucinating in his room, but is able to coherently describe his experience. Patient given PRN Geodon and Zyprexa with limited effect. Per mother [facility] patient was very sedated yesterday following Zyprexa administration, and does better with Geodon historically as a PRN. Patient continues to appear ill, with tachycardia unresolved despite fluid rehydration. 7/4- patient more visible, has been more coherent than 24 hours prior. Vitals still concerning, tachycardic and with autonomic instability evident. Medicine following. Patient endorses disorganized thoughts and anxiety.   7/5- patient largely unchanged, repeatedly asking for help from staff due to \"something in my head\" but coherent, slept 7 hours. Patient continues to endorse anxiety and restlessness. Patient still noted to be in moderate distress. Patient medication compliant. Repeatedly states he is dying. 7/6-Presents anxious and remains needy. Staff familiar to him reports slow improvement is noticed. Remains pre-occupied but compliant with meds. Prn used-Atarax. 7/7-Pt was moved to the General side yesterday. Continues to maintain improvement, less anxious. Slept 7 hrs. No prn's were used. 7/8- no acute overnight events. Patient has been visible, odd, standing with or at nursing station most of the day, continues to endorse problems \"in my head\" but has been pleasant, smiling inappropriately. Patient received CT scan of head, unremarkable 7/5. Medication compliant, continues to complain of anxiety. 7/9- patient slept 8 hours overnight, did not get PRNs, states he is doing better. Still odd, visible, standing by nursing station much of the day, confused for much of the day but with no specific complaints. EKG performed, QTc 407. SHYLA resolved. 7/10- patient visible, slept 8 hours medication compliant. He remains discharged focused, c/o constipation. Patient still disorganized but more coherent, no agitation or hallucinations x24 hours did not get PRNs for psychosis. 7/11- patient calm and cooperative, medication compliant. Has been visible, still odd, disorganized. Patient with no specific concerns, discharge focused. Per mom, patient still not at his baseline, but improved since admission. Constipation resolved though pt still c/o hard stools. NMDAr Ab negative. 7/12- no acute overnight events. Patient for discharge this morning but made grossly paranoid statements to treatment team (\"the nurses are trying to kill me\") and continues to be anxious. The decision was made to hold the patient for further observation and titrate antipsychotic given ongoing PI and limited home resources over the weekend.   7/13Lamar Bones reports feeling well and moods are alright. Continues to be bizarre and paranoid. Denies SI/HI/AH/VH. No aggression or violence. Appropriately interactive and aware. Tolerating medications well. Eating and sleeping fairly. 7/14Tommie Given reports feeling restless and needs something for his ADHD. Pacing the unit despite receiving his prns. No aggression or violence. Seems to be driven to move. Denies SI/HI/AH/VH.    7/15- patient was moved over the acute side of the unit following an episode of aggression, was noted to be confused, irritable. EKG WNL. Slept 6 hours, pleasant on interview this morning. Acknowledges agitated, stating he is feeling anxious. Patient continues to express PI regarding the FBI. 7/16- patient is visible, remains confused. Patient continues to report PI, states that the nurses are \"law enforcement\" but can reality test.  7/17- patient has been visible, confused, got Ativan IM x2 doses due to agitation and psychosis. He remains grossly disorganized, c/o constipation. Patient with prominent PI towards nurses. 7/18- patient received multiple PRNs for agitation, gross psychosis, has been visible, posturing, with paranoid ideation. He is calm and cooperative in treatment team and states his previous agitation was due to the 'nurses being police' cannot elaborate further. 7/19- patient with worsening of paranoid ideation towards staff. Reports ongoing mistrust of the nursing staff, but medication compliant. Marked akathisia noted. CK elevated to 878 this morning. Patient still c/o constipation, confusion. 7/22- no acute overnight events. Patient continues to be paranoid, misperceiving events with nursing staff, has been noted to have elevated BP at times. Patient somatic, confused about the nature of the admission. Patient states \"I'm dead\" when asked about his mood.    7/23- patient improved since yesterday,but per SW called his mother and hung up after telling her was trying to call 911. Patient medication compliant, has been visible, confused. Less paranoid ideation toward nursing. 7/24- patient slept 6 hours, has been visible, still with PI toward staff. Patient noted to be crying this morning, unable to state what the problem is. Patient medication compliant, more conversant this AM in treatment team.        SIDE EFFECTS: (reviewed/updated 7/24/2019)  None reported or admitted to. ALLERGIES:(reviewed/updated 7/24/2019)  No Known Allergies   MEDICATIONS PRIOR TO ADMISSION:(reviewed/updated 7/24/2019)  Medications Prior to Admission   Medication Sig    levothyroxine (SYNTHROID) 50 mcg tablet Take 50 mcg by mouth Daily (before breakfast).  risperiDONE (RISPERDAL) 0.5 mg tablet Take 1.5 mg by mouth nightly. Risperidone 0.5 mg tabs: Take 3 tabs po every evening    pyridoxine, vitamin B6, (VITAMIN B-6) 50 mg tablet Take 50 mg by mouth daily.  isoniazid (NYDRAZID) 300 mg tablet Take 300 mg by mouth daily.  traZODone (DESYREL) 150 mg tablet Take 300 mg by mouth nightly. Indications: Insomnia      PAST MEDICAL HISTORY: Past medical history from the initial psychiatric evaluation has been reviewed (reviewed/updated 7/24/2019) with no additional updates (I asked patient and no additional past medical history provided). No past medical history on file. No past surgical history on file. SOCIAL HISTORY: Social history from the initial psychiatric evaluation has been reviewed (reviewed/updated 7/24/2019) with no additional updates (I asked patient and no additional social history provided).    Social History     Socioeconomic History    Marital status: SINGLE     Spouse name: Not on file    Number of children: Not on file    Years of education: Not on file    Highest education level: Not on file   Occupational History    Not on file   Social Needs    Financial resource strain: Not on file    Food insecurity:     Worry: Not on file     Inability: Not on file    Transportation needs: Medical: Not on file     Non-medical: Not on file   Tobacco Use    Smoking status: Not on file   Substance and Sexual Activity    Alcohol use: Not on file    Drug use: Not on file    Sexual activity: Not on file   Lifestyle    Physical activity:     Days per week: Not on file     Minutes per session: Not on file    Stress: Not on file   Relationships    Social connections:     Talks on phone: Not on file     Gets together: Not on file     Attends Tenriism service: Not on file     Active member of club or organization: Not on file     Attends meetings of clubs or organizations: Not on file     Relationship status: Not on file    Intimate partner violence:     Fear of current or ex partner: Not on file     Emotionally abused: Not on file     Physically abused: Not on file     Forced sexual activity: Not on file   Other Topics Concern    Not on file   Social History Narrative    Not on file      FAMILY HISTORY: Family history from the initial psychiatric evaluation has been reviewed (reviewed/updated 7/24/2019) with no additional updates (I asked patient and no additional family history provided). No family history on file. REVIEW OF SYSTEMS: (reviewed/updated 7/24/2019)  Appetite:poor   Sleep: poor with DIMS (difficulty initiating & maintaining sleep)   All other Review of Systems: Negative except confusion per HPI         [facility] (MSE):    MSE FINDINGS ARE WITHIN NORMAL LIMITS (WNL) UNLESS OTHERWISE STATED BELOW. ( ALL OF THE BELOW CATEGORIES OF THE MSE HAVE BEEN REVIEWED (reviewed 7/24/2019) AND UPDATED AS DEEMED APPROPRIATE )  General Presentation age appropriate, cooperative   Orientation confused, disorganized, disoriented   Vital Signs  See below (reviewed 7/24/2019); Vital Signs (BP, Pulse, & Temp) are within normal limits if not listed below.    Gait and Station Stable/steady, no ataxia   Musculoskeletal System No extrapyramidal symptoms (EPS); no abnormal muscular movements or Tardive Dyskinesia (TD); muscle strength and tone are within normal limits   Language No aphasia or dysarthria   Speech:  normal volume and non-pressured   Thought Processes coherent; normal rate of thoughts; poor abstract reasoning/computation   Thought Associations loose associations   Thought Content free of hallucinations and internally preoccupied   Suicidal Ideations none   Homicidal Ideations none   Mood:  anxious    Affect:  constricted and mood-congruent   Memory recent  impaired   Memory remote:  fair   Concentration/Attention:  impaired   Fund of Knowledge average   Insight:  limited   Reliability fair   Judgment:  impaired due to active psychosis          VITALS:     Patient Vitals for the past 24 hrs:   Temp Pulse Resp BP SpO2   07/24/19 0748 98 °F (36.7 °C) 84 18 (!) 108/92 99 %   07/23/19 1950 97.7 °F (36.5 °C) 72 18 129/82 100 %     Wt Readings from Last 3 Encounters:   07/10/19 91.4 kg (201 lb 8 oz)   06/28/19 113.4 kg (250 lb)   06/18/19 113.4 kg (250 lb)     Temp Readings from Last 3 Encounters:   07/24/19 98 °F (36.7 °C)   07/01/19 98 °F (36.7 °C)   06/25/19 99.7 °F (37.6 °C)     BP Readings from Last 3 Encounters:   07/24/19 (!) 108/92   07/01/19 114/69   06/25/19 145/85     Pulse Readings from Last 3 Encounters:   07/24/19 84   07/01/19 87   06/25/19 (!) 107            DATA     LABORATORY DATA:(reviewed/updated 7/24/2019)  Recent Results (from the past 24 hour(s))   EKG, 12 LEAD, SUBSEQUENT    Collection Time: 07/23/19  1:24 PM   Result Value Ref Range    Ventricular Rate 61 BPM    Atrial Rate 61 BPM    P-R Interval 148 ms    QRS Duration 96 ms    Q-T Interval 396 ms    QTC Calculation (Bezet) 398 ms    Calculated P Axis 75 degrees    Calculated R Axis 74 degrees    Calculated T Axis 79 degrees    Diagnosis       Normal sinus rhythm  Normal ECG  When compared with ECG of 15-JUL-2019 08:37,  T wave amplitude has decreased in Lateral leads  Confirmed by Hardeep Khan Trudy Pierce ([id number]) on 7/23/2019 11:13:13 PM     CK    Collection Time: 07/24/19  4:53 AM   Result Value Ref Range     (H) 39 - 924 U/L   METABOLIC PANEL, COMPREHENSIVE    Collection Time: 07/24/19  4:53 AM   Result Value Ref Range    Sodium 143 136 - 145 mmol/L    Potassium 3.9 3.5 - 5.1 mmol/L    Chloride 107 97 - 108 mmol/L    CO2 25 21 - 32 mmol/L    Anion gap 11 5 - 15 mmol/L    Glucose 95 65 - 100 mg/dL    BUN 17 6 - 20 MG/DL    Creatinine 0.97 0.70 - 1.30 MG/DL    BUN/Creatinine ratio 18 12 - 20      GFR est AA >60 >60 ml/min/1.73m2    GFR est non-AA >60 >60 ml/min/1.73m2    Calcium 8.7 8.5 - 10.1 MG/DL    Bilirubin, total 0.3 0.2 - 1.0 MG/DL    ALT (SGPT) 57 12 - 78 U/L    AST (SGOT) 42 (H) 15 - 37 U/L    Alk. phosphatase 84 45 - 117 U/L    Protein, total 6.1 (L) 6.4 - 8.2 g/dL    Albumin 3.3 (L) 3.5 - 5.0 g/dL    Globulin 2.8 2.0 - 4.0 g/dL    A-G Ratio 1.2 1.1 - 2.2       No results found for: VALF2, VALAC, VALP, VALPR, DS6, CRBAM, CRBAMP, CARB2, XCRBAM  No results found for: LITHM   RADIOLOGY REPORTS:(reviewed/updated 7/24/2019)  No results found.        MEDICATIONS     ALL MEDICATIONS:   Current Facility-Administered Medications   Medication Dose Route Frequency    QUEtiapine SR (SEROquel XR) TABLET 400 mg  400 mg Oral QHS    risperiDONE (RisperDAL) tablet 1 mg  1 mg Oral Q12H    senna-docusate (PERICOLACE) 8.6-50 mg per tablet 2 Tab  2 Tab Oral DAILY    ziprasidone (GEODON) 20 mg in sterile water (preservative free) 1 mL injection  20 mg IntraMUSCular Q12H PRN    And    diphenhydrAMINE (BENADRYL) injection 50 mg  50 mg IntraMUSCular Q12H PRN    traZODone (DESYREL) tablet 100 mg  100 mg Oral QHS PRN    metoprolol tartrate (LOPRESSOR) tablet 25 mg  25 mg Oral Q12H    ziprasidone (GEODON) capsule 20 mg  20 mg Oral Q8H PRN    cloNIDine HCl (CATAPRES) tablet 0.1 mg  0.1 mg Oral Q2H PRN    levothyroxine (SYNTHROID) tablet 50 mcg  50 mcg Oral ACB    benztropine (COGENTIN) tablet 2 mg  2 mg Oral BID PRN    benztropine (COGENTIN) injection 2 mg  2 mg IntraMUSCular BID PRN    acetaminophen (TYLENOL) tablet 650 mg  650 mg Oral Q4H PRN    ibuprofen (MOTRIN) tablet 400 mg  400 mg Oral Q8H PRN    magnesium hydroxide (MILK OF MAGNESIA) 400 mg/5 mL oral suspension 30 mL  30 mL Oral DAILY PRN    nicotine (NICODERM CQ) 21 mg/24 hr patch 1 Patch  1 Patch TransDERmal DAILY PRN    hydrOXYzine HCl (ATARAX) tablet 50 mg  50 mg Oral Q6H PRN      SCHEDULED MEDICATIONS:   Current Facility-Administered Medications   Medication Dose Route Frequency    QUEtiapine SR (SEROquel XR) TABLET 400 mg  400 mg Oral QHS    risperiDONE (RisperDAL) tablet 1 mg  1 mg Oral Q12H    senna-docusate (PERICOLACE) 8.6-50 mg per tablet 2 Tab  2 Tab Oral DAILY    metoprolol tartrate (LOPRESSOR) tablet 25 mg  25 mg Oral Q12H    levothyroxine (SYNTHROID) tablet 50 mcg  50 mcg Oral ACB          ASSESSMENT & PLAN     DIAGNOSES REQUIRING ACTIVE TREATMENT AND MONITORING: (reviewed/updated 7/24/2019)  Patient Active Hospital Problem List:   Schizoaffective disorder ([facility] [address] (6/18/2019)    Assessment: patient with ongoing psychotic presentation, had previously been stabilized on Invega and Seroquel, now on Risperdal s/p rhabdomyolysis and SHYLA due to dehydration vs iatrogenic. Will continue psychiatric stabilization and monitor vitals. Patient with marked EPS vs akathisia, hypotensive, CK elevated again. Oriented but grossly paranoid. Goal is Seroquel monotherapy with adjunctive anxiolytics. Plan:  - TAPER to Risperdal 1 mg QDAY for psychosis (tapered due to mitigation of akathisia)  - CONTINUE Trazodone 50 mg QHS PRN insomnia  - INCREASE Seroquel XR to 400 mg QHS for psychosis  - f/u EKG for QTc monitoring  - CONTINUE bowel regimen  - IGM therapy as tolerated  - Expand database / obtain collateral  - Dispo planning    Patient discharged on two antipsychotics now due to relative refractoriness to treatment thus far.   Prior to admission patient had THREE or more failed trails of monotherapy, including: Haldol, Seroquel and Risperdal     Patient is a very slow responder to medications. Risks and benefits in the use of two antipsychotics have been weighed in full, including the risk of metabolic syndrome and the potential increased risk of QTc  Based on this analysis, it is considered favorable for the utilization of two antipsychotics. In the future, once stable on the two antipsychotics, patient can possibly be tapered to one of the chosen antipsychotics. Will check on EKG results tomorrow to monitor QTc. In summary, Pieter Fountain, is a 29 y.o.  male who presents with a severe exacerbation of the principal diagnosis of Schizoaffective disorder ([facility] [address]    Patient's condition is improving. Patient requires continued inpatient hospitalization for further stabilization, safety monitoring and medication management. I will continue to coordinate the provision of individual, milieu, occupational, group, and substance abuse therapies to address target symptoms/diagnoses as deemed appropriate for the individual patient. A coordinated, multidisplinary treatment team round was conducted with the patient (this team consists of the nurse, psychiatric unit pharmcist,  and writer). Complete current electronic health record for patient has been reviewed today including consultant notes, ancillary staff notes, nurses and psychiatric tech notes. Suicide risk assessment completed and patient deemed to be of low risk for suicide at this time. The following regarding medications was addressed during rounds with patient:   the risks and benefits of the proposed medication. The patient was given the opportunity to ask questions. Informed consent given to the use of the above medications.  Will continue to adjust psychiatric and non-psychiatric medications (see above \"medication\" section and orders section for details) as deemed appropriate & based upon diagnoses and response to treatment. I will continue to order blood tests/labs and diagnostic tests as deemed appropriate and review results as they become available (see orders for details and above listed lab/test results). I will order psychiatric records from previous [facility] hospitals to further elucidate the nature of patient's psychopathology and review once available. I will gather additional collateral information from friends, family and o/p treatment team to further elucidate the nature of patient's psychopathology and baselline level of psychiatric functioning. I certify that this patient's inpatient psychiatric hospital services furnished since the previous certification were, and continue to be, required for treatment that could reasonably be expected to improve the patient's condition, or for diagnostic study, and that the patient continues to need, on a daily basis, active treatment furnished directly by or requiring the supervision of inpatient psychiatric facility personnel. In addition, the hospital records show that services furnished were intensive treatment services, admission or related services, or equivalent services.     EXPECTED DISCHARGE DATE/DAY: 7/26/2019     DISPOSITION: Home       Signed By:   Fox Shin MD  7/24/2019

## 2019-07-24 NOTE — BH NOTES
Patient is sitting up in bed and is crying when asked what is wrong or what I could do for him he was not able to convey what was bothering  him ,scheduled 2100 meds were given  Slept 6 hours

## 2019-07-25 PROCEDURE — 65220000003 HC RM SEMIPRIVATE PSYCH

## 2019-07-25 PROCEDURE — 74011250637 HC RX REV CODE- 250/637: Performed by: PSYCHIATRY & NEUROLOGY

## 2019-07-25 PROCEDURE — 74011250637 HC RX REV CODE- 250/637: Performed by: FAMILY MEDICINE

## 2019-07-25 RX ADMIN — RISPERIDONE 1 MG: 1 TABLET ORAL at 08:34

## 2019-07-25 RX ADMIN — HYDROXYZINE HYDROCHLORIDE 50 MG: 25 TABLET, FILM COATED ORAL at 17:18

## 2019-07-25 RX ADMIN — TRAZODONE HYDROCHLORIDE 100 MG: 100 TABLET ORAL at 21:57

## 2019-07-25 RX ADMIN — LEVOTHYROXINE SODIUM 50 MCG: 50 TABLET ORAL at 07:22

## 2019-07-25 RX ADMIN — QUETIAPINE FUMARATE 400 MG: 200 TABLET, EXTENDED RELEASE ORAL at 21:57

## 2019-07-25 RX ADMIN — METOPROLOL TARTRATE 25 MG: 25 TABLET ORAL at 21:57

## 2019-07-25 RX ADMIN — METOPROLOL TARTRATE 25 MG: 25 TABLET ORAL at 08:34

## 2019-07-25 RX ADMIN — SENNOSIDES AND DOCUSATE SODIUM 2 TABLET: 8.6; 5 TABLET ORAL at 08:34

## 2019-07-25 NOTE — PROGRESS NOTES
Patient attended the Spirituality Group on Self-Care, July 25, 2015, Behavior Health at [facility]. Rev.  Alex Lomas MDiv   For  Assistance Page [phone])

## 2019-07-25 NOTE — BH NOTES
GROUP THERAPY PROGRESS NOTE    The patient Janee carias 29 y.o. male is participating in [employer]. Group time: 1 hour    Personal goal for participation:  To concentrate on selected task    Goal orientation: social    Group therapy participation: active    Therapeutic interventions reviewed and discussed: Crafts, games, music    Impression of participation: The patient was attentive-required prompting    Gertrudis Records  7/25/2019  6:05 PM

## 2019-07-25 NOTE — BH NOTES
Manas Sibley requires frequent redirection as he has several somatic complaints. \"I need a shot. Give me some Ativan. \" Patient was given PO Geodon prior to bedtime. He denies any current feelings of SI or HI. Responds to internal stimuli. Q 15 minute safety checks continue.

## 2019-07-25 NOTE — BH NOTES
PSYCHIATRIC PROGRESS NOTE     Weekend Coverage    Patient Name  Jayden Kowalski   Date of Birth 1985   [facility] [id number]   Medical Record Number  [id number]      Age  29 y.o. PCP None   Admit date:  7/1/2019    Room Number  052/53  @ [facility]   Date of Service  7/25/2019         E & M PROGRESS NOTE:         HISTORY       CC:  \"psychosis\"  HISTORY OF PRESENT ILLNESS/INTERVAL HISTORY:  (reviewed/updated 7/25/2019). per initial evaluation: The patient, Jayden Kowalski, is a 29 y.o. WHITE [place]  male with a past psychiatric history significant for schizoaffective disorder vs schizophrenia, who presents at this time with complaints of (and/or evidence of) the following emotional symptoms: agitation, delusions and psychotic behavior. Additional symptomatology include AH and VH. The above symptoms have been present for 2+ weeks. These symptoms are of moderate to high severity. These symptoms are constant in nature. The patient's condition has been precipitated by psychosocial stressors. Patient's condition made worse by treatment noncompliance. UDS: negative; BAL=0.      The patient was readmitted from medical unit following rhabdomyolysis and SHYLA which resolved with IV hydration. He is grossly psychotic and has been noted to be confused. Patient is markedly disorganized and confused on interview.  He cannot state the days of the week backward despite effort.     From patient's medical admission:     The patient is a 21-year-old  man, who is currently admitted to the medical floor at [address] [place] [address] Drive was initially admitted on 06/17, to Dr. Gali Weems service on the psychiatry floor. Jazmine Root a couple of days, his CPK was found to be elevated to 1729 and he was transferred to the medical floor for treatment of rhabdomyolysis.  At that time, he did not have any rigidity, hyperthermia, or leukocytosis, which are indicative of neuroleptic malignant syndrome.  His CKs dropped immediately with hydration. Nidhi Smith has improved markedly and currently reports feeling better in a physical sense.  Also during his stay, his liver enzymes increased although, they were normal at baseline. Nidhi Smith does have a history of being positive for hepatitis C and has been treated with Isoniazid for latent tuberculosis in the past.  Currently, he remains on one-to-one observation and was agitated and aggressive at times yesterday. Nidhi Smith had to be given p.r.n.'s. Aayush Les had started him on a low dose of risperidone yesterday given the low likelihood of him having had NMS.  His CPK most recently was 1. Nidhi Smith continues to exhibit disorganized behavior and delusional thinking.  When I asked him, he stated that he wanted to leave and go stay with his mother but when Dr. Estephania Castanon, the hospitalist, asked him he stated that he wanted to go to the psychiatric floor because he does not feel safe going home.  At the present time, if the patient requests to be discharged home, my opinion is that Crisis should be called for TDO evaluation. Nidhi Smith denies any auditory hallucinations today, but was yesterday noted to be talking to himself. Nidhi Smith is a poor historian and unable to provide much history. 7/3- patient has remained largely illogical, at times appears to be hallucinating in his room, but is able to coherently describe his experience. Patient given PRN Geodon and Zyprexa with limited effect. Per mother Gustabo Olszewski patient was very sedated yesterday following Zyprexa administration, and does better with Geodon historically as a PRN. Patient continues to appear ill, with tachycardia unresolved despite fluid rehydration. 7/4- patient more visible, has been more coherent than 24 hours prior. Vitals still concerning, tachycardic and with autonomic instability evident. Medicine following. Patient endorses disorganized thoughts and anxiety.   7/5- patient largely unchanged, repeatedly asking for help from staff due to \"something in my head\" but coherent, slept 7 hours. Patient continues to endorse anxiety and restlessness. Patient still noted to be in moderate distress. Patient medication compliant. Repeatedly states he is dying. 7/6-Presents anxious and remains needy. Staff familiar to him reports slow improvement is noticed. Remains pre-occupied but compliant with meds. Prn used-Atarax. 7/7-Pt was moved to the General side yesterday. Continues to maintain improvement, less anxious. Slept 7 hrs. No prn's were used. 7/8- no acute overnight events. Patient has been visible, odd, standing with or at nursing station most of the day, continues to endorse problems \"in my head\" but has been pleasant, smiling inappropriately. Patient received CT scan of head, unremarkable 7/5. Medication compliant, continues to complain of anxiety. 7/9- patient slept 8 hours overnight, did not get PRNs, states he is doing better. Still odd, visible, standing by nursing station much of the day, confused for much of the day but with no specific complaints. EKG performed, QTc 407. SHYLA resolved. 7/10- patient visible, slept 8 hours medication compliant. He remains discharged focused, c/o constipation. Patient still disorganized but more coherent, no agitation or hallucinations x24 hours did not get PRNs for psychosis. 7/11- patient calm and cooperative, medication compliant. Has been visible, still odd, disorganized. Patient with no specific concerns, discharge focused. Per mom, patient still not at his baseline, but improved since admission. Constipation resolved though pt still c/o hard stools. NMDAr Ab negative. 7/12- no acute overnight events. Patient for discharge this morning but made grossly paranoid statements to treatment team (\"the nurses are trying to kill me\") and continues to be anxious. The decision was made to hold the patient for further observation and titrate antipsychotic given ongoing PI and limited home resources over the weekend.   7/13Erling Fuel reports feeling well and moods are alright. Continues to be bizarre and paranoid. Denies SI/HI/AH/VH. No aggression or violence. Appropriately interactive and aware. Tolerating medications well. Eating and sleeping fairly. 7[facility]14[facility] reports feeling restless and needs something for his ADHD. Pacing the unit despite receiving his prns. No aggression or violence. Seems to be driven to move. Denies SI/HI/AH/VH.    7/15- patient was moved over the acute side of the unit following an episode of aggression, was noted to be confused, irritable. EKG WNL. Slept 6 hours, pleasant on interview this morning. Acknowledges agitated, stating he is feeling anxious. Patient continues to express PI regarding the FBI. 7/16- patient is visible, remains confused. Patient continues to report PI, states that the nurses are \"law enforcement\" but can reality test.  7/17- patient has been visible, confused, got Ativan IM x2 doses due to agitation and psychosis. He remains grossly disorganized, c/o constipation. Patient with prominent PI towards nurses. 7/18- patient received multiple PRNs for agitation, gross psychosis, has been visible, posturing, with paranoid ideation. He is calm and cooperative in treatment team and states his previous agitation was due to the 'nurses being police' cannot elaborate further. 7/19- patient with worsening of paranoid ideation towards staff. Reports ongoing mistrust of the nursing staff, but medication compliant. Marked akathisia noted. CK elevated to 878 this morning. Patient still c/o constipation, confusion. 7/22- no acute overnight events. Patient continues to be paranoid, misperceiving events with nursing staff, has been noted to have elevated BP at times. Patient somatic, confused about the nature of the admission. Patient states \"I'm dead\" when asked about his mood.    7/23- patient improved since yesterday,but per SW called his mother and hung up after telling her was trying to call 911. Patient medication compliant, has been visible, confused. Less paranoid ideation toward nursing. 7/24- patient slept 6 hours, has been visible, still with PI toward staff. Patient noted to be crying this morning, unable to state what the problem is. Patient medication compliant, more conversant this AM in treatment team.  7/25- patient visible, with ongoing PI, LEANDRO. Patient medication compliant, still getting Geodon and Atarax PRN for agitated psychosis. Patient confused this AM, continues to state that people are saying he is \"dead. \" Requesting IM injections. SIDE EFFECTS: (reviewed/updated 7/25/2019)  None reported or admitted to. ALLERGIES:(reviewed/updated 7/25/2019)  No Known Allergies   MEDICATIONS PRIOR TO ADMISSION:(reviewed/updated 7/25/2019)  Medications Prior to Admission   Medication Sig    levothyroxine (SYNTHROID) 50 mcg tablet Take 50 mcg by mouth Daily (before breakfast).  risperiDONE (RISPERDAL) 0.5 mg tablet Take 1.5 mg by mouth nightly. Risperidone 0.5 mg tabs: Take 3 tabs po every evening    pyridoxine, vitamin B6, (VITAMIN B-6) 50 mg tablet Take 50 mg by mouth daily.  isoniazid (NYDRAZID) 300 mg tablet Take 300 mg by mouth daily.  traZODone (DESYREL) 150 mg tablet Take 300 mg by mouth nightly. Indications: Insomnia      PAST MEDICAL HISTORY: Past medical history from the initial psychiatric evaluation has been reviewed (reviewed/updated 7/25/2019) with no additional updates (I asked patient and no additional past medical history provided). No past medical history on file. No past surgical history on file. SOCIAL HISTORY: Social history from the initial psychiatric evaluation has been reviewed (reviewed/updated 7/25/2019) with no additional updates (I asked patient and no additional social history provided).    Social History     Socioeconomic History    Marital status: SINGLE     Spouse name: Not on file    Number of children: Not on file    Years of education: Not on file    Highest education level: Not on file   Occupational History    Not on file   Social Needs    Financial resource strain: Not on file    Food insecurity:     Worry: Not on file     Inability: Not on file    Transportation needs:     Medical: Not on file     Non-medical: Not on file   Tobacco Use    Smoking status: Not on file   Substance and Sexual Activity    Alcohol use: Not on file    Drug use: Not on file    Sexual activity: Not on file   Lifestyle    Physical activity:     Days per week: Not on file     Minutes per session: Not on file    Stress: Not on file   Relationships    Social connections:     Talks on phone: Not on file     Gets together: Not on file     Attends Rastafarian service: Not on file     Active member of club or organization: Not on file     Attends meetings of clubs or organizations: Not on file     Relationship status: Not on file    Intimate partner violence:     Fear of current or ex partner: Not on file     Emotionally abused: Not on file     Physically abused: Not on file     Forced sexual activity: Not on file   Other Topics Concern    Not on file   Social History Narrative    Not on file      FAMILY HISTORY: Family history from the initial psychiatric evaluation has been reviewed (reviewed/updated 7/25/2019) with no additional updates (I asked patient and no additional family history provided). No family history on file.     REVIEW OF SYSTEMS: (reviewed/updated 7/25/2019)  Appetite:poor   Sleep: poor with DIMS (difficulty initiating & maintaining sleep)   All other Review of Systems: Negative except confusion per HPI         [facility] (MSE):    MSE FINDINGS ARE WITHIN NORMAL LIMITS (WNL) UNLESS OTHERWISE STATED BELOW. ( ALL OF THE BELOW CATEGORIES OF THE MSE HAVE BEEN REVIEWED (reviewed 7/25/2019) AND UPDATED AS DEEMED APPROPRIATE )  General Presentation age appropriate, cooperative   Orientation confused, disorganized, disoriented   Vital Signs  See below (reviewed 7/25/2019); Vital Signs (BP, Pulse, & Temp) are within normal limits if not listed below. Gait and Station Stable/steady, no ataxia   Musculoskeletal System No extrapyramidal symptoms (EPS); no abnormal muscular movements or Tardive Dyskinesia (TD); muscle strength and tone are within normal limits   Language No aphasia or dysarthria   Speech:  normal volume and non-pressured   Thought Processes coherent; normal rate of thoughts; poor abstract reasoning/computation   Thought Associations loose associations   Thought Content free of hallucinations and internally preoccupied   Suicidal Ideations none   Homicidal Ideations none   Mood:  anxious    Affect:  constricted and mood-congruent   Memory recent  impaired   Memory remote:  fair   Concentration/Attention:  impaired   Fund of Knowledge average   Insight:  limited   Reliability fair   Judgment:  impaired due to active psychosis          VITALS:     Patient Vitals for the past 24 hrs:   Temp Pulse Resp BP SpO2   07/24/19 2000 98.7 °F (37.1 °C) 89 17 127/81 98 %     Wt Readings from Last 3 Encounters:   07/10/19 91.4 kg (201 lb 8 oz)   06/28/19 113.4 kg (250 lb)   06/18/19 113.4 kg (250 lb)     Temp Readings from Last 3 Encounters:   07/24/19 98.7 °F (37.1 °C)   07/01/19 98 °F (36.7 °C)   06/25/19 99.7 °F (37.6 °C)     BP Readings from Last 3 Encounters:   07/24/19 127/81   07/01/19 114/69   06/25/19 145/85     Pulse Readings from Last 3 Encounters:   07/24/19 89   07/01/19 87   06/25/19 (!) 107            DATA     LABORATORY DATA:(reviewed/updated 7/25/2019)  No results found for this or any previous visit (from the past 24 hour(s)). No results found for: VALF2, VALAC, VALP, VALPR, DS6, CRBAM, CRBAMP, CARB2, XCRBAM  No results found for: LITHM   RADIOLOGY REPORTS:(reviewed/updated 7/25/2019)  No results found.        MEDICATIONS     ALL MEDICATIONS:   Current Facility-Administered Medications   Medication Dose Route Frequency    QUEtiapine SR (SEROquel XR) TABLET 400 mg  400 mg Oral QHS    risperiDONE (RisperDAL) tablet 1 mg  1 mg Oral DAILY    senna-docusate (PERICOLACE) 8.6-50 mg per tablet 2 Tab  2 Tab Oral DAILY    ziprasidone (GEODON) 20 mg in sterile water (preservative free) 1 mL injection  20 mg IntraMUSCular Q12H PRN    And    diphenhydrAMINE (BENADRYL) injection 50 mg  50 mg IntraMUSCular Q12H PRN    traZODone (DESYREL) tablet 100 mg  100 mg Oral QHS PRN    metoprolol tartrate (LOPRESSOR) tablet 25 mg  25 mg Oral Q12H    ziprasidone (GEODON) capsule 20 mg  20 mg Oral Q8H PRN    cloNIDine HCl (CATAPRES) tablet 0.1 mg  0.1 mg Oral Q2H PRN    levothyroxine (SYNTHROID) tablet 50 mcg  50 mcg Oral ACB    benztropine (COGENTIN) tablet 2 mg  2 mg Oral BID PRN    benztropine (COGENTIN) injection 2 mg  2 mg IntraMUSCular BID PRN    acetaminophen (TYLENOL) tablet 650 mg  650 mg Oral Q4H PRN    ibuprofen (MOTRIN) tablet 400 mg  400 mg Oral Q8H PRN    magnesium hydroxide (MILK OF MAGNESIA) 400 mg/5 mL oral suspension 30 mL  30 mL Oral DAILY PRN    nicotine (NICODERM CQ) 21 mg/24 hr patch 1 Patch  1 Patch TransDERmal DAILY PRN    hydrOXYzine HCl (ATARAX) tablet 50 mg  50 mg Oral Q6H PRN      SCHEDULED MEDICATIONS:   Current Facility-Administered Medications   Medication Dose Route Frequency    QUEtiapine SR (SEROquel XR) TABLET 400 mg  400 mg Oral QHS    risperiDONE (RisperDAL) tablet 1 mg  1 mg Oral DAILY    senna-docusate (PERICOLACE) 8.6-50 mg per tablet 2 Tab  2 Tab Oral DAILY    metoprolol tartrate (LOPRESSOR) tablet 25 mg  25 mg Oral Q12H    levothyroxine (SYNTHROID) tablet 50 mcg  50 mcg Oral ACB          ASSESSMENT & PLAN     DIAGNOSES REQUIRING ACTIVE TREATMENT AND MONITORING: (reviewed/updated 7/25/2019)  Patient Active Hospital Problem List:   [facility] ([facility] (6/18/2019)    Assessment: patient with ongoing psychotic presentation, had previously been stabilized on Invega and Seroquel, now on Risperdal s/p rhabdomyolysis and SHYLA due to dehydration vs iatrogenic. Will continue psychiatric stabilization and monitor vitals. Patient with marked EPS vs akathisia, hypotensive, CK elevated again. Oriented but grossly paranoid. Goal is Seroquel monotherapy with adjunctive anxiolytics. Plan:  - CONTINUE Risperdal 1 mg QDAY for psychosis (tapered due to mitigation of akathisia)  - CONTINUE Trazodone 50 mg QHS PRN insomnia  - CONTINUE Seroquel  mg QHS for psychosis  - Consider Clozaril due to treatment resistance  - EKG WNL  - CONTINUE bowel regimen  - IGM therapy as tolerated  - Expand database / obtain collateral  - Dispo planning    Patient discharged on two antipsychotics now due to relative refractoriness to treatment thus far. Prior to admission patient had THREE or more failed trails of monotherapy, including: Haldol, Seroquel and Risperdal     Patient is a very slow responder to medications. Risks and benefits in the use of two antipsychotics have been weighed in full, including the risk of metabolic syndrome and the potential increased risk of QTc  Based on this analysis, it is considered favorable for the utilization of two antipsychotics. In the future, once stable on the two antipsychotics, patient can possibly be tapered to one of the chosen antipsychotics. Will check on EKG results tomorrow to monitor QTc. In summary, Mariza Alvarez, is a 29 y.o.  male who presents with a severe exacerbation of the principal diagnosis of Schizoaffective disorder ([facility]    Patient's condition is improving. Patient requires continued inpatient hospitalization for further stabilization, safety monitoring and medication management. I will continue to coordinate the provision of individual, milieu, occupational, group, and substance abuse therapies to address target symptoms/diagnoses as deemed appropriate for the individual patient.   A coordinated, multidisplinary treatment team round was conducted with the patient (this team consists of the nurse, psychiatric unit pharmcist,  and writer). Complete current electronic health record for patient has been reviewed today including consultant notes, ancillary staff notes, nurses and psychiatric tech notes. Suicide risk assessment completed and patient deemed to be of low risk for suicide at this time. The following regarding medications was addressed during rounds with patient:   the risks and benefits of the proposed medication. The patient was given the opportunity to ask questions. Informed consent given to the use of the above medications. Will continue to adjust psychiatric and non-psychiatric medications (see above \"medication\" section and orders section for details) as deemed appropriate & based upon diagnoses and response to treatment. I will continue to order blood tests/labs and diagnostic tests as deemed appropriate and review results as they become available (see orders for details and above listed lab/test results). I will order psychiatric records from previous [facility] hospitals to further elucidate the nature of patient's psychopathology and review once available. I will gather additional collateral information from friends, family and o/p treatment team to further elucidate the nature of patient's psychopathology and baselline level of psychiatric functioning. I certify that this patient's inpatient psychiatric hospital services furnished since the previous certification were, and continue to be, required for treatment that could reasonably be expected to improve the patient's condition, or for diagnostic study, and that the patient continues to need, on a daily basis, active treatment furnished directly by or requiring the supervision of inpatient psychiatric facility personnel.  In addition, the hospital records show that services furnished were intensive treatment services, admission or related services, or equivalent services.     EXPECTED DISCHARGE DATE/DAY: 7/26/2019     DISPOSITION: Home       Signed By:   Murphy Perez MD  7/25/2019

## 2019-07-25 NOTE — BH NOTES
GROUP THERAPY PROGRESS NOTE    The patient Janee carias 29 y.o. male is participating in [address] [place] [address]. Group time: 45 minutes    Personal goal for participation:  To participate in chair exercise routine    Goal orientation:  personal    Group therapy participation: active    Therapeutic interventions reviewed and discussed: benefits of exercise     Impression of participation:  The patient was attentive-required prompting    Gertrudis Records  7/25/2019  5:28 PM

## 2019-07-25 NOTE — BH NOTES
Patient alert and verbal. Denies SI/HI. Remains suspicious, paranoid and delusional. AH remain. Visible on unit. Attends groups. PRN atarax given for complaints of anxiety at 1719. Patient cooperative and easily redirected. Needs constant reassurance. Q 15 minute checks continue for safety.

## 2019-07-26 PROCEDURE — 65220000003 HC RM SEMIPRIVATE PSYCH

## 2019-07-26 PROCEDURE — 74011250637 HC RX REV CODE- 250/637: Performed by: PSYCHIATRY & NEUROLOGY

## 2019-07-26 PROCEDURE — 74011250637 HC RX REV CODE- 250/637: Performed by: FAMILY MEDICINE

## 2019-07-26 RX ORDER — QUETIAPINE 300 MG/1
600 TABLET, FILM COATED, EXTENDED RELEASE ORAL
Status: DISCONTINUED | OUTPATIENT
Start: 2019-07-26 | End: 2019-07-30 | Stop reason: HOSPADM

## 2019-07-26 RX ORDER — ACETAMINOPHEN 500 MG
2 TABLET ORAL
Status: DISCONTINUED | OUTPATIENT
Start: 2019-07-26 | End: 2019-07-30 | Stop reason: HOSPADM

## 2019-07-26 RX ADMIN — METOPROLOL TARTRATE 25 MG: 25 TABLET ORAL at 08:28

## 2019-07-26 RX ADMIN — QUETIAPINE FUMARATE 600 MG: 300 TABLET, EXTENDED RELEASE ORAL at 21:01

## 2019-07-26 RX ADMIN — LEVOTHYROXINE SODIUM 50 MCG: 50 TABLET ORAL at 06:30

## 2019-07-26 RX ADMIN — NICOTINE POLACRILEX 2 MG: 2 GUM, CHEWING BUCCAL at 17:06

## 2019-07-26 RX ADMIN — SENNOSIDES AND DOCUSATE SODIUM 2 TABLET: 8.6; 5 TABLET ORAL at 08:28

## 2019-07-26 RX ADMIN — RISPERIDONE 1 MG: 1 TABLET ORAL at 08:28

## 2019-07-26 RX ADMIN — HYDROXYZINE HYDROCHLORIDE 50 MG: 25 TABLET, FILM COATED ORAL at 15:21

## 2019-07-26 RX ADMIN — NICOTINE POLACRILEX 2 MG: 2 GUM, CHEWING BUCCAL at 13:15

## 2019-07-26 RX ADMIN — TRAZODONE HYDROCHLORIDE 100 MG: 100 TABLET ORAL at 21:01

## 2019-07-26 RX ADMIN — NICOTINE POLACRILEX 2 MG: 2 GUM, CHEWING BUCCAL at 10:54

## 2019-07-26 RX ADMIN — ZIPRASIDONE HYDROCHLORIDE 20 MG: 20 CAPSULE ORAL at 21:01

## 2019-07-26 RX ADMIN — METOPROLOL TARTRATE 25 MG: 25 TABLET ORAL at 21:01

## 2019-07-26 NOTE — BH NOTES
Social Work - Pt was seen in treatment team this morning. Pt is alert and oriented. Pt denies SI/HI. Pt's mood is anxious, affect is constricted. Pt's thought process is coherent. Pt's insight and judgment is limited, reliability is fair. Pt continues to repeatedly ask nursing staff for pills and injections. Pt's mother was updated with medication changes and aware that he will not be discharging tomorrow. Social work department will continue to coordinate discharge plans.

## 2019-07-26 NOTE — BH NOTES
Patient has been in his room and in bed resting most of the evening. Wendy Yousif is medication compliant denies SI/HI no delusions or hallucinations observed  0530 Patient is awake and is confused about what to do comes to nursing station for direction about the same.   Hourly rounding done  Slept 7 hours

## 2019-07-26 NOTE — BH NOTES
PSYCHIATRIC PROGRESS NOTE     Weekend Coverage    Patient Name  Fanta Jose   Date of Birth 1985   [facility] [id number]   Medical Record Number  [id number]      Age  29 y.o. PCP None   Admit date:  7/1/2019    Room Number  302/87  @ [facility]   Date of Service  7/26/2019         E & M PROGRESS NOTE:         HISTORY       CC:  \"psychosis\"  HISTORY OF PRESENT ILLNESS/INTERVAL HISTORY:  (reviewed/updated 7/26/2019). per initial evaluation: The patient, Fanta Jose, is a 29 y.o. WHITE [place]  male with a past psychiatric history significant for schizoaffective disorder vs schizophrenia, who presents at this time with complaints of (and/or evidence of) the following emotional symptoms: agitation, delusions and psychotic behavior. Additional symptomatology include AH and VH. The above symptoms have been present for 2+ weeks. These symptoms are of moderate to high severity. These symptoms are constant in nature. The patient's condition has been precipitated by psychosocial stressors. Patient's condition made worse by treatment noncompliance. UDS: negative; BAL=0.      The patient was readmitted from medical unit following rhabdomyolysis and SHYLA which resolved with IV hydration. He is grossly psychotic and has been noted to be confused. Patient is markedly disorganized and confused on interview.  He cannot state the days of the week backward despite effort.     From patient's medical admission:     The patient is a 64-year-old  man, who is currently admitted to the medical floor at [address] [place] [address] Drive was initially admitted on 06/17, to Dr. Jannet Francisco service on the psychiatry floor. Nancy Emilio a couple of days, his CPK was found to be elevated to 1729 and he was transferred to the medical floor for treatment of rhabdomyolysis.  At that time, he did not have any rigidity, hyperthermia, or leukocytosis, which are indicative of neuroleptic malignant syndrome.  His CKs dropped immediately with hydration. [facility] has improved markedly and currently reports feeling better in a physical sense.  Also during his stay, his liver enzymes increased although, they were normal at baseline. [facility] does have a history of being positive for hepatitis C and has been treated with Isoniazid for latent tuberculosis in the past.  Currently, he remains on one-to-one observation and was agitated and aggressive at times yesterday. [facility] had to be given p.r.n.'s. Divya Vargas had started him on a low dose of risperidone yesterday given the low likelihood of him having had NMS.  His CPK most recently was 1. [facility] continues to exhibit disorganized behavior and delusional thinking.  When I asked him, he stated that he wanted to leave and go stay with his mother but when Dr. Joshua Pineda, the hospitalist, asked him he stated that he wanted to go to the psychiatric floor because he does not feel safe going home.  At the present time, if the patient requests to be discharged home, my opinion is that Crisis should be called for TDO evaluation. [facility] denies any auditory hallucinations today, but was yesterday noted to be talking to himself. [facility] is a poor historian and unable to provide much history. 7/3- patient has remained largely illogical, at times appears to be hallucinating in his room, but is able to coherently describe his experience. Patient given PRN Geodon and Zyprexa with limited effect. Per mother Brad Tsang patient was very sedated yesterday following Zyprexa administration, and does better with Geodon historically as a PRN. Patient continues to appear ill, with tachycardia unresolved despite fluid rehydration. 7/4- patient more visible, has been more coherent than 24 hours prior. Vitals still concerning, tachycardic and with autonomic instability evident. Medicine following. Patient endorses disorganized thoughts and anxiety.   7/5- patient largely unchanged, repeatedly asking for help from staff due to \"something in my head\" but coherent, slept 7 hours. Patient continues to endorse anxiety and restlessness. Patient still noted to be in moderate distress. Patient medication compliant. Repeatedly states he is dying. 7/6-Presents anxious and remains needy. Staff familiar to him reports slow improvement is noticed. Remains pre-occupied but compliant with meds. Prn used-Atarax. 7/7-Pt was moved to the General side yesterday. Continues to maintain improvement, less anxious. Slept 7 hrs. No prn's were used. 7/8- no acute overnight events. Patient has been visible, odd, standing with or at nursing station most of the day, continues to endorse problems \"in my head\" but has been pleasant, smiling inappropriately. Patient received CT scan of head, unremarkable 7/5. Medication compliant, continues to complain of anxiety. 7/9- patient slept 8 hours overnight, did not get PRNs, states he is doing better. Still odd, visible, standing by nursing station much of the day, confused for much of the day but with no specific complaints. EKG performed, QTc 407. SHYLA resolved. 7/10- patient visible, slept 8 hours medication compliant. He remains discharged focused, c/o constipation. Patient still disorganized but more coherent, no agitation or hallucinations x24 hours did not get PRNs for psychosis. 7/11- patient calm and cooperative, medication compliant. Has been visible, still odd, disorganized. Patient with no specific concerns, discharge focused. Per mom, patient still not at his baseline, but improved since admission. Constipation resolved though pt still c/o hard stools. NMDAr Ab negative. 7/12- no acute overnight events. Patient for discharge this morning but made grossly paranoid statements to treatment team (\"the nurses are trying to kill me\") and continues to be anxious. The decision was made to hold the patient for further observation and titrate antipsychotic given ongoing PI and limited home resources over the weekend.   7/13Fredie Peak reports feeling well and moods are alright. Continues to be bizarre and paranoid. Denies SI/HI/AH/VH. No aggression or violence. Appropriately interactive and aware. Tolerating medications well. Eating and sleeping fairly. 7/14Praveen Ynaez reports feeling restless and needs something for his ADHD. Pacing the unit despite receiving his prns. No aggression or violence. Seems to be driven to move. Denies SI/HI/AH/VH.    7/15- patient was moved over the acute side of the unit following an episode of aggression, was noted to be confused, irritable. EKG WNL. Slept 6 hours, pleasant on interview this morning. Acknowledges agitated, stating he is feeling anxious. Patient continues to express PI regarding the FBI. 7/16- patient is visible, remains confused. Patient continues to report PI, states that the nurses are \"law enforcement\" but can reality test.  7/17- patient has been visible, confused, got Ativan IM x2 doses due to agitation and psychosis. He remains grossly disorganized, c/o constipation. Patient with prominent PI towards nurses. 7/18- patient received multiple PRNs for agitation, gross psychosis, has been visible, posturing, with paranoid ideation. He is calm and cooperative in treatment team and states his previous agitation was due to the 'nurses being police' cannot elaborate further. 7/19- patient with worsening of paranoid ideation towards staff. Reports ongoing mistrust of the nursing staff, but medication compliant. Marked akathisia noted. CK elevated to 878 this morning. Patient still c/o constipation, confusion. 7/22- no acute overnight events. Patient continues to be paranoid, misperceiving events with nursing staff, has been noted to have elevated BP at times. Patient somatic, confused about the nature of the admission. Patient states \"I'm dead\" when asked about his mood.    7/23- patient improved since yesterday,but per SW called his mother and hung up after telling her was trying to call 911. Patient medication compliant, has been visible, confused. Less paranoid ideation toward nursing. 7/24- patient slept 6 hours, has been visible, still with PI toward staff. Patient noted to be crying this morning, unable to state what the problem is. Patient medication compliant, more conversant this AM in treatment team.  7/25- patient visible, with ongoing PI, LEANDRO. Patient medication compliant, still getting Geodon and Atarax PRN for agitated psychosis. Patient confused this AM, continues to state that people are saying he is \"dead. \" Requesting IM injections. 7/26- patient slept 7 hours, has been confused, requesting medication repeatedly (\"I need ADHD medicine, I need a shot, I need Geodon\") but with vague symptoms when pressed. Psycho-education provided about the role of medication, patient did not voice understanding. Patient reports having been on 6 mg of Risperdal in the past. Continues to state he is 'dead' to nursing. SIDE EFFECTS: (reviewed/updated 7/26/2019)  None reported or admitted to. ALLERGIES:(reviewed/updated 7/26/2019)  No Known Allergies   MEDICATIONS PRIOR TO ADMISSION:(reviewed/updated 7/26/2019)  Medications Prior to Admission   Medication Sig    levothyroxine (SYNTHROID) 50 mcg tablet Take 50 mcg by mouth Daily (before breakfast).  risperiDONE (RISPERDAL) 0.5 mg tablet Take 1.5 mg by mouth nightly. Risperidone 0.5 mg tabs: Take 3 tabs po every evening    pyridoxine, vitamin B6, (VITAMIN B-6) 50 mg tablet Take 50 mg by mouth daily.  isoniazid (NYDRAZID) 300 mg tablet Take 300 mg by mouth daily.  traZODone (DESYREL) 150 mg tablet Take 300 mg by mouth nightly. Indications: Insomnia      PAST MEDICAL HISTORY: Past medical history from the initial psychiatric evaluation has been reviewed (reviewed/updated 7/26/2019) with no additional updates (I asked patient and no additional past medical history provided). No past medical history on file. No past surgical history on file.   SOCIAL HISTORY: Social history from the initial psychiatric evaluation has been reviewed (reviewed/updated 7/26/2019) with no additional updates (I asked patient and no additional social history provided). Social History     Socioeconomic History    Marital status: SINGLE     Spouse name: Not on file    Number of children: Not on file    Years of education: Not on file    Highest education level: Not on file   Occupational History    Not on file   Social Needs    Financial resource strain: Not on file    Food insecurity:     Worry: Not on file     Inability: Not on file    Transportation needs:     Medical: Not on file     Non-medical: Not on file   Tobacco Use    Smoking status: Not on file   Substance and Sexual Activity    Alcohol use: Not on file    Drug use: Not on file    Sexual activity: Not on file   Lifestyle    Physical activity:     Days per week: Not on file     Minutes per session: Not on file    Stress: Not on file   Relationships    Social connections:     Talks on phone: Not on file     Gets together: Not on file     Attends Caodaism service: Not on file     Active member of club or organization: Not on file     Attends meetings of clubs or organizations: Not on file     Relationship status: Not on file    Intimate partner violence:     Fear of current or ex partner: Not on file     Emotionally abused: Not on file     Physically abused: Not on file     Forced sexual activity: Not on file   Other Topics Concern    Not on file   Social History Narrative    Not on file      FAMILY HISTORY: Family history from the initial psychiatric evaluation has been reviewed (reviewed/updated 7/26/2019) with no additional updates (I asked patient and no additional family history provided). No family history on file.     REVIEW OF SYSTEMS: (reviewed/updated 7/26/2019)  Appetite:poor   Sleep: poor with DIMS (difficulty initiating & maintaining sleep)   All other Review of Systems: Negative except confusion per HPI         [facility] (MSE):    MSE FINDINGS ARE WITHIN NORMAL LIMITS (WNL) UNLESS OTHERWISE STATED BELOW. ( ALL OF THE BELOW CATEGORIES OF THE MSE HAVE BEEN REVIEWED (reviewed 7/26/2019) AND UPDATED AS DEEMED APPROPRIATE )  General Presentation age appropriate, cooperative   Orientation confused, disorganized, disoriented   Vital Signs  See below (reviewed 7/26/2019); Vital Signs (BP, Pulse, & Temp) are within normal limits if not listed below.    Gait and Station Stable/steady, no ataxia   Musculoskeletal System No extrapyramidal symptoms (EPS); no abnormal muscular movements or Tardive Dyskinesia (TD); muscle strength and tone are within normal limits   Language No aphasia or dysarthria   Speech:  normal volume and non-pressured   Thought Processes coherent; normal rate of thoughts; poor abstract reasoning/computation   Thought Associations loose associations   Thought Content free of hallucinations and internally preoccupied   Suicidal Ideations none   Homicidal Ideations none   Mood:  anxious    Affect:  constricted and mood-congruent   Memory recent  impaired   Memory remote:  fair   Concentration/Attention:  impaired   Fund of Knowledge average   Insight:  limited   Reliability fair   Judgment:  impaired due to active psychosis          VITALS:     Patient Vitals for the past 24 hrs:   Temp Pulse Resp BP SpO2   07/26/19 0741 97 °F (36.1 °C) 74 18 170/79 100 %   07/25/19 2054 98 °F (36.7 °C) 99 18 (!) 141/91 100 %     Wt Readings from Last 3 Encounters:   07/10/19 91.4 kg (201 lb 8 oz)   06/28/19 113.4 kg (250 lb)   06/18/19 113.4 kg (250 lb)     Temp Readings from Last 3 Encounters:   07/26/19 97 °F (36.1 °C)   07/01/19 98 °F (36.7 °C)   06/25/19 99.7 °F (37.6 °C)     BP Readings from Last 3 Encounters:   07/26/19 170/79   07/01/19 114/69   06/25/19 145/85     Pulse Readings from Last 3 Encounters:   07/26/19 74   07/01/19 87   06/25/19 (!) 107 DATA     LABORATORY DATA:(reviewed/updated 7/26/2019)  No results found for this or any previous visit (from the past 24 hour(s)). No results found for: VALF2, VALAC, VALP, VALPR, DS6, CRBAM, CRBAMP, CARB2, XCRBAM  No results found for: LITHM   RADIOLOGY REPORTS:(reviewed/updated 7/26/2019)  No results found.        MEDICATIONS     ALL MEDICATIONS:   Current Facility-Administered Medications   Medication Dose Route Frequency    nicotine (NICORETTE) gum 2 mg  2 mg Oral Q2H PRN    QUEtiapine SR (SEROquel XR) tablet 600 mg  600 mg Oral QHS    risperiDONE (RisperDAL) tablet 1 mg  1 mg Oral DAILY    senna-docusate (PERICOLACE) 8.6-50 mg per tablet 2 Tab  2 Tab Oral DAILY    ziprasidone (GEODON) 20 mg in sterile water (preservative free) 1 mL injection  20 mg IntraMUSCular Q12H PRN    And    diphenhydrAMINE (BENADRYL) injection 50 mg  50 mg IntraMUSCular Q12H PRN    traZODone (DESYREL) tablet 100 mg  100 mg Oral QHS PRN    metoprolol tartrate (LOPRESSOR) tablet 25 mg  25 mg Oral Q12H    ziprasidone (GEODON) capsule 20 mg  20 mg Oral Q8H PRN    cloNIDine HCl (CATAPRES) tablet 0.1 mg  0.1 mg Oral Q2H PRN    levothyroxine (SYNTHROID) tablet 50 mcg  50 mcg Oral ACB    benztropine (COGENTIN) tablet 2 mg  2 mg Oral BID PRN    benztropine (COGENTIN) injection 2 mg  2 mg IntraMUSCular BID PRN    acetaminophen (TYLENOL) tablet 650 mg  650 mg Oral Q4H PRN    ibuprofen (MOTRIN) tablet 400 mg  400 mg Oral Q8H PRN    magnesium hydroxide (MILK OF MAGNESIA) 400 mg/5 mL oral suspension 30 mL  30 mL Oral DAILY PRN    hydrOXYzine HCl (ATARAX) tablet 50 mg  50 mg Oral Q6H PRN      SCHEDULED MEDICATIONS:   Current Facility-Administered Medications   Medication Dose Route Frequency    QUEtiapine SR (SEROquel XR) tablet 600 mg  600 mg Oral QHS    risperiDONE (RisperDAL) tablet 1 mg  1 mg Oral DAILY    senna-docusate (PERICOLACE) 8.6-50 mg per tablet 2 Tab  2 Tab Oral DAILY    metoprolol tartrate (LOPRESSOR) tablet 25 mg  25 mg Oral Q12H    levothyroxine (SYNTHROID) tablet 50 mcg  50 mcg Oral ACB          ASSESSMENT & PLAN     DIAGNOSES REQUIRING ACTIVE TREATMENT AND MONITORING: (reviewed/updated 7/26/2019)  Patient Active Hospital Problem List:   Schizoaffective disorder ([facility].) (6/18/2019)    Assessment: patient with ongoing psychotic presentation, had previously been stabilized on Invega and Seroquel, now on Risperdal s/p rhabdomyolysis and SHYLA due to dehydration vs iatrogenic. Will continue psychiatric stabilization and monitor vitals. Patient with marked EPS vs akathisia, hypotensive, CK elevated again. Oriented but grossly paranoid. Goal is Seroquel monotherapy with adjunctive anxiolytics. Plan:  - CONTINUE Risperdal 1 mg QDAY for psychosis (tapered due to mitigation of akathisia)  - CONTINUE Trazodone 50 mg QHS PRN insomnia  - INCREASE Seroquel XR to 600 mg QHS for psychosis  - Consider Clozaril due to treatment resistance  - EKG WNL  - CONTINUE bowel regimen  - IGM therapy as tolerated  - Expand database / obtain collateral  - Dispo planning    Patient discharged on two antipsychotics now due to relative refractoriness to treatment thus far. Prior to admission patient had THREE or more failed trails of monotherapy, including: Haldol, Seroquel and Risperdal     Patient is a very slow responder to medications. Risks and benefits in the use of two antipsychotics have been weighed in full, including the risk of metabolic syndrome and the potential increased risk of QTc  Based on this analysis, it is considered favorable for the utilization of two antipsychotics. In the future, once stable on the two antipsychotics, patient can possibly be tapered to one of the chosen antipsychotics. Will check on EKG results tomorrow to monitor QTc.     In summary, Eladia Matthews, is a 29 y.o.  male who presents with a severe exacerbation of the principal diagnosis of Schizoaffective disorder ([facility].)    Patient's condition is improving. Patient requires continued inpatient hospitalization for further stabilization, safety monitoring and medication management. I will continue to coordinate the provision of individual, milieu, occupational, group, and substance abuse therapies to address target symptoms/diagnoses as deemed appropriate for the individual patient. A coordinated, multidisplinary treatment team round was conducted with the patient (this team consists of the nurse, psychiatric unit pharmcist,  and writer). Complete current electronic health record for patient has been reviewed today including consultant notes, ancillary staff notes, nurses and psychiatric tech notes. Suicide risk assessment completed and patient deemed to be of low risk for suicide at this time. The following regarding medications was addressed during rounds with patient:   the risks and benefits of the proposed medication. The patient was given the opportunity to ask questions. Informed consent given to the use of the above medications. Will continue to adjust psychiatric and non-psychiatric medications (see above \"medication\" section and orders section for details) as deemed appropriate & based upon diagnoses and response to treatment. I will continue to order blood tests/labs and diagnostic tests as deemed appropriate and review results as they become available (see orders for details and above listed lab/test results). I will order psychiatric records from previous [facility] hospitals to further elucidate the nature of patient's psychopathology and review once available. I will gather additional collateral information from friends, family and o/p treatment team to further elucidate the nature of patient's psychopathology and baselline level of psychiatric functioning.          I certify that this patient's inpatient psychiatric hospital services furnished since the previous certification were, and continue to be, required for treatment that could reasonably be expected to improve the patient's condition, or for diagnostic study, and that the patient continues to need, on a daily basis, active treatment furnished directly by or requiring the supervision of inpatient psychiatric facility personnel. In addition, the hospital records show that services furnished were intensive treatment services, admission or related services, or equivalent services.     EXPECTED DISCHARGE DATE/DAY: TBD     DISPOSITION: Home       Signed By:   Ml Narayanan MD  7/26/2019

## 2019-07-26 NOTE — BH NOTES
GROUP THERAPY PROGRESS NOTE    The patient Janee carias 29 y.o. male is participating in [address] [place] [address]. Group time: 45 minutes    Personal goal for participation:  To identify positive coping strategies a-z    Goal orientation:  personal    Group therapy participation: active    Therapeutic interventions reviewed and discussed: worksheet    Impression of participation:  The patient was attentive-required prompting    Gertrudis Records  7/26/2019  5:31 PM

## 2019-07-26 NOTE — PROGRESS NOTES
10 [facility] received report from Reji Lima RN. 0800 Pt up in hallway. Pt presents with a cooperative/suspicious attitude, labile mood, and labile affect. Pt presents with delusions and is preoccupied. Pt with flight of ideas thought process and restlessness motor activity. Pt alert and oriented x 3. Pt is med and meal compliant. Pt denied SI, HI, and AVH. 0830 Pt ate 65% of his breakfast. Pt restless, pacing the hallway. Pt asking questions regarding discharge. 65 Pt in hallway asking to receive injectable medications. Pt delusional thinking he is dying. Pt asked this writer \"Am I dead. \" This writer assured the pt that he is not dead. 1054 Pt requested nicorette gum. Pt received 2 mg nicorette gum. 1124 Pt participating in coping skills group. 1154 Pt in room resting after receiving prn nicorette gum. Prn nicorette gum effective. 1204 Pt in hallway asking this writer for medication for \"anxiety. Pt stated \"I need a shot. \" This writer educated the pt on coping skills to reduce feelings of anxiety. 1522 Pt presenting with increase feelings of anxiety. This writer gave the pt prn PO atarax 50 mg. For anxiety.

## 2019-07-27 PROCEDURE — 65220000003 HC RM SEMIPRIVATE PSYCH

## 2019-07-27 PROCEDURE — 74011250637 HC RX REV CODE- 250/637: Performed by: PSYCHIATRY & NEUROLOGY

## 2019-07-27 PROCEDURE — 74011250636 HC RX REV CODE- 250/636: Performed by: PSYCHIATRY & NEUROLOGY

## 2019-07-27 PROCEDURE — 74011000250 HC RX REV CODE- 250: Performed by: PSYCHIATRY & NEUROLOGY

## 2019-07-27 PROCEDURE — 74011250637 HC RX REV CODE- 250/637: Performed by: FAMILY MEDICINE

## 2019-07-27 RX ADMIN — BENZTROPINE MESYLATE 2 MG: 2 TABLET ORAL at 14:26

## 2019-07-27 RX ADMIN — NICOTINE POLACRILEX 2 MG: 2 GUM, CHEWING BUCCAL at 09:38

## 2019-07-27 RX ADMIN — HYDROXYZINE HYDROCHLORIDE 50 MG: 25 TABLET, FILM COATED ORAL at 11:34

## 2019-07-27 RX ADMIN — METOPROLOL TARTRATE 25 MG: 25 TABLET ORAL at 08:42

## 2019-07-27 RX ADMIN — QUETIAPINE FUMARATE 600 MG: 300 TABLET, EXTENDED RELEASE ORAL at 21:42

## 2019-07-27 RX ADMIN — NICOTINE POLACRILEX 2 MG: 2 GUM, CHEWING BUCCAL at 15:35

## 2019-07-27 RX ADMIN — METOPROLOL TARTRATE 25 MG: 25 TABLET ORAL at 21:42

## 2019-07-27 RX ADMIN — TRAZODONE HYDROCHLORIDE 100 MG: 100 TABLET ORAL at 21:42

## 2019-07-27 RX ADMIN — HYDROXYZINE HYDROCHLORIDE 50 MG: 25 TABLET, FILM COATED ORAL at 17:34

## 2019-07-27 RX ADMIN — LEVOTHYROXINE SODIUM 50 MCG: 50 TABLET ORAL at 05:50

## 2019-07-27 RX ADMIN — NICOTINE POLACRILEX 2 MG: 2 GUM, CHEWING BUCCAL at 17:53

## 2019-07-27 RX ADMIN — WATER 20 MG: 1 INJECTION INTRAMUSCULAR; INTRAVENOUS; SUBCUTANEOUS at 05:02

## 2019-07-27 RX ADMIN — RISPERIDONE 1 MG: 1 TABLET ORAL at 08:43

## 2019-07-27 RX ADMIN — SENNOSIDES AND DOCUSATE SODIUM 2 TABLET: 8.6; 5 TABLET ORAL at 08:42

## 2019-07-27 NOTE — BH NOTES
0700 - report received from [address] [place] [address] - patient pacing halls     0800 - patient redirected to day room due to intrusive behaviors    0842 - morning medication compliant    0900 - redirected to day room; pacing hallways    1000 - patient paranoid     1010 - patient requesting \"PRN\" Unable to articulate need for PRN; denies current distress    1135 - patient reports anxiety; PRN atarax given per order    1400 - pacing hallways     1734 - PRN Atarax given for anxiety; patient has required frequently redirection today. Frequently has requested medications today.

## 2019-07-27 NOTE — BH NOTES
0 - Received report from Luis Armando [facility]    Patient in room lying on bed with eyes open. Patient states he is \"okay\". Patient denies SI/HI and AH/VH and no s/s of hallucinations are observed. Patient continues to ask for \"a shot\". Educated patient on use of IM medication. He smiled and walked away. Will continue to monitor patient for safety per unit policy. 2100 - Patient requested PRN for sleep and \"crazyiness\". Trazodone and PO Geodon given. Somewhat effective. 0500 - Patient loud and pacing. \"Am I dead\" over and over again. Geodon 20mg given IM.    0530 - Geodon effective. Patient resting quietly in bed with eyes open. 5 - Rounds were made and continue. Patient slept approximately 5.75 hours this shift.

## 2019-07-28 PROCEDURE — 74011250637 HC RX REV CODE- 250/637: Performed by: PSYCHIATRY & NEUROLOGY

## 2019-07-28 PROCEDURE — 74011250637 HC RX REV CODE- 250/637: Performed by: FAMILY MEDICINE

## 2019-07-28 PROCEDURE — 65220000003 HC RM SEMIPRIVATE PSYCH

## 2019-07-28 RX ADMIN — METOPROLOL TARTRATE 25 MG: 25 TABLET ORAL at 08:38

## 2019-07-28 RX ADMIN — QUETIAPINE FUMARATE 600 MG: 300 TABLET, EXTENDED RELEASE ORAL at 21:14

## 2019-07-28 RX ADMIN — NICOTINE POLACRILEX 2 MG: 2 GUM, CHEWING BUCCAL at 10:56

## 2019-07-28 RX ADMIN — METOPROLOL TARTRATE 25 MG: 25 TABLET ORAL at 21:14

## 2019-07-28 RX ADMIN — RISPERIDONE 1 MG: 1 TABLET ORAL at 08:38

## 2019-07-28 RX ADMIN — LEVOTHYROXINE SODIUM 50 MCG: 50 TABLET ORAL at 05:32

## 2019-07-28 RX ADMIN — NICOTINE POLACRILEX 2 MG: 2 GUM, CHEWING BUCCAL at 15:05

## 2019-07-28 RX ADMIN — NICOTINE POLACRILEX 2 MG: 2 GUM, CHEWING BUCCAL at 18:31

## 2019-07-28 RX ADMIN — HYDROXYZINE HYDROCHLORIDE 50 MG: 25 TABLET, FILM COATED ORAL at 08:38

## 2019-07-28 RX ADMIN — SENNOSIDES AND DOCUSATE SODIUM 2 TABLET: 8.6; 5 TABLET ORAL at 08:38

## 2019-07-28 NOTE — BH NOTES
Pt ambulating on unit. Follows this writer for a time, then asks about starting something \"stronger\" for anxiety. Discussed policy regarding controlled substances. MSE: pacing, speech sometimes difficult to understand. TP fixated on obtaining certain medications. Denies SI or HI. Plan:  Continue current treatment.

## 2019-07-28 NOTE — BH NOTES
0700 Assumed care of the patient. 0840 Medication and meal compliant. Denies SI/HI. Remains delusional and preoccupied. Medicated with PRN atarax for anxiety. Blood pressure elevated. Will monitor. 1030 Blood pressure within normal limits. Anxiety medication effective.      1200 Meal compliant     1330 Redirected to room during quiet time    1430 In dayroom for quiet time    1600 In room awake    1715 In dayroom for dinner    1800 In room resting quietly

## 2019-07-28 NOTE — BH NOTES
[id number] Received report from off going nurse RN DB- patient pacing the hallway and mumbling in front of the nursing station requesting the shot. No signs of distress noted from patient to warrant an IM injection. Redirected several times and educated as well. Tolerated hs medications with no problem or concerns. PRN sleep aid given per his request. He denies any SI/HI and AH/VH at this time. Safety checks in place per policy and protocol. Resting in room with eyes close.      0645- Approx 8 hours of sleep

## 2019-07-29 VITALS
OXYGEN SATURATION: 100 % | TEMPERATURE: 97.9 F | SYSTOLIC BLOOD PRESSURE: 112 MMHG | WEIGHT: 201.5 LBS | HEIGHT: 72 IN | DIASTOLIC BLOOD PRESSURE: 81 MMHG | BODY MASS INDEX: 27.29 KG/M2 | HEART RATE: 95 BPM | RESPIRATION RATE: 18 BRPM

## 2019-07-29 PROCEDURE — 74011250637 HC RX REV CODE- 250/637: Performed by: PSYCHIATRY & NEUROLOGY

## 2019-07-29 PROCEDURE — 74011250637 HC RX REV CODE- 250/637: Performed by: FAMILY MEDICINE

## 2019-07-29 RX ORDER — METOPROLOL TARTRATE 25 MG/1
25 TABLET, FILM COATED ORAL EVERY 12 HOURS
Qty: 60 TAB | Refills: 1 | Status: SHIPPED | OUTPATIENT
Start: 2019-07-29

## 2019-07-29 RX ORDER — QUETIAPINE 300 MG/1
600 TABLET, FILM COATED, EXTENDED RELEASE ORAL
Qty: 60 TAB | Refills: 1 | Status: SHIPPED | OUTPATIENT
Start: 2019-07-29

## 2019-07-29 RX ORDER — LEVOTHYROXINE SODIUM 50 UG/1
50 TABLET ORAL
Qty: 30 TAB | Refills: 1 | Status: SHIPPED | OUTPATIENT
Start: 2019-07-30

## 2019-07-29 RX ORDER — TRAZODONE HYDROCHLORIDE 100 MG/1
100 TABLET ORAL
Qty: 30 TAB | Refills: 1 | Status: SHIPPED | OUTPATIENT
Start: 2019-07-29

## 2019-07-29 RX ORDER — AMOXICILLIN 250 MG
2 CAPSULE ORAL DAILY
Qty: 60 TAB | Refills: 1 | Status: SHIPPED | OUTPATIENT
Start: 2019-07-30

## 2019-07-29 RX ORDER — RISPERIDONE 1 MG/1
1 TABLET, FILM COATED ORAL DAILY
Qty: 60 TAB | Refills: 1 | Status: SHIPPED | OUTPATIENT
Start: 2019-07-30

## 2019-07-29 RX ADMIN — METOPROLOL TARTRATE 25 MG: 25 TABLET ORAL at 08:05

## 2019-07-29 RX ADMIN — NICOTINE POLACRILEX 2 MG: 2 GUM, CHEWING BUCCAL at 15:11

## 2019-07-29 RX ADMIN — HYDROXYZINE HYDROCHLORIDE 50 MG: 25 TABLET, FILM COATED ORAL at 15:11

## 2019-07-29 RX ADMIN — METOPROLOL TARTRATE 25 MG: 25 TABLET ORAL at 17:27

## 2019-07-29 RX ADMIN — HYDROXYZINE HYDROCHLORIDE 50 MG: 25 TABLET, FILM COATED ORAL at 05:35

## 2019-07-29 RX ADMIN — SENNOSIDES AND DOCUSATE SODIUM 2 TABLET: 8.6; 5 TABLET ORAL at 08:05

## 2019-07-29 RX ADMIN — RISPERIDONE 1 MG: 1 TABLET ORAL at 08:05

## 2019-07-29 RX ADMIN — NICOTINE POLACRILEX 2 MG: 2 GUM, CHEWING BUCCAL at 08:21

## 2019-07-29 RX ADMIN — LEVOTHYROXINE SODIUM 50 MCG: 50 TABLET ORAL at 05:35

## 2019-07-29 RX ADMIN — QUETIAPINE FUMARATE 600 MG: 300 TABLET, EXTENDED RELEASE ORAL at 17:27

## 2019-07-29 NOTE — BH NOTES
1900 - Report received from [place], [facility]    Patient up in his room. Patient states he is okay. Patient denies SI/HI and AH/VH. Will continue to monitor for safety per unit policy. 2100 - Patient resting quietly in bed with eyes opened. 2300 - Patient resting quietly in bed with eyes closed. NAD    0235 - Patient resting quietly in bed with eyes closed.

## 2019-07-29 NOTE — BH NOTES
Pt ambulating on unit  Met 1:1, pt requests to be put on Adderall  Discussed unit policy with controlled substances  Pt denies SI or HI, remained calm    Plan:  No changes at this time

## 2019-07-29 NOTE — BH NOTES
0700 Assumed care of the patient    0830 Medication and meal compliant. Denies SI/HI. Calm and cooperative. Remains delusional. Med seeking. Easily redirected. Less paranoid. 0930 Patient visible on unit    1100 In room awake    1200 Meal compliant for lunch    1400 In room resting quietly    1515 Patient at nurses station requesting \"a shot\". Delusional. States people are laughing at him because he is having a heart attack. Medicated with PRN atarax for anxiety    1615 In room with eyes closed. Appears to be sleeping    1700 Remains in room resting quietly    1745 Awake.  Medication and meal compliant

## 2019-07-29 NOTE — DISCHARGE SUMMARY
PSYCHIATRIC DISCHARGE SUMMARY         IDENTIFICATION:    Patient Name  Jayden Kowalski   Date of Birth 1985   [facility] [id number]   Medical Record Number  [id number]      Age  29 y.o. PCP None   Admit date:  7/1/2019    Discharge date: 7/29/2019   Room Number  36/65  @ [facility]   Date of Service  7/29/2019            TYPE OF DISCHARGE: REGULAR               CONDITION AT DISCHARGE: fair and stable       PROVISIONAL & DISCHARGE DIAGNOSES:    Problem List  Date Reviewed: 6/30/2019          Codes Class    Schizophrenia, acute ([facility] ICD-10-CM: F23  ICD-9-CM: 295.80         Elevated LFTs ICD-10-CM: R94.5  ICD-9-CM: 790.6         Rhabdomyolysis ICD-10-CM: M62.82  ICD-9-CM: 728.88         * (Principal) Schizoaffective disorder ([facility] ICD-10-CM: F25.9  ICD-9-CM: 295.70               Active Hospital Problems    Schizophrenia, acute ([facility]      *Schizoaffective disorder ([facility]        DISCHARGE DIAGNOSIS:   Axis I:  SEE ABOVE  Axis II: SEE ABOVE  Axis III: SEE ABOVE  Axis IV:  lack of structure  Axis V:  20 on admission, 60 on discharge     CC & HISTORY OF PRESENT ILLNESS:  \"psychosis\"    The April Ceja, is C 89 y.o.  WHITE Yisel Damon a past psychiatric history significant for schizoaffective disorder vs schizophrenia, who presents at this time with complaints of (and/or evidence of) the following emotional symptoms: agitation, delusions and psychotic behavior.  Additional symptomatology include AH and VH.  The above symptoms have been present for 2+ weeks. These symptoms are of moderate to high severity. These symptoms are constant in nature.  The patient's condition has been precipitated by psychosocial stressors.  Patient's condition made worse by treatment noncompliance. UDS: negative; BAL=0.      The patient was readmitted from medical unit following rhabdomyolysis and SHYLA which resolved with IV hydration. He is grossly psychotic and has been noted to be confused.  Patient is markedly disorganized and confused on interview.  He cannot state the days of the week backward despite effort.     From patient's medical admission:     The patient is a 64-year-old  man, who is currently admitted to the medical floor at [address] was initially admitted on 06/17, to Dr. Latham's service on the psychiatry floor. Arminda Leake a couple of days, his CPK was found to be elevated to 1729 and he was transferred to the medical floor for treatment of rhabdomyolysis.  At that time, he did not have any rigidity, hyperthermia, or leukocytosis, which are indicative of neuroleptic malignant syndrome.  His CKs dropped immediately with hydration. Blanca Diop has improved markedly and currently reports feeling better in a physical sense.  Also during his stay, his liver enzymes increased although, they were normal at baseline. Blanca Diop does have a history of being positive for hepatitis C and has been treated with Isoniazid for latent tuberculosis in the past.  Currently, he remains on one-to-one observation and was agitated and aggressive at times yesterday. Blanca Diop had to be given p.r.n.'s. Valjean Hamper had started him on a low dose of risperidone yesterday given the low likelihood of him having had NMS.  His CPK most recently was 1. Blanca Diop continues to exhibit disorganized behavior and delusional thinking.  When I asked him, he stated that he wanted to leave and go stay with his mother but when Dr. Paty Chapman, the hospitalist, asked him he stated that he wanted to go to the psychiatric floor because he does not feel safe going home.  At the present time, if the patient requests to be discharged home, my opinion is that Crisis should be called for TDO evaluation. Blanca Diop denies any auditory hallucinations today, but was yesterday noted to be talking to himself. Blanca Diop is a poor historian and unable to provide much history.     7/3- patient has remained largely illogical, at times appears to be hallucinating in his room, but is able to coherently describe his experience. Patient given PRN Geodon and Zyprexa with limited effect. Per mother Usnny Setters patient was very sedated yesterday following Zyprexa administration, and does better with Geodon historically as a PRN. Patient continues to appear ill, with tachycardia unresolved despite fluid rehydration. 7/4- patient more visible, has been more coherent than 24 hours prior. Vitals still concerning, tachycardic and with autonomic instability evident. Medicine following. Patient endorses disorganized thoughts and anxiety. 7/5- patient largely unchanged, repeatedly asking for help from staff due to \"something in my head\" but coherent, slept 7 hours. Patient continues to endorse anxiety and restlessness. Patient still noted to be in moderate distress. Patient medication compliant. Repeatedly states he is dying. 7/6-Presents anxious and remains needy. Staff familiar to him reports slow improvement is noticed. Remains pre-occupied but compliant with meds. Prn used-Atarax. 7/7-Pt was moved to the General side yesterday. Continues to maintain improvement, less anxious. Slept 7 hrs. No prn's were used. 7/8- no acute overnight events. Patient has been visible, odd, standing with or at nursing station most of the day, continues to endorse problems \"in my head\" but has been pleasant, smiling inappropriately. Patient received CT scan of head, unremarkable 7/5. Medication compliant, continues to complain of anxiety. 7/9- patient slept 8 hours overnight, did not get PRNs, states he is doing better. Still odd, visible, standing by nursing station much of the day, confused for much of the day but with no specific complaints. EKG performed, QTc 407. SHYLA resolved. 7/10- patient visible, slept 8 hours medication compliant. He remains discharged focused, c/o constipation. Patient still disorganized but more coherent, no agitation or hallucinations x24 hours did not get PRNs for psychosis.   7/11- patient calm and cooperative, medication compliant. Has been visible, still odd, disorganized. Patient with no specific concerns, discharge focused. Per mom, patient still not at his baseline, but improved since admission. Constipation resolved though pt still c/o hard stools. NMDAr Ab negative. 7/12- no acute overnight events. Patient for discharge this morning but made grossly paranoid statements to treatment team (\"the nurses are trying to kill me\") and continues to be anxious. The decision was made to hold the patient for further observation and titrate antipsychotic given ongoing PI and limited home resources over the weekend. 7/13Fredriaz Madden  reports feeling well and moods are alright. Continues to be bizarre and paranoid. Denies SI/HI/AH/VH. No aggression or violence. Appropriately interactive and aware. Tolerating medications well. Eating and sleeping fairly. 7/14edriaz Madden reports feeling restless and needs something for his ADHD. Pacing the unit despite receiving his prns. No aggression or violence. Seems to be driven to move. Denies SI/HI/AH/VH.    7/15- patient was moved over the acute side of the unit following an episode of aggression, was noted to be confused, irritable. EKG WNL. Slept 6 hours, pleasant on interview this morning. Acknowledges agitated, stating he is feeling anxious. Patient continues to express PI regarding the FBI. 7/16- patient is visible, remains confused. Patient continues to report PI, states that the nurses are \"law enforcement\" but can reality test.  7/17- patient has been visible, confused, got Ativan IM x2 doses due to agitation and psychosis. He remains grossly disorganized, c/o constipation. Patient with prominent PI towards nurses. 7/18- patient received multiple PRNs for agitation, gross psychosis, has been visible, posturing, with paranoid ideation.  He is calm and cooperative in treatment team and states his previous agitation was due to the 'nurses being police' cannot elaborate further. 7/19- patient with worsening of paranoid ideation towards staff. Reports ongoing mistrust of the nursing staff, but medication compliant. Marked akathisia noted. CK elevated to 878 this morning. Patient still c/o constipation, confusion. 7/22- no acute overnight events. Patient continues to be paranoid, misperceiving events with nursing staff, has been noted to have elevated BP at times. Patient somatic, confused about the nature of the admission. Patient states \"I'm dead\" when asked about his mood. 7/23- patient improved since yesterday,but per SW called his mother and hung up after telling her was trying to call 911. Patient medication compliant, has been visible, confused. Less paranoid ideation toward nursing. 7/24- patient slept 6 hours, has been visible, still with PI toward staff. Patient noted to be crying this morning, unable to state what the problem is. Patient medication compliant, more conversant this AM in treatment team.  7/25- patient visible, with ongoing PI, . Patient medication compliant, still getting Geodon and Atarax PRN for agitated psychosis. Patient confused this AM, continues to state that people are saying he is \"dead. \" Requesting IM injections. 7/26- patient slept 7 hours, has been confused, requesting medication repeatedly (\"I need ADHD medicine, I need a shot, I need Geodon\") but with vague symptoms when pressed. Psycho-education provided about the role of medication, patient did not voice understanding. Patient reports having been on 6 mg of Risperdal in the past. Continues to state he is 'dead' to nursing.        SOCIAL HISTORY:    Social History     Socioeconomic History    Marital status: SINGLE     Spouse name: Not on file    Number of children: Not on file    Years of education: Not on file    Highest education level: Not on file   Occupational History    Not on file   Social Needs    Financial resource strain: Not on file   [employer] insecurity:     Worry: Not on file     Inability: Not on file    Transportation needs:     Medical: Not on file     Non-medical: Not on file   Tobacco Use    Smoking status: Not on file   Substance and Sexual Activity    Alcohol use: Not on file    Drug use: Not on file    Sexual activity: Not on file   Lifestyle    Physical activity:     Days per week: Not on file     Minutes per session: Not on file    Stress: Not on file   Relationships    Social connections:     Talks on phone: Not on file     Gets together: Not on file     Attends [employer] service: Not on file     Active member of club or organization: Not on file     Attends meetings of clubs or organizations: Not on file     Relationship status: Not on file    Intimate partner violence:     Fear of current or ex partner: Not on file     Emotionally abused: Not on file     Physically abused: Not on file     Forced sexual activity: Not on file   Other Topics Concern    Not on file   Social History Narrative    Not on file      FAMILY HISTORY:   No family history on file. HOSPITALIZATION COURSE:    Shannan Barron was admitted to the inpatient psychiatric unit [facility] for acute psychiatric stabilization in regards to symptomatology as described in the HPI above. The differential diagnosis at time of admission included: schizophrenia vs schizoaffective disorder. While on the unit Shannan Barron was involved in individual, group, occupational and milieu therapy. Psychiatric medications were adjusted during this hospitalization including Seroquel and Risperdal.   Shannan Barron demonstrated a slow, but progressive improvement in overall condition. Much of patient's initial presentation appeared to be related to situational stressors, effects of medication non-compliance and psychological factors. Please see individual progress notes for more specific details regarding patient's hospitalization course.      Patient discharged on two antipsychotics now due to relative refractoriness to treatment thus far. Prior to admission patient had THREE or more failed trails of monotherapy, including: Geodon, Seroquel and Risperdal     Patient is a very slow responder to medications. Risks and benefits in the use of two antipsychotics have been weighed in full, including the risk of metabolic syndrome and the potential increased risk of QTc  Based on this analysis, it is considered favorable for the utilization of two antipsychotics. In the future, once stable on the two antipsychotics, patient can possibly be tapered to one of the chosen antipsychotics. At time of discharge, Kay Nuñez is without significant problems of depression, psychosis, or ronda. Patient free of suicidal and homicidal ideations (appears to be at very low risk of suicide or homicide) and reports many positive predictive factors in terms of not attempting suicide or homicide. Overall presentation at time of discharge is most consistent with the diagnosis of schizophrenia. Patient has maximized benefit to be derived from acute inpatient psychiatric treatment. All members of the treatment team concur with each other in regards to plans for discharge today. Patient and family are aware and in agreement with discharge and discharge plan.          LABS AND IMAGAING:    Labs Reviewed   METABOLIC PANEL, COMPREHENSIVE - Abnormal; Notable for the following components:       Result Value    Anion gap 17 (*)     BUN 23 (*)     ALT (SGPT) 120 (*)     AST (SGOT) 104 (*)     All other components within normal limits   METABOLIC PANEL, COMPREHENSIVE - Abnormal; Notable for the following components:    Protein, total 6.1 (*)     Albumin 3.2 (*)     All other components within normal limits   METABOLIC PANEL, COMPREHENSIVE - Abnormal; Notable for the following components:    Glucose 104 (*)     AST (SGOT) 53 (*)     All other components within normal limits   CK - Abnormal; Notable for the following components:     (*)     All other components within normal limits   CK - Abnormal; Notable for the following components:     (*)     All other components within normal limits   METABOLIC PANEL, COMPREHENSIVE - Abnormal; Notable for the following components:    AST (SGOT) 42 (*)     Protein, total 6.1 (*)     Albumin 3.3 (*)     All other components within normal limits   N-METHYL-D-ASPARTATE RECPT, IGG   TSH 3RD GENERATION   AMMONIA     No results found for: DS35, PHEN, PHENO, PHENT, DILF, DS39, PHENY, PTN, VALF2, VALAC, VALP, VALPR, DS6, CRBAM, CRBAMP, CARB2, XCRBAM  Admission on 07/01/2019   Component Date Value Ref Range Status    NMDA Receptor Ab, IgG 07/04/2019 <1:10  <1:10 Final    Sodium 07/04/2019 142  136 - 145 mmol/L Final    Potassium 07/04/2019 4.3  3.5 - 5.1 mmol/L Final    Chloride 07/04/2019 103  97 - 108 mmol/L Final    CO2 07/04/2019 22  21 - 32 mmol/L Final    Anion gap 07/04/2019 17* 5 - 15 mmol/L Final    Glucose 07/04/2019 79  65 - 100 mg/dL Final    BUN 07/04/2019 23* 6 - 20 MG/DL Final    Creatinine 07/04/2019 1.18  0.70 - 1.30 MG/DL Final    BUN/Creatinine ratio 07/04/2019 19  12 - 20   Final    GFR est AA 07/04/2019 >60  >60 ml/min/1.73m2 Final    GFR est non-AA 07/04/2019 >60  >60 ml/min/1.73m2 Final    Calcium 07/04/2019 9.8  8.5 - 10.1 MG/DL Final    Bilirubin, total 07/04/2019 0.8  0.2 - 1.0 MG/DL Final    ALT (SGPT) 07/04/2019 120* 12 - 78 U/L Final    AST (SGOT) 07/04/2019 104* 15 - 37 U/L Final    Alk.  phosphatase 07/04/2019 91  45 - 117 U/L Final    Protein, total 07/04/2019 7.0  6.4 - 8.2 g/dL Final    Albumin 07/04/2019 4.3  3.5 - 5.0 g/dL Final    Globulin 07/04/2019 2.7  2.0 - 4.0 g/dL Final    A-G Ratio 07/04/2019 1.6  1.1 - 2.2   Final    TSH 07/06/2019 1.27  0.36 - 3.74 uIU/mL Final    Sodium 07/09/2019 144  136 - 145 mmol/L Final    Potassium 07/09/2019 4.4  3.5 - 5.1 mmol/L Final    Chloride 07/09/2019 108  97 - 108 mmol/L Final    CO2 07/09/2019 28  21 - 32 mmol/L Final    Anion gap 07/09/2019 8  5 - 15 mmol/L Final    Glucose 07/09/2019 87  65 - 100 mg/dL Final    BUN 07/09/2019 16  6 - 20 MG/DL Final    Creatinine 07/09/2019 1.05  0.70 - 1.30 MG/DL Final    BUN/Creatinine ratio 07/09/2019 15  12 - 20   Final    GFR est AA 07/09/2019 >60  >60 ml/min/1.73m2 Final    GFR est non-AA 07/09/2019 >60  >60 ml/min/1.73m2 Final    Calcium 07/09/2019 8.7  8.5 - 10.1 MG/DL Final    Bilirubin, total 07/09/2019 0.2  0.2 - 1.0 MG/DL Final    ALT (SGPT) 07/09/2019 74  12 - 78 U/L Final    AST (SGOT) 07/09/2019 31  15 - 37 U/L Final    Alk. phosphatase 07/09/2019 73  45 - 117 U/L Final    Protein, total 07/09/2019 6.1* 6.4 - 8.2 g/dL Final    Albumin 07/09/2019 3.2* 3.5 - 5.0 g/dL Final    Globulin 07/09/2019 2.9  2.0 - 4.0 g/dL Final    A-G Ratio 07/09/2019 1.1  1.1 - 2.2   Final    Ventricular Rate 07/09/2019 59  BPM Final    Atrial Rate 07/09/2019 59  BPM Final    P-R Interval 07/09/2019 150  ms Final    QRS Duration 07/09/2019 84  ms Final    Q-T Interval 07/09/2019 412  ms Final    QTC Calculation (Bezet) 07/09/2019 407  ms Final    Calculated P Axis 07/09/2019 61  degrees Final    Calculated R Axis 07/09/2019 29  degrees Final    Calculated T Axis 07/09/2019 41  degrees Final    Diagnosis 07/09/2019    Final                    Value:Sinus bradycardia  Otherwise normal ECG  When compared with ECG of 24-JUN-2019 20:16,  Vent.  rate has decreased BY  57 BPM  Confirmed by Afsaneh Tamayo [id number]) on 7/9/2019 1:22:11 PM      Ventricular Rate 07/15/2019 76  BPM Final    Atrial Rate 07/15/2019 76  BPM Final    P-R Interval 07/15/2019 152  ms Final    QRS Duration 07/15/2019 88  ms Final    Q-T Interval 07/15/2019 376  ms Final    QTC Calculation (Bezet) 07/15/2019 423  ms Final    Calculated P Axis 07/15/2019 55  degrees Final    Calculated R Axis 07/15/2019 25  degrees Final    Calculated T Axis 07/15/2019 41  degrees Final    Diagnosis 07/15/2019    Final                    Value:Normal sinus rhythm  mild leftward axis  normal variant EKG  no significant interval change  Confirmed by Lara Patel MD, [facility] ([id number]) on 7/17/2019 9:10:54 AM      Sodium 07/19/2019 141  136 - 145 mmol/L Final    Potassium 07/19/2019 4.2  3.5 - 5.1 mmol/L Final    Chloride 07/19/2019 103  97 - 108 mmol/L Final    CO2 07/19/2019 28  21 - 32 mmol/L Final    Anion gap 07/19/2019 10  5 - 15 mmol/L Final    Glucose 07/19/2019 104* 65 - 100 mg/dL Final    BUN 07/19/2019 14  6 - 20 MG/DL Final    Creatinine 07/19/2019 1.02  0.70 - 1.30 MG/DL Final    BUN/Creatinine ratio 07/19/2019 14  12 - 20   Final    GFR est AA 07/19/2019 >60  >60 ml/min/1.73m2 Final    GFR est non-AA 07/19/2019 >60  >60 ml/min/1.73m2 Final    Calcium 07/19/2019 9.6  8.5 - 10.1 MG/DL Final    Bilirubin, total 07/19/2019 0.5  0.2 - 1.0 MG/DL Final    ALT (SGPT) 07/19/2019 77  12 - 78 U/L Final    AST (SGOT) 07/19/2019 53* 15 - 37 U/L Final    Alk.  phosphatase 07/19/2019 105  45 - 117 U/L Final    Protein, total 07/19/2019 7.2  6.4 - 8.2 g/dL Final    Albumin 07/19/2019 4.2  3.5 - 5.0 g/dL Final    Globulin 07/19/2019 3.0  2.0 - 4.0 g/dL Final    A-G Ratio 07/19/2019 1.4  1.1 - 2.2   Final    Ammonia 07/19/2019 <10  <32 UMOL/L Final    CK 07/19/2019 878* 39 - 308 U/L Final    Ventricular Rate 07/23/2019 61  BPM Final    Atrial Rate 07/23/2019 61  BPM Final    P-R Interval 07/23/2019 148  ms Final    QRS Duration 07/23/2019 96  ms Final    Q-T Interval 07/23/2019 396  ms Final    QTC Calculation (Bezet) 07/23/2019 398  ms Final    Calculated P Axis 07/23/2019 75  degrees Final    Calculated R Axis 07/23/2019 74  degrees Final    Calculated T Axis 07/23/2019 79  degrees Final    Diagnosis 07/23/2019    Final                    Value:Normal sinus rhythm  Normal ECG  When compared with ECG of 15-JUL-2019 08:37,  T wave amplitude has decreased in Lateral leads  Confirmed by Betito Bynum ([id number]) on 7/23/2019 11:13:13 PM      CK 07/24/2019 849* 39 - 308 U/L Final    Sodium 07/24/2019 143  136 - 145 mmol/L Final    Potassium 07/24/2019 3.9  3.5 - 5.1 mmol/L Final    Chloride 07/24/2019 107  97 - 108 mmol/L Final    CO2 07/24/2019 25  21 - 32 mmol/L Final    Anion gap 07/24/2019 11  5 - 15 mmol/L Final    Glucose 07/24/2019 95  65 - 100 mg/dL Final    BUN 07/24/2019 17  6 - 20 MG/DL Final    Creatinine 07/24/2019 0.97  0.70 - 1.30 MG/DL Final    BUN/Creatinine ratio 07/24/2019 18  12 - 20   Final    GFR est AA 07/24/2019 >60  >60 ml/min/1.73m2 Final    GFR est non-AA 07/24/2019 >60  >60 ml/min/1.73m2 Final    Calcium 07/24/2019 8.7  8.5 - 10.1 MG/DL Final    Bilirubin, total 07/24/2019 0.3  0.2 - 1.0 MG/DL Final    ALT (SGPT) 07/24/2019 57  12 - 78 U/L Final    AST (SGOT) 07/24/2019 42* 15 - 37 U/L Final    Alk. phosphatase 07/24/2019 84  45 - 117 U/L Final    Protein, total 07/24/2019 6.1* 6.4 - 8.2 g/dL Final    Albumin 07/24/2019 3.3* 3.5 - 5.0 g/dL Final    Globulin 07/24/2019 2.8  2.0 - 4.0 g/dL Final    A-G Ratio 07/24/2019 1.2  1.1 - 2.2   Final     Ct Head Wo Cont    Result Date: 7/6/2019  EXAM: CT HEAD WO CONT INDICATION: Confusion/delirium, altered LOC, unexplained COMPARISON: None. CONTRAST: None. TECHNIQUE: Unenhanced CT of the head was performed using 5 mm images. Brain and bone windows were generated. CT dose reduction was achieved through use of a standardized protocol tailored for this examination and automatic exposure control for dose modulation. FINDINGS: The ventricles and sulci are normal in size, shape and configuration and midline. There is no significant white matter disease. There is no intracranial hemorrhage, extra-axial collection, mass, mass effect or midline shift. The basilar cisterns are open. No acute infarct is identified. The bone windows demonstrate no abnormalities.  The visualized portions of the paranasal sinuses and mastoid air cells are clear. There are postsurgical changes involving the TMJs. IMPRESSION: No acute intracranial abnormality. DISPOSITION:    Home. Patient to f/u with psychiatric and psychotherapy appointments. Patient is to f/u with internist as directed. FOLLOW-UP CARE:    Activity as tolerated  Regular diet  Wound Care: none needed. Follow-up Information     Follow up With Specialties Details Why [facility] Board  Go on 7/22/2019 Medication management appointment on Monday, July 22nd at 1:30PM with psychiatric nurse practitioner Lisette Johansen. Address: [address] [place] [address]      Phone: [phone]  Fax: [phone]      Marleen Yeager  Board  Call  Please call  Tereza Espinal on Monday to set up meeting time within the next week - he will assist you with disability paperwork, skill building services and a day treatment program.  Address: [address] [place] [address]      Phone: [phone]  Fax: [phone]    None    None ([phone]) Patient stated that they have no PCP                   PROGNOSIS:   Fair ---- based on nature of patient's pathology/ies and treatment compliance issues. Prognosis is greatly dependent upon patient's ability to remain sober and to follow up with scheduled appointments as well as to comply with psychiatric medications as prescribed. DISCHARGE MEDICATIONS: (no changes made). Informed consent given for the use of following psychotropic medications:  Current Discharge Medication List      START taking these medications    Details   metoprolol tartrate (LOPRESSOR) 25 mg tablet Take 1 Tab by mouth every twelve (12) hours. Indications: high blood pressure, inappropriate sinus tachycardia  Qty: 60 Tab, Refills: 1      QUEtiapine SR (SEROQUEL XR) 300 mg sr tablet Take 2 Tabs by mouth nightly. Indications: Schizophrenia  Qty: 60 Tab, Refills: 1      senna-docusate (PERICOLACE) 8.6-50 mg per tablet Take 2 Tabs by mouth daily. Indications: constipation  Qty: 60 Tab, Refills: 1         CONTINUE these medications which have CHANGED    Details   levothyroxine (SYNTHROID) 50 mcg tablet Take 1 Tab by mouth Daily (before breakfast). Indications: hypothyroidism  Qty: 30 Tab, Refills: 1      risperiDONE (RISPERDAL) 1 mg tablet Take 1 Tab by mouth daily. Indications: Schizoaffective disorder  Qty: 60 Tab, Refills: 1      traZODone (DESYREL) 100 mg tablet Take 1 Tab by mouth nightly as needed (Insomnia). Indications: Insomnia  Qty: 30 Tab, Refills: 1         STOP taking these medications       pyridoxine, vitamin B6, (VITAMIN B-6) 50 mg tablet Comments:   Reason for Stopping:         isoniazid (NYDRAZID) 300 mg tablet Comments:   Reason for Stopping:                      A coordinated, multidisplinary treatment team round was conducted with Edwige Wright is done daily here at [facility]. This team consists of the nurse, psychiatric unit pharmacist,  and Jason Tran. I have spent greater than 35 minutes on discharge work.     Signed:  Rai Cabrera MD  7/29/2019

## 2019-07-29 NOTE — PROGRESS NOTES
Pharmacist Discharge Medication Reconciliation    Discharging Provider: Dr. Ciara Davidson    Chief Complaint for this Admission: No chief complaint on file. Allergies: Patient has no known allergies. Discharge Medications:   Current Discharge Medication List        START taking these medications    Details   metoprolol tartrate (LOPRESSOR) 25 mg tablet Take 1 Tab by mouth every twelve (12) hours. Indications: high blood pressure, inappropriate sinus tachycardia  Qty: 60 Tab, Refills: 1      QUEtiapine SR (SEROQUEL XR) 300 mg sr tablet Take 2 Tabs by mouth nightly. Indications: Schizophrenia  Qty: 60 Tab, Refills: 1      senna-docusate (PERICOLACE) 8.6-50 mg per tablet Take 2 Tabs by mouth daily. Indications: constipation  Qty: 60 Tab, Refills: 1           CONTINUE these medications which have CHANGED    Details   levothyroxine (SYNTHROID) 50 mcg tablet Take 1 Tab by mouth Daily (before breakfast). Indications: hypothyroidism  Qty: 30 Tab, Refills: 1      risperiDONE (RISPERDAL) 1 mg tablet Take 1 Tab by mouth daily. Indications: Schizoaffective disorder  Qty: 60 Tab, Refills: 1      traZODone (DESYREL) 100 mg tablet Take 1 Tab by mouth nightly as needed (Insomnia). Indications:  Insomnia  Qty: 30 Tab, Refills: 1           STOP taking these medications       pyridoxine, vitamin B6, (VITAMIN B-6) 50 mg tablet Comments:   Reason for Stopping:         isoniazid (NYDRAZID) 300 mg tablet Comments:   Reason for Stopping:               The patient's chart, MAR and AVS were reviewed by Erin Duarte, NJD, BCPS

## 2019-07-29 NOTE — DISCHARGE INSTRUCTIONS
DISCHARGE SUMMARY from Nurse    PATIENT INSTRUCTIONS:    What to do at Home:  Recommended activity: Activity as tolerated,       *  Please give a list of your current medications to your Primary Care Provider. *  Please update this list whenever your medications are discontinued, doses are      changed, or new medications (including over-the-counter products) are added. *  Please carry medication information at all times in case of emergency situations. These are general instructions for a healthy lifestyle:    No smoking/ No tobacco products/ Avoid exposure to second hand smoke  Surgeon General's Warning:  Quitting smoking now greatly reduces serious risk to your health. Obesity, smoking, and sedentary lifestyle greatly increases your risk for illness    A healthy diet, regular physical exercise & weight monitoring are important for maintaining a healthy lifestyle    If I feel I am at risk of hurting myself or others, I will call the crisis office and speak with a crisis worker who will assist me during my crisis. Zhao Coelho   [phone]  [address] [address] [phone]  [address]  [phone]      Patient Education        Learning About Schizoaffective Disorder  What is schizoaffective disorder? Schizoaffective (say \"kdnb-lq-ar-FECK-tiv\") disorder is a complex mental illness. People who have it have the symptoms of both schizophrenia and a mood disorder. The disorder affects how clearly you can think. It can also make it hard to manage your emotions and connect with others. And it affects how happy or sad you feel. What causes it? Experts don't know what causes schizoaffective disorder. It may have different causes for different people. It's not caused by anything you did or how your parents raised you. And it's not a sign of weakness.   What are the symptoms? The symptoms of schizoaffective disorder are the same as those of schizophrenia and a mood disorder. People with schizoaffective disorder may have many of these symptoms. Schizophrenia symptoms include:  · Having hallucinations. This means that you see or hear things that aren't really there. · Having delusions. These are beliefs that aren't real.  · Having a hard time feeling and showing emotion. Mood disorder symptoms include:  · Depression. · Feeling extremely happy or having lots of energy. How is it diagnosed? Your doctor or mental health professional usually can tell if you have schizoaffective disorder by talking with you. He or she will look at the order and timing of your symptoms and how long your symptoms last.  Your doctor will ask you about other things too. These may include questions about:  · Any odd experiences you may have had, such as hearing voices or having confusing thoughts. · Your feelings. · Any changes in eating habits, energy level, and interest in daily tasks. · How well you are sleeping. · If you can focus on the things you do. How is it treated? Finding out that you have schizoaffective disorder can be scary and hard to deal with. But the disorder can be treated. The goal of treatment is to lower your stress and help your brain work as it should. Ongoing treatment can keep the disorder under control. Treatment includes medicines and counseling. Medicines help your symptoms. It's important to take your medicines on schedule to keep your moods even. When you feel good, you may think that you don't need them. But it is important to keep taking them. Counseling helps you change how you think about things. It can also help you cope with the illness. You will work with a mental health professional. This may be a psychologist, a licensed professional counselor, a clinical , or a psychiatrist.  Follow-up care is a key part of your treatment and safety.  Be sure to make and go to all appointments, and call your doctor if you are having problems. It's also a good idea to know your test results and keep a list of the medicines you take. Where can you learn more? Go to http://philippe-shimon.info/. Enter A016 in the search box to learn more about \"Learning About Schizoaffective Disorder. \"  Current as of: September 11, 2018  Content Version: 12.1  © 2680-5597 Healthwise, Incorporated. Care instructions adapted under license by [employer] (which disclaims liability or warranty for this information). If you have questions about a medical condition or this instruction, always ask your healthcare professional. [address] any warranty or liability for your use of this information. The discharge information has been reviewed with the patient. The patient verbalized understanding. Discharge medications reviewed with the patient and appropriate educational materials and side effects teaching were provided.   ___________________________________________________________________________________________________________________________________

## 2019-07-30 NOTE — BH NOTES
Behavioral Health Transition Record to Provider    Patient Name: Kristina Ledezma  YOB: 1985  Medical Record Number: [id number]  Date of Admission: 7/1/2019  Date of Discharge: 7/30/2019    Attending Provider: Demetris Rodrigues MD  Discharging Provider: Demetris Rodrigues MD  To contact this individual call  and ask the  to page. If unavailable, ask to be transferred to [address] [place] [address] Provider on call. [facility] Provider will be available on call 24/7 and during holidays. Primary Care Provider: None    No Known Allergies    Reason for Admission:Pt presented to [facility] ED reporting chest pain but was notably paranoid and reporting AH. Pt was admitted under a TDO and committed during hearing. Pt carries and diagnosis of schizoaffective disorder. Pt was a poor historian due to level of psychosis. Pt stated he was recently released from prison and reports wanting to live a better life for his family. Recently visited his fathers grave. Reports that his mother cares for his children - Roddy Mcgrath and Lin Nelson. Admission Diagnosis: Schizophrenia, acute ([facility] [address] [F23]    * No surgery found *    Results for orders placed or performed during the hospital encounter of 07/01/19   N-METHYL-D-ASPARTATE RECPT, IGG   Result Value Ref Range    NMDA Receptor Ab, IgG <1:68 <9:11   METABOLIC PANEL, COMPREHENSIVE   Result Value Ref Range    Sodium 142 136 - 145 mmol/L    Potassium 4.3 3.5 - 5.1 mmol/L    Chloride 103 97 - 108 mmol/L    CO2 22 21 - 32 mmol/L    Anion gap 17 (H) 5 - 15 mmol/L    Glucose 79 65 - 100 mg/dL    BUN 23 (H) 6 - 20 MG/DL    Creatinine 1.18 0.70 - 1.30 MG/DL    BUN/Creatinine ratio 19 12 - 20      GFR est AA >60 >60 ml/min/1.73m2    GFR est non-AA >60 >60 ml/min/1.73m2    Calcium 9.8 8.5 - 10.1 MG/DL    Bilirubin, total 0.8 0.2 - 1.0 MG/DL    ALT (SGPT) 120 (H) 12 - 78 U/L    AST (SGOT) 104 (H) 15 - 37 U/L    Alk.  phosphatase 91 45 - 117 U/L    Protein, total 7.0 6.4 - 8.2 g/dL    Albumin 4.3 3.5 - 5.0 g/dL    Globulin 2.7 2.0 - 4.0 g/dL    A-G Ratio 1.6 1.1 - 2.2     TSH 3RD GENERATION   Result Value Ref Range    TSH 1.27 0.36 - 3.90 uIU/mL   METABOLIC PANEL, COMPREHENSIVE   Result Value Ref Range    Sodium 144 136 - 145 mmol/L    Potassium 4.4 3.5 - 5.1 mmol/L    Chloride 108 97 - 108 mmol/L    CO2 28 21 - 32 mmol/L    Anion gap 8 5 - 15 mmol/L    Glucose 87 65 - 100 mg/dL    BUN 16 6 - 20 MG/DL    Creatinine 1.05 0.70 - 1.30 MG/DL    BUN/Creatinine ratio 15 12 - 20      GFR est AA >60 >60 ml/min/1.73m2    GFR est non-AA >60 >60 ml/min/1.73m2    Calcium 8.7 8.5 - 10.1 MG/DL    Bilirubin, total 0.2 0.2 - 1.0 MG/DL    ALT (SGPT) 74 12 - 78 U/L    AST (SGOT) 31 15 - 37 U/L    Alk. phosphatase 73 45 - 117 U/L    Protein, total 6.1 (L) 6.4 - 8.2 g/dL    Albumin 3.2 (L) 3.5 - 5.0 g/dL    Globulin 2.9 2.0 - 4.0 g/dL    A-G Ratio 1.1 1.1 - 2.2     METABOLIC PANEL, COMPREHENSIVE   Result Value Ref Range    Sodium 141 136 - 145 mmol/L    Potassium 4.2 3.5 - 5.1 mmol/L    Chloride 103 97 - 108 mmol/L    CO2 28 21 - 32 mmol/L    Anion gap 10 5 - 15 mmol/L    Glucose 104 (H) 65 - 100 mg/dL    BUN 14 6 - 20 MG/DL    Creatinine 1.02 0.70 - 1.30 MG/DL    BUN/Creatinine ratio 14 12 - 20      GFR est AA >60 >60 ml/min/1.73m2    GFR est non-AA >60 >60 ml/min/1.73m2    Calcium 9.6 8.5 - 10.1 MG/DL    Bilirubin, total 0.5 0.2 - 1.0 MG/DL    ALT (SGPT) 77 12 - 78 U/L    AST (SGOT) 53 (H) 15 - 37 U/L    Alk.  phosphatase 105 45 - 117 U/L    Protein, total 7.2 6.4 - 8.2 g/dL    Albumin 4.2 3.5 - 5.0 g/dL    Globulin 3.0 2.0 - 4.0 g/dL    A-G Ratio 1.4 1.1 - 2.2     AMMONIA   Result Value Ref Range    Ammonia <10 <32 UMOL/L   CK   Result Value Ref Range     (H) 39 - 308 U/L   CK   Result Value Ref Range     (H) 39 - 543 U/L   METABOLIC PANEL, COMPREHENSIVE   Result Value Ref Range    Sodium 143 136 - 145 mmol/L    Potassium 3.9 3.5 - 5.1 mmol/L    Chloride 107 97 - 108 mmol/L    CO2 25 21 - 32 mmol/L    Anion gap 11 5 - 15 mmol/L    Glucose 95 65 - 100 mg/dL    BUN 17 6 - 20 MG/DL    Creatinine 0.97 0.70 - 1.30 MG/DL    BUN/Creatinine ratio 18 12 - 20      GFR est AA >60 >60 ml/min/1.73m2    GFR est non-AA >60 >60 ml/min/1.73m2    Calcium 8.7 8.5 - 10.1 MG/DL    Bilirubin, total 0.3 0.2 - 1.0 MG/DL    ALT (SGPT) 57 12 - 78 U/L    AST (SGOT) 42 (H) 15 - 37 U/L    Alk. phosphatase 84 45 - 117 U/L    Protein, total 6.1 (L) 6.4 - 8.2 g/dL    Albumin 3.3 (L) 3.5 - 5.0 g/dL    Globulin 2.8 2.0 - 4.0 g/dL    A-G Ratio 1.2 1.1 - 2.2     EKG, 12 LEAD, SUBSEQUENT   Result Value Ref Range    Ventricular Rate 59 BPM    Atrial Rate 59 BPM    P-R Interval 150 ms    QRS Duration 84 ms    Q-T Interval 412 ms    QTC Calculation (Bezet) 407 ms    Calculated P Axis 61 degrees    Calculated R Axis 29 degrees    Calculated T Axis 41 degrees    Diagnosis       Sinus bradycardia  Otherwise normal ECG  When compared with ECG of 24-JUN-2019 20:16,  Vent.  rate has decreased BY  57 BPM  Confirmed by Nicole Zazueta ([id number]) on 7/9/2019 1:22:11 PM     EKG, 12 LEAD, SUBSEQUENT   Result Value Ref Range    Ventricular Rate 76 BPM    Atrial Rate 76 BPM    P-R Interval 152 ms    QRS Duration 88 ms    Q-T Interval 376 ms    QTC Calculation (Bezet) 423 ms    Calculated P Axis 55 degrees    Calculated R Axis 25 degrees    Calculated T Axis 41 degrees    Diagnosis       Normal sinus rhythm  mild leftward axis  normal variant EKG  no significant interval change  Confirmed by Anaya Kelly MD, Robert Vizcarra ([id number]) on 7/17/2019 9:10:54 AM     EKG, 12 LEAD, SUBSEQUENT   Result Value Ref Range    Ventricular Rate 61 BPM    Atrial Rate 61 BPM    P-R Interval 148 ms    QRS Duration 96 ms    Q-T Interval 396 ms    QTC Calculation (Bezet) 398 ms    Calculated P Axis 75 degrees    Calculated R Axis 74 degrees    Calculated T Axis 79 degrees    Diagnosis       Normal sinus rhythm  Normal ECG  When compared with ECG of 15-JUL-2019 08:37,  T wave amplitude has decreased in Lateral leads  Confirmed by Macel Service ([id number]) on 7/23/2019 11:13:13 PM         Immunizations administered during this encounter: There is no immunization history on file for this patient. Screening for Metabolic Disorders for Patients on Antipsychotic Medications  (Data obtained from the EMR)    Estimated Body Mass Index  Estimated body mass index is 27.33 kg/m² as calculated from the following:    Height as of this encounter: 6' (1.829 m). Weight as of this encounter: 91.4 kg (201 lb 8 oz). Vital Signs/Blood Pressure  Visit Vitals  /81   Pulse 95   Temp 97.9 °F (36.6 °C)   Resp 18   Ht 6' (1.829 m)   Wt 91.4 kg (201 lb 8 oz)   SpO2 100%   BMI 27.33 kg/m²       Blood Glucose/Hemoglobin A1c  Lab Results   Component Value Date/Time    Glucose 95 07/24/2019 04:53 AM    Glucose 102 (H) 06/18/2019 05:13 AM       No results found for: HBA1C, HGBE8, [id number]     Lipid Panel  Lab Results   Component Value Date/Time    Cholesterol, total 116 06/18/2019 05:13 AM    HDL Cholesterol 40 06/18/2019 05:13 AM    LDL, calculated 68.6 06/18/2019 05:13 AM    Triglyceride 37 06/18/2019 05:13 AM    CHOL/HDL Ratio 2.9 06/18/2019 05:13 AM        Discharge Diagnosis: Please refer to physician's discharge plan. Discharge Plan: Pt was discharged and transported home by mother. Pt denies SI/HI/AH. Pt's thought process was within normal limits. Pt exhibits no gross symptoms of psychosis or ronda. Pt and mother are in agreement with follow up plan. Discharge Medication List and Instructions:   Discharge Medication List as of 7/29/2019  3:01 PM      START taking these medications    Details   metoprolol tartrate (LOPRESSOR) 25 mg tablet Take 1 Tab by mouth every twelve (12) hours. Indications: high blood pressure, inappropriate sinus tachycardia, Normal, Disp-60 Tab, R-1      QUEtiapine SR (SEROQUEL XR) 300 mg sr tablet Take 2 Tabs by mouth nightly.  Indications: Schizophrenia, Normal, Disp-60 Tab, R-1      senna-docusate (PERICOLACE) 8.6-50 mg per tablet Take 2 Tabs by mouth daily. Indications: constipation, Normal, Disp-60 Tab, R-1         CONTINUE these medications which have CHANGED    Details   levothyroxine (SYNTHROID) 50 mcg tablet Take 1 Tab by mouth Daily (before breakfast). Indications: hypothyroidism, Normal, Disp-30 Tab, R-1      risperiDONE (RISPERDAL) 1 mg tablet Take 1 Tab by mouth daily. Indications: Schizoaffective disorder, Normal, Disp-60 Tab, R-1      traZODone (DESYREL) 100 mg tablet Take 1 Tab by mouth nightly as needed (Insomnia). Indications: Insomnia, Normal, Disp-30 Tab, R-1         STOP taking these medications       pyridoxine, vitamin B6, (VITAMIN B-6) 50 mg tablet Comments:   Reason for Stopping:         isoniazid (NYDRAZID) 300 mg tablet Comments:   Reason for Stopping:               Unresulted Labs (24h ago, onward)    None        To obtain results of studies pending at discharge, please contact N/A. Follow-up Information     Follow up With Specialties Details Why [facility] Board  Go on 7/22/2019 Medication management appointment on August 13th at 1:00 PM with psychiatric nurse practitioner Sammy Rich. Address: [address] [place] [address] Road      Phone: [phone]  Fax: [phone]      [address] [address] Board  Call on 7/30/2019  Please call  Lori Orozco on Tuesday to set up meeting time within the next week - he will assist you with disability paperwork, skill building services and a day treatment program.  Address: [address] [place] [address]      Phone: [phone]  Fax: [phone]          Advanced Directive:   Does the patient have an appointed surrogate decision maker? Unknown   Does the patient have a Medical Advance Directive? Unknown   Does the patient have a Psychiatric Advance Directive?  Unknown   If the patient does not have a surrogate or Medical Advance Directive AND Psychiatric Advance Directive, the patient was offered information on these advance directives. Unknown        Patient Instructions: Please continue all medications until otherwise directed by physician. Tobacco Cessation Discharge Plan:   Is the patient a smoker and needs referral for smoking cessation? No  Patient referred to the following for smoking cessation with an appointment? No   Patient was offered medication to assist with smoking cessation at discharge? No  Was education for smoking cessation added to the discharge instructions? No     Alcohol/Substance Abuse Discharge Plan:   Does the patient have a history of substance/alcohol abuse and requires a referral for treatment? Yes  Patient referred to the following for substance/alcohol abuse treatment with an appointment? Yes  Patient was offered medication to assist with alcohol cessation at discharge? No  Was education for substance/alcohol abuse added to discharge instructions? Yes     Patient discharged to Home; provided to the patient/caregiver either in hard copy or electronically. Continuing care paperwork was faxed to community mental health providers.

## 2020-08-06 NOTE — PROGRESS NOTES
Initial Nutrition Assessment:     INTERVENTIONS/RECOMMENDATIONS:   · Meals/Snacks: General/healthful diet:  Continue regular diet. Enc compliance with meals.      ASSESSMENT:   7/8:  Chart reviewed; med noted for schiphrenia. Ad[facility] to the [facility]. Recent documented po intake indicates good intake. No new acute nutrition dx at this time. Weight updated now.       Diet Order: Regular  % Eaten:  80%. Pertinent Medications: [x]Reviewed []Other: milk of mag  Pertinent Labs: [x]Reviewed []Other   Food Allergies: [x]None []Other   Last BM: 7/1     [x]Active     []Hyperactive  []Hypoactive       [] Absent BS  Skin:     [x] Intact              [] Incision           [] Breakdown                [] Other:     Anthropometrics:   Height: 6' 2\" (188 cm)            Weight: 92.9 kg (204 lb 11.2 oz)         IBW (%IBW):   ( )        UBW (%UBW):   (  %)   Last Weight Metrics:  Weight Loss Metrics 7/8/2019 6/28/2019 6/25/2019 6/18/2019 6/17/2019   Today's Wt 204 lb 11.2 oz 250 lb - 250 lb -   BMI 26.28 kg/m2 - 31.25 kg/m2 - 31.25 kg/m2         BMI: Body mass index is 26.28 kg/m². This BMI is indicative of:   []Underweight    []Normal    [x]Overweight    [] Obesity   [] Extreme Obesity (BMI>40)      Estimated Nutrition Needs (Based on):   1918 Kcals/day(BMR (1789) x 1. 2AF) , 73 g(.8 g/kg bw) Protein  Carbohydrate: At Least 130 g/day  Fluids: 1900 mL/day (1ml/kcal)     NUTRITION DIAGNOSES:   Problem:  No nutritional diagnosis at this time      Etiology: related to       Signs/Symptoms: as evidenced by         NUTRITION INTERVENTIONS:  Meals/Snacks: General/healthful diet                  GOAL:   PO intake >50% of meals next 5-7 days.   Previous goal met.     LEARNING NEEDS (Diet, Food/Nutrient-Drug Interaction):    [x] None Identified   [] Identified and Education Provided/Documented   [] Identified and Pt declined/was not appropriate     Cultureal, Congregation, OR Ethnic Dietary Needs:    [x] None Identified   [] Identified testicular pain since last week, no fever and Addressed      [x] Interdisciplinary Care Plan Reviewed/Documented    [x] Discharge Planning:  Continue regular diet      MONITORING /EVALUATION:   Food/Nutrient Intake Outcomes:  Total energy intake     NUTRITION RISK:    [x] Patient At Nutritional Risk        [] Patient Not At Nutritional Risk     PT SEEN FOR:    []  MD Consult: []Calorie Count                                       []Diabetic Diet Education                                                                        []Diet Education                                      []Electrolyte Management                                      [x]General Nutrition Management and Supplements                                      []Management of Tube Feeding                                      []TPN Recommendations    []  RN Referral:             [x]MST score >=2                                      []Enteral/Parenteral Nutrition PTA                                      []Pregnant: Gestational DM or Multigestation                                      []Pressure Ulcer/Wound Care needs                                         []  Low BMI  []  [facility], [address] [place] [address]  Pager [phone]

## 2024-11-29 NOTE — PROGRESS NOTES
Medication: metFORMIN (GLUCOPHAGE) 850 MG tablet   Last office visit date: 11/18/2024  Medication Refill Protocol Failed.  Hgb A1c less than 8 within last 6 months looking at last value      Medication: lovastatin (MEVACOR) 40 MG tablet  passed protocol.   Last office visit date: 11/18/2024  Next appointment scheduled?: Yes   Number of refills given: 0    Medication: omeprazole (PrilOSEC) 20 MG capsule  passed protocol.   Last office visit date: 11/18/2024  Next appointment scheduled?: Yes   Number of refills given: 0     Spiritual Care Partner Volunteer visited patient in PCU at [facility] on 6/27/19.   Documented by:  Rick Vargas